# Patient Record
Sex: MALE | Race: WHITE | NOT HISPANIC OR LATINO | Employment: OTHER | URBAN - METROPOLITAN AREA
[De-identification: names, ages, dates, MRNs, and addresses within clinical notes are randomized per-mention and may not be internally consistent; named-entity substitution may affect disease eponyms.]

---

## 2017-02-24 ENCOUNTER — GENERIC CONVERSION - ENCOUNTER (OUTPATIENT)
Dept: OTHER | Facility: OTHER | Age: 72
End: 2017-02-24

## 2017-03-08 ENCOUNTER — GENERIC CONVERSION - ENCOUNTER (OUTPATIENT)
Dept: OTHER | Facility: OTHER | Age: 72
End: 2017-03-08

## 2017-03-29 ENCOUNTER — LAB CONVERSION - ENCOUNTER (OUTPATIENT)
Dept: OTHER | Facility: OTHER | Age: 72
End: 2017-03-29

## 2017-03-29 LAB
CHOLEST SERPL-MCNC: 166 MG/DL (ref 125–200)
CHOLEST/HDLC SERPL: 2.2 (CALC)
HBA1C MFR BLD HPLC: 6.2 % OF TOTAL HGB
HDLC SERPL-MCNC: 75 MG/DL
LDL CHOLESTEROL (HISTORICAL): 67 MG/DL (CALC)
NON-HDL-CHOL (CHOL-HDL) (HISTORICAL): 91 MG/DL (CALC)
TRIGL SERPL-MCNC: 121 MG/DL
TSH SERPL DL<=0.05 MIU/L-ACNC: 1.41 MIU/L (ref 0.4–4.5)

## 2017-04-13 ENCOUNTER — GENERIC CONVERSION - ENCOUNTER (OUTPATIENT)
Dept: OTHER | Facility: OTHER | Age: 72
End: 2017-04-13

## 2017-04-18 ENCOUNTER — ALLSCRIPTS OFFICE VISIT (OUTPATIENT)
Dept: OTHER | Facility: OTHER | Age: 72
End: 2017-04-18

## 2017-05-24 ENCOUNTER — GENERIC CONVERSION - ENCOUNTER (OUTPATIENT)
Dept: OTHER | Facility: OTHER | Age: 72
End: 2017-05-24

## 2017-09-14 ENCOUNTER — GENERIC CONVERSION - ENCOUNTER (OUTPATIENT)
Dept: OTHER | Facility: OTHER | Age: 72
End: 2017-09-14

## 2017-09-20 ENCOUNTER — LAB CONVERSION - ENCOUNTER (OUTPATIENT)
Dept: OTHER | Facility: OTHER | Age: 72
End: 2017-09-20

## 2017-09-20 LAB
25(OH)D3 SERPL-MCNC: 16 NG/ML (ref 30–100)
A/G RATIO (HISTORICAL): 1.3 (CALC) (ref 1–2.5)
ALBUMIN SERPL BCP-MCNC: 3.8 G/DL (ref 3.6–5.1)
ALP SERPL-CCNC: 74 U/L (ref 40–115)
ALT SERPL W P-5'-P-CCNC: 16 U/L (ref 9–46)
AST SERPL W P-5'-P-CCNC: 13 U/L (ref 10–35)
BASOPHILS # BLD AUTO: 0.5 %
BASOPHILS # BLD AUTO: 29 CELLS/UL (ref 0–200)
BILIRUB SERPL-MCNC: 0.4 MG/DL (ref 0.2–1.2)
BUN SERPL-MCNC: 16 MG/DL (ref 7–25)
BUN/CREA RATIO (HISTORICAL): ABNORMAL (CALC) (ref 6–22)
CALCIUM SERPL-MCNC: 9.1 MG/DL (ref 8.6–10.3)
CHLORIDE SERPL-SCNC: 105 MMOL/L (ref 98–110)
CHOLEST SERPL-MCNC: 145 MG/DL
CHOLEST/HDLC SERPL: 2.2 (CALC)
CO2 SERPL-SCNC: 28 MMOL/L (ref 20–31)
CREAT SERPL-MCNC: 1.16 MG/DL (ref 0.7–1.18)
DEPRECATED RDW RBC AUTO: 11.9 % (ref 11–15)
EGFR AFRICAN AMERICAN (HISTORICAL): 72 ML/MIN/1.73M2
EGFR-AMERICAN CALC (HISTORICAL): 62 ML/MIN/1.73M2
EOSINOPHIL # BLD AUTO: 319 CELLS/UL (ref 15–500)
EOSINOPHIL # BLD AUTO: 5.5 %
GAMMA GLOBULIN (HISTORICAL): 2.9 G/DL (CALC) (ref 1.9–3.7)
GLUCOSE (HISTORICAL): 110 MG/DL (ref 65–99)
HBA1C MFR BLD HPLC: 5.9 % OF TOTAL HGB
HCT VFR BLD AUTO: 41.6 % (ref 38.5–50)
HDLC SERPL-MCNC: 67 MG/DL
HGB BLD-MCNC: 13.7 G/DL (ref 13.2–17.1)
LDL CHOLESTEROL (HISTORICAL): 57 MG/DL (CALC)
LYMPHOCYTES # BLD AUTO: 12.9 %
LYMPHOCYTES # BLD AUTO: 748 CELLS/UL (ref 850–3900)
MCH RBC QN AUTO: 33.3 PG (ref 27–33)
MCHC RBC AUTO-ENTMCNC: 32.9 G/DL (ref 32–36)
MCV RBC AUTO: 101 FL (ref 80–100)
MONOCYTES # BLD AUTO: 876 CELLS/UL (ref 200–950)
MONOCYTES (HISTORICAL): 15.1 %
NEUTROPHILS # BLD AUTO: 3828 CELLS/UL (ref 1500–7800)
NEUTROPHILS # BLD AUTO: 66 %
NON-HDL-CHOL (CHOL-HDL) (HISTORICAL): 78 MG/DL (CALC)
PLATELET # BLD AUTO: 254 THOUSAND/UL (ref 140–400)
PMV BLD AUTO: 9.4 FL (ref 7.5–12.5)
POTASSIUM SERPL-SCNC: 4.5 MMOL/L (ref 3.5–5.3)
RBC # BLD AUTO: 4.12 MILLION/UL (ref 4.2–5.8)
SODIUM SERPL-SCNC: 140 MMOL/L (ref 135–146)
TOTAL PROTEIN (HISTORICAL): 6.7 G/DL (ref 6.1–8.1)
TRIGL SERPL-MCNC: 119 MG/DL
TSH SERPL DL<=0.05 MIU/L-ACNC: 1 MIU/L (ref 0.4–4.5)
WBC # BLD AUTO: 5.8 THOUSAND/UL (ref 3.8–10.8)

## 2017-10-05 ENCOUNTER — ALLSCRIPTS OFFICE VISIT (OUTPATIENT)
Dept: OTHER | Facility: OTHER | Age: 72
End: 2017-10-05

## 2017-10-18 DIAGNOSIS — E78.5 HYPERLIPIDEMIA: ICD-10-CM

## 2017-10-18 DIAGNOSIS — I63.9 CEREBRAL INFARCTION (HCC): ICD-10-CM

## 2017-10-18 DIAGNOSIS — R73.9 HYPERGLYCEMIA: ICD-10-CM

## 2017-10-18 DIAGNOSIS — I10 ESSENTIAL (PRIMARY) HYPERTENSION: ICD-10-CM

## 2017-10-31 ENCOUNTER — GENERIC CONVERSION - ENCOUNTER (OUTPATIENT)
Dept: OTHER | Facility: OTHER | Age: 72
End: 2017-10-31

## 2018-01-11 NOTE — RESULT NOTES
Message   Recorded as Task   Date: 10/13/2016 09:57 AM, Created By: Rachael Jones   Task Name: Follow Up   Assigned To: Felisha Womack   Regarding Patient: Emily Harrison, Status: Active   CommentAlexia Pilling - 13 Oct 2016 9:57 AM     TASK CREATED  Patient saw Lina Watson at the beginning of the month  Labs reviewed  Looks good  HGB A1C is 6 1  Continue current regimen      Thank you,  Philly Winslow - 13 Oct 2016 4:57 PM     TASK EDITED                 [facility] informing pt of BW results and instructions     Signatures   Electronically signed by : Faisal Ybarra, ; Oct 13 2016  4:57PM EST                       (Author)

## 2018-01-13 VITALS
DIASTOLIC BLOOD PRESSURE: 78 MMHG | HEART RATE: 80 BPM | WEIGHT: 170.25 LBS | BODY MASS INDEX: 26.72 KG/M2 | HEIGHT: 67 IN | RESPIRATION RATE: 16 BRPM | SYSTOLIC BLOOD PRESSURE: 108 MMHG

## 2018-01-14 VITALS
SYSTOLIC BLOOD PRESSURE: 132 MMHG | BODY MASS INDEX: 24.98 KG/M2 | WEIGHT: 159.13 LBS | DIASTOLIC BLOOD PRESSURE: 74 MMHG | HEIGHT: 67 IN | HEART RATE: 76 BPM

## 2018-03-21 LAB
25(OH)D3 SERPL-MCNC: 61 NG/ML (ref 30–100)
ALBUMIN SERPL-MCNC: 4.1 G/DL (ref 3.6–5.1)
ALBUMIN/GLOB SERPL: 1.4 (CALC) (ref 1–2.5)
ALP SERPL-CCNC: 58 U/L (ref 40–115)
ALT SERPL-CCNC: 17 U/L (ref 9–46)
AST SERPL-CCNC: 14 U/L (ref 10–35)
BASOPHILS # BLD AUTO: 40 CELLS/UL (ref 0–200)
BASOPHILS NFR BLD AUTO: 0.6 %
BILIRUB SERPL-MCNC: 0.4 MG/DL (ref 0.2–1.2)
BUN SERPL-MCNC: 22 MG/DL (ref 7–25)
BUN/CREAT SERPL: ABNORMAL (CALC) (ref 6–22)
CALCIUM SERPL-MCNC: 9.4 MG/DL (ref 8.6–10.3)
CHLORIDE SERPL-SCNC: 104 MMOL/L (ref 98–110)
CHOLEST SERPL-MCNC: 153 MG/DL
CHOLEST/HDLC SERPL: 1.9 (CALC)
CO2 SERPL-SCNC: 29 MMOL/L (ref 20–31)
CREAT SERPL-MCNC: 1.14 MG/DL (ref 0.7–1.18)
EOSINOPHIL # BLD AUTO: 475 CELLS/UL (ref 15–500)
EOSINOPHIL NFR BLD AUTO: 7.2 %
ERYTHROCYTE [DISTWIDTH] IN BLOOD BY AUTOMATED COUNT: 11.8 % (ref 11–15)
GLOBULIN SER CALC-MCNC: 2.9 G/DL (CALC) (ref 1.9–3.7)
GLUCOSE SERPL-MCNC: 105 MG/DL (ref 65–99)
HBA1C MFR BLD: 5.7 % OF TOTAL HGB
HCT VFR BLD AUTO: 40.7 % (ref 38.5–50)
HDLC SERPL-MCNC: 81 MG/DL
HGB BLD-MCNC: 13.8 G/DL (ref 13.2–17.1)
LDLC SERPL CALC-MCNC: 55 MG/DL (CALC)
LYMPHOCYTES # BLD AUTO: 957 CELLS/UL (ref 850–3900)
LYMPHOCYTES NFR BLD AUTO: 14.5 %
MCH RBC QN AUTO: 34.1 PG (ref 27–33)
MCHC RBC AUTO-ENTMCNC: 33.9 G/DL (ref 32–36)
MCV RBC AUTO: 100.5 FL (ref 80–100)
MONOCYTES # BLD AUTO: 845 CELLS/UL (ref 200–950)
MONOCYTES NFR BLD AUTO: 12.8 %
NEUTROPHILS # BLD AUTO: 4283 CELLS/UL (ref 1500–7800)
NEUTROPHILS NFR BLD AUTO: 64.9 %
NONHDLC SERPL-MCNC: 72 MG/DL (CALC)
PLATELET # BLD AUTO: 218 THOUSAND/UL (ref 140–400)
PMV BLD REES-ECKER: 9.6 FL (ref 7.5–12.5)
POTASSIUM SERPL-SCNC: 4.6 MMOL/L (ref 3.5–5.3)
PROT SERPL-MCNC: 7 G/DL (ref 6.1–8.1)
PSA SERPL-MCNC: 2.1 NG/ML
RBC # BLD AUTO: 4.05 MILLION/UL (ref 4.2–5.8)
SL AMB EGFR AFRICAN AMERICAN: 74 ML/MIN/1.73M2
SL AMB EGFR NON AFRICAN AMERICAN: 63 ML/MIN/1.73M2
SODIUM SERPL-SCNC: 141 MMOL/L (ref 135–146)
TRIGL SERPL-MCNC: 87 MG/DL
TSH SERPL-ACNC: 1.15 MIU/L (ref 0.4–4.5)
WBC # BLD AUTO: 6.6 THOUSAND/UL (ref 3.8–10.8)

## 2018-04-05 DIAGNOSIS — I63.9 CEREBRAL INFARCTION (HCC): ICD-10-CM

## 2018-04-05 DIAGNOSIS — E55.9 VITAMIN D DEFICIENCY: ICD-10-CM

## 2018-04-05 DIAGNOSIS — Z12.5 ENCOUNTER FOR SCREENING FOR MALIGNANT NEOPLASM OF PROSTATE: ICD-10-CM

## 2018-04-05 DIAGNOSIS — I10 ESSENTIAL (PRIMARY) HYPERTENSION: ICD-10-CM

## 2018-04-05 DIAGNOSIS — R73.9 HYPERGLYCEMIA: ICD-10-CM

## 2018-04-05 DIAGNOSIS — E78.5 HYPERLIPIDEMIA: ICD-10-CM

## 2018-04-16 PROBLEM — E55.9 VITAMIN D DEFICIENCY: Status: ACTIVE | Noted: 2017-10-05

## 2018-04-20 ENCOUNTER — OFFICE VISIT (OUTPATIENT)
Dept: FAMILY MEDICINE CLINIC | Facility: CLINIC | Age: 73
End: 2018-04-20
Payer: COMMERCIAL

## 2018-04-20 VITALS
HEART RATE: 80 BPM | SYSTOLIC BLOOD PRESSURE: 120 MMHG | BODY MASS INDEX: 26.26 KG/M2 | WEIGHT: 165.2 LBS | DIASTOLIC BLOOD PRESSURE: 70 MMHG

## 2018-04-20 DIAGNOSIS — I65.29 STENOSIS OF CAROTID ARTERY, UNSPECIFIED LATERALITY: ICD-10-CM

## 2018-04-20 DIAGNOSIS — E55.9 VITAMIN D DEFICIENCY: ICD-10-CM

## 2018-04-20 DIAGNOSIS — Z12.5 SCREENING FOR PROSTATE CANCER: ICD-10-CM

## 2018-04-20 DIAGNOSIS — E78.2 MIXED HYPERLIPIDEMIA: Primary | ICD-10-CM

## 2018-04-20 DIAGNOSIS — I63.9 CEREBRAL INFARCTION, UNSPECIFIED MECHANISM (HCC): ICD-10-CM

## 2018-04-20 DIAGNOSIS — R73.9 HYPERGLYCEMIA: ICD-10-CM

## 2018-04-20 DIAGNOSIS — I10 ESSENTIAL HYPERTENSION: ICD-10-CM

## 2018-04-20 PROCEDURE — 99214 OFFICE O/P EST MOD 30 MIN: CPT | Performed by: PHYSICIAN ASSISTANT

## 2018-04-20 PROCEDURE — 3725F SCREEN DEPRESSION PERFORMED: CPT | Performed by: PHYSICIAN ASSISTANT

## 2018-04-20 PROCEDURE — 1101F PT FALLS ASSESS-DOCD LE1/YR: CPT | Performed by: PHYSICIAN ASSISTANT

## 2018-04-20 RX ORDER — ASPIRIN 325 MG
325 TABLET ORAL DAILY
COMMUNITY
End: 2021-12-31 | Stop reason: HOSPADM

## 2018-04-20 RX ORDER — ATORVASTATIN CALCIUM 40 MG/1
TABLET, FILM COATED ORAL
Refills: 0 | COMMUNITY
Start: 2018-02-21 | End: 2018-11-20 | Stop reason: SDUPTHER

## 2018-04-20 RX ORDER — FOLIC ACID 1 MG/1
TABLET ORAL DAILY
COMMUNITY

## 2018-04-20 RX ORDER — MULTIVIT WITH MINERALS/LUTEIN
1000 TABLET ORAL DAILY
COMMUNITY

## 2018-04-20 RX ORDER — CHLORAL HYDRATE 500 MG
1000 CAPSULE ORAL DAILY
COMMUNITY
End: 2022-06-02 | Stop reason: ALTCHOICE

## 2018-04-20 RX ORDER — AMLODIPINE BESYLATE 5 MG/1
1 TABLET ORAL DAILY
COMMUNITY
Start: 2011-11-28 | End: 2018-10-17 | Stop reason: SDUPTHER

## 2018-04-20 RX ORDER — DIPHENOXYLATE HYDROCHLORIDE AND ATROPINE SULFATE 2.5; .025 MG/1; MG/1
1 TABLET ORAL DAILY
COMMUNITY
End: 2022-06-02 | Stop reason: ALTCHOICE

## 2018-04-20 RX ORDER — BENAZEPRIL HYDROCHLORIDE 40 MG/1
TABLET, FILM COATED ORAL
Refills: 0 | COMMUNITY
Start: 2018-01-22 | End: 2018-10-17 | Stop reason: SDUPTHER

## 2018-04-20 RX ORDER — METOPROLOL SUCCINATE 100 MG/1
1 TABLET, EXTENDED RELEASE ORAL DAILY
COMMUNITY
Start: 2011-11-28 | End: 2018-10-17 | Stop reason: SDUPTHER

## 2018-04-20 RX ORDER — UBIDECARENONE 75 MG
CAPSULE ORAL DAILY
COMMUNITY

## 2018-04-20 RX ORDER — MAG HYDROX/ALUMINUM HYD/SIMETH 400-400-40
1000 SUSPENSION, ORAL (FINAL DOSE FORM) ORAL DAILY
COMMUNITY

## 2018-04-20 NOTE — PROGRESS NOTES
Assessment/Plan:  Patient Instructions   1  CVA-patient is presently stable on aspirin with blood pressure control and statin therapy  Repeat carotid ultrasound will be due in 6 months  2   Hypertension-stable on metoprolol, amlodipine, and benazepril  3   Hyperlipidemia-stable on Lipitor 40 milligrams daily  Cholesterol numbers were reviewed in detail with the patient  He continues to exercise 3 times per week  4   Vitamin-D deficiency-stable with vitamin-D supplementation  Level was 61  No problem-specific Assessment & Plan notes found for this encounter  Diagnoses and all orders for this visit:    Mixed hyperlipidemia  -     CBC and differential; Future  -     Comprehensive metabolic panel; Future  -     HEMOGLOBIN A1C W/ EAG ESTIMATION; Future  -     Lipid Panel with Direct LDL reflex; Future  -     TSH, 3rd generation; Future  -     Vitamin D 25 hydroxy; Future  -     Cancel: PSA, Total Screen; Future    Hyperglycemia  -     CBC and differential; Future  -     Comprehensive metabolic panel; Future  -     HEMOGLOBIN A1C W/ EAG ESTIMATION; Future  -     Lipid Panel with Direct LDL reflex; Future  -     TSH, 3rd generation; Future  -     Vitamin D 25 hydroxy; Future  -     Cancel: PSA, Total Screen; Future    Cerebral infarction, unspecified mechanism (HCC)  -     CBC and differential; Future  -     Comprehensive metabolic panel; Future  -     HEMOGLOBIN A1C W/ EAG ESTIMATION; Future  -     Lipid Panel with Direct LDL reflex; Future  -     TSH, 3rd generation; Future  -     Vitamin D 25 hydroxy; Future  -     Cancel: PSA, Total Screen; Future  -     VAS carotid complete study; Future    Vitamin D deficiency  -     CBC and differential; Future  -     Comprehensive metabolic panel; Future  -     HEMOGLOBIN A1C W/ EAG ESTIMATION; Future  -     Lipid Panel with Direct LDL reflex; Future  -     TSH, 3rd generation; Future  -     Vitamin D 25 hydroxy; Future  -     Cancel: PSA, Total Screen;  Future  - Vitamin D 25 hydroxy    Essential hypertension  -     CBC and differential; Future  -     Comprehensive metabolic panel; Future  -     HEMOGLOBIN A1C W/ EAG ESTIMATION; Future  -     Lipid Panel with Direct LDL reflex; Future  -     TSH, 3rd generation; Future  -     Vitamin D 25 hydroxy; Future  -     Cancel: PSA, Total Screen; Future    Stenosis of carotid artery, unspecified laterality  -     CBC and differential; Future  -     Comprehensive metabolic panel; Future  -     HEMOGLOBIN A1C W/ EAG ESTIMATION; Future  -     Lipid Panel with Direct LDL reflex; Future  -     TSH, 3rd generation; Future  -     Vitamin D 25 hydroxy; Future  -     Cancel: PSA, Total Screen; Future    Screening for prostate cancer  -     PSA, Total Screen    Other orders  -     aspirin 325 mg tablet; Take 325 mg by mouth daily  -     Omega-3 Fatty Acids (FISH OIL) 1,000 mg; Take 1,000 mg by mouth daily  -     folic acid (FOLVITE) 1 mg tablet; Take by mouth daily  -     multivitamin (THERAGRAN) TABS; Take 1 tablet by mouth daily  -     cyanocobalamin (VITAMIN B-12) 100 mcg tablet; Take by mouth daily  -     Ascorbic Acid (VITAMIN C) 1000 MG tablet; Take 1,000 mg by mouth daily  -     vitamin E 100 UNIT capsule; Take 100 Units by mouth daily  -     Cholecalciferol (VITAMIN D3) 5000 units CAPS; Take 1,000 Units by mouth daily          Subjective:     CC: Follow up to review blood work  Carmelia Fleischer     Patient ID: Ana Parks is a 75 y o  male  HPI:  This is a 71-year-old gentleman that presents to the office for follow-up of chronic health conditions  He has had a history of CVA with some mild expressive dysphagia residually  He had followed with Neurology for quite some time  He also continues to get periodic carotid ultrasounds and is due in the next 6 months to get a repeat  He does have a history of hypertension which has now been controlled with benazepril, amlodipine, and metoprolol    His cholesterol has been stable on statin therapy  He has been going to the gym and exercising regularly 3 times per week  He denies any problems with chest pains or shortness of breath  He has not had any bowel or bladder issues and denies lightheadedness or dizziness  The following portions of the patient's history were reviewed and updated as appropriate: allergies, current medications, past family history, past medical history, past social history, past surgical history and problem list     Review of Systems   Constitutional: Negative for chills, fatigue and fever  HENT: Negative for congestion, ear pain and sinus pressure  Eyes: Negative for visual disturbance  Respiratory: Negative for cough, chest tightness and shortness of breath  Cardiovascular: Negative for chest pain and palpitations  Gastrointestinal: Negative for diarrhea, nausea and vomiting  Endocrine: Negative for polyuria  Genitourinary: Negative for dysuria and frequency  Musculoskeletal: Negative for arthralgias and myalgias  Skin: Negative for pallor and rash  Neurological: Negative for dizziness, weakness, light-headedness, numbness and headaches  Psychiatric/Behavioral: Negative for agitation, behavioral problems and sleep disturbance  All other systems reviewed and are negative  Objective:      Vitals:    04/20/18 1436   BP: 120/70   BP Location: Left arm   Patient Position: Sitting   Pulse: 80   Weight: 74 9 kg (165 lb 3 2 oz)            Physical Exam   Constitutional: He is oriented to person, place, and time  He appears well-developed and well-nourished  No distress  HENT:   Head: Normocephalic and atraumatic  Right Ear: External ear normal    Left Ear: External ear normal    Nose: Nose normal    Mouth/Throat: Oropharynx is clear and moist  No oropharyngeal exudate  Eyes: Conjunctivae and EOM are normal  Pupils are equal, round, and reactive to light  Neck: Normal range of motion  Neck supple  No tracheal deviation present   No thyromegaly present  Cardiovascular: Normal rate, regular rhythm and normal heart sounds  Exam reveals no friction rub  No murmur heard  Pulmonary/Chest: Effort normal and breath sounds normal  No respiratory distress  He has no wheezes  He has no rales  Abdominal: Soft  Bowel sounds are normal  He exhibits no distension  There is no tenderness  There is no rebound and no guarding  Musculoskeletal: Normal range of motion  He exhibits no edema or tenderness  Lymphadenopathy:     He has no cervical adenopathy  Neurological: He is alert and oriented to person, place, and time  No cranial nerve deficit  Coordination normal    Skin: Skin is warm and dry  No rash noted  No erythema  Psychiatric: He has a normal mood and affect  His behavior is normal  Thought content normal    Nursing note and vitals reviewed

## 2018-04-20 NOTE — PATIENT INSTRUCTIONS
1  CVA-patient is presently stable on aspirin with blood pressure control and statin therapy  Repeat carotid ultrasound will be due in 6 months  2   Hypertension-stable on metoprolol, amlodipine, and benazepril  3   Hyperlipidemia-stable on Lipitor 40 milligrams daily  Cholesterol numbers were reviewed in detail with the patient  He continues to exercise 3 times per week  4   Vitamin-D deficiency-stable with vitamin-D supplementation  Level was 61

## 2018-10-17 DIAGNOSIS — I10 BENIGN ESSENTIAL HYPERTENSION: Primary | ICD-10-CM

## 2018-10-17 LAB
25(OH)D3 SERPL-MCNC: 55 NG/ML (ref 30–100)
ALBUMIN SERPL-MCNC: 4 G/DL (ref 3.6–5.1)
ALBUMIN/GLOB SERPL: 1.4 (CALC) (ref 1–2.5)
ALP SERPL-CCNC: 62 U/L (ref 40–115)
ALT SERPL-CCNC: 20 U/L (ref 9–46)
AST SERPL-CCNC: 17 U/L (ref 10–35)
BASOPHILS # BLD AUTO: 43 CELLS/UL (ref 0–200)
BASOPHILS NFR BLD AUTO: 0.7 %
BILIRUB SERPL-MCNC: 0.4 MG/DL (ref 0.2–1.2)
BUN SERPL-MCNC: 19 MG/DL (ref 7–25)
BUN/CREAT SERPL: ABNORMAL (CALC) (ref 6–22)
CALCIUM SERPL-MCNC: 9.4 MG/DL (ref 8.6–10.3)
CHLORIDE SERPL-SCNC: 107 MMOL/L (ref 98–110)
CHOLEST SERPL-MCNC: 147 MG/DL
CHOLEST/HDLC SERPL: 1.8 (CALC)
CO2 SERPL-SCNC: 28 MMOL/L (ref 20–32)
CREAT SERPL-MCNC: 1.1 MG/DL (ref 0.7–1.18)
EOSINOPHIL # BLD AUTO: 470 CELLS/UL (ref 15–500)
EOSINOPHIL NFR BLD AUTO: 7.7 %
ERYTHROCYTE [DISTWIDTH] IN BLOOD BY AUTOMATED COUNT: 11.8 % (ref 11–15)
EST. AVERAGE GLUCOSE BLD GHB EST-MCNC: 117 (CALC)
EST. AVERAGE GLUCOSE BLD GHB EST-SCNC: 6.5 (CALC)
GLOBULIN SER CALC-MCNC: 2.8 G/DL (CALC) (ref 1.9–3.7)
GLUCOSE SERPL-MCNC: 102 MG/DL (ref 65–99)
HBA1C MFR BLD: 5.7 % OF TOTAL HGB
HCT VFR BLD AUTO: 41.6 % (ref 38.5–50)
HDLC SERPL-MCNC: 82 MG/DL
HGB BLD-MCNC: 13.8 G/DL (ref 13.2–17.1)
LDLC SERPL CALC-MCNC: 51 MG/DL (CALC)
LYMPHOCYTES # BLD AUTO: 903 CELLS/UL (ref 850–3900)
LYMPHOCYTES NFR BLD AUTO: 14.8 %
MCH RBC QN AUTO: 33.7 PG (ref 27–33)
MCHC RBC AUTO-ENTMCNC: 33.2 G/DL (ref 32–36)
MCV RBC AUTO: 101.5 FL (ref 80–100)
MONOCYTES # BLD AUTO: 769 CELLS/UL (ref 200–950)
MONOCYTES NFR BLD AUTO: 12.6 %
NEUTROPHILS # BLD AUTO: 3916 CELLS/UL (ref 1500–7800)
NEUTROPHILS NFR BLD AUTO: 64.2 %
NONHDLC SERPL-MCNC: 65 MG/DL (CALC)
PLATELET # BLD AUTO: 215 THOUSAND/UL (ref 140–400)
PMV BLD REES-ECKER: 9.8 FL (ref 7.5–12.5)
POTASSIUM SERPL-SCNC: 4.8 MMOL/L (ref 3.5–5.3)
PROT SERPL-MCNC: 6.8 G/DL (ref 6.1–8.1)
PSA SERPL-MCNC: 2.3 NG/ML
RBC # BLD AUTO: 4.1 MILLION/UL (ref 4.2–5.8)
SL AMB EGFR AFRICAN AMERICAN: 76 ML/MIN/1.73M2
SL AMB EGFR NON AFRICAN AMERICAN: 66 ML/MIN/1.73M2
SODIUM SERPL-SCNC: 142 MMOL/L (ref 135–146)
TRIGL SERPL-MCNC: 60 MG/DL
TSH SERPL-ACNC: 1.3 MIU/L (ref 0.4–4.5)
WBC # BLD AUTO: 6.1 THOUSAND/UL (ref 3.8–10.8)

## 2018-10-17 RX ORDER — METOPROLOL SUCCINATE 100 MG/1
TABLET, EXTENDED RELEASE ORAL
Qty: 90 TABLET | Refills: 3 | Status: SHIPPED | OUTPATIENT
Start: 2018-10-17 | End: 2019-10-15 | Stop reason: SDUPTHER

## 2018-10-17 RX ORDER — AMLODIPINE BESYLATE 5 MG/1
TABLET ORAL
Qty: 90 TABLET | Refills: 3 | Status: SHIPPED | OUTPATIENT
Start: 2018-10-17 | End: 2019-10-15 | Stop reason: SDUPTHER

## 2018-10-17 RX ORDER — BENAZEPRIL HYDROCHLORIDE 40 MG/1
TABLET, FILM COATED ORAL
Qty: 90 TABLET | Refills: 3 | Status: SHIPPED | OUTPATIENT
Start: 2018-10-17 | End: 2019-10-15 | Stop reason: SDUPTHER

## 2018-10-22 ENCOUNTER — OFFICE VISIT (OUTPATIENT)
Dept: FAMILY MEDICINE CLINIC | Facility: CLINIC | Age: 73
End: 2018-10-22
Payer: COMMERCIAL

## 2018-10-22 VITALS
HEART RATE: 72 BPM | DIASTOLIC BLOOD PRESSURE: 80 MMHG | BODY MASS INDEX: 25.27 KG/M2 | WEIGHT: 161 LBS | HEIGHT: 67 IN | SYSTOLIC BLOOD PRESSURE: 130 MMHG

## 2018-10-22 DIAGNOSIS — I10 ESSENTIAL HYPERTENSION: ICD-10-CM

## 2018-10-22 DIAGNOSIS — E55.9 VITAMIN D DEFICIENCY: ICD-10-CM

## 2018-10-22 DIAGNOSIS — J06.9 VIRAL UPPER RESPIRATORY TRACT INFECTION: ICD-10-CM

## 2018-10-22 DIAGNOSIS — R73.9 HYPERGLYCEMIA: ICD-10-CM

## 2018-10-22 DIAGNOSIS — I63.9 CEREBRAL INFARCTION, UNSPECIFIED MECHANISM (HCC): ICD-10-CM

## 2018-10-22 DIAGNOSIS — E78.2 MIXED HYPERLIPIDEMIA: Primary | ICD-10-CM

## 2018-10-22 PROCEDURE — 1170F FXNL STATUS ASSESSED: CPT | Performed by: PHYSICIAN ASSISTANT

## 2018-10-22 PROCEDURE — 99214 OFFICE O/P EST MOD 30 MIN: CPT | Performed by: PHYSICIAN ASSISTANT

## 2018-10-22 PROCEDURE — 1125F AMNT PAIN NOTED PAIN PRSNT: CPT | Performed by: PHYSICIAN ASSISTANT

## 2018-10-22 PROCEDURE — G0439 PPPS, SUBSEQ VISIT: HCPCS | Performed by: PHYSICIAN ASSISTANT

## 2018-10-22 PROCEDURE — 3008F BODY MASS INDEX DOCD: CPT | Performed by: PHYSICIAN ASSISTANT

## 2018-10-22 PROCEDURE — 1036F TOBACCO NON-USER: CPT | Performed by: PHYSICIAN ASSISTANT

## 2018-10-22 PROCEDURE — 1160F RVW MEDS BY RX/DR IN RCRD: CPT | Performed by: PHYSICIAN ASSISTANT

## 2018-10-22 NOTE — PROGRESS NOTES
Assessment/Plan:  Patient Instructions   1  Hyperlipidemia-presently stable with atorvastatin 40 mg daily  Cholesterol numbers were reviewed in detail with the patient  2   Benign essential hypertension-presently stable on benazepril, amlodipine, and metoprolol  3   Hyperglycemia-stable with hemoglobin A1c of 5 7  Continue to monitor periodically  4   Vitamin-D deficiency-stable at 55   5   History of cerebral infarction-presently stable, no medication changes  Continue aspirin 325 mg daily  6   Upper respiratory infection-this is likely viral and patient reports significant improvement  If symptoms persist or worsen later this week would consider antibiotic therapy  Patient verbalizes understanding and agreement with plan  7   Health maintenance-Medicare wellness visit filled out today  No problem-specific Assessment & Plan notes found for this encounter  Diagnoses and all orders for this visit:    Mixed hyperlipidemia  -     CBC and differential; Future  -     Comprehensive metabolic panel; Future  -     Hemoglobin A1C; Future  -     Lipid Panel with Direct LDL reflex; Future  -     TSH, 3rd generation; Future  -     Vitamin D 25 hydroxy; Future    Essential hypertension  -     CBC and differential; Future  -     Comprehensive metabolic panel; Future  -     Hemoglobin A1C; Future  -     Lipid Panel with Direct LDL reflex; Future  -     TSH, 3rd generation; Future  -     Vitamin D 25 hydroxy; Future    Cerebral infarction, unspecified mechanism (HCC)  -     CBC and differential; Future  -     Comprehensive metabolic panel; Future  -     Hemoglobin A1C; Future  -     Lipid Panel with Direct LDL reflex; Future  -     TSH, 3rd generation; Future  -     Vitamin D 25 hydroxy; Future    Vitamin D deficiency  -     CBC and differential; Future  -     Comprehensive metabolic panel; Future  -     Hemoglobin A1C; Future  -     Lipid Panel with Direct LDL reflex;  Future  -     TSH, 3rd generation; Future  -     Vitamin D 25 hydroxy; Future    Hyperglycemia  -     CBC and differential; Future  -     Comprehensive metabolic panel; Future  -     Hemoglobin A1C; Future  -     Lipid Panel with Direct LDL reflex; Future  -     TSH, 3rd generation; Future  -     Vitamin D 25 hydroxy; Future    Viral upper respiratory tract infection          Subjective: pt is here for follow up of hyperlipidemia and to review blood work~cd     Patient ID: Yang Hartmann is a 75 y o  male  HPI:  This is a 65-year-old gentleman that presents to the office for follow-up of chronic health conditions including hypertension, increased lipids, and history of CVA  He has been feeling well without any acute complaints  He does mention that he is recently getting over a cold however but this seems to be improving on its own and is much better than it has been  He is not having any fevers or chills  Patient does continue control of his lipids with atorvastatin  His blood pressure has been well controlled with benazepril, metoprolol, and amlodipine  Patient is also here for annual Medicare wellness visit  The following portions of the patient's history were reviewed and updated as appropriate: allergies, current medications, past family history, past medical history, past social history, past surgical history and problem list     Review of Systems   Constitutional: Negative for chills, fatigue and fever  HENT: Negative for congestion, ear pain and sinus pressure  Eyes: Negative for visual disturbance  Respiratory: Negative for cough, chest tightness and shortness of breath  Cardiovascular: Negative for chest pain and palpitations  Gastrointestinal: Negative for diarrhea, nausea and vomiting  Endocrine: Negative for polyuria  Genitourinary: Negative for dysuria and frequency  Musculoskeletal: Negative for arthralgias and myalgias  Skin: Negative for pallor and rash     Neurological: Negative for dizziness, weakness, light-headedness, numbness and headaches  Psychiatric/Behavioral: Negative for agitation, behavioral problems and sleep disturbance  All other systems reviewed and are negative  Objective:  Vitals:    10/22/18 1323   BP: 130/80   BP Location: Left arm   Patient Position: Sitting   Cuff Size: Large   Pulse: 72   Weight: 73 kg (161 lb)   Height: 5' 7" (1 702 m)                Physical Exam   Constitutional: He is oriented to person, place, and time  He appears well-developed and well-nourished  No distress  HENT:   Head: Normocephalic and atraumatic  Right Ear: External ear normal    Left Ear: External ear normal    Nose: Nose normal    Mouth/Throat: Oropharynx is clear and moist  No oropharyngeal exudate  Eyes: Pupils are equal, round, and reactive to light  Conjunctivae and EOM are normal    Neck: Normal range of motion  Neck supple  No tracheal deviation present  No thyromegaly present  Cardiovascular: Normal rate, regular rhythm and normal heart sounds  Exam reveals no friction rub  No murmur heard  Pulmonary/Chest: Effort normal and breath sounds normal  No respiratory distress  He has no wheezes  He has no rales  Abdominal: Soft  Bowel sounds are normal  He exhibits no distension  There is no tenderness  There is no rebound and no guarding  Musculoskeletal: Normal range of motion  He exhibits no edema or tenderness  Lymphadenopathy:     He has no cervical adenopathy  Neurological: He is alert and oriented to person, place, and time  No cranial nerve deficit  Coordination normal    Skin: Skin is warm and dry  No rash noted  No erythema  Psychiatric: He has a normal mood and affect  His behavior is normal  Thought content normal    Nursing note and vitals reviewed

## 2018-10-22 NOTE — PATIENT INSTRUCTIONS
1   Hyperlipidemia-presently stable with atorvastatin 40 mg daily  Cholesterol numbers were reviewed in detail with the patient  2   Benign essential hypertension-presently stable on benazepril, amlodipine, and metoprolol  3   Hyperglycemia-stable with hemoglobin A1c of 5 7  Continue to monitor periodically  4   Vitamin-D deficiency-stable at 55   5   History of cerebral infarction-presently stable, no medication changes  Continue aspirin 325 mg daily  6   Upper respiratory infection-this is likely viral and patient reports significant improvement  If symptoms persist or worsen later this week would consider antibiotic therapy  Patient verbalizes understanding and agreement with plan  7   Health maintenance-Medicare wellness visit filled out today

## 2018-10-22 NOTE — PROGRESS NOTES
Assessment and Plan:    Problem List Items Addressed This Visit     Cerebral infarction [facility])    Relevant Orders    CBC and differential    Comprehensive metabolic panel    Hemoglobin A1C    Lipid Panel with Direct LDL reflex    TSH, 3rd generation    Vitamin D 25 hydroxy    Hyperlipidemia - Primary    Relevant Orders    CBC and differential    Comprehensive metabolic panel    Hemoglobin A1C    Lipid Panel with Direct LDL reflex    TSH, 3rd generation    Vitamin D 25 hydroxy    Hypertension    Relevant Orders    CBC and differential    Comprehensive metabolic panel    Hemoglobin A1C    Lipid Panel with Direct LDL reflex    TSH, 3rd generation    Vitamin D 25 hydroxy    Vitamin D deficiency    Relevant Orders    CBC and differential    Comprehensive metabolic panel    Hemoglobin A1C    Lipid Panel with Direct LDL reflex    TSH, 3rd generation    Vitamin D 25 hydroxy    Hyperglycemia    Relevant Orders    CBC and differential    Comprehensive metabolic panel    Hemoglobin A1C    Lipid Panel with Direct LDL reflex    TSH, 3rd generation    Vitamin D 25 hydroxy      Other Visit Diagnoses     Viral upper respiratory tract infection            Health Maintenance Due   Topic Date Due    [employer] PLAN OF CARE  1945         HPI:  Nithin Calvert is a 75 y o  male here for his Subsequent Wellness Visit  Patient Active Problem List   Diagnosis    Stenosis of carotid artery    Cerebral infarction ([address] )    Expressive aphasia    Hyperlipidemia    Hypertension    Vitamin D deficiency    Macular degeneration    Hyperglycemia    Actinic keratosis     No past medical history on file    Past Surgical History:   Procedure Laterality Date    HERNIA REPAIR       Family History   Problem Relation Age of Onset    Lung cancer Father      History   Smoking Status    Former Smoker   Smokeless Tobacco    Former User     Comment: No secondhand smoke exposure     History   Alcohol Use    Yes     Comment: Social drinker; Daily alcohol use per [employer]      History   Drug use: Unknown       Current Outpatient Prescriptions   Medication Sig Dispense Refill    amLODIPine (NORVASC) 5 mg tablet take 1 tablet by mouth once daily 90 tablet 3    Ascorbic Acid (VITAMIN C) 1000 MG tablet Take 1,000 mg by mouth daily      aspirin 325 mg tablet Take 325 mg by mouth daily      atorvastatin (LIPITOR) 40 mg tablet   0    benazepril (LOTENSIN) 40 MG tablet take 1 tablet by mouth once daily 90 tablet 3    Cholecalciferol (VITAMIN D3) 5000 units CAPS Take 1,000 Units by mouth daily      cyanocobalamin (VITAMIN B-12) 100 mcg tablet Take by mouth daily      folic acid (FOLVITE) 1 mg tablet Take by mouth daily      metoprolol succinate (TOPROL-XL) 100 mg 24 hr tablet take 1 tablet by mouth once daily 90 tablet 3    multivitamin (THERAGRAN) TABS Take 1 tablet by mouth daily      Omega-3 Fatty Acids (FISH OIL) 1,000 mg Take 1,000 mg by mouth daily      vitamin E 100 UNIT capsule Take 100 Units by mouth daily       No current facility-administered medications for this visit  No Known Allergies  There is no immunization history for the selected administration types on file for this patient  Patient Care Team:  Sherly Summers PA-C as PCP - General (Family Medicine)    Medicare Screening Tests and Risk Assessments:  Harhs Jimenez is here for his Subsequent Wellness visit  Last Medicare Wellness visit information reviewed, patient interviewed and updates made to the record today  Health Risk Assessment:  Patient rates overall health as excellent  Patient feels that their physical health rating is Same  Eyesight was rated as Same  Hearing was rated as Same  Patient feels that their emotional and mental health rating is Same  Pain experienced by patient in the last 7 days has been None  Patient states that he has experienced no weight loss or gain in last 6 months  Emotional/Mental Health:  Patient has been feeling nervous/anxious      PHQ-9 Depression Screening:    Frequency of the following problems over the past two weeks:      1  Little interest or pleasure in doing things: 0 - not at all      2  Feeling down, depressed, or hopeless: 0 - not at all  PHQ-2 Score: 0          Broken Bones/Falls: Fall Risk Assessment:    In the past year, patient has experienced: No history of falling in past year          Bladder/Bowel:  Patient has not leaked urine accidently in the last six months  Patient reports no loss of bowel control  Immunizations:  Patient has not had a flu vaccination within the last year  Patient has not received a pneumonia shot  Patient has not received a shingles shot  Patient has not received tetanus/diphtheria shot  Home Safety:  Patient does not have trouble with stairs inside or outside of their home  Patient currently reports that there are no safety hazards present in home, working smoke alarms, working carbon monoxide detectors  Preventative Screenings:   prostate cancer screen performed, no colon cancer screen completed, cholesterol screen completed, glaucoma eye exam completed,     Nutrition:  Current diet: Regular and Limited junk food with servings of the following:    Medications:  Patient is currently taking over-the-counter supplements  Patient is able to manage medications  Lifestyle Choices:  Patient reports no tobacco use  Patient has smoked or used tobacco in the past   Patient has stopped his tobacco use  Patient reports alcohol use  Alcohol use per week: 10  Patient drives a vehicle  Patient wears seat belt  Current level of exercise of physical activity described by patient as: goes to the gym 3x a week          Activities of Daily Living:  Can get out of bed by his or her self, able to dress self, able to make own meals, able to do own shopping, able to bathe self, can do own laundry/housekeeping, can manage own money, pay bills and track expenses    Previous Hospitalizations:  No hospitalization or ED visit in past 12 months    Social Support: pts friend tim    Preventative Screening/Counseling:      Cardiovascular:      General: Risks and Benefits Discussed          Diabetes:      General: Screening Current          Colorectal Cancer:      General: Screening Current          Prostate Cancer:      General: Screening Current          Osteoporosis:      General: Screening Current          AAA:      General: Screening Current          Glaucoma:      General: Screening Current          HIV:      General: Screening Not Indicated          Hepatitis C:      General: Screening Not Indicated        Advanced Directives:   Patient has living will for healthcare, has durable POA for healthcare, patient has an advanced directive  Information on ACP and/or AD provided  No 5 wishes given  End of life assessment reviewed with patient  Provider agrees with end of life decisions

## 2018-10-26 ENCOUNTER — HOSPITAL ENCOUNTER (OUTPATIENT)
Dept: NON INVASIVE DIAGNOSTICS | Facility: CLINIC | Age: 73
Discharge: HOME | End: 2018-10-26
Payer: COMMERCIAL

## 2018-10-26 DIAGNOSIS — I63.9 CEREBRAL INFARCTION, UNSPECIFIED MECHANISM (HCC): ICD-10-CM

## 2018-10-26 PROCEDURE — 93880 EXTRACRANIAL BILAT STUDY: CPT | Performed by: SURGERY

## 2018-10-26 PROCEDURE — 93880 EXTRACRANIAL BILAT STUDY: CPT

## 2018-11-20 DIAGNOSIS — E78.2 MIXED HYPERLIPIDEMIA: Primary | ICD-10-CM

## 2018-11-20 RX ORDER — ATORVASTATIN CALCIUM 40 MG/1
TABLET, FILM COATED ORAL
Qty: 90 TABLET | Refills: 3 | Status: SHIPPED | OUTPATIENT
Start: 2018-11-20 | End: 2019-11-12 | Stop reason: SDUPTHER

## 2019-04-03 LAB
25(OH)D3 SERPL-MCNC: 51 NG/ML (ref 30–100)
ALBUMIN SERPL-MCNC: 4.1 G/DL (ref 3.6–5.1)
ALBUMIN/GLOB SERPL: 1.4 (CALC) (ref 1–2.5)
ALP SERPL-CCNC: 60 U/L (ref 40–115)
ALT SERPL-CCNC: 16 U/L (ref 9–46)
AST SERPL-CCNC: 14 U/L (ref 10–35)
BASOPHILS # BLD AUTO: 40 CELLS/UL (ref 0–200)
BASOPHILS NFR BLD AUTO: 0.6 %
BILIRUB SERPL-MCNC: 0.4 MG/DL (ref 0.2–1.2)
BUN SERPL-MCNC: 21 MG/DL (ref 7–25)
BUN/CREAT SERPL: ABNORMAL (CALC) (ref 6–22)
CALCIUM SERPL-MCNC: 9.4 MG/DL (ref 8.6–10.3)
CHLORIDE SERPL-SCNC: 101 MMOL/L (ref 98–110)
CHOLEST SERPL-MCNC: 156 MG/DL
CHOLEST/HDLC SERPL: 2.1 (CALC)
CO2 SERPL-SCNC: 27 MMOL/L (ref 20–32)
CREAT SERPL-MCNC: 1.15 MG/DL (ref 0.7–1.18)
EOSINOPHIL # BLD AUTO: 389 CELLS/UL (ref 15–500)
EOSINOPHIL NFR BLD AUTO: 5.8 %
ERYTHROCYTE [DISTWIDTH] IN BLOOD BY AUTOMATED COUNT: 11.8 % (ref 11–15)
GLOBULIN SER CALC-MCNC: 2.9 G/DL (CALC) (ref 1.9–3.7)
GLUCOSE SERPL-MCNC: 122 MG/DL (ref 65–99)
HBA1C MFR BLD: 6 % OF TOTAL HGB
HCT VFR BLD AUTO: 41.8 % (ref 38.5–50)
HDLC SERPL-MCNC: 76 MG/DL
HGB BLD-MCNC: 14.1 G/DL (ref 13.2–17.1)
LDLC SERPL CALC-MCNC: 64 MG/DL (CALC)
LYMPHOCYTES # BLD AUTO: 1219 CELLS/UL (ref 850–3900)
LYMPHOCYTES NFR BLD AUTO: 18.2 %
MCH RBC QN AUTO: 33.8 PG (ref 27–33)
MCHC RBC AUTO-ENTMCNC: 33.7 G/DL (ref 32–36)
MCV RBC AUTO: 100.2 FL (ref 80–100)
MONOCYTES # BLD AUTO: 730 CELLS/UL (ref 200–950)
MONOCYTES NFR BLD AUTO: 10.9 %
NEUTROPHILS # BLD AUTO: 4322 CELLS/UL (ref 1500–7800)
NEUTROPHILS NFR BLD AUTO: 64.5 %
NONHDLC SERPL-MCNC: 80 MG/DL (CALC)
PLATELET # BLD AUTO: 217 THOUSAND/UL (ref 140–400)
PMV BLD REES-ECKER: 9.8 FL (ref 7.5–12.5)
POTASSIUM SERPL-SCNC: 4.9 MMOL/L (ref 3.5–5.3)
PROT SERPL-MCNC: 7 G/DL (ref 6.1–8.1)
RBC # BLD AUTO: 4.17 MILLION/UL (ref 4.2–5.8)
SL AMB EGFR AFRICAN AMERICAN: 72 ML/MIN/1.73M2
SL AMB EGFR NON AFRICAN AMERICAN: 62 ML/MIN/1.73M2
SODIUM SERPL-SCNC: 137 MMOL/L (ref 135–146)
TRIGL SERPL-MCNC: 77 MG/DL
TSH SERPL-ACNC: 1.25 MIU/L (ref 0.4–4.5)
WBC # BLD AUTO: 6.7 THOUSAND/UL (ref 3.8–10.8)

## 2019-04-15 ENCOUNTER — OFFICE VISIT (OUTPATIENT)
Dept: FAMILY MEDICINE CLINIC | Facility: CLINIC | Age: 74
End: 2019-04-15
Payer: COMMERCIAL

## 2019-04-15 VITALS
HEART RATE: 72 BPM | DIASTOLIC BLOOD PRESSURE: 78 MMHG | HEIGHT: 67 IN | BODY MASS INDEX: 25.87 KG/M2 | SYSTOLIC BLOOD PRESSURE: 120 MMHG | WEIGHT: 164.8 LBS

## 2019-04-15 DIAGNOSIS — I63.9 CEREBRAL INFARCTION, UNSPECIFIED MECHANISM (HCC): ICD-10-CM

## 2019-04-15 DIAGNOSIS — E78.2 MIXED HYPERLIPIDEMIA: ICD-10-CM

## 2019-04-15 DIAGNOSIS — I10 ESSENTIAL HYPERTENSION: Primary | ICD-10-CM

## 2019-04-15 DIAGNOSIS — R73.9 HYPERGLYCEMIA: ICD-10-CM

## 2019-04-15 DIAGNOSIS — E55.9 VITAMIN D DEFICIENCY: ICD-10-CM

## 2019-04-15 PROCEDURE — 99214 OFFICE O/P EST MOD 30 MIN: CPT | Performed by: PHYSICIAN ASSISTANT

## 2019-09-03 ENCOUNTER — TELEPHONE (OUTPATIENT)
Dept: FAMILY MEDICINE CLINIC | Facility: CLINIC | Age: 74
End: 2019-09-03

## 2019-09-03 DIAGNOSIS — I65.29 STENOSIS OF CAROTID ARTERY, UNSPECIFIED LATERALITY: Primary | ICD-10-CM

## 2019-09-03 NOTE — TELEPHONE ENCOUNTER
Dorinda Casillas from [employer] at [address] they need a different code on a Vas  Carotid Complete study done Oct  2018  They said to go to to [employer] and look up the policy  On the LCD  The ploicy Number is [id number] you may fax a new script to [phone] or if you have any questions you may reach her at [phone] Ext   [facility]

## 2019-09-25 LAB
25(OH)D3 SERPL-MCNC: 62 NG/ML (ref 30–100)
ALBUMIN SERPL-MCNC: 4 G/DL (ref 3.6–5.1)
ALBUMIN/GLOB SERPL: 1.3 (CALC) (ref 1–2.5)
ALP SERPL-CCNC: 62 U/L (ref 40–115)
ALT SERPL-CCNC: 17 U/L (ref 9–46)
AST SERPL-CCNC: 15 U/L (ref 10–35)
BASOPHILS # BLD AUTO: 41 CELLS/UL (ref 0–200)
BASOPHILS NFR BLD AUTO: 0.5 %
BILIRUB SERPL-MCNC: 0.5 MG/DL (ref 0.2–1.2)
BUN SERPL-MCNC: 19 MG/DL (ref 7–25)
BUN/CREAT SERPL: ABNORMAL (CALC) (ref 6–22)
CALCIUM SERPL-MCNC: 9.2 MG/DL (ref 8.6–10.3)
CHLORIDE SERPL-SCNC: 105 MMOL/L (ref 98–110)
CHOLEST SERPL-MCNC: 140 MG/DL
CHOLEST/HDLC SERPL: 2.3 (CALC)
CO2 SERPL-SCNC: 29 MMOL/L (ref 20–32)
CREAT SERPL-MCNC: 1.1 MG/DL (ref 0.7–1.18)
EOSINOPHIL # BLD AUTO: 279 CELLS/UL (ref 15–500)
EOSINOPHIL NFR BLD AUTO: 3.4 %
ERYTHROCYTE [DISTWIDTH] IN BLOOD BY AUTOMATED COUNT: 11.9 % (ref 11–15)
GLOBULIN SER CALC-MCNC: 3 G/DL (CALC) (ref 1.9–3.7)
GLUCOSE SERPL-MCNC: 102 MG/DL (ref 65–99)
HBA1C MFR BLD: 6 % OF TOTAL HGB
HCT VFR BLD AUTO: 42.7 % (ref 38.5–50)
HDLC SERPL-MCNC: 62 MG/DL
HGB BLD-MCNC: 14.1 G/DL (ref 13.2–17.1)
LDLC SERPL CALC-MCNC: 62 MG/DL (CALC)
LYMPHOCYTES # BLD AUTO: 918 CELLS/UL (ref 850–3900)
LYMPHOCYTES NFR BLD AUTO: 11.2 %
MCH RBC QN AUTO: 33.5 PG (ref 27–33)
MCHC RBC AUTO-ENTMCNC: 33 G/DL (ref 32–36)
MCV RBC AUTO: 101.4 FL (ref 80–100)
MONOCYTES # BLD AUTO: 935 CELLS/UL (ref 200–950)
MONOCYTES NFR BLD AUTO: 11.4 %
NEUTROPHILS # BLD AUTO: 6027 CELLS/UL (ref 1500–7800)
NEUTROPHILS NFR BLD AUTO: 73.5 %
NONHDLC SERPL-MCNC: 78 MG/DL (CALC)
PLATELET # BLD AUTO: 253 THOUSAND/UL (ref 140–400)
PMV BLD REES-ECKER: 9.4 FL (ref 7.5–12.5)
POTASSIUM SERPL-SCNC: 4.7 MMOL/L (ref 3.5–5.3)
PROT SERPL-MCNC: 7 G/DL (ref 6.1–8.1)
RBC # BLD AUTO: 4.21 MILLION/UL (ref 4.2–5.8)
SL AMB EGFR AFRICAN AMERICAN: 76 ML/MIN/1.73M2
SL AMB EGFR NON AFRICAN AMERICAN: 65 ML/MIN/1.73M2
SODIUM SERPL-SCNC: 142 MMOL/L (ref 135–146)
TRIGL SERPL-MCNC: 84 MG/DL
TSH SERPL-ACNC: 1.23 MIU/L (ref 0.4–4.5)
WBC # BLD AUTO: 8.2 THOUSAND/UL (ref 3.8–10.8)

## 2019-10-15 DIAGNOSIS — I10 BENIGN ESSENTIAL HYPERTENSION: ICD-10-CM

## 2019-10-15 RX ORDER — METOPROLOL SUCCINATE 100 MG/1
TABLET, EXTENDED RELEASE ORAL
Qty: 90 TABLET | Refills: 3 | Status: SHIPPED | OUTPATIENT
Start: 2019-10-15 | End: 2020-10-06

## 2019-10-15 RX ORDER — AMLODIPINE BESYLATE 5 MG/1
TABLET ORAL
Qty: 90 TABLET | Refills: 3 | Status: SHIPPED | OUTPATIENT
Start: 2019-10-15 | End: 2020-10-06

## 2019-10-15 RX ORDER — BENAZEPRIL HYDROCHLORIDE 40 MG/1
TABLET, FILM COATED ORAL
Qty: 90 TABLET | Refills: 3 | Status: SHIPPED | OUTPATIENT
Start: 2019-10-15 | End: 2020-10-06

## 2019-10-23 ENCOUNTER — OFFICE VISIT (OUTPATIENT)
Dept: FAMILY MEDICINE CLINIC | Facility: CLINIC | Age: 74
End: 2019-10-23
Payer: COMMERCIAL

## 2019-10-23 VITALS
BODY MASS INDEX: 25.11 KG/M2 | HEIGHT: 67 IN | DIASTOLIC BLOOD PRESSURE: 70 MMHG | WEIGHT: 160 LBS | HEART RATE: 74 BPM | SYSTOLIC BLOOD PRESSURE: 116 MMHG

## 2019-10-23 DIAGNOSIS — R73.9 HYPERGLYCEMIA: ICD-10-CM

## 2019-10-23 DIAGNOSIS — I10 ESSENTIAL HYPERTENSION: Primary | ICD-10-CM

## 2019-10-23 DIAGNOSIS — E66.3 OVERWEIGHT (BMI 25.0-29.9): ICD-10-CM

## 2019-10-23 DIAGNOSIS — Z00.00 HEALTH CARE MAINTENANCE: ICD-10-CM

## 2019-10-23 DIAGNOSIS — E55.9 VITAMIN D DEFICIENCY: ICD-10-CM

## 2019-10-23 DIAGNOSIS — E78.2 MIXED HYPERLIPIDEMIA: ICD-10-CM

## 2019-10-23 DIAGNOSIS — I63.9 CEREBRAL INFARCTION, UNSPECIFIED MECHANISM (HCC): ICD-10-CM

## 2019-10-23 PROCEDURE — G0439 PPPS, SUBSEQ VISIT: HCPCS | Performed by: PHYSICIAN ASSISTANT

## 2019-10-23 PROCEDURE — 3074F SYST BP LT 130 MM HG: CPT | Performed by: PHYSICIAN ASSISTANT

## 2019-10-23 PROCEDURE — 1101F PT FALLS ASSESS-DOCD LE1/YR: CPT | Performed by: PHYSICIAN ASSISTANT

## 2019-10-23 PROCEDURE — 3078F DIAST BP <80 MM HG: CPT | Performed by: PHYSICIAN ASSISTANT

## 2019-10-23 PROCEDURE — 1170F FXNL STATUS ASSESSED: CPT | Performed by: PHYSICIAN ASSISTANT

## 2019-10-23 PROCEDURE — 1125F AMNT PAIN NOTED PAIN PRSNT: CPT | Performed by: PHYSICIAN ASSISTANT

## 2019-10-23 PROCEDURE — 99214 OFFICE O/P EST MOD 30 MIN: CPT | Performed by: PHYSICIAN ASSISTANT

## 2019-10-23 NOTE — PATIENT INSTRUCTIONS
1  Hypertension-presently stable with benazepril, amlodipine, and metoprolol  2  Hyperlipidemia-cholesterol numbers were reviewed in detail in presently stable with atorvastatin  3  History of CVA-patient with persistent expressive aphasia  Currently stable  He does seem fairly minimally impacted by this  He occasionally stumbles to find what he is trying to say but for the most part is very conversational   4  Hyperglycemia-fasting glucose of 102  His hemoglobin A1c is 6 0  He continues regular diet and exercise and we will reassess  5  Overweight-according to BMI, patient is less than 1 lb overweight  continue with healthy diet and exercise recommended

## 2019-10-23 NOTE — PROGRESS NOTES
Assessment/Plan:  Patient Instructions   1  Hypertension-presently stable with benazepril, amlodipine, and metoprolol  2  Hyperlipidemia-cholesterol numbers were reviewed in detail in presently stable with atorvastatin  3  History of CVA-patient with persistent expressive aphasia  Currently stable  He does seem fairly minimally impacted by this  He occasionally stumbles to find what he is trying to say but for the most part is very conversational   4  Hyperglycemia-fasting glucose of 102  His hemoglobin A1c is 6 0  He continues regular diet and exercise and we will reassess  5  Overweight-according to BMI, patient is less than 1 lb overweight  continue with healthy diet and exercise recommended  Diagnoses and all orders for this visit:    Essential hypertension  -     CBC and differential; Future  -     Comprehensive metabolic panel; Future  -     Hemoglobin A1C; Future  -     Lipid Panel with Direct LDL reflex; Future  -     TSH, 3rd generation; Future  -     Vitamin D 25 hydroxy; Future  -     PSA, Total Screen; Future    Mixed hyperlipidemia  -     CBC and differential; Future  -     Comprehensive metabolic panel; Future  -     Hemoglobin A1C; Future  -     Lipid Panel with Direct LDL reflex; Future  -     TSH, 3rd generation; Future  -     Vitamin D 25 hydroxy; Future  -     PSA, Total Screen; Future    Hyperglycemia  -     CBC and differential; Future  -     Comprehensive metabolic panel; Future  -     Hemoglobin A1C; Future  -     Lipid Panel with Direct LDL reflex; Future  -     TSH, 3rd generation; Future  -     Vitamin D 25 hydroxy; Future  -     PSA, Total Screen; Future    Vitamin D deficiency  -     CBC and differential; Future  -     Comprehensive metabolic panel; Future  -     Hemoglobin A1C; Future  -     Lipid Panel with Direct LDL reflex; Future  -     TSH, 3rd generation; Future  -     Vitamin D 25 hydroxy; Future  -     PSA, Total Screen;  Future    Health care maintenance    Cerebral infarction, unspecified mechanism ([facility] [address] )  -     CBC and differential; Future  -     Comprehensive metabolic panel; Future  -     Hemoglobin A1C; Future  -     Lipid Panel with Direct LDL reflex; Future  -     TSH, 3rd generation; Future  -     Vitamin D 25 hydroxy; Future  -     PSA, Total Screen; Future    Overweight (BMI 25 0-29  9)    BMI 25 0-25 9,adult          Subjective:      Patient ID: Debra Abraham is a 75 y o  male  HPI    The following portions of the patient's history were reviewed and updated as appropriate: allergies, current medications, past family history, past medical history, past social history, past surgical history and problem list     Review of Systems      Objective:      /70 (BP Location: Left arm, Patient Position: Sitting, Cuff Size: Large)   Pulse 74   Ht 5' 7" (1 702 m)   Wt 72 6 kg (160 lb)   BMI 25 06 kg/m²          Physical Exam    BMI Counseling: Body mass index is 25 06 kg/m²  The BMI is above normal  Nutrition recommendations include reducing portion sizes

## 2019-10-23 NOTE — PROGRESS NOTES
Assessment and Plan:     Problem List Items Addressed This Visit        Cardiovascular and Mediastinum    Hypertension - Primary    Relevant Orders    CBC and differential    Comprehensive metabolic panel    Hemoglobin A1C    Lipid Panel with Direct LDL reflex    TSH, 3rd generation    Vitamin D 25 hydroxy    PSA, Total Screen       Nervous and Auditory    Cerebral infarction (HCC)    Relevant Orders    CBC and differential    Comprehensive metabolic panel    Hemoglobin A1C    Lipid Panel with Direct LDL reflex    TSH, 3rd generation    Vitamin D 25 hydroxy    PSA, Total Screen       Other    Hyperlipidemia    Relevant Orders    CBC and differential    Comprehensive metabolic panel    Hemoglobin A1C    Lipid Panel with Direct LDL reflex    TSH, 3rd generation    Vitamin D 25 hydroxy    PSA, Total Screen    Vitamin D deficiency    Relevant Orders    CBC and differential    Comprehensive metabolic panel    Hemoglobin A1C    Lipid Panel with Direct LDL reflex    TSH, 3rd generation    Vitamin D 25 hydroxy    PSA, Total Screen    Hyperglycemia    Relevant Orders    CBC and differential    Comprehensive metabolic panel    Hemoglobin A1C    Lipid Panel with Direct LDL reflex    TSH, 3rd generation    Vitamin D 25 hydroxy    PSA, Total Screen      Other Visit Diagnoses     Health care maintenance        Overweight (BMI 25 0-29  9)        BMI 25 0-25 9,adult               Preventive health issues were discussed with patient, and age appropriate screening tests were ordered as noted in patient's After Visit Summary  Personalized health advice and appropriate referrals for health education or preventive services given if needed, as noted in patient's After Visit Summary       History of Present Illness:     Patient presents for Medicare Annual Wellness visit    Patient Care Team:  Yeni Ayala PA-C as PCP - General (Family Medicine)     Problem List:     Patient Active Problem List   Diagnosis    Stenosis of carotid artery    Cerebral infarction ([facility] [address] )    Expressive aphasia    Hyperlipidemia    Hypertension    Vitamin D deficiency    Macular degeneration    Hyperglycemia    Actinic keratosis      Past Medical and Surgical History:     No past medical history on file    Past Surgical History:   Procedure Laterality Date    HERNIA REPAIR        Family History:     Family History   Problem Relation Age of Onset    Lung cancer Father       Social History:     Social History     Socioeconomic History    Marital status: /Civil Union     Spouse name: None    Number of children: None    Years of education: None    Highest education level: None   Occupational History    Occupation: Retired   Social Needs    Financial resource strain: None    Food insecurity:     Worry: None     Inability: None    Transportation needs:     Medical: None     Non-medical: None   Tobacco Use    Smoking status: Former Smoker    Smokeless tobacco: Former User    Tobacco comment: No secondhand smoke exposure   Substance and Sexual Activity    Alcohol use: Yes     Comment: Social drinker; Daily alcohol use per [employer]    Drug use: None    Sexual activity: None   Lifestyle    Physical activity:     Days per week: None     Minutes per session: None    Stress: None   Relationships    Social connections:     Talks on phone: None     Gets together: None     Attends Buddhism service: None     Active member of club or organization: None     Attends meetings of clubs or organizations: None     Relationship status: None    Intimate partner violence:     Fear of current or ex partner: None     Emotionally abused: None     Physically abused: None     Forced sexual activity: None   Other Topics Concern    None   Social History Narrative    Single per [employer]       Medications and Allergies:     Current Outpatient Medications   Medication Sig Dispense Refill    amLODIPine (NORVASC) 5 mg tablet take 1 tablet by mouth once daily 90 tablet 3    Ascorbic Acid (VITAMIN C) 1000 MG tablet Take 1,000 mg by mouth daily      aspirin 325 mg tablet Take 325 mg by mouth daily      atorvastatin (LIPITOR) 40 mg tablet take 1 tablet by mouth once daily 90 tablet 3    benazepril (LOTENSIN) 40 MG tablet take 1 tablet by mouth once daily 90 tablet 3    Cholecalciferol (VITAMIN D3) 5000 units CAPS Take 1,000 Units by mouth daily      cyanocobalamin (VITAMIN B-12) 100 mcg tablet Take by mouth daily      folic acid (FOLVITE) 1 mg tablet Take by mouth daily      metoprolol succinate (TOPROL-XL) 100 mg 24 hr tablet take 1 tablet by mouth once daily 90 tablet 3    multivitamin (THERAGRAN) TABS Take 1 tablet by mouth daily      Omega-3 Fatty Acids (FISH OIL) 1,000 mg Take 1,000 mg by mouth daily      vitamin E 100 UNIT capsule Take 100 Units by mouth daily       No current facility-administered medications for this visit  No Known Allergies   Immunizations: There is no immunization history for the selected administration types on file for this patient  Health Maintenance:         Topic Date Due    CRC Screening: Colonoscopy  10/24/2019 (Originally 1945)     There are no preventive care reminders to display for this patient  Medicare Health Risk Assessment:     /70 (BP Location: Left arm, Patient Position: Sitting, Cuff Size: Large)   Pulse 74   Ht 5' 7" (1 702 m)   Wt 72 6 kg (160 lb)   BMI 25 06 kg/m²      Sera Finch is here for his Subsequent Wellness visit  Last Medicare Wellness visit information reviewed, patient interviewed, no change since last AWV  Health Risk Assessment:   Patient rates overall health as good  Patient feels that their physical health rating is same  Eyesight was rated as same  Hearing was rated as same  Patient feels that their emotional and mental health rating is same  Pain experienced in the last 7 days has been none  Patient states that he has experienced no weight loss or gain in last 6 months  Depression Screening:   PHQ-2 Score: 0      Fall Risk Screening: In the past year, patient has experienced: no history of falling in past year      Home Safety:  Patient does not have trouble with stairs inside or outside of their home  Patient has working smoke alarms and has working carbon monoxide detector  Home safety hazards include: none  Nutrition:   Current diet is Regular  Medications:   Patient is currently taking over-the-counter supplements  OTC medications include: ASA, fish oil  Patient is able to manage medications  Activities of Daily Living (ADLs)/Instrumental Activities of Daily Living (IADLs):   Walk and transfer into and out of bed and chair?: Yes  Dress and groom yourself?: Yes    Bathe or shower yourself?: Yes    Feed yourself? Yes  Do your laundry/housekeeping?: Yes  Manage your money, pay your bills and track your expenses?: Yes  Make your own meals?: Yes    Do your own shopping?: Yes    Previous Hospitalizations:   Any hospitalizations or ED visits within the last 12 months?: No      Advance Care Planning:   Living will: Yes    Durable POA for healthcare:  Yes    Advanced directive: Yes    End of Life Decisions reviewed with patient: Yes      Cognitive Screening:   Provider or family/friend/caregiver concerned regarding cognition?: No    PREVENTIVE SCREENINGS      Cardiovascular Screening:    General: Screening Not Indicated and History Lipid Disorder      Diabetes Screening:     General: Screening Current      Colorectal Cancer Screening:     General: Screening Current      Prostate Cancer Screening:    General: Screening Not Indicated      Osteoporosis Screening:    General: Patient Declines      Abdominal Aortic Aneurysm (AAA) Screening:    Risk factors include: age between 73-67 yo and tobacco use        Lung Cancer Screening:     General: Screening Not Indicated      Hepatitis C Screening:    General: Patient Declines      Simone De La Torre and Plan:    Problem List Items Addressed This Visit        Cardiovascular and Mediastinum    Hypertension - Primary    Relevant Orders    CBC and differential    Comprehensive metabolic panel    Hemoglobin A1C    Lipid Panel with Direct LDL reflex    TSH, 3rd generation    Vitamin D 25 hydroxy    PSA, Total Screen       Nervous and Auditory    Cerebral infarction (HCC)    Relevant Orders    CBC and differential    Comprehensive metabolic panel    Hemoglobin A1C    Lipid Panel with Direct LDL reflex    TSH, 3rd generation    Vitamin D 25 hydroxy    PSA, Total Screen       Other    Hyperlipidemia    Relevant Orders    CBC and differential    Comprehensive metabolic panel    Hemoglobin A1C    Lipid Panel with Direct LDL reflex    TSH, 3rd generation    Vitamin D 25 hydroxy    PSA, Total Screen    Vitamin D deficiency    Relevant Orders    CBC and differential    Comprehensive metabolic panel    Hemoglobin A1C    Lipid Panel with Direct LDL reflex    TSH, 3rd generation    Vitamin D 25 hydroxy    PSA, Total Screen    Hyperglycemia    Relevant Orders    CBC and differential    Comprehensive metabolic panel    Hemoglobin A1C    Lipid Panel with Direct LDL reflex    TSH, 3rd generation    Vitamin D 25 hydroxy    PSA, Total Screen      Other Visit Diagnoses     Health care maintenance        Overweight (BMI 25 0-29  9)        BMI 25 0-25 9,adult                     Diagnoses and all orders for this visit:    Essential hypertension  -     CBC and differential; Future  -     Comprehensive metabolic panel; Future  -     Hemoglobin A1C; Future  -     Lipid Panel with Direct LDL reflex; Future  -     TSH, 3rd generation; Future  -     Vitamin D 25 hydroxy; Future  -     PSA, Total Screen; Future    Mixed hyperlipidemia  -     CBC and differential; Future  -     Comprehensive metabolic panel; Future  -     Hemoglobin A1C; Future  -     Lipid Panel with Direct LDL reflex; Future  -     TSH, 3rd generation;  Future  -     Vitamin D 25 hydroxy; Future  -     PSA, Total Screen; Future    Hyperglycemia  -     CBC and differential; Future  -     Comprehensive metabolic panel; Future  -     Hemoglobin A1C; Future  -     Lipid Panel with Direct LDL reflex; Future  -     TSH, 3rd generation; Future  -     Vitamin D 25 hydroxy; Future  -     PSA, Total Screen; Future    Vitamin D deficiency  -     CBC and differential; Future  -     Comprehensive metabolic panel; Future  -     Hemoglobin A1C; Future  -     Lipid Panel with Direct LDL reflex; Future  -     TSH, 3rd generation; Future  -     Vitamin D 25 hydroxy; Future  -     PSA, Total Screen; Future    Health care maintenance    Cerebral infarction, unspecified mechanism ([facility] [address] )  -     CBC and differential; Future  -     Comprehensive metabolic panel; Future  -     Hemoglobin A1C; Future  -     Lipid Panel with Direct LDL reflex; Future  -     TSH, 3rd generation; Future  -     Vitamin D 25 hydroxy; Future  -     PSA, Total Screen; Future    Overweight (BMI 25 0-29  9)    BMI 25 0-25 9,adult              Subjective:      Patient ID: Kaylan Mayers is a 75 y o  male  CC:    Chief Complaint   Patient presents with    Hypertension    Hyperglycemia    Vitamin D Deficiency     Review BW results  Pt states he has no other concerns at this time  Flu and pneumonia vaccines declined    -  lsh  Medicare Wellness Visit       HPI:    HPI    The following portions of the patient's history were reviewed and updated as appropriate: allergies, current medications, past family history, past medical history, past social history, past surgical history and problem list       Review of Systems      Data to review:       Objective:    Vitals:    10/23/19 1249   BP: 116/70   BP Location: Left arm   Patient Position: Sitting   Cuff Size: Large   Pulse: 74   Weight: 72 6 kg (160 lb)   Height: 5' 7" (1 702 m)        Physical Exam

## 2019-11-12 DIAGNOSIS — E78.2 MIXED HYPERLIPIDEMIA: ICD-10-CM

## 2019-11-12 RX ORDER — ATORVASTATIN CALCIUM 40 MG/1
TABLET, FILM COATED ORAL
Qty: 90 TABLET | Refills: 3 | Status: SHIPPED | OUTPATIENT
Start: 2019-11-12 | End: 2020-11-05

## 2020-01-13 LAB
LEFT EYE DIABETIC RETINOPATHY: NORMAL
RIGHT EYE DIABETIC RETINOPATHY: NORMAL

## 2020-04-15 LAB
25(OH)D3 SERPL-MCNC: 61 NG/ML (ref 30–100)
ALBUMIN SERPL-MCNC: 4 G/DL (ref 3.6–5.1)
ALBUMIN/GLOB SERPL: 1.4 (CALC) (ref 1–2.5)
ALP SERPL-CCNC: 61 U/L (ref 35–144)
ALT SERPL-CCNC: 19 U/L (ref 9–46)
AST SERPL-CCNC: 15 U/L (ref 10–35)
BASOPHILS # BLD AUTO: 30 CELLS/UL (ref 0–200)
BASOPHILS NFR BLD AUTO: 0.5 %
BILIRUB SERPL-MCNC: 0.3 MG/DL (ref 0.2–1.2)
BUN SERPL-MCNC: 31 MG/DL (ref 7–25)
BUN/CREAT SERPL: 27 (CALC) (ref 6–22)
CALCIUM SERPL-MCNC: 9.4 MG/DL (ref 8.6–10.3)
CHLORIDE SERPL-SCNC: 105 MMOL/L (ref 98–110)
CHOLEST SERPL-MCNC: 149 MG/DL
CHOLEST/HDLC SERPL: 2.2 (CALC)
CO2 SERPL-SCNC: 29 MMOL/L (ref 20–32)
CREAT SERPL-MCNC: 1.13 MG/DL (ref 0.7–1.18)
EOSINOPHIL # BLD AUTO: 342 CELLS/UL (ref 15–500)
EOSINOPHIL NFR BLD AUTO: 5.7 %
ERYTHROCYTE [DISTWIDTH] IN BLOOD BY AUTOMATED COUNT: 11.5 % (ref 11–15)
GLOBULIN SER CALC-MCNC: 2.9 G/DL (CALC) (ref 1.9–3.7)
GLUCOSE SERPL-MCNC: 105 MG/DL (ref 65–99)
HBA1C MFR BLD: 6 % OF TOTAL HGB
HCT VFR BLD AUTO: 41.6 % (ref 38.5–50)
HDLC SERPL-MCNC: 68 MG/DL
HGB BLD-MCNC: 13.9 G/DL (ref 13.2–17.1)
LDLC SERPL CALC-MCNC: 65 MG/DL (CALC)
LYMPHOCYTES # BLD AUTO: 1050 CELLS/UL (ref 850–3900)
LYMPHOCYTES NFR BLD AUTO: 17.5 %
MCH RBC QN AUTO: 33.8 PG (ref 27–33)
MCHC RBC AUTO-ENTMCNC: 33.4 G/DL (ref 32–36)
MCV RBC AUTO: 101.2 FL (ref 80–100)
MONOCYTES # BLD AUTO: 690 CELLS/UL (ref 200–950)
MONOCYTES NFR BLD AUTO: 11.5 %
NEUTROPHILS # BLD AUTO: 3888 CELLS/UL (ref 1500–7800)
NEUTROPHILS NFR BLD AUTO: 64.8 %
NONHDLC SERPL-MCNC: 81 MG/DL (CALC)
PLATELET # BLD AUTO: 210 THOUSAND/UL (ref 140–400)
PMV BLD REES-ECKER: 9.5 FL (ref 7.5–12.5)
POTASSIUM SERPL-SCNC: 4.4 MMOL/L (ref 3.5–5.3)
PROT SERPL-MCNC: 6.9 G/DL (ref 6.1–8.1)
PSA SERPL-MCNC: 1.3 NG/ML
RBC # BLD AUTO: 4.11 MILLION/UL (ref 4.2–5.8)
SL AMB EGFR AFRICAN AMERICAN: 73 ML/MIN/1.73M2
SL AMB EGFR NON AFRICAN AMERICAN: 63 ML/MIN/1.73M2
SODIUM SERPL-SCNC: 141 MMOL/L (ref 135–146)
TRIGL SERPL-MCNC: 80 MG/DL
TSH SERPL-ACNC: 0.88 MIU/L (ref 0.4–4.5)
WBC # BLD AUTO: 6 THOUSAND/UL (ref 3.8–10.8)

## 2020-04-30 ENCOUNTER — OFFICE VISIT (OUTPATIENT)
Dept: FAMILY MEDICINE CLINIC | Facility: CLINIC | Age: 75
End: 2020-04-30
Payer: COMMERCIAL

## 2020-04-30 VITALS
SYSTOLIC BLOOD PRESSURE: 108 MMHG | WEIGHT: 158 LBS | BODY MASS INDEX: 24.8 KG/M2 | HEIGHT: 67 IN | HEART RATE: 72 BPM | DIASTOLIC BLOOD PRESSURE: 72 MMHG | RESPIRATION RATE: 16 BRPM

## 2020-04-30 DIAGNOSIS — E55.9 VITAMIN D DEFICIENCY: ICD-10-CM

## 2020-04-30 DIAGNOSIS — I63.9 CEREBRAL INFARCTION, UNSPECIFIED MECHANISM (HCC): ICD-10-CM

## 2020-04-30 DIAGNOSIS — I10 ESSENTIAL HYPERTENSION: Primary | ICD-10-CM

## 2020-04-30 DIAGNOSIS — E78.2 MIXED HYPERLIPIDEMIA: ICD-10-CM

## 2020-04-30 DIAGNOSIS — R73.9 HYPERGLYCEMIA: ICD-10-CM

## 2020-04-30 PROCEDURE — 3074F SYST BP LT 130 MM HG: CPT | Performed by: PHYSICIAN ASSISTANT

## 2020-04-30 PROCEDURE — 99214 OFFICE O/P EST MOD 30 MIN: CPT | Performed by: PHYSICIAN ASSISTANT

## 2020-04-30 PROCEDURE — 1160F RVW MEDS BY RX/DR IN RCRD: CPT | Performed by: PHYSICIAN ASSISTANT

## 2020-04-30 PROCEDURE — 3008F BODY MASS INDEX DOCD: CPT | Performed by: PHYSICIAN ASSISTANT

## 2020-04-30 PROCEDURE — 3078F DIAST BP <80 MM HG: CPT | Performed by: PHYSICIAN ASSISTANT

## 2020-04-30 PROCEDURE — 1036F TOBACCO NON-USER: CPT | Performed by: PHYSICIAN ASSISTANT

## 2020-10-06 DIAGNOSIS — I10 BENIGN ESSENTIAL HYPERTENSION: ICD-10-CM

## 2020-10-06 RX ORDER — METOPROLOL SUCCINATE 100 MG/1
TABLET, EXTENDED RELEASE ORAL
Qty: 90 TABLET | Refills: 3 | Status: SHIPPED | OUTPATIENT
Start: 2020-10-06 | End: 2021-10-08

## 2020-10-06 RX ORDER — BENAZEPRIL HYDROCHLORIDE 40 MG/1
TABLET, FILM COATED ORAL
Qty: 90 TABLET | Refills: 3 | Status: SHIPPED | OUTPATIENT
Start: 2020-10-06 | End: 2021-10-08

## 2020-10-06 RX ORDER — AMLODIPINE BESYLATE 5 MG/1
TABLET ORAL
Qty: 90 TABLET | Refills: 3 | Status: SHIPPED | OUTPATIENT
Start: 2020-10-06 | End: 2021-10-08

## 2020-10-21 LAB
25(OH)D3 SERPL-MCNC: 68 NG/ML (ref 30–100)
BASOPHILS # BLD AUTO: 31 CELLS/UL (ref 0–200)
BASOPHILS NFR BLD AUTO: 0.5 %
CHOLEST SERPL-MCNC: 165 MG/DL
CHOLEST/HDLC SERPL: 2.2 (CALC)
EOSINOPHIL # BLD AUTO: 291 CELLS/UL (ref 15–500)
EOSINOPHIL NFR BLD AUTO: 4.7 %
ERYTHROCYTE [DISTWIDTH] IN BLOOD BY AUTOMATED COUNT: 11.8 % (ref 11–15)
HCT VFR BLD AUTO: 41.7 % (ref 38.5–50)
HDLC SERPL-MCNC: 74 MG/DL
HGB BLD-MCNC: 13.7 G/DL (ref 13.2–17.1)
LDLC SERPL CALC-MCNC: 73 MG/DL (CALC)
LYMPHOCYTES # BLD AUTO: 1079 CELLS/UL (ref 850–3900)
LYMPHOCYTES NFR BLD AUTO: 17.4 %
MCH RBC QN AUTO: 33.6 PG (ref 27–33)
MCHC RBC AUTO-ENTMCNC: 32.9 G/DL (ref 32–36)
MCV RBC AUTO: 102.2 FL (ref 80–100)
MONOCYTES # BLD AUTO: 868 CELLS/UL (ref 200–950)
MONOCYTES NFR BLD AUTO: 14 %
NEUTROPHILS # BLD AUTO: 3931 CELLS/UL (ref 1500–7800)
NEUTROPHILS NFR BLD AUTO: 63.4 %
NONHDLC SERPL-MCNC: 91 MG/DL (CALC)
PLATELET # BLD AUTO: 195 THOUSAND/UL (ref 140–400)
PMV BLD REES-ECKER: 9.5 FL (ref 7.5–12.5)
RBC # BLD AUTO: 4.08 MILLION/UL (ref 4.2–5.8)
TRIGL SERPL-MCNC: 93 MG/DL
TSH SERPL-ACNC: 1.27 MIU/L (ref 0.4–4.5)
WBC # BLD AUTO: 6.2 THOUSAND/UL (ref 3.8–10.8)

## 2020-11-05 DIAGNOSIS — E78.2 MIXED HYPERLIPIDEMIA: ICD-10-CM

## 2020-11-05 RX ORDER — ATORVASTATIN CALCIUM 40 MG/1
TABLET, FILM COATED ORAL
Qty: 90 TABLET | Refills: 3 | Status: SHIPPED | OUTPATIENT
Start: 2020-11-05 | End: 2021-11-02

## 2020-11-11 ENCOUNTER — OFFICE VISIT (OUTPATIENT)
Dept: FAMILY MEDICINE CLINIC | Facility: CLINIC | Age: 75
End: 2020-11-11
Payer: COMMERCIAL

## 2020-11-11 VITALS
DIASTOLIC BLOOD PRESSURE: 70 MMHG | HEIGHT: 67 IN | BODY MASS INDEX: 25.9 KG/M2 | SYSTOLIC BLOOD PRESSURE: 136 MMHG | HEART RATE: 60 BPM | WEIGHT: 165 LBS | TEMPERATURE: 98 F

## 2020-11-11 DIAGNOSIS — L57.0 ACTINIC KERATOSIS: ICD-10-CM

## 2020-11-11 DIAGNOSIS — Z00.00 HEALTH CARE MAINTENANCE: ICD-10-CM

## 2020-11-11 DIAGNOSIS — R73.9 HYPERGLYCEMIA: ICD-10-CM

## 2020-11-11 DIAGNOSIS — E55.9 VITAMIN D DEFICIENCY: ICD-10-CM

## 2020-11-11 DIAGNOSIS — I63.9 CEREBRAL INFARCTION, UNSPECIFIED MECHANISM (HCC): ICD-10-CM

## 2020-11-11 DIAGNOSIS — H35.30 MACULAR DEGENERATION, UNSPECIFIED LATERALITY, UNSPECIFIED TYPE: ICD-10-CM

## 2020-11-11 DIAGNOSIS — I10 ESSENTIAL HYPERTENSION: Primary | ICD-10-CM

## 2020-11-11 DIAGNOSIS — E78.2 MIXED HYPERLIPIDEMIA: ICD-10-CM

## 2020-11-11 DIAGNOSIS — R47.01 EXPRESSIVE APHASIA: ICD-10-CM

## 2020-11-11 PROCEDURE — T1015 CLINIC SERVICE: HCPCS | Performed by: PHYSICIAN ASSISTANT

## 2020-11-11 PROCEDURE — 99214 OFFICE O/P EST MOD 30 MIN: CPT | Performed by: PHYSICIAN ASSISTANT

## 2020-11-11 PROCEDURE — G0438 PPPS, INITIAL VISIT: HCPCS | Performed by: PHYSICIAN ASSISTANT

## 2021-03-24 LAB
ALBUMIN SERPL-MCNC: 3.9 G/DL (ref 3.6–5.1)
ALBUMIN/GLOB SERPL: 1.6 (CALC) (ref 1–2.5)
ALP SERPL-CCNC: 67 U/L (ref 35–144)
ALT SERPL-CCNC: 22 U/L (ref 9–46)
AST SERPL-CCNC: 16 U/L (ref 10–35)
BASOPHILS # BLD AUTO: 31 CELLS/UL (ref 0–200)
BASOPHILS NFR BLD AUTO: 0.5 %
BILIRUB SERPL-MCNC: 0.4 MG/DL (ref 0.2–1.2)
BUN SERPL-MCNC: 21 MG/DL (ref 7–25)
BUN/CREAT SERPL: ABNORMAL (CALC) (ref 6–22)
CALCIUM SERPL-MCNC: 9.3 MG/DL (ref 8.6–10.3)
CHLORIDE SERPL-SCNC: 99 MMOL/L (ref 98–110)
CHOLEST SERPL-MCNC: 143 MG/DL
CHOLEST/HDLC SERPL: 2 (CALC)
CO2 SERPL-SCNC: 30 MMOL/L (ref 20–32)
CREAT SERPL-MCNC: 1.1 MG/DL (ref 0.7–1.18)
EOSINOPHIL # BLD AUTO: 192 CELLS/UL (ref 15–500)
EOSINOPHIL NFR BLD AUTO: 3.1 %
ERYTHROCYTE [DISTWIDTH] IN BLOOD BY AUTOMATED COUNT: 11.8 % (ref 11–15)
GLOBULIN SER CALC-MCNC: 2.5 G/DL (CALC) (ref 1.9–3.7)
GLUCOSE SERPL-MCNC: 127 MG/DL (ref 65–99)
HBA1C MFR BLD: 6.1 % OF TOTAL HGB
HCT VFR BLD AUTO: 42.1 % (ref 38.5–50)
HDLC SERPL-MCNC: 72 MG/DL
HGB BLD-MCNC: 13.9 G/DL (ref 13.2–17.1)
LDLC SERPL CALC-MCNC: 53 MG/DL (CALC)
LYMPHOCYTES # BLD AUTO: 918 CELLS/UL (ref 850–3900)
LYMPHOCYTES NFR BLD AUTO: 14.8 %
MCH RBC QN AUTO: 33.8 PG (ref 27–33)
MCHC RBC AUTO-ENTMCNC: 33 G/DL (ref 32–36)
MCV RBC AUTO: 102.4 FL (ref 80–100)
MONOCYTES # BLD AUTO: 744 CELLS/UL (ref 200–950)
MONOCYTES NFR BLD AUTO: 12 %
NEUTROPHILS # BLD AUTO: 4315 CELLS/UL (ref 1500–7800)
NEUTROPHILS NFR BLD AUTO: 69.6 %
NONHDLC SERPL-MCNC: 71 MG/DL (CALC)
PLATELET # BLD AUTO: 207 THOUSAND/UL (ref 140–400)
PMV BLD REES-ECKER: 9.5 FL (ref 7.5–12.5)
POTASSIUM SERPL-SCNC: 4.8 MMOL/L (ref 3.5–5.3)
PROT SERPL-MCNC: 6.4 G/DL (ref 6.1–8.1)
RBC # BLD AUTO: 4.11 MILLION/UL (ref 4.2–5.8)
SL AMB EGFR AFRICAN AMERICAN: 75 ML/MIN/1.73M2
SL AMB EGFR NON AFRICAN AMERICAN: 65 ML/MIN/1.73M2
SODIUM SERPL-SCNC: 137 MMOL/L (ref 135–146)
TRIGL SERPL-MCNC: 96 MG/DL
TSH SERPL-ACNC: 1.29 MIU/L (ref 0.4–4.5)
WBC # BLD AUTO: 6.2 THOUSAND/UL (ref 3.8–10.8)

## 2021-04-13 ENCOUNTER — TELEMEDICINE (OUTPATIENT)
Dept: FAMILY MEDICINE CLINIC | Facility: CLINIC | Age: 76
End: 2021-04-13
Payer: COMMERCIAL

## 2021-04-13 DIAGNOSIS — U07.1 COVID-19 DETERMINED BY CLINICAL DIAGNOSTIC CRITERIA: ICD-10-CM

## 2021-04-13 DIAGNOSIS — Z20.822 EXPOSURE TO COVID-19 VIRUS: Primary | ICD-10-CM

## 2021-04-13 PROCEDURE — 99213 OFFICE O/P EST LOW 20 MIN: CPT | Performed by: PHYSICIAN ASSISTANT

## 2021-04-13 NOTE — PROGRESS NOTES
COVID-19 Outpatient Progress Note    Assessment/Plan:    Problem List Items Addressed This Visit        Other    [id number] determined by clinical diagnostic criteria      Other Visit Diagnoses     Exposure to COVID-19 virus    -  Primary    Relevant Orders    Novel Coronavirus (Covid-19),PCR [facility] - Collected at [facility] or [facility]         Disposition:     Assessment/plan:  1  Exposure to COVID-19-patient is now symptomatic and I would recommend testing today at [facility], however patient states that he is unable to drive and is not able to get there safely for testing  Therefore based on his exposure and symptoms consistent with clinical criteria for COVID-19 infection he should be treated as positive  He will continue quarantine from the 9th of April until the 19th of April  I will follow-up with him on Friday but he will contact the office if symptoms would worsen in the meantime  I have spent 15 minutes directly with the patient  Encounter provider Tere Waddell PA-C    Provider located at [address] [place] [address] PRIMARY CARE  [facility] [id number]    Recent Visits  No visits were found meeting these conditions  Showing recent visits within past 7 days and meeting all other requirements     Today's Visits  Date Type Provider Dept   04/13/21 Telemedicine Tere Waddell PA-C [facility]   Showing today's visits and meeting all other requirements     Future Appointments  No visits were found meeting these conditions  Showing future appointments within next 150 days and meeting all other requirements        Patient agrees to participate in a virtual check in via telephone or video visit instead of presenting to the office to address urgent/immediate medical needs  Patient is aware this is a billable service  After connecting through Telephone, the patient was identified by name and date of birth   Nicole Caro was informed that this was a telemedicine visit and that the exam was being conducted confidentially over secure lines  My office door was closed  No one else was in the room  Peter Bowens acknowledged consent and understanding of privacy and security of the telemedicine visit  I informed the patient that I have reviewed his record in Epic and presented the opportunity for him to ask any questions regarding the visit today  The patient agreed to participate  It was my intent to perform this visit via video technology but the patient was not able to do a video connection so the visit was completed via audio telephone only  Subjective:   Peter Bowens is a 67 y o  male who is concerned about COVID-19  Patient's symptoms include fever, fatigue, malaise and shortness of breath  Patient denies chills, congestion, rhinorrhea, sore throat, anosmia, loss of taste, cough, chest tightness, abdominal pain, nausea, vomiting, diarrhea, myalgias and headaches  Date of symptom onset: 4/9/2021    Exposure:   Contact with a person who is under investigation (PUI) for or who is positive for COVID-19 within the last 14 days?: Yes    HPI:  This is a 79-year-old gentleman that presents via virtual telephone visit with concern over likely COVID-19 infection  He was exposed to somebody that was in his household on Easter Sunday for just a minute that was positive  He started then 5 days later on the 9th of April with fever, fatigue, some increased shortness of breath, and cough  He states that he does have baseline shortness of breath and cough with his other health conditions but it was a little bit worse  He does note that his fever has now broken and he is starting to feel quite a bit better  He has not had any difficulty getting around the house  He is currently living alone and has not been able to drive  No results found for: Simona Cronin  No past medical history on file    Past Surgical History:   Procedure Laterality Date    HERNIA REPAIR       Current Outpatient Medications   Medication Sig Dispense Refill    amLODIPine (NORVASC) 5 mg tablet take 1 tablet by mouth once daily 90 tablet 3    Ascorbic Acid (VITAMIN C) 1000 MG tablet Take 1,000 mg by mouth daily      aspirin 325 mg tablet Take 325 mg by mouth daily      atorvastatin (LIPITOR) 40 mg tablet take 1 tablet by mouth once daily 90 tablet 3    benazepril (LOTENSIN) 40 MG tablet take 1 tablet by mouth once daily 90 tablet 3    Cholecalciferol (VITAMIN D3) 5000 units CAPS Take 1,000 Units by mouth daily      cyanocobalamin (VITAMIN B-12) 100 mcg tablet Take by mouth daily      folic acid (FOLVITE) 1 mg tablet Take by mouth daily      metoprolol succinate (TOPROL-XL) 100 mg 24 hr tablet take 1 tablet by mouth once daily 90 tablet 3    multivitamin (THERAGRAN) TABS Take 1 tablet by mouth daily      Omega-3 Fatty Acids (FISH OIL) 1,000 mg Take 1,000 mg by mouth daily      vitamin E 100 UNIT capsule Take 100 Units by mouth daily       No current facility-administered medications for this visit  No Known Allergies    Review of Systems   Constitutional: Positive for fatigue and fever  Negative for chills  HENT: Negative for congestion, rhinorrhea and sore throat  Respiratory: Positive for shortness of breath  Negative for cough and chest tightness  Gastrointestinal: Negative for abdominal pain, diarrhea, nausea and vomiting  Musculoskeletal: Negative for myalgias  Neurological: Negative for headaches  Objective: There were no vitals filed for this visit  Physical Exam  Constitutional:       General: He is not in acute distress  Pulmonary:      Effort: No respiratory distress  Neurological:      Mental Status: He is alert and oriented to person, place, and time  Psychiatric:         Mood and Affect: Mood normal          Thought Content:  Thought content normal          Judgment: Judgment normal  VIRTUAL VISIT DISCLAIMER    Talia Adam acknowledges that he has consented to an online visit or consultation  He understands that the online visit is based solely on information provided by him, and that, in the absence of a face-to-face physical evaluation by the physician, the diagnosis he receives is both limited and provisional in terms of accuracy and completeness  This is not intended to replace a full medical face-to-face evaluation by the physician  Talia Adam understands and accepts these terms

## 2021-04-16 ENCOUNTER — TELEMEDICINE (OUTPATIENT)
Dept: FAMILY MEDICINE CLINIC | Facility: CLINIC | Age: 76
End: 2021-04-16
Payer: COMMERCIAL

## 2021-04-16 DIAGNOSIS — U07.1 COVID-19 DETERMINED BY CLINICAL DIAGNOSTIC CRITERIA: Primary | ICD-10-CM

## 2021-04-16 PROCEDURE — 99212 OFFICE O/P EST SF 10 MIN: CPT | Performed by: PHYSICIAN ASSISTANT

## 2021-04-16 NOTE — PROGRESS NOTES
COVID-19 Outpatient Progress Note    Assessment/Plan:    Problem List Items Addressed This Visit        Other    [id number] determined by clinical diagnostic criteria - Primary         Disposition:     Assessment/plan:  1  COVID-19 diagnosed by clinical criteria-patient is feeling well  He is released from quarantine as of this Monday the 19th  He has had recent blood work and we will schedule are regular follow-up for this coming week  If he has any worsening symptoms he will contact us sooner  I have spent 10 minutes directly with the patient  Encounter provider Catrachita Dill PA-C    Provider located at [address] [place] [phone]    Recent Visits  Date Type Provider Dept   04/13/21 Telemedicine [facility]   Showing recent visits within past 7 days and meeting all other requirements     Today's Visits  Date Type Provider Dept   04/16/21 Telemedicine Catrachita Dill PA-C [facility]   Showing today's visits and meeting all other requirements     Future Appointments  No visits were found meeting these conditions  Showing future appointments within next 150 days and meeting all other requirements        Patient agrees to participate in a virtual check in via telephone or video visit instead of presenting to the office to address urgent/immediate medical needs  Patient is aware this is a billable service  After connecting through Telephone, the patient was identified by name and date of birth  Clara Miller was informed that this was a telemedicine visit and that the exam was being conducted confidentially over secure lines  My office door was closed  No one else was in the room  Clara Miller acknowledged consent and understanding of privacy and security of the telemedicine visit   I informed the patient that I have reviewed his record in Epic and presented the opportunity for him to ask any questions regarding the visit today  The patient agreed to participate  It was my intent to perform this visit via video technology but the patient was not able to do a video connection so the visit was completed via audio telephone only  Subjective:   Jessica Garcia is a 67 y o  male who has been screened for COVID-19  Symptom change since last report: resolving  Patient is currently asymptomatic  Patient denies fever, chills, fatigue, malaise, congestion, rhinorrhea, sore throat, anosmia, loss of taste, cough, shortness of breath, chest tightness, abdominal pain, nausea, vomiting, diarrhea, myalgias and headaches  Jeremy Santana has been staying home and has isolated themselves in his home  He is taking care to not share personal items and is cleaning all surfaces that are touched often, like counters, tabletops, and doorknobs using household cleaning sprays or wipes  He is wearing a mask when he leaves his room  Date of symptom onset: 4/9/2021    HPI:  This is a 59-year-old gentleman that presents via virtual telephone visit for follow-up of clinically diagnosed COVID-19 infection  He has not been able to get to a testing center for test, but he does have symptoms that developed 5 days after significant exposure to someone that was positive  Earlier this week he did have some significant symptoms fever, headache, and severe fatigue  He slept most of Wednesday and felt better Thursday and today as if nothing was wrong  No results found for: Franca Kline, [address]  No past medical history on file    Past Surgical History:   Procedure Laterality Date    HERNIA REPAIR       Current Outpatient Medications   Medication Sig Dispense Refill    amLODIPine (NORVASC) 5 mg tablet take 1 tablet by mouth once daily 90 tablet 3    Ascorbic Acid (VITAMIN C) 1000 MG tablet Take 1,000 mg by mouth daily      aspirin 325 mg tablet Take 325 mg by mouth daily      atorvastatin (LIPITOR) 40 mg tablet take 1 tablet by mouth once daily 90 tablet 3    benazepril (LOTENSIN) 40 MG tablet take 1 tablet by mouth once daily 90 tablet 3    Cholecalciferol (VITAMIN D3) 5000 units CAPS Take 1,000 Units by mouth daily      cyanocobalamin (VITAMIN B-12) 100 mcg tablet Take by mouth daily      folic acid (FOLVITE) 1 mg tablet Take by mouth daily      metoprolol succinate (TOPROL-XL) 100 mg 24 hr tablet take 1 tablet by mouth once daily 90 tablet 3    multivitamin (THERAGRAN) TABS Take 1 tablet by mouth daily      Omega-3 Fatty Acids (FISH OIL) 1,000 mg Take 1,000 mg by mouth daily      vitamin E 100 UNIT capsule Take 100 Units by mouth daily       No current facility-administered medications for this visit  No Known Allergies    Review of Systems   Constitutional: Negative for chills, fatigue and fever  HENT: Negative for congestion, rhinorrhea and sore throat  Respiratory: Negative for cough, chest tightness and shortness of breath  Gastrointestinal: Negative for abdominal pain, diarrhea, nausea and vomiting  Musculoskeletal: Negative for myalgias  Neurological: Negative for headaches  Objective: There were no vitals filed for this visit  Physical Exam  Constitutional:       General: He is not in acute distress  Pulmonary:      Effort: No respiratory distress  Neurological:      Mental Status: He is alert and oriented to person, place, and time  Psychiatric:         Mood and Affect: Mood normal          Thought Content: Thought content normal          Judgment: Judgment normal        VIRTUAL VISIT DISCLAIMER    Nicole Caro acknowledges that he has consented to an online visit or consultation  He understands that the online visit is based solely on information provided by him, and that, in the absence of a face-to-face physical evaluation by the physician, the diagnosis he receives is both limited and provisional in terms of accuracy and completeness   This is not intended to replace a full medical face-to-face evaluation by the physician  Jessica Garcia understands and accepts these terms

## 2021-04-21 ENCOUNTER — OFFICE VISIT (OUTPATIENT)
Dept: FAMILY MEDICINE CLINIC | Facility: CLINIC | Age: 76
End: 2021-04-21
Payer: COMMERCIAL

## 2021-04-21 VITALS
SYSTOLIC BLOOD PRESSURE: 128 MMHG | OXYGEN SATURATION: 99 % | HEART RATE: 66 BPM | HEIGHT: 67 IN | WEIGHT: 167 LBS | DIASTOLIC BLOOD PRESSURE: 76 MMHG | BODY MASS INDEX: 26.21 KG/M2 | RESPIRATION RATE: 16 BRPM | TEMPERATURE: 97.7 F

## 2021-04-21 DIAGNOSIS — I63.9 CEREBRAL INFARCTION, UNSPECIFIED MECHANISM (HCC): Primary | ICD-10-CM

## 2021-04-21 DIAGNOSIS — I10 ESSENTIAL HYPERTENSION: ICD-10-CM

## 2021-04-21 DIAGNOSIS — E78.2 MIXED HYPERLIPIDEMIA: ICD-10-CM

## 2021-04-21 PROCEDURE — 1036F TOBACCO NON-USER: CPT | Performed by: PHYSICIAN ASSISTANT

## 2021-04-21 PROCEDURE — 3074F SYST BP LT 130 MM HG: CPT | Performed by: PHYSICIAN ASSISTANT

## 2021-04-21 PROCEDURE — 1160F RVW MEDS BY RX/DR IN RCRD: CPT | Performed by: PHYSICIAN ASSISTANT

## 2021-04-21 PROCEDURE — 3078F DIAST BP <80 MM HG: CPT | Performed by: PHYSICIAN ASSISTANT

## 2021-04-21 PROCEDURE — 99214 OFFICE O/P EST MOD 30 MIN: CPT | Performed by: PHYSICIAN ASSISTANT

## 2021-04-21 NOTE — PROGRESS NOTES
Assessment and Plan:  Patient Instructions   Assessment/plan:  1  COVID-19 infection-patient has just completed quarantine and he has some residual fatigue present  He will try to increase physical activity next week gradually  Follow-up in the next 4-6 weeks if he does not feel that he is improving  2  Benign essential hypertension-presently stable with benazepril and amlodipine and metoprolol  No medication changes  3  Mixed hyperlipidemia-stable on atorvastatin 40 mg daily  4  Cerebral infarction-presently stable, continue regular aspirin daily  Blood work was reviewed in detail with patient  Recommend follow-up in 6 months with repeat labs  Problem List Items Addressed This Visit        Cardiovascular and Mediastinum    Hypertension    Relevant Orders    CBC and differential    Comprehensive metabolic panel    Hemoglobin A1C    Lipid Panel with Direct LDL reflex    TSH, 3rd generation       Nervous and Auditory    Cerebral infarction ([facility] [address] ) - Primary    Relevant Orders    CBC and differential    Comprehensive metabolic panel    Hemoglobin A1C    Lipid Panel with Direct LDL reflex    TSH, 3rd generation       Other    Hyperlipidemia    Relevant Orders    CBC and differential    Comprehensive metabolic panel    Hemoglobin A1C    Lipid Panel with Direct LDL reflex    TSH, 3rd generation                 Diagnoses and all orders for this visit:    Cerebral infarction, unspecified mechanism (HCC)  -     CBC and differential; Future  -     Comprehensive metabolic panel; Future  -     Hemoglobin A1C; Future  -     Lipid Panel with Direct LDL reflex; Future  -     TSH, 3rd generation; Future    Essential hypertension  -     CBC and differential; Future  -     Comprehensive metabolic panel; Future  -     Hemoglobin A1C; Future  -     Lipid Panel with Direct LDL reflex; Future  -     TSH, 3rd generation;  Future    Mixed hyperlipidemia  -     CBC and differential; Future  -     Comprehensive metabolic panel; Future  -     Hemoglobin A1C; Future  -     Lipid Panel with Direct LDL reflex; Future  -     TSH, 3rd generation; Future              Subjective:      Patient ID: Jodi Perdomo is a 67 y o  male  CC:    Chief Complaint   Patient presents with    Follow-up     pt here for a follow up from having covid, pt states he is still very fatigue  R Sean       HPI:    HPI:  This is a 70-year-old gentleman that presents to the office for follow-up of chronic health conditions  He was recently diagnosed with COVID about 2 weeks ago and has finished quarantine  He is still with some residual fatigue after his infection  He does have a history of hypertension, hyperlipidemia, and previous cerebral infarction  He has had labs which were completed in late March prior to him developing COVID infection  He does plan on returning to the gym this coming Monday  He feels that he has just been so inactive for a long period of time that it is affecting him  He is not feeling short of breath or having any wheezing in the chest and he denies any swelling of the extremities or symptoms of shortness of breath nighttime  The following portions of the patient's history were reviewed and updated as appropriate: allergies, current medications, past family history, past medical history, past social history, past surgical history and problem list       Review of Systems   Constitutional: Negative for chills, fatigue and fever  HENT: Negative for congestion, ear pain and sinus pressure  Eyes: Negative for visual disturbance  Respiratory: Negative for cough, chest tightness and shortness of breath  Cardiovascular: Negative for chest pain and palpitations  Gastrointestinal: Negative for diarrhea, nausea and vomiting  Endocrine: Negative for polyuria  Genitourinary: Negative for dysuria and frequency  Musculoskeletal: Negative for arthralgias and myalgias  Skin: Negative for pallor and rash     Neurological: Negative for dizziness, weakness, light-headedness, numbness and headaches  Psychiatric/Behavioral: Negative for agitation, behavioral problems and sleep disturbance  All other systems reviewed and are negative  Data to review:       Objective:    Vitals:    04/21/21 1354   BP: 128/76   BP Location: Left arm   Patient Position: Sitting   Cuff Size: Large   Pulse: 66   Resp: 16   Temp: 97 7 °F (36 5 °C)   TempSrc: Temporal   SpO2: 99%   Weight: 75 8 kg (167 lb)   Height: 5' 7" (1 702 m)        Physical Exam  Constitutional:       General: He is not in acute distress  Appearance: He is well-developed  HENT:      Head: Normocephalic and atraumatic  Right Ear: Tympanic membrane normal       Left Ear: Tympanic membrane normal    Eyes:      Conjunctiva/sclera: Conjunctivae normal    Neck:      Musculoskeletal: Normal range of motion  Cardiovascular:      Rate and Rhythm: Normal rate and regular rhythm  Pulmonary:      Effort: Pulmonary effort is normal    Abdominal:      General: Abdomen is flat  Bowel sounds are normal  There is no distension  Palpations: Abdomen is soft  There is no mass  Musculoskeletal: Normal range of motion  Skin:     General: Skin is warm  Findings: No rash  Neurological:      Mental Status: He is alert and oriented to person, place, and time  Psychiatric:         Mood and Affect: Mood normal            BMI Counseling: Body mass index is 26 16 kg/m²  The BMI is above normal  Nutrition recommendations include decreasing portion sizes  Exercise recommendations include exercising 3-5 times per week

## 2021-04-21 NOTE — PATIENT INSTRUCTIONS
Assessment/plan:  1  COVID-19 infection-patient has just completed quarantine and he has some residual fatigue present  He will try to increase physical activity next week gradually  Follow-up in the next 4-6 weeks if he does not feel that he is improving  2  Benign essential hypertension-presently stable with benazepril and amlodipine and metoprolol  No medication changes  3  Mixed hyperlipidemia-stable on atorvastatin 40 mg daily  4  Cerebral infarction-presently stable, continue regular aspirin daily  Blood work was reviewed in detail with patient  Recommend follow-up in 6 months with repeat labs

## 2021-10-05 LAB
ALBUMIN SERPL-MCNC: 4.1 G/DL (ref 3.6–5.1)
ALBUMIN/GLOB SERPL: 1.4 (CALC) (ref 1–2.5)
ALP SERPL-CCNC: 67 U/L (ref 35–144)
ALT SERPL-CCNC: 21 U/L (ref 9–46)
AST SERPL-CCNC: 16 U/L (ref 10–35)
BASOPHILS # BLD AUTO: 38 CELLS/UL (ref 0–200)
BASOPHILS NFR BLD AUTO: 0.6 %
BILIRUB SERPL-MCNC: 0.5 MG/DL (ref 0.2–1.2)
BUN SERPL-MCNC: 17 MG/DL (ref 7–25)
BUN/CREAT SERPL: ABNORMAL (CALC) (ref 6–22)
CALCIUM SERPL-MCNC: 9.3 MG/DL (ref 8.6–10.3)
CHLORIDE SERPL-SCNC: 104 MMOL/L (ref 98–110)
CHOLEST SERPL-MCNC: 158 MG/DL
CHOLEST/HDLC SERPL: 1.9 (CALC)
CO2 SERPL-SCNC: 31 MMOL/L (ref 20–32)
CREAT SERPL-MCNC: 1.1 MG/DL (ref 0.7–1.18)
EOSINOPHIL # BLD AUTO: 230 CELLS/UL (ref 15–500)
EOSINOPHIL NFR BLD AUTO: 3.6 %
ERYTHROCYTE [DISTWIDTH] IN BLOOD BY AUTOMATED COUNT: 11.8 % (ref 11–15)
GLOBULIN SER CALC-MCNC: 2.9 G/DL (CALC) (ref 1.9–3.7)
GLUCOSE SERPL-MCNC: 112 MG/DL (ref 65–99)
HBA1C MFR BLD: 5.7 % OF TOTAL HGB
HCT VFR BLD AUTO: 41.4 % (ref 38.5–50)
HDLC SERPL-MCNC: 85 MG/DL
HGB BLD-MCNC: 14.1 G/DL (ref 13.2–17.1)
LDLC SERPL CALC-MCNC: 59 MG/DL (CALC)
LYMPHOCYTES # BLD AUTO: 1005 CELLS/UL (ref 850–3900)
LYMPHOCYTES NFR BLD AUTO: 15.7 %
MCH RBC QN AUTO: 34.1 PG (ref 27–33)
MCHC RBC AUTO-ENTMCNC: 34.1 G/DL (ref 32–36)
MCV RBC AUTO: 100.2 FL (ref 80–100)
MONOCYTES # BLD AUTO: 838 CELLS/UL (ref 200–950)
MONOCYTES NFR BLD AUTO: 13.1 %
NEUTROPHILS # BLD AUTO: 4288 CELLS/UL (ref 1500–7800)
NEUTROPHILS NFR BLD AUTO: 67 %
NONHDLC SERPL-MCNC: 73 MG/DL (CALC)
PLATELET # BLD AUTO: 212 THOUSAND/UL (ref 140–400)
PMV BLD REES-ECKER: 9.4 FL (ref 7.5–12.5)
POTASSIUM SERPL-SCNC: 4.6 MMOL/L (ref 3.5–5.3)
PROT SERPL-MCNC: 7 G/DL (ref 6.1–8.1)
RBC # BLD AUTO: 4.13 MILLION/UL (ref 4.2–5.8)
SL AMB EGFR AFRICAN AMERICAN: 75 ML/MIN/1.73M2
SL AMB EGFR NON AFRICAN AMERICAN: 64 ML/MIN/1.73M2
SODIUM SERPL-SCNC: 142 MMOL/L (ref 135–146)
TRIGL SERPL-MCNC: 67 MG/DL
TSH SERPL-ACNC: 1.3 MIU/L (ref 0.4–4.5)
WBC # BLD AUTO: 6.4 THOUSAND/UL (ref 3.8–10.8)

## 2021-10-08 DIAGNOSIS — I10 BENIGN ESSENTIAL HYPERTENSION: ICD-10-CM

## 2021-10-08 RX ORDER — METOPROLOL SUCCINATE 100 MG/1
TABLET, EXTENDED RELEASE ORAL
Qty: 90 TABLET | Refills: 3 | Status: SHIPPED | OUTPATIENT
Start: 2021-10-08 | End: 2022-06-23 | Stop reason: DRUGHIGH

## 2021-10-08 RX ORDER — AMLODIPINE BESYLATE 5 MG/1
TABLET ORAL
Qty: 90 TABLET | Refills: 3 | Status: SHIPPED | OUTPATIENT
Start: 2021-10-08 | End: 2022-06-02 | Stop reason: ALTCHOICE

## 2021-10-08 RX ORDER — BENAZEPRIL HYDROCHLORIDE 40 MG/1
TABLET, FILM COATED ORAL
Qty: 90 TABLET | Refills: 3 | Status: SHIPPED | OUTPATIENT
Start: 2021-10-08 | End: 2022-02-09 | Stop reason: HOSPADM

## 2021-10-19 ENCOUNTER — OFFICE VISIT (OUTPATIENT)
Dept: FAMILY MEDICINE CLINIC | Facility: CLINIC | Age: 76
End: 2021-10-19
Payer: COMMERCIAL

## 2021-10-19 VITALS
TEMPERATURE: 97.4 F | SYSTOLIC BLOOD PRESSURE: 144 MMHG | BODY MASS INDEX: 25.43 KG/M2 | HEART RATE: 80 BPM | HEIGHT: 67 IN | WEIGHT: 162 LBS | DIASTOLIC BLOOD PRESSURE: 86 MMHG

## 2021-10-19 DIAGNOSIS — I10 PRIMARY HYPERTENSION: ICD-10-CM

## 2021-10-19 DIAGNOSIS — R47.01 EXPRESSIVE APHASIA: ICD-10-CM

## 2021-10-19 DIAGNOSIS — E78.2 MIXED HYPERLIPIDEMIA: ICD-10-CM

## 2021-10-19 DIAGNOSIS — I65.29 STENOSIS OF CAROTID ARTERY, UNSPECIFIED LATERALITY: ICD-10-CM

## 2021-10-19 DIAGNOSIS — R73.9 HYPERGLYCEMIA: ICD-10-CM

## 2021-10-19 DIAGNOSIS — I63.9 CEREBRAL INFARCTION, UNSPECIFIED MECHANISM (HCC): Primary | ICD-10-CM

## 2021-10-19 PROCEDURE — 3077F SYST BP >= 140 MM HG: CPT | Performed by: PHYSICIAN ASSISTANT

## 2021-10-19 PROCEDURE — 1036F TOBACCO NON-USER: CPT | Performed by: PHYSICIAN ASSISTANT

## 2021-10-19 PROCEDURE — 3725F SCREEN DEPRESSION PERFORMED: CPT | Performed by: PHYSICIAN ASSISTANT

## 2021-10-19 PROCEDURE — T1015 CLINIC SERVICE: HCPCS | Performed by: PHYSICIAN ASSISTANT

## 2021-10-19 PROCEDURE — 3079F DIAST BP 80-89 MM HG: CPT | Performed by: PHYSICIAN ASSISTANT

## 2021-10-19 PROCEDURE — 1160F RVW MEDS BY RX/DR IN RCRD: CPT | Performed by: PHYSICIAN ASSISTANT

## 2021-10-19 PROCEDURE — 99214 OFFICE O/P EST MOD 30 MIN: CPT | Performed by: PHYSICIAN ASSISTANT

## 2021-11-02 DIAGNOSIS — E78.2 MIXED HYPERLIPIDEMIA: ICD-10-CM

## 2021-11-02 RX ORDER — ATORVASTATIN CALCIUM 40 MG/1
TABLET, FILM COATED ORAL
Qty: 90 TABLET | Refills: 3 | Status: SHIPPED | OUTPATIENT
Start: 2021-11-02

## 2021-12-16 ENCOUNTER — TELEPHONE (OUTPATIENT)
Dept: FAMILY MEDICINE CLINIC | Facility: CLINIC | Age: 76
End: 2021-12-16

## 2021-12-18 ENCOUNTER — TELEPHONE (OUTPATIENT)
Dept: FAMILY MEDICINE CLINIC | Facility: CLINIC | Age: 76
End: 2021-12-18

## 2021-12-18 DIAGNOSIS — R06.02 SHORTNESS OF BREATH: Primary | ICD-10-CM

## 2021-12-27 ENCOUNTER — APPOINTMENT (EMERGENCY)
Dept: RADIOLOGY | Facility: HOSPITAL | Age: 76
DRG: 871 | End: 2021-12-27
Payer: COMMERCIAL

## 2021-12-27 ENCOUNTER — HOSPITAL ENCOUNTER (INPATIENT)
Facility: HOSPITAL | Age: 76
LOS: 4 days | Discharge: HOME HEALTH | DRG: 871 | End: 2021-12-31
Attending: EMERGENCY MEDICINE | Admitting: HOSPITALIST
Payer: COMMERCIAL

## 2021-12-27 ENCOUNTER — APPOINTMENT (EMERGENCY)
Dept: CT IMAGING | Facility: HOSPITAL | Age: 76
DRG: 871 | End: 2021-12-27
Payer: COMMERCIAL

## 2021-12-27 DIAGNOSIS — J96.01 ACUTE RESPIRATORY FAILURE WITH HYPOXIA (HCC): ICD-10-CM

## 2021-12-27 DIAGNOSIS — R93.89 ABNORMAL CT SCAN: ICD-10-CM

## 2021-12-27 DIAGNOSIS — I26.99 PULMONARY EMBOLISM (HCC): ICD-10-CM

## 2021-12-27 DIAGNOSIS — J43.9 EMPHYSEMA LUNG (HCC): ICD-10-CM

## 2021-12-27 DIAGNOSIS — I26.94 MULTIPLE SUBSEGMENTAL PULMONARY EMBOLI WITHOUT ACUTE COR PULMONALE (HCC): ICD-10-CM

## 2021-12-27 DIAGNOSIS — R91.8 LUNG MASS: ICD-10-CM

## 2021-12-27 DIAGNOSIS — I47.2 NSVT (NONSUSTAINED VENTRICULAR TACHYCARDIA) (HCC): ICD-10-CM

## 2021-12-27 DIAGNOSIS — J18.9 PNEUMONIA: ICD-10-CM

## 2021-12-27 DIAGNOSIS — R09.02 HYPOXIA: Primary | ICD-10-CM

## 2021-12-27 DIAGNOSIS — R06.02 SOB (SHORTNESS OF BREATH): ICD-10-CM

## 2021-12-27 DIAGNOSIS — J18.9 COMMUNITY ACQUIRED PNEUMONIA: ICD-10-CM

## 2021-12-27 PROBLEM — N17.9 AKI (ACUTE KIDNEY INJURY) (HCC): Status: ACTIVE | Noted: 2021-12-27

## 2021-12-27 PROBLEM — A41.9 SEPSIS (HCC): Status: ACTIVE | Noted: 2021-12-27

## 2021-12-27 PROBLEM — R77.8 ELEVATED TROPONIN: Status: ACTIVE | Noted: 2021-12-27

## 2021-12-27 PROBLEM — I77.810 AORTIC ECTASIA, THORACIC (HCC): Status: ACTIVE | Noted: 2021-12-27

## 2021-12-27 LAB
2HR DELTA HS TROPONIN: 21 NG/L
4HR DELTA HS TROPONIN: 19 NG/L
ALBUMIN SERPL BCP-MCNC: 3 G/DL (ref 3.5–5)
ALP SERPL-CCNC: 94 U/L (ref 46–116)
ALT SERPL W P-5'-P-CCNC: 20 U/L (ref 12–78)
ANION GAP SERPL CALCULATED.3IONS-SCNC: 11 MMOL/L (ref 4–13)
APTT PPP: 29 SECONDS (ref 23–37)
AST SERPL W P-5'-P-CCNC: 14 U/L (ref 5–45)
ATRIAL RATE: 88 BPM
BASOPHILS # BLD AUTO: 0.04 THOUSANDS/ΜL (ref 0–0.1)
BASOPHILS NFR BLD AUTO: 0 % (ref 0–1)
BILIRUB SERPL-MCNC: 0.5 MG/DL (ref 0.2–1)
BUN SERPL-MCNC: 32 MG/DL (ref 5–25)
CALCIUM ALBUM COR SERPL-MCNC: 9.6 MG/DL (ref 8.3–10.1)
CALCIUM SERPL-MCNC: 8.8 MG/DL (ref 8.3–10.1)
CARDIAC TROPONIN I PNL SERPL HS: 105 NG/L
CARDIAC TROPONIN I PNL SERPL HS: 107 NG/L
CARDIAC TROPONIN I PNL SERPL HS: 86 NG/L
CHLORIDE SERPL-SCNC: 101 MMOL/L (ref 100–108)
CK SERPL-CCNC: 111 U/L (ref 39–308)
CO2 SERPL-SCNC: 24 MMOL/L (ref 21–32)
CREAT SERPL-MCNC: 1.5 MG/DL (ref 0.6–1.3)
CRP SERPL QL: 88.8 MG/L
EOSINOPHIL # BLD AUTO: 0 THOUSAND/ΜL (ref 0–0.61)
EOSINOPHIL NFR BLD AUTO: 0 % (ref 0–6)
ERYTHROCYTE [DISTWIDTH] IN BLOOD BY AUTOMATED COUNT: 13 % (ref 11.6–15.1)
FLUAV RNA RESP QL NAA+PROBE: NEGATIVE
FLUBV RNA RESP QL NAA+PROBE: NEGATIVE
GFR SERPL CREATININE-BSD FRML MDRD: 44 ML/MIN/1.73SQ M
GLUCOSE SERPL-MCNC: 136 MG/DL (ref 65–140)
HCT VFR BLD AUTO: 43.5 % (ref 36.5–49.3)
HGB BLD-MCNC: 14.4 G/DL (ref 12–17)
IMM GRANULOCYTES # BLD AUTO: 0.08 THOUSAND/UL (ref 0–0.2)
IMM GRANULOCYTES NFR BLD AUTO: 1 % (ref 0–2)
INR PPP: 1.13 (ref 0.84–1.19)
LACTATE SERPL-SCNC: 1.8 MMOL/L (ref 0.5–2)
LYMPHOCYTES # BLD AUTO: 0.74 THOUSANDS/ΜL (ref 0.6–4.47)
LYMPHOCYTES NFR BLD AUTO: 7 % (ref 14–44)
MAGNESIUM SERPL-MCNC: 2 MG/DL (ref 1.6–2.6)
MCH RBC QN AUTO: 34.5 PG (ref 26.8–34.3)
MCHC RBC AUTO-ENTMCNC: 33.1 G/DL (ref 31.4–37.4)
MCV RBC AUTO: 104 FL (ref 82–98)
MONOCYTES # BLD AUTO: 1.25 THOUSAND/ΜL (ref 0.17–1.22)
MONOCYTES NFR BLD AUTO: 12 % (ref 4–12)
NEUTROPHILS # BLD AUTO: 8.59 THOUSANDS/ΜL (ref 1.85–7.62)
NEUTS SEG NFR BLD AUTO: 80 % (ref 43–75)
NRBC BLD AUTO-RTO: 0 /100 WBCS
NT-PROBNP SERPL-MCNC: 1220 PG/ML
P AXIS: 43 DEGREES
PLATELET # BLD AUTO: 231 THOUSANDS/UL (ref 149–390)
PMV BLD AUTO: 9.2 FL (ref 8.9–12.7)
POTASSIUM SERPL-SCNC: 4.6 MMOL/L (ref 3.5–5.3)
PR INTERVAL: 140 MS
PROCALCITONIN SERPL-MCNC: 0.05 NG/ML
PROT SERPL-MCNC: 7.7 G/DL (ref 6.4–8.2)
PROTHROMBIN TIME: 14.1 SECONDS (ref 11.6–14.5)
QRS AXIS: 71 DEGREES
QRSD INTERVAL: 84 MS
QT INTERVAL: 372 MS
QTC INTERVAL: 450 MS
RBC # BLD AUTO: 4.17 MILLION/UL (ref 3.88–5.62)
RSV RNA RESP QL NAA+PROBE: NEGATIVE
SARS-COV-2 RNA RESP QL NAA+PROBE: NEGATIVE
SODIUM SERPL-SCNC: 136 MMOL/L (ref 136–145)
T WAVE AXIS: -3 DEGREES
VENTRICULAR RATE: 88 BPM
WBC # BLD AUTO: 10.7 THOUSAND/UL (ref 4.31–10.16)

## 2021-12-27 PROCEDURE — 96365 THER/PROPH/DIAG IV INF INIT: CPT

## 2021-12-27 PROCEDURE — 93005 ELECTROCARDIOGRAM TRACING: CPT

## 2021-12-27 PROCEDURE — G1004 CDSM NDSC: HCPCS

## 2021-12-27 PROCEDURE — 84484 ASSAY OF TROPONIN QUANT: CPT | Performed by: EMERGENCY MEDICINE

## 2021-12-27 PROCEDURE — 84145 PROCALCITONIN (PCT): CPT | Performed by: EMERGENCY MEDICINE

## 2021-12-27 PROCEDURE — 80053 COMPREHEN METABOLIC PANEL: CPT | Performed by: EMERGENCY MEDICINE

## 2021-12-27 PROCEDURE — 99285 EMERGENCY DEPT VISIT HI MDM: CPT | Performed by: EMERGENCY MEDICINE

## 2021-12-27 PROCEDURE — 96375 TX/PRO/DX INJ NEW DRUG ADDON: CPT

## 2021-12-27 PROCEDURE — 87040 BLOOD CULTURE FOR BACTERIA: CPT | Performed by: EMERGENCY MEDICINE

## 2021-12-27 PROCEDURE — 83735 ASSAY OF MAGNESIUM: CPT | Performed by: EMERGENCY MEDICINE

## 2021-12-27 PROCEDURE — 82955 ASSAY OF G6PD ENZYME: CPT | Performed by: EMERGENCY MEDICINE

## 2021-12-27 PROCEDURE — 86140 C-REACTIVE PROTEIN: CPT | Performed by: EMERGENCY MEDICINE

## 2021-12-27 PROCEDURE — 99222 1ST HOSP IP/OBS MODERATE 55: CPT | Performed by: HOSPITALIST

## 2021-12-27 PROCEDURE — 36415 COLL VENOUS BLD VENIPUNCTURE: CPT | Performed by: EMERGENCY MEDICINE

## 2021-12-27 PROCEDURE — 83880 ASSAY OF NATRIURETIC PEPTIDE: CPT | Performed by: EMERGENCY MEDICINE

## 2021-12-27 PROCEDURE — 94640 AIRWAY INHALATION TREATMENT: CPT

## 2021-12-27 PROCEDURE — 83605 ASSAY OF LACTIC ACID: CPT | Performed by: EMERGENCY MEDICINE

## 2021-12-27 PROCEDURE — 71275 CT ANGIOGRAPHY CHEST: CPT

## 2021-12-27 PROCEDURE — 93010 ELECTROCARDIOGRAM REPORT: CPT | Performed by: INTERNAL MEDICINE

## 2021-12-27 PROCEDURE — 71045 X-RAY EXAM CHEST 1 VIEW: CPT

## 2021-12-27 PROCEDURE — 85025 COMPLETE CBC W/AUTO DIFF WBC: CPT | Performed by: EMERGENCY MEDICINE

## 2021-12-27 PROCEDURE — 82550 ASSAY OF CK (CPK): CPT | Performed by: EMERGENCY MEDICINE

## 2021-12-27 PROCEDURE — 99285 EMERGENCY DEPT VISIT HI MDM: CPT

## 2021-12-27 PROCEDURE — 0241U HB NFCT DS VIR RESP RNA 4 TRGT: CPT | Performed by: EMERGENCY MEDICINE

## 2021-12-27 PROCEDURE — 85730 THROMBOPLASTIN TIME PARTIAL: CPT | Performed by: EMERGENCY MEDICINE

## 2021-12-27 PROCEDURE — 85610 PROTHROMBIN TIME: CPT | Performed by: EMERGENCY MEDICINE

## 2021-12-27 RX ORDER — HEPARIN SODIUM 1000 [USP'U]/ML
5600 INJECTION, SOLUTION INTRAVENOUS; SUBCUTANEOUS
Status: DISCONTINUED | OUTPATIENT
Start: 2021-12-27 | End: 2021-12-29

## 2021-12-27 RX ORDER — HEPARIN SODIUM 1000 [USP'U]/ML
2800 INJECTION, SOLUTION INTRAVENOUS; SUBCUTANEOUS
Status: DISCONTINUED | OUTPATIENT
Start: 2021-12-27 | End: 2021-12-29

## 2021-12-27 RX ORDER — SODIUM CHLORIDE FOR INHALATION 0.9 %
3 VIAL, NEBULIZER (ML) INHALATION ONCE
Status: COMPLETED | OUTPATIENT
Start: 2021-12-27 | End: 2021-12-27

## 2021-12-27 RX ORDER — HEPARIN SODIUM 1000 [USP'U]/ML
5600 INJECTION, SOLUTION INTRAVENOUS; SUBCUTANEOUS ONCE
Status: COMPLETED | OUTPATIENT
Start: 2021-12-27 | End: 2021-12-27

## 2021-12-27 RX ORDER — HEPARIN SODIUM 10000 [USP'U]/100ML
3-30 INJECTION, SOLUTION INTRAVENOUS
Status: DISCONTINUED | OUTPATIENT
Start: 2021-12-27 | End: 2021-12-29

## 2021-12-27 RX ORDER — METOPROLOL SUCCINATE 50 MG/1
100 TABLET, EXTENDED RELEASE ORAL DAILY
Status: DISCONTINUED | OUTPATIENT
Start: 2021-12-28 | End: 2021-12-31 | Stop reason: HOSPADM

## 2021-12-27 RX ORDER — AMLODIPINE BESYLATE 5 MG/1
5 TABLET ORAL DAILY
Status: DISCONTINUED | OUTPATIENT
Start: 2021-12-28 | End: 2021-12-28

## 2021-12-27 RX ORDER — AZITHROMYCIN 250 MG/1
500 TABLET, FILM COATED ORAL EVERY 24 HOURS
Status: COMPLETED | OUTPATIENT
Start: 2021-12-27 | End: 2021-12-29

## 2021-12-27 RX ORDER — FLUTICASONE FUROATE AND VILANTEROL 200; 25 UG/1; UG/1
1 POWDER RESPIRATORY (INHALATION) DAILY
Status: DISCONTINUED | OUTPATIENT
Start: 2021-12-28 | End: 2021-12-28

## 2021-12-27 RX ORDER — ASPIRIN 81 MG/1
324 TABLET, CHEWABLE ORAL ONCE
Status: DISCONTINUED | OUTPATIENT
Start: 2021-12-27 | End: 2021-12-27

## 2021-12-27 RX ORDER — METHYLPREDNISOLONE SODIUM SUCCINATE 40 MG/ML
20 INJECTION, POWDER, LYOPHILIZED, FOR SOLUTION INTRAMUSCULAR; INTRAVENOUS EVERY 8 HOURS
Status: DISCONTINUED | OUTPATIENT
Start: 2021-12-27 | End: 2021-12-29

## 2021-12-27 RX ORDER — FOLIC ACID 1 MG/1
1 TABLET ORAL DAILY
Status: DISCONTINUED | OUTPATIENT
Start: 2021-12-28 | End: 2021-12-31 | Stop reason: HOSPADM

## 2021-12-27 RX ORDER — GUAIFENESIN 600 MG
600 TABLET, EXTENDED RELEASE 12 HR ORAL 2 TIMES DAILY
Status: DISCONTINUED | OUTPATIENT
Start: 2021-12-27 | End: 2021-12-31 | Stop reason: HOSPADM

## 2021-12-27 RX ORDER — ATORVASTATIN CALCIUM 40 MG/1
40 TABLET, FILM COATED ORAL EVERY EVENING
Status: DISCONTINUED | OUTPATIENT
Start: 2021-12-27 | End: 2021-12-31 | Stop reason: HOSPADM

## 2021-12-27 RX ORDER — LEVALBUTEROL 1.25 MG/.5ML
1.25 SOLUTION, CONCENTRATE RESPIRATORY (INHALATION)
Status: DISCONTINUED | OUTPATIENT
Start: 2021-12-27 | End: 2021-12-31 | Stop reason: HOSPADM

## 2021-12-27 RX ORDER — ACETAMINOPHEN 325 MG/1
650 TABLET ORAL EVERY 6 HOURS PRN
Status: DISCONTINUED | OUTPATIENT
Start: 2021-12-27 | End: 2021-12-31 | Stop reason: HOSPADM

## 2021-12-27 RX ORDER — SODIUM CHLORIDE FOR INHALATION 0.9 %
3 VIAL, NEBULIZER (ML) INHALATION
Status: DISCONTINUED | OUTPATIENT
Start: 2021-12-27 | End: 2021-12-28

## 2021-12-27 RX ORDER — SODIUM CHLORIDE 9 MG/ML
75 INJECTION, SOLUTION INTRAVENOUS CONTINUOUS
Status: DISCONTINUED | OUTPATIENT
Start: 2021-12-27 | End: 2021-12-28

## 2021-12-27 RX ORDER — SODIUM CHLORIDE 9 MG/ML
3 INJECTION INTRAVENOUS
Status: DISCONTINUED | OUTPATIENT
Start: 2021-12-27 | End: 2021-12-31 | Stop reason: HOSPADM

## 2021-12-27 RX ORDER — DEXAMETHASONE SODIUM PHOSPHATE 4 MG/ML
10 INJECTION, SOLUTION INTRA-ARTICULAR; INTRALESIONAL; INTRAMUSCULAR; INTRAVENOUS; SOFT TISSUE ONCE
Status: COMPLETED | OUTPATIENT
Start: 2021-12-27 | End: 2021-12-27

## 2021-12-27 RX ADMIN — ISODIUM CHLORIDE 3 ML: 0.03 SOLUTION RESPIRATORY (INHALATION) at 15:09

## 2021-12-27 RX ADMIN — AZITHROMYCIN MONOHYDRATE 500 MG: 250 TABLET ORAL at 22:52

## 2021-12-27 RX ADMIN — ALBUTEROL SULFATE 10 MG: 2.5 SOLUTION RESPIRATORY (INHALATION) at 15:09

## 2021-12-27 RX ADMIN — ATORVASTATIN CALCIUM 40 MG: 40 TABLET, FILM COATED ORAL at 22:52

## 2021-12-27 RX ADMIN — IODIXANOL 85 ML: 320 INJECTION, SOLUTION INTRAVASCULAR at 16:49

## 2021-12-27 RX ADMIN — DEXAMETHASONE SODIUM PHOSPHATE 10 MG: 4 INJECTION, SOLUTION INTRAMUSCULAR; INTRAVENOUS at 15:05

## 2021-12-27 RX ADMIN — IPRATROPIUM BROMIDE 1 MG: 0.5 SOLUTION RESPIRATORY (INHALATION) at 15:09

## 2021-12-27 RX ADMIN — CEFTRIAXONE SODIUM 1000 MG: 10 INJECTION, POWDER, FOR SOLUTION INTRAVENOUS at 18:20

## 2021-12-27 RX ADMIN — METHYLPREDNISOLONE SODIUM SUCCINATE 20 MG: 40 INJECTION, POWDER, FOR SOLUTION INTRAMUSCULAR; INTRAVENOUS at 22:21

## 2021-12-27 RX ADMIN — HEPARIN SODIUM 18 UNITS/KG/HR: 10000 INJECTION, SOLUTION INTRAVENOUS at 22:24

## 2021-12-27 RX ADMIN — SODIUM CHLORIDE 75 ML/HR: 0.9 INJECTION, SOLUTION INTRAVENOUS at 22:42

## 2021-12-27 RX ADMIN — GUAIFENESIN 600 MG: 600 TABLET, EXTENDED RELEASE ORAL at 22:16

## 2021-12-27 RX ADMIN — HEPARIN SODIUM 5600 UNITS: 1000 INJECTION INTRAVENOUS; SUBCUTANEOUS at 22:16

## 2021-12-27 RX ADMIN — HEPARIN SODIUM 5600 UNITS: 1000 INJECTION INTRAVENOUS; SUBCUTANEOUS at 23:15

## 2021-12-27 NOTE — ED NOTES
Pt placed in triage room 3, placed on 15L nonrebreather, sats in 90s        Cuate Robertson RN  12/27/21 0889

## 2021-12-27 NOTE — ED PROVIDER NOTES
History  Chief Complaint   Patient presents with    Shortness of Breath     Pt has increased SOB for several weeks, dyspnea with exertion  Also reports weight loss  79-year-old male with history of hypertension, prior COVID-19 infection, former smoker, presents to the emergency department for shortness of breath  Patient reports he has had gradually worsening shortness of breath over the last 3 weeks  Also has a chronic cough is worse than usual and has lost about 10 lb over the last month  Denies fever, chills, cough, chest pain, n/v/d, abdominal pain, headache, any other complaints  Is not vaccinated against COVID-19  Prior to Admission Medications   Prescriptions Last Dose Informant Patient Reported? Taking? Ascorbic Acid (VITAMIN C) 1000 MG tablet  Self Yes No   Sig: Take 1,000 mg by mouth daily   Cholecalciferol (VITAMIN D3) 5000 units CAPS  Self Yes No   Sig: Take 1,000 Units by mouth daily   Omega-3 Fatty Acids (FISH OIL) 1,000 mg   Yes No   Sig: Take 1,000 mg by mouth daily   amLODIPine (NORVASC) 5 mg tablet   No No   Sig: take 1 tablet by mouth once daily   aspirin 325 mg tablet  Self Yes No   Sig: Take 325 mg by mouth daily   atorvastatin (LIPITOR) 40 mg tablet   No No   Sig: take 1 tablet by mouth once daily   benazepril (LOTENSIN) 40 MG tablet   No No   Sig: take 1 tablet by mouth once daily   cyanocobalamin (VITAMIN B-12) 100 mcg tablet  Self Yes No   Sig: Take by mouth daily   fluticasone-vilanterol (Breo Ellipta) 200-25 MCG/INH inhaler   No No   Sig: Inhale 1 puff daily Rinse mouth after use     folic acid (FOLVITE) 1 mg tablet  Self Yes No   Sig: Take by mouth daily   metoprolol succinate (TOPROL-XL) 100 mg 24 hr tablet   No No   Sig: take 1 tablet by mouth once daily   multivitamin (THERAGRAN) TABS  Self Yes No   Sig: Take 1 tablet by mouth daily   vitamin E 100 UNIT capsule  Self Yes No   Sig: Take 100 Units by mouth daily      Facility-Administered Medications: None       Past Medical History:   Diagnosis Date    Hypertension        Past Surgical History:   Procedure Laterality Date    HERNIA REPAIR         Family History   Problem Relation Age of Onset    Lung cancer Father      I have reviewed and agree with the history as documented  E-Cigarette/Vaping    E-Cigarette Use Never User      E-Cigarette/Vaping Substances     Social History     Tobacco Use    Smoking status: Former Smoker     Packs/day: 2 00     Years: 49 00     Pack years: 98 00     Quit date:      Years since quittin 9    Smokeless tobacco: Former User    Tobacco comment: No secondhand smoke exposure   Vaping Use    Vaping Use: Never used   Substance Use Topics    Alcohol use: Yes     Comment: Social drinker; Daily alcohol use per [employer]    Drug use: Not on file       Review of Systems   Constitutional: Positive for unexpected weight change  Negative for chills and fever  HENT: Negative  Negative for congestion and rhinorrhea  Eyes: Negative  Respiratory: Positive for cough and shortness of breath  Cardiovascular: Negative for chest pain and leg swelling  Gastrointestinal: Negative  Negative for abdominal distention, abdominal pain, diarrhea, nausea and vomiting  Musculoskeletal: Negative  Negative for back pain and neck pain  Skin: Negative  Negative for rash  Neurological: Negative  Negative for light-headedness and headaches  Hematological: Negative  All other systems reviewed and are negative  Physical Exam  Physical Exam  Vitals and nursing note reviewed  Constitutional:       General: He is not in acute distress  Appearance: He is well-developed  HENT:      Head: Normocephalic and atraumatic  Mouth/Throat:      Mouth: Mucous membranes are moist    Eyes:      Pupils: Pupils are equal, round, and reactive to light  Cardiovascular:      Rate and Rhythm: Normal rate and regular rhythm  Heart sounds: Normal heart sounds  No murmur heard    No friction rub  No gallop  Pulmonary:      Effort: Pulmonary effort is normal  Tachypnea (RR 28) present  No respiratory distress  Breath sounds: No stridor  Examination of the right-lower field reveals rales  Examination of the left-lower field reveals rales  Decreased breath sounds, wheezing and rales present  Comments: Decreased movement and expiratory wheezing bilaterally  Speaking in full sentences  Chest:      Chest wall: No tenderness  Abdominal:      Palpations: Abdomen is soft  Tenderness: There is no abdominal tenderness  There is no guarding or rebound  Musculoskeletal:         General: No swelling or tenderness  Normal range of motion  Cervical back: Normal range of motion and neck supple  Right lower leg: No edema  Left lower leg: No edema  Skin:     General: Skin is warm and dry  Capillary Refill: Capillary refill takes less than 2 seconds  Neurological:      Mental Status: He is alert and oriented to person, place, and time        Comments: Clear fluent speech         Vital Signs  ED Triage Vitals [12/27/21 1419]   Temperature Pulse Respirations Blood Pressure SpO2   97 9 °F (36 6 °C) 97 (!) 36 158/67 (S) (!) 52 %      Temp Source Heart Rate Source Patient Position - Orthostatic VS BP Location FiO2 (%)   Oral Monitor Sitting Right arm --      Pain Score       No Pain           Vitals:    12/27/21 1419 12/27/21 1913   BP: 158/67 135/63   Pulse: 97 88   Patient Position - Orthostatic VS: Sitting Lying         Visual Acuity      ED Medications  Medications   sodium chloride (PF) 0 9 % injection 3 mL (has no administration in time range)   azithromycin (ZITHROMAX) 500 mg in sodium chloride 0 9% 250mL IVPB 500 mg (has no administration in time range)   heparin (porcine) injection 5,600 Units (has no administration in time range)   heparin (porcine) 25,000 units in 0 45% NaCl 250 mL infusion (premix) (has no administration in time range)   heparin (porcine) injection 5,600 Units (has no administration in time range)   heparin (porcine) injection 2,800 Units (has no administration in time range)   albuterol inhalation solution 10 mg (10 mg Nebulization Given 12/27/21 1509)     And   ipratropium (ATROVENT) 0 02 % inhalation solution 1 mg (1 mg Nebulization Given 12/27/21 1509)     And   sodium chloride 0 9 % inhalation solution 3 mL (3 mL Nebulization Given 12/27/21 1509)   dexamethasone (DECADRON) injection 10 mg (10 mg Intravenous Given 12/27/21 1505)   iodixanol (VISIPAQUE) 320 MG/ML injection 85 mL (85 mL Intravenous Given 12/27/21 1649)   ceftriaxone (ROCEPHIN) 1 g/50 mL in dextrose IVPB ( Intravenous Restarted 12/27/21 1910)       Diagnostic Studies  Results Reviewed     Procedure Component Value Units Date/Time    Magnesium [[id number]]  (Normal) Collected: 12/27/21 1503    Lab Status: Final result Specimen: Blood from Arm, Right Updated: 12/27/21 2015     Magnesium 2 0 mg/dL     C-reactive protein [[id number]]  (Abnormal) Collected: 12/27/21 1503    Lab Status: Final result Specimen: Blood from Arm, Right Updated: 12/27/21 2015     CRP 88 8 mg/L     HS Troponin I 4hr [[id number]]  (Abnormal) Collected: 12/27/21 1908    Lab Status: Final result Specimen: Blood from Arm, Left Updated: 12/27/21 2004     hs TnI 4hr 105 ng/L      Delta 4hr hsTnI 19 ng/L     CK (with reflex to MB) [[id number]]  (Normal) Collected: 12/27/21 1503    Lab Status: Final result Specimen: Blood from Arm, Right Updated: 12/27/21 1957     Total  U/L     Ferritin [[id number]] Updated: 12/27/21 1943    Lab Status: No result Specimen: Blood from Arm, Left     Lactic acid, plasma [[id number]]  (Normal) Collected: 12/27/21 1852    Lab Status: Final result Specimen: Blood from Hand, Left Updated: 12/27/21 1928     LACTIC ACID 1 8 mmol/L     Narrative:      Result may be elevated if tourniquet was used during collection      APTT [[id number]]  (Normal) Collected: 12/27/21 6782    Lab Status: Final result Specimen: Blood from Hand, Left Updated: 12/27/21 1918     PTT 29 seconds     Protime-INR [[id number]]  (Normal) Collected: 12/27/21 1852    Lab Status: Final result Specimen: Blood from Hand, Left Updated: 12/27/21 1918     Protime 14 1 seconds      INR 1 13    Blood culture #2 [[id number]] Collected: 12/27/21 1852    Lab Status: In process Specimen: Blood from Hand, Left Updated: 12/27/21 1908    Blood culture #1 [[id number]] Collected: 12/27/21 1456    Lab Status: In process Specimen: Blood from Arm, Right Updated: 12/27/21 1908    Procalcitonin with AM Reflex [[id number]] Collected: 12/27/21 1852    Lab Status: In process Specimen: Blood from Hand, Left Updated: 12/27/21 1907    Glucose 6 phosphate dehydrogenase [[id number]] Collected: 12/27/21 1852    Lab Status: In process Specimen: Blood from Hand, Left Updated: 12/27/21 1906    COVID/FLU/RSV - 2 hour TAT [[id number]]  (Normal) Collected: 12/27/21 1719    Lab Status: Final result Specimen: Nares from Nasopharyngeal Swab Updated: 12/27/21 1808     SARS-CoV-2 Negative     INFLUENZA A PCR Negative     INFLUENZA B PCR Negative     RSV PCR Negative    Narrative:      FOR PEDIATRIC PATIENTS - copy/paste COVID Guidelines URL to browser: [email or web address]  [email or web address]     This test has been authorized by FDA under an EUA (Emergency Use Assay) for use by authorized laboratories  Clinical caution and judgement should be used with the interpretation of these results with consideration of the clinical impression and other laboratory testing  Testing reported as "Positive" or "Negative" has been proven to be accurate according to standard laboratory validation requirements  All testing is performed with control materials showing appropriate reactivity at standard intervals      HS Troponin I 2hr [[id number]]  (Abnormal) Collected: 12/27/21 1714    Lab Status: Final result Specimen: Blood from Arm, Right Updated: 12/27/21 1802 hs TnI 2hr 107 ng/L      Delta 2hr hsTnI 21 ng/L     HS Troponin 0hr (reflex protocol) [[id number]]  (Abnormal) Collected: 12/27/21 1504    Lab Status: Final result Specimen: Blood from Arm, Right Updated: 12/27/21 1557     hs TnI 0hr 86 ng/L     Comprehensive metabolic panel [[id number]]  (Abnormal) Collected: 12/27/21 1503    Lab Status: Final result Specimen: Blood from Arm, Right Updated: 12/27/21 1547     Sodium 136 mmol/L      Potassium 4 6 mmol/L      Chloride 101 mmol/L      CO2 24 mmol/L      ANION GAP 11 mmol/L      BUN 32 mg/dL      Creatinine 1 50 mg/dL      Glucose 136 mg/dL      Calcium 8 8 mg/dL      Corrected Calcium 9 6 mg/dL      AST 14 U/L      ALT 20 U/L      Alkaline Phosphatase 94 U/L      Total Protein 7 7 g/dL      Albumin 3 0 g/dL      Total Bilirubin 0 50 mg/dL      eGFR 44 ml/min/1 73sq m     Narrative:      [place] guidelines for Chronic Kidney Disease (CKD):     Stage 1 with normal or high GFR (GFR > 90 mL/min/1 73 square meters)    Stage 2 Mild CKD (GFR = 60-89 mL/min/1 73 square meters)    Stage 3A Moderate CKD (GFR = 45-59 mL/min/1 73 square meters)    Stage 3B Moderate CKD (GFR = 30-44 mL/min/1 73 square meters)    Stage 4 Severe CKD (GFR = 15-29 mL/min/1 73 square meters)    Stage 5 End Stage CKD (GFR <15 mL/min/1 73 square meters)  Note: GFR calculation is accurate only with a steady state creatinine    NT-BNP PRO [[id number]]  (Abnormal) Collected: 12/27/21 1503    Lab Status: Final result Specimen: Blood from Arm, Right Updated: 12/27/21 1547     NT-proBNP 1,220 pg/mL     CBC and differential [[id number]]  (Abnormal) Collected: 12/27/21 1504    Lab Status: Final result Specimen: Blood from Arm, Right Updated: 12/27/21 1515     WBC 10 70 Thousand/uL      RBC 4 17 Million/uL      Hemoglobin 14 4 g/dL      Hematocrit 43 5 %       fL      MCH 34 5 pg      MCHC 33 1 g/dL      RDW 13 0 %      MPV 9 2 fL      Platelets 627 Thousands/uL      nRBC 0 /100 WBCs      Neutrophils Relative 80 %      Immat GRANS % 1 %      Lymphocytes Relative 7 %      Monocytes Relative 12 %      Eosinophils Relative 0 %      Basophils Relative 0 %      Neutrophils Absolute 8 59 Thousands/µL      Immature Grans Absolute 0 08 Thousand/uL      Lymphocytes Absolute 0 74 Thousands/µL      Monocytes Absolute 1 25 Thousand/µL      Eosinophils Absolute 0 00 Thousand/µL      Basophils Absolute 0 04 Thousands/µL                  CTA ED chest PE study   Final Result by Haley Villegas MD (12/27 8366)      Segmental pulmonary emboli are noted in the left upper lobe image 2/104 and right lower lobe image 2/179  Measured RV/LV ratio is within normal limits at less than 0 9  Severe background emphysema with scattered groundglass opacities and alveolar opacities in the right middle lobe and left lower lobe could represent pneumonia  Bacterial versus Covid 19 pneumonia are both possibilities  Asymmetric soft tissue in the right apex compared to the left  While this could reflect scar tissue, a follow-up CT scan is recommended in 3-6 months to ensure stability  Ectasia of the descending thoracic aorta at the diaphragmatic hiatus measuring 4 6 x 2 9 cm on the final image of this exam incompletely evaluated  Follow-up CTA of the aorta could be obtained for more evaluation  I personally discussed this study with ISAEL GAMINO on 12/27/2021 at 5:38 PM                      Workstation performed: [id number]         XR chest 1 view portable   ED Interpretation by Juan Sumner MD (12/27 1615)   Bilateral pulmonary edema/pulmonary infiltrates       Final Result by Klarissa Guillory MD (12/27 1634)      Patchy opacities within the mid and lower lung fields likely infectious in nature and should be correlated with Covid-19 testing                    Workstation performed: [id number]                    Procedures  Procedures         ED Course  ED Course as of 12/27/21 2042   [facility] 27, 2021   1559 Procedure Note: EKG  Date/Time: 12/27/21 4:03 PM   Interpreted by: Ping Drake  Indications / Diagnosis: CP  ECG reviewed by me, the ED Provider: yes   The EKG demonstrates:  Rate: 88  Rhythm: normal sinus  Intervals: normal intervals  Axis: normal axis  QRS/Blocks: normal QRS  ST Changes: T wave inversion in III, No other acute ST Changes, no STD/NUNU      1603 hs TnI 0hr(!!): 86   1706 Discussed elevated trop with cardiology and reviewed EKG  Will likely start ACS protocol heparin ggt if CTA is negative for PE     1803 hs TnI 2hr(!!): 107                               SBIRT 20yo+      Most Recent Value   SBIRT (23 yo +)    In order to provide better care to our patients, we are screening all of our patients for alcohol and drug use  Would it be okay to ask you these screening questions? No Filed at: 12/27/2021 1427                    MDM  Number of Diagnoses or Management Options  Hypoxia  Lung mass  Multiple subsegmental pulmonary emboli without acute cor pulmonale (HCC)  Pneumonia  SOB (shortness of breath)  Diagnosis management comments: 71-year-old male with history of hypertension, prior COVID-19 infection, former smoker, presents to the emergency department for shortness of breath  Within the differential consider viral illness such as COVID-19, ACS, pneumonia, pneumothorax, CHF, COPD  He does have poor air movement and wheezing on exam   Will give duoneb and steroids and obtain labs and imaging to assess for these etiologies  He was initially hypoxic to O2 sats in the 50s but is currently stable on non-rebreather  Final assessment:  CT imaging suggestive of pneumonia, unclear if viral vs bacterial  Started IV ceftriaxone and azithromycin for antibiotics coverage  He also has multiple subsegmental PEs and possible lung mass  Troponin is elevated  Discussed with Cardiology  Suspect demand ischemia in setting of hypoxia    He does have a T-wave inversion in lead III but no other acute ischemic changes  Given PE started heparin drip per PE protocol  He already took full-dose aspirin at home  Disposition  Final diagnoses:   Pneumonia   Hypoxia   SOB (shortness of breath)   Multiple subsegmental pulmonary emboli without acute cor pulmonale (HCC)   Lung mass     Time reflects when diagnosis was documented in both MDM as applicable and the Disposition within this note     Time User Action Codes Description Comment    12/27/2021  6:51 PM Minor, Ping Add [J18 9] Pneumonia     12/27/2021  6:51 PM Minor, Ping Add [R09 02] Hypoxia     12/27/2021  6:51 PM Minor, Ping Add [R06 02] SOB (shortness of breath)     12/27/2021  6:51 PM Minor, Ping Add [I26 94] Multiple subsegmental pulmonary emboli without acute cor pulmonale ([address] )     12/27/2021  6:51 PM Minor, Ping Modify [J18 9] Pneumonia     12/27/2021  6:51 PM Minor, Ping Modify [R09 02] Hypoxia     12/27/2021  8:41 PM Minor, Ping Add [R91 8] Lung mass       ED Disposition     ED Disposition Condition Date/Time Comment    Admit Stable Mon Dec 27, 2021  6:51 PM Case was discussed with LOU and the patient's admission status was agreed to be Admission Status: inpatient status to the service of [facility]   Follow-up Information    None         Patient's Medications   Discharge Prescriptions    No medications on file       No discharge procedures on file      PDMP Review     None          ED Provider  Electronically Signed by           Imani Regalado MD  12/27/21 2042

## 2021-12-28 ENCOUNTER — APPOINTMENT (INPATIENT)
Dept: NON INVASIVE DIAGNOSTICS | Facility: HOSPITAL | Age: 76
DRG: 871 | End: 2021-12-28
Payer: COMMERCIAL

## 2021-12-28 LAB
ANION GAP SERPL CALCULATED.3IONS-SCNC: 10 MMOL/L (ref 4–13)
AORTIC ROOT: 2.7 CM
AORTIC VALVE MEAN VELOCITY: 7 M/S
APICAL FOUR CHAMBER EJECTION FRACTION: 66 %
APTT PPP: 161 SECONDS (ref 23–37)
APTT PPP: 70 SECONDS (ref 23–37)
APTT PPP: >210 SECONDS (ref 23–37)
ATRIAL RATE: 106 BPM
AV LVOT MEAN GRADIENT: 1 MMHG
AV LVOT PEAK GRADIENT: 2 MMHG
AV MEAN GRADIENT: 2 MMHG
AV PEAK GRADIENT: 5 MMHG
BACTERIA SPT RESP CULT: ABNORMAL
BASOPHILS # BLD AUTO: 0.01 THOUSANDS/ΜL (ref 0–0.1)
BASOPHILS NFR BLD AUTO: 0 % (ref 0–1)
BUN SERPL-MCNC: 29 MG/DL (ref 5–25)
CALCIUM SERPL-MCNC: 8.5 MG/DL (ref 8.3–10.1)
CHLORIDE SERPL-SCNC: 107 MMOL/L (ref 100–108)
CO2 SERPL-SCNC: 24 MMOL/L (ref 21–32)
CREAT SERPL-MCNC: 1.22 MG/DL (ref 0.6–1.3)
DOP CALC AO VTI: 22.26 CM
DOP CALC LVOT PEAK VEL VTI: 19.84 CM
DOP CALC LVOT PEAK VEL: 0.75 M/S
E WAVE DECELERATION TIME: 136 MS
EOSINOPHIL # BLD AUTO: 0 THOUSAND/ΜL (ref 0–0.61)
EOSINOPHIL NFR BLD AUTO: 0 % (ref 0–6)
ERYTHROCYTE [DISTWIDTH] IN BLOOD BY AUTOMATED COUNT: 13 % (ref 11.6–15.1)
FERRITIN SERPL-MCNC: 364 NG/ML (ref 8–388)
FRACTIONAL SHORTENING: 35 % (ref 28–44)
G6PD BLD QN: 543 U/10E12 RBC (ref 127–427)
GFR SERPL CREATININE-BSD FRML MDRD: 56 ML/MIN/1.73SQ M
GLUCOSE SERPL-MCNC: 168 MG/DL (ref 65–140)
GRAM STN SPEC: ABNORMAL
HCT VFR BLD AUTO: 38.4 % (ref 36.5–49.3)
HGB BLD-MCNC: 12.7 G/DL (ref 12–17)
IMM GRANULOCYTES # BLD AUTO: 0.04 THOUSAND/UL (ref 0–0.2)
IMM GRANULOCYTES NFR BLD AUTO: 1 % (ref 0–2)
INTERVENTRICULAR SEPTUM IN DIASTOLE (PARASTERNAL SHORT AXIS VIEW): 0.7 CM
LEFT ATRIUM AREA SYSTOLE SINGLE PLANE A4C: 19.7 CM2
LEFT INTERNAL DIMENSION IN SYSTOLE: 2.8 CM (ref 2.1–4)
LEFT VENTRICULAR INTERNAL DIMENSION IN DIASTOLE: 4.3 CM (ref 4.24–6.32)
LEFT VENTRICULAR POSTERIOR WALL IN END DIASTOLE: 0.8 CM
LEFT VENTRICULAR STROKE VOLUME: 51 ML
LYMPHOCYTES # BLD AUTO: 0.42 THOUSANDS/ΜL (ref 0.6–4.47)
LYMPHOCYTES NFR BLD AUTO: 7 % (ref 14–44)
MCH RBC QN AUTO: 34.5 PG (ref 26.8–34.3)
MCHC RBC AUTO-ENTMCNC: 33.1 G/DL (ref 31.4–37.4)
MCV RBC AUTO: 104 FL (ref 82–98)
MONOCYTES # BLD AUTO: 0.24 THOUSAND/ΜL (ref 0.17–1.22)
MONOCYTES NFR BLD AUTO: 4 % (ref 4–12)
MV E'TISSUE VEL-SEP: 10 CM/S
MV PEAK A VEL: 1.09 M/S
MV PEAK E VEL: 78 CM/S
MV STENOSIS PRESSURE HALF TIME: 0 MS
NEUTROPHILS # BLD AUTO: 5.56 THOUSANDS/ΜL (ref 1.85–7.62)
NEUTS SEG NFR BLD AUTO: 88 % (ref 43–75)
NRBC BLD AUTO-RTO: 0 /100 WBCS
P AXIS: 55 DEGREES
PLATELET # BLD AUTO: 186 THOUSANDS/UL (ref 149–390)
PMV BLD AUTO: 9.2 FL (ref 8.9–12.7)
POTASSIUM SERPL-SCNC: 4.8 MMOL/L (ref 3.5–5.3)
PR INTERVAL: 152 MS
PROCALCITONIN SERPL-MCNC: 0.06 NG/ML
QRS AXIS: 74 DEGREES
QRSD INTERVAL: 84 MS
QT INTERVAL: 336 MS
QTC INTERVAL: 446 MS
RA PRESSURE ESTIMATED: 8 MMHG
RBC # BLD AUTO: 3.63 X10E6/UL (ref 4.14–5.8)
RBC # BLD AUTO: 3.68 MILLION/UL (ref 3.88–5.62)
RIGHT ATRIUM AREA SYSTOLE A4C: 17.1 CM2
RIGHT VENTRICLE ID DIMENSION: 4.1 CM
SL CV LV EF: 65
SL CV PED ECHO LEFT VENTRICLE DIASTOLIC VOLUME (MOD BIPLANE) 2D: 81 ML
SL CV PED ECHO LEFT VENTRICLE SYSTOLIC VOLUME (MOD BIPLANE) 2D: 30 ML
SODIUM SERPL-SCNC: 141 MMOL/L (ref 136–145)
T WAVE AXIS: -7 DEGREES
TRICUSPID VALVE S': 0.8 CM/S
VENTRICULAR RATE: 106 BPM
WBC # BLD AUTO: 6.27 THOUSAND/UL (ref 4.31–10.16)
Z-SCORE OF LEFT VENTRICULAR DIMENSION IN END SYSTOLE: -1.85

## 2021-12-28 PROCEDURE — 94640 AIRWAY INHALATION TREATMENT: CPT

## 2021-12-28 PROCEDURE — 97163 PT EVAL HIGH COMPLEX 45 MIN: CPT

## 2021-12-28 PROCEDURE — 87205 SMEAR GRAM STAIN: CPT | Performed by: PHYSICIAN ASSISTANT

## 2021-12-28 PROCEDURE — 85730 THROMBOPLASTIN TIME PARTIAL: CPT | Performed by: STUDENT IN AN ORGANIZED HEALTH CARE EDUCATION/TRAINING PROGRAM

## 2021-12-28 PROCEDURE — 84145 PROCALCITONIN (PCT): CPT | Performed by: EMERGENCY MEDICINE

## 2021-12-28 PROCEDURE — 36415 COLL VENOUS BLD VENIPUNCTURE: CPT | Performed by: HOSPITALIST

## 2021-12-28 PROCEDURE — 99223 1ST HOSP IP/OBS HIGH 75: CPT | Performed by: INTERNAL MEDICINE

## 2021-12-28 PROCEDURE — 80048 BASIC METABOLIC PNL TOTAL CA: CPT | Performed by: PHYSICIAN ASSISTANT

## 2021-12-28 PROCEDURE — 85025 COMPLETE CBC W/AUTO DIFF WBC: CPT | Performed by: PHYSICIAN ASSISTANT

## 2021-12-28 PROCEDURE — 93306 TTE W/DOPPLER COMPLETE: CPT

## 2021-12-28 PROCEDURE — 99232 SBSQ HOSP IP/OBS MODERATE 35: CPT | Performed by: STUDENT IN AN ORGANIZED HEALTH CARE EDUCATION/TRAINING PROGRAM

## 2021-12-28 PROCEDURE — 85730 THROMBOPLASTIN TIME PARTIAL: CPT | Performed by: HOSPITALIST

## 2021-12-28 PROCEDURE — 82728 ASSAY OF FERRITIN: CPT | Performed by: EMERGENCY MEDICINE

## 2021-12-28 PROCEDURE — 97166 OT EVAL MOD COMPLEX 45 MIN: CPT

## 2021-12-28 PROCEDURE — 93010 ELECTROCARDIOGRAM REPORT: CPT

## 2021-12-28 PROCEDURE — 93970 EXTREMITY STUDY: CPT

## 2021-12-28 RX ORDER — BUDESONIDE 0.5 MG/2ML
0.5 INHALANT ORAL
Status: DISCONTINUED | OUTPATIENT
Start: 2021-12-28 | End: 2021-12-31 | Stop reason: HOSPADM

## 2021-12-28 RX ORDER — AMLODIPINE BESYLATE 10 MG/1
10 TABLET ORAL DAILY
Status: DISCONTINUED | OUTPATIENT
Start: 2021-12-29 | End: 2021-12-31 | Stop reason: HOSPADM

## 2021-12-28 RX ADMIN — LEVALBUTEROL HYDROCHLORIDE 1.25 MG: 1.25 SOLUTION, CONCENTRATE RESPIRATORY (INHALATION) at 19:53

## 2021-12-28 RX ADMIN — GUAIFENESIN 600 MG: 600 TABLET, EXTENDED RELEASE ORAL at 08:35

## 2021-12-28 RX ADMIN — METHYLPREDNISOLONE SODIUM SUCCINATE 20 MG: 40 INJECTION, POWDER, FOR SOLUTION INTRAMUSCULAR; INTRAVENOUS at 05:22

## 2021-12-28 RX ADMIN — BUDESONIDE 0.5 MG: 0.5 INHALANT ORAL at 19:52

## 2021-12-28 RX ADMIN — AZITHROMYCIN MONOHYDRATE 500 MG: 250 TABLET ORAL at 21:39

## 2021-12-28 RX ADMIN — FOLIC ACID 1 MG: 1 TABLET ORAL at 08:35

## 2021-12-28 RX ADMIN — ISODIUM CHLORIDE 3 ML: 0.03 SOLUTION RESPIRATORY (INHALATION) at 08:34

## 2021-12-28 RX ADMIN — IPRATROPIUM BROMIDE 0.5 MG: 0.5 SOLUTION RESPIRATORY (INHALATION) at 19:52

## 2021-12-28 RX ADMIN — GUAIFENESIN 600 MG: 600 TABLET, EXTENDED RELEASE ORAL at 17:33

## 2021-12-28 RX ADMIN — BUDESONIDE 0.5 MG: 0.5 INHALANT ORAL at 09:12

## 2021-12-28 RX ADMIN — AMLODIPINE BESYLATE 5 MG: 5 TABLET ORAL at 08:35

## 2021-12-28 RX ADMIN — IPRATROPIUM BROMIDE 0.5 MG: 0.5 SOLUTION RESPIRATORY (INHALATION) at 13:26

## 2021-12-28 RX ADMIN — LEVALBUTEROL HYDROCHLORIDE 1.25 MG: 1.25 SOLUTION, CONCENTRATE RESPIRATORY (INHALATION) at 08:35

## 2021-12-28 RX ADMIN — LEVALBUTEROL HYDROCHLORIDE 1.25 MG: 1.25 SOLUTION, CONCENTRATE RESPIRATORY (INHALATION) at 13:26

## 2021-12-28 RX ADMIN — IPRATROPIUM BROMIDE 0.5 MG: 0.5 SOLUTION RESPIRATORY (INHALATION) at 09:00

## 2021-12-28 RX ADMIN — METHYLPREDNISOLONE SODIUM SUCCINATE 20 MG: 40 INJECTION, POWDER, FOR SOLUTION INTRAMUSCULAR; INTRAVENOUS at 21:39

## 2021-12-28 RX ADMIN — ISODIUM CHLORIDE 3 ML: 0.03 SOLUTION RESPIRATORY (INHALATION) at 13:26

## 2021-12-28 RX ADMIN — GLYCERIN, HYPROMELLOSE, POLYETHYLENE GLYCOL 1 DROP: .2; .2; 1 LIQUID OPHTHALMIC at 21:39

## 2021-12-28 RX ADMIN — ATORVASTATIN CALCIUM 40 MG: 40 TABLET, FILM COATED ORAL at 17:33

## 2021-12-28 RX ADMIN — METHYLPREDNISOLONE SODIUM SUCCINATE 20 MG: 40 INJECTION, POWDER, FOR SOLUTION INTRAMUSCULAR; INTRAVENOUS at 13:26

## 2021-12-28 RX ADMIN — METOPROLOL SUCCINATE 100 MG: 50 TABLET, EXTENDED RELEASE ORAL at 08:35

## 2021-12-28 RX ADMIN — HEPARIN SODIUM 15 UNITS/KG/HR: 10000 INJECTION, SOLUTION INTRAVENOUS at 20:03

## 2021-12-28 NOTE — PROGRESS NOTES
[facility]  Progress Note - Gearldine Ahumada 1945, 67 y o  male MRN: [id number]  Unit/Bed#: S9H2 Encounter: [id number]  Primary Care Provider: Tommy Luna PA-C   Date and time admitted to hospital: 12/27/2021  2:36 PM    CATY (acute kidney injury) [facility])  Assessment & Plan  · Present on admission as evidenced by creatinine 1 5  · Baseline creatinine 1 1-1 2  · Improved with IV fluids    Abnormal CT scan  Assessment & Plan  · CTA chest shows: Asymmetric soft tissue in the right apex compared to the left  This measures 2 4 x 2 4 cm    While this could represent scar tissue, a mass is not excluded particularly in this high risk setting  · Outpatient follow-up CT scan recommended in 3-6 months  · Pulmonary to evaluate, consider possible bronchoscopy inpatient versus outpatient    Community acquired pneumonia  Assessment & Plan  · CTA chest shows evidence multifocal pneumonia  · Pro tacho negative, will discontinue Rocephin    Pulmonary embolism (HCC)  Assessment & Plan  · CTA chest shows: segmental pulmonary emboli in the MINAL without right heart strain   · Pending 2D echo  · Appreciate pulmonary evaluation  · Will likely need 6 weeks of anticoagulation  · Continue heparin drip for now, consider NOAC tomorrow    Hypertension  Assessment & Plan  · Blood pressure stable  · Continue PTA Norvasc and Toprol XL    * Acute respiratory failure with hypoxia (HCC)  Assessment & Plan    · Likely multifactorial in the setting of acute pulmonary embolism, mild COPD exacerbation, questionable mass in right apex of the lung   · Patient initially hypoxic on admission at 52% oxygen saturation requiring 7L NRB at 97% spO2  · COVID negative   · Heparin drip for PE  · Discontinue antibiotics with procalcitonin negative x2  · Continue IV steroids and bronchodilators per pulmonology  · Wean oxygen as able        VTE Pharmacologic Prophylaxis:   Pharmacologic: Heparin Drip  Mechanical VTE Prophylaxis in Place: Yes    Patient Centered Rounds: I have performed bedside rounds with nursing staff today  Discussions with Specialists or Other Care Team Provider: Pulmonary    Education and Discussions with Family / Patient: patient    Time Spent for Care: 30 minutes  More than 50% of total time spent on counseling and coordination of care as described above  Current Length of Stay: 1 day(s)    Current Patient Status: Inpatient   Certification Statement: The patient will continue to require additional inpatient hospital stay due to heparin gtt, pending echo and pulm evaluation    Discharge Plan: pending, 24-48 hours    Code Status: Level 1 - Full Code      Subjective:   No events overnight  Reports breathing has somewhat improved  Objective:     Vitals:   Temp (24hrs), Av 9 °F (36 6 °C), Min:97 9 °F (36 6 °C), Max:97 9 °F (36 6 °C)    Temp:  [97 9 °F (36 6 °C)] 97 9 °F (36 6 °C)  HR:  [65-97] 75  Resp:  [28-36] 28  BP: (135-158)/(63-84) 154/84  SpO2:  [52 %-97 %] 97 %  Body mass index is 24 9 kg/m²  Input and Output Summary (last 24 hours): Intake/Output Summary (Last 24 hours) at 2021 1247  Last data filed at 2021 0501  Gross per 24 hour   Intake --   Output 500 ml   Net -500 ml       Physical Exam:     Physical Exam  Vitals and nursing note reviewed  Constitutional:       Appearance: Normal appearance  HENT:      Head: Normocephalic and atraumatic  Eyes:      Conjunctiva/sclera: Conjunctivae normal    Cardiovascular:      Rate and Rhythm: Normal rate and regular rhythm  Heart sounds: Normal heart sounds  Pulmonary:      Effort: Pulmonary effort is normal       Breath sounds: Wheezing and rales present  No rhonchi  Abdominal:      General: Bowel sounds are normal  There is no distension  Palpations: Abdomen is soft  Tenderness: There is no abdominal tenderness  Musculoskeletal:         General: No swelling  Right lower leg: Edema present        Left lower leg: Edema present  Comments: Trace pitting edema   Skin:     General: Skin is warm and dry  Neurological:      General: No focal deficit present  Mental Status: He is alert  Mental status is at baseline  Additional Data:     Labs:    Results from last 7 days   Lab Units 12/28/21  0501   WBC Thousand/uL 6 27   HEMOGLOBIN g/dL 12 7   HEMATOCRIT % 38 4   PLATELETS Thousands/uL 186   NEUTROS PCT % 88*   LYMPHS PCT % 7*   MONOS PCT % 4   EOS PCT % 0     Results from last 7 days   Lab Units 12/28/21  0501 12/27/21  1503 12/27/21  1503   SODIUM mmol/L 141   < > 136   POTASSIUM mmol/L 4 8   < > 4 6   CHLORIDE mmol/L 107   < > 101   CO2 mmol/L 24   < > 24   BUN mg/dL 29*   < > 32*   CREATININE mg/dL 1 22   < > 1 50*   ANION GAP mmol/L 10   < > 11   CALCIUM mg/dL 8 5   < > 8 8   ALBUMIN g/dL  --   --  3 0*   TOTAL BILIRUBIN mg/dL  --   --  0 50   ALK PHOS U/L  --   --  94   ALT U/L  --   --  20   AST U/L  --   --  14   GLUCOSE RANDOM mg/dL 168*   < > 136    < > = values in this interval not displayed  Results from last 7 days   Lab Units 12/27/21  1852   INR  1 13             Results from last 7 days   Lab Units 12/28/21  0504 12/27/21  1852   LACTIC ACID mmol/L  --  1 8   PROCALCITONIN ng/ml 0 06 0 05           * I Have Reviewed All Lab Data Listed Above  * Additional Pertinent Lab Tests Reviewed: [facility] Admission Reviewed    Imaging:    XR chest 1 view portable    Result Date: 12/27/2021  Impression: Patchy opacities within the mid and lower lung fields likely infectious in nature and should be correlated with Covid-19 testing  Workstation performed: [id number]     CTA ED chest PE study    Result Date: 12/27/2021  Impression: Segmental pulmonary emboli are noted in the left upper lobe image 2/104 and right lower lobe image 2/179  Measured RV/LV ratio is within normal limits at less than 0 9    Severe background emphysema with scattered groundglass opacities and alveolar opacities in the right middle lobe and left lower lobe could represent pneumonia  Bacterial versus Covid 19 pneumonia are both possibilities  Asymmetric soft tissue in the right apex compared to the left  While this could reflect scar tissue, a follow-up CT scan is recommended in 3-6 months to ensure stability  Ectasia of the descending thoracic aorta at the diaphragmatic hiatus measuring 4 6 x 2 9 cm on the final image of this exam incompletely evaluated  Follow-up CTA of the aorta could be obtained for more evaluation  I personally discussed this study with PING DRAKE on 12/27/2021 at 5:38 PM  Workstation performed: [id number]       Recent Cultures (last 7 days):     Results from last 7 days   Lab Units 12/27/21  1852 12/27/21  1456   BLOOD CULTURE  Received in Microbiology Lab  Culture in Progress  Received in Microbiology Lab  Culture in Progress         Last 24 Hours Medication List:   Current Facility-Administered Medications   Medication Dose Route Frequency Provider Last Rate    acetaminophen  650 mg Oral Q6H PRN Mundoe Ramón, PA-C      amLODIPine  5 mg Oral Daily Ashley Melgar, PA-C      atorvastatin  40 mg Oral QPM Ashley Melgar, PA-C      azithromycin  500 mg Oral Q24H Ashley Melgar, PA-C      budesonide  0 5 mg Nebulization Q12H ASHLEY Nelson      folic acid  1 mg Oral Daily Ashley Melgar, PA-C      guaiFENesin  600 mg Oral BID Ashley Melgar, PA-C      heparin (porcine)  3-30 Units/kg/hr (Order-Specific) Intravenous Titrated Ping Drake MD 15 Units/kg/hr (12/28/21 0718)    heparin (porcine)  2,800 Units Intravenous Q1H PRN Ping Drake MD      heparin (porcine)  5,600 Units Intravenous Q1H PRN Ping Drake MD      ipratropium  0 5 mg Nebulization TID YUMIKO Bar-C      levalbuterol  1 25 mg Nebulization TID YUMIKO Bar-C      And    sodium chloride  3 mL Nebulization TID Ashley Melgar, PA-C      methylPREDNISolone sodium succinate  20 mg Intravenous Q8H Nick Feathers, PA-C      metoprolol succinate  100 mg Oral Daily Nick Feathers, PA-C      sodium chloride (PF)  3 mL Intravenous Q1H PRN Cuca Drake MD      sodium chloride  75 mL/hr Intravenous Continuous Nick Feathers, PA-C 75 mL/hr (12/27/21 2730)        Today, Patient Was Seen By: Tom Curtis MD    ** Please Note: Dictation voice to text software may have been used in the creation of this document   **

## 2021-12-28 NOTE — ASSESSMENT & PLAN NOTE
· CTA chest shows severe emphysema  · Patient received nebulizer treatment and IV Decadron in the ER due to suspected COPD exacerbation with wheezing  · Upon my exam, wheezing has resolved  · Patient is on Breo Ellipta PTA  · Continue scheduled nebulizer treatments   · IV Solu-Medrol 20 mg q 8 hours

## 2021-12-28 NOTE — PLAN OF CARE
Problem: PHYSICAL THERAPY ADULT  Goal: Performs mobility at highest level of function for planned discharge setting  See evaluation for individualized goals  Description: Treatment/Interventions: Functional transfer training,LE strengthening/ROM,Elevations,Therapeutic exercise,Endurance training,Patient/family training,Equipment eval/education,Bed mobility,Gait training,Spoke to nursing,OT          See flowsheet documentation for full assessment, interventions and recommendations  Note: Prognosis: Fair  Problem List: Decreased strength,Decreased endurance,Impaired balance,Decreased mobility,Decreased safety awareness (impaired knowledge of energy conservation)  Assessment: Pt is an 67 y o  male admitted to [facility] on 12/27/21 with c/o SOB  Pt is being treated for Acute Respiratory Failure with Hypoxia  Pt found to have PE and Exac COPD  PT consulted with eval and treat and activity as tolerated orders  Pt lives alone in a split level home  Pt has limited support of a neighbor  At baseline, pt is independent with ADLs and ambulation without AD  Upon evaluation, pt required min A for ambulation and was very limited due to poor endurance/ drop in SaO2/ ROSALES  Pt presents with weakness, impaired balance, impaired endurance, gait dysfunction and decreased safety awareness  The patient's AM-PAC Basic Mobility Inpatient Short Form Raw Score is 19  A Raw score of greater than 16 suggests the patient may benefit from discharge to home  Please also refer to the recommendation of the Physical Therapist for safe discharge planning  PT to follow 3-5x/wk during acute stay to address deficits  Recommend STR v  Home PT depending on progress due to pt functioning below baseline     Barriers to Discharge: Decreased caregiver support,Inaccessible home environment        PT Discharge Recommendation: Post acute rehabilitation services (STR v  Home with Home PT depending on progress)          See flowsheet documentation for full assessment

## 2021-12-28 NOTE — ASSESSMENT & PLAN NOTE
· CTA chest shows: Asymmetric soft tissue in the right apex compared to the left  This measures 2 4 x 2 4 cm    While this could represent scar tissue, a mass is not excluded particularly in this high risk setting  · Outpatient follow-up CT scan recommended in 3-6 months

## 2021-12-28 NOTE — ASSESSMENT & PLAN NOTE
· CTA chest shows: segmental pulmonary emboli in the MINAL without right heart strain   · Troponins elevated on admission, BNP 1220   · Check echocardiogram, bilateral lower extremity venous duplex  · Suspect may be provoked by possible underlying lung malignancy  · Patient currently requiring 7 L on non-rebreather  · Start IV heparin gtt   · Monitor on telemetry

## 2021-12-28 NOTE — PHYSICAL THERAPY NOTE
PT EVALUATION    67 y o     [id number]    SOB (shortness of breath) [R06 02]    Past Medical History:   Diagnosis Date    Hypertension          Past Surgical History:   Procedure Laterality Date    HERNIA REPAIR          12/28/21 1412   PT Last Visit   PT Visit Date 12/28/21   Note Type   Note type Evaluation   Pain Assessment   Pain Assessment Tool 0-10   Pain Score No Pain   Restrictions/Precautions   Weight Bearing Precautions Per Order No   Other Precautions Multiple lines;Telemetry;O2   Home Living   Type of Home House   Home Layout Multi-level;Bed/bath upstairs  (Split-level, no NUNU; 5+5+7 steps to Bed/ Bath)   Bathroom Shower/Tub Tub/shower unit   Bathroom Toilet Standard   Bathroom Accessibility Accessible   Home Equipment Other (Comment)  (no DME)   Prior Function   Level of [place] Independent with ADLs and functional mobility   Lives With Alone   Receives Help From Neighbor  Erenis is supportive, but is moving into [facility] soon )   ADL Assistance Independent   IADLs Needs assistance   Falls in the last 6 months 0   Vocational Retired   General   Additional Pertinent History (+) PE, on Heparin; COPD   Family/Caregiver Present No   Cognition   Overall Cognitive Status WFL   Arousal/Participation Alert   Orientation Level Oriented X4   Memory Within functional limits   Following Commands Follows one step commands without difficulty   Subjective   Subjective Agreeable to participate in evaluation   RLE Assessment   RLE Assessment X   Strength RLE   R Hip Flexion 4-/5   R Knee Extension 4+/5   R Knee Flexion 4/5   LLE Assessment   LLE Assessment X   Strength LLE   L Hip Flexion 4-/5   L Knee Flexion 4/5   L Knee Extension 4+/5   Bed Mobility   Supine to Sit 6  Modified independent   Sit to Supine 6  Modified independent   Transfers   Sit to Stand 5  Supervision   Additional items Increased time required;Verbal cues   Stand to Sit 5  Supervision   Additional items Increased time required;Verbal cues Additional Comments VCs for safety   Ambulation/Elevation   Gait pattern Forward Flexion; Wide OCTAVIA; Decreased foot clearance; Short stride   Gait Assistance 4  Minimal assist   Additional items Assist x 1;Verbal cues   Assistive Device None   Distance 2 sidesteps to right (ltd due to lines/ space in ED)   Balance   Static Sitting Good   Dynamic Sitting Fair +   Static Standing Fair -   Dynamic Standing Poor +   Ambulatory Poor +   Endurance Deficit   Endurance Deficit Yes   Endurance Deficit Description ROSALES, 80-92% on 8 L face mask   Activity Tolerance   Activity Tolerance Patient limited by fatigue;Treatment limited secondary to medical complications (Comment)   Medical Staff Phuong Potter OT   Nurse Made Aware yes, Tamera, RN   Assessment   Prognosis Fair   Problem List Decreased strength;Decreased endurance; Impaired balance;Decreased mobility; Decreased safety awareness  (impaired knowledge of energy conservation)   Assessment Pt is an 67 y o  male [facility] on 12/27/21 with c/o SOB  Pt is being treated for Acute Respiratory Failure with Hypoxia  Pt found to have PE and Exac COPD  PT consulted with eval and treat and activity as tolerated orders  Pt lives alone in a split level home  Pt has limited support of a neighbor  At baseline, pt is independent with ADLs and ambulation without AD  Upon evaluation, pt required min A for ambulation and was very limited due to poor endurance/ drop in SaO2/ ROSALES  Pt presents with weakness, impaired balance, impaired endurance, gait dysfunction and decreased safety awareness  The patient's AM-PAC Basic Mobility Inpatient Short Form Raw Score is 19  A Raw score of greater than 16 suggests the patient may benefit from discharge to home  Please also refer to the recommendation of the Physical Therapist for safe discharge planning  PT to follow 3-5x/wk during acute stay to address deficits   Recommend STR v  Home PT depending on progress due to pt functioning below baseline  Barriers to Discharge Decreased caregiver support; Inaccessible home environment   Goals   Patient Goals to get better   STG Expiration Date 01/11/22   Short Term Goal #1 1)  Pt will perform all transfers with Rocco demonstrating safe and appropriate technique 100% of the time in order to improve ability to negotiate safely in home environment  2) Amb with least restrictive AD > 100'x1 with mod I in order to demonstrate ability to negotiate in home environment  3)  Improve overall strength and balance 1/2 grade in order to optimize ability to perform functional tasks and reduce fall risk  4) Increase activity tolerance to 45 minutes in order to improve endurance to functional tasks  5)  Negotiate stairs using most appropriate technique and S in order to be able to negotiate safely in home environment  6) PT for ongoing patient and family/caregiver education, DME needs and d/c planning in order to promote highest level of function in least restrictive environment  PT Treatment Day 0   Plan   Treatment/Interventions Functional transfer training;LE strengthening/ROM; Elevations; Therapeutic exercise; Endurance training;Patient/family training;Equipment eval/education; Bed mobility;Gait training;Spoke to nursing;OT   PT Frequency 3-5x/wk   Recommendation   PT Discharge Recommendation Post acute rehabilitation services  (STR v  Home with Home PT depending on progress)   AM-PAC Basic Mobility Inpatient   Turning in Bed Without Bedrails 4   Lying on Back to Sitting on Edge of Flat Bed 4   Moving Bed to Chair 3   Standing Up From Chair 3   Walk in Room 3   Climb 3-5 Stairs 2   Basic Mobility Inpatient Raw Score 19   Basic Mobility Standardized Score 42 48   Highest Level Of Mobility   -[facility] Goal 6: Walk 10 steps or more   End of Consult   Patient Position at End of Consult Supine; All needs within reach     Complexity: High  History: advanced age, stairs in home environment, lives alone, poor vision (h/o glaucoma)  Examination: impairments in musculoskeletal system (weakness), endurance, balance, locomotion, knowledge of precautions  Presentation: unstable/ unpredictable (ongoing medical management, monitor VS)    Kennedi Barker, PT

## 2021-12-28 NOTE — ASSESSMENT & PLAN NOTE
· Likely multifactorial in the setting of acute pulmonary embolism, mild COPD exacerbation, questionable mass in right apex of the lung   · Patient initially hypoxic on admission at 52% oxygen saturation requiring 7L NRB at 97% spO2  · COVID negative   · Heparin drip for PE  · Discontinue antibiotics with procalcitonin negative x2  · Continue IV steroids and bronchodilators per pulmonology  · Wean oxygen as able

## 2021-12-28 NOTE — ASSESSMENT & PLAN NOTE
Background:  Patient with past medical history of hypertension, CVA, hyperlipidemia presents with worsening shortness of breath over the past 2-3 weeks with associated cough, wheezing, palpitations  Patient reports smoking for 48 years, has since quit      · Likely multifactorial in the setting of acute pulmonary embolism, possible community acquired pneumonia, mild COPD exacerbation, questionable mass in right apex of the lung   · Patient initially hypoxic on admission at 52% oxygen saturation requiring 7L NRB at 97% spO2  · COVID negative   · Anticoagulation for pulmonary embolism with heparin drip  · Empiric IV antibiotics for possible community-acquired pneumonia,  can discontinue antibiotics if procalcitonin x2 negative  · Treatment for possible COPD exacerbation with scheduled nebulizers and IV steroids  · Patient is not on oxygen at home, continue to wean off oxygen to maintain oxygen saturation greater than 89%

## 2021-12-28 NOTE — H&P
[facility]  H&P- Lilia Mills 1945, 67 y o  male MRN: [id number]  Unit/Bed#: G7Y3 Encounter: [id number]  Primary Care Provider: Ab Garces PA-C   Date and time admitted to hospital: 12/27/2021  2:36 PM    * Acute respiratory failure with hypoxia [facility])  Assessment & Plan  Background:  Patient with past medical history of hypertension, CVA, hyperlipidemia presents with worsening shortness of breath over the past 2-3 weeks with associated cough, wheezing, palpitations  Patient reports smoking for 48 years, has since quit      · Likely multifactorial in the setting of acute pulmonary embolism, possible community acquired pneumonia, mild COPD exacerbation, questionable mass in right apex of the lung   · Patient initially hypoxic on admission at 52% oxygen saturation requiring 7L NRB at 97% spO2  · COVID negative   · Anticoagulation for pulmonary embolism with heparin drip  · Empiric IV antibiotics for possible community-acquired pneumonia,  can discontinue antibiotics if procalcitonin x2 negative  · Treatment for possible COPD exacerbation with scheduled nebulizers and IV steroids  · Patient is not on oxygen at home, continue to wean off oxygen to maintain oxygen saturation greater than 89%    Pulmonary embolism (HCC)  Assessment & Plan  · CTA chest shows: segmental pulmonary emboli in the MINAL without right heart strain   · Troponins elevated on admission, BNP 1220   · Check echocardiogram, bilateral lower extremity venous duplex  · Suspect may be provoked by possible underlying lung malignancy  · Patient currently requiring 7 L on non-rebreather  · Start IV heparin gtt   · Monitor on telemetry    Sepsis ([facility] [address] )  Assessment & Plan  · Present on admission as evidenced by tachypnea 36, tachycardia 97, hypoxia 52% oxygen saturation  · Chest x-ray and CTA chest show scattered ground-glass opacities and alveolar opacities in the right middle lobe and left lower lobe- suspected multifocal pneumonia  · Lactic acid 1 8  · Will empirically treat with IV ceftriaxone azithromycin  · Check procalcitonin x2, if negative can DC antibiotics  · Check sputum culture, urine strep, urine Legionella   · Follow final blood cultures      Community acquired pneumonia  Assessment & Plan  · CTA chest shows evidence multifocal pneumonia  · IV ceftriaxone and azithromycin  · Check procalcitonin x2  · Pending sputum culture, urine strep, urine Legionella    Emphysema lung (HCC)  Assessment & Plan  · CTA chest shows severe emphysema  · Patient received nebulizer treatment and IV Decadron in the ER due to suspected COPD exacerbation with wheezing  · Upon my exam, wheezing has resolved  · Patient is on Breo Ellipta PTA  · Continue scheduled nebulizer treatments   · IV Solu-Medrol 20 mg q 8 hours    Elevated troponin  Assessment & Plan  · Troponin elevated on admission 86 > 107   · Suspect elevated troponin the setting of acute pulmonary embolism and sepsis  · Follow-up 4 hour troponin  · Patient denies any chest pain  · EKG shows NSR with nonspecific ST/T-wave abnormalities  · Continue to trend troponin with EKG    Abnormal CT scan  Assessment & Plan  · CTA chest shows: Asymmetric soft tissue in the right apex compared to the left  This measures 2 4 x 2 4 cm  While this could represent scar tissue, a mass is not excluded particularly in this high risk setting  · Outpatient follow-up CT scan recommended in 3-6 months    CATY (acute kidney injury) ([facility] [address] )  Assessment & Plan  · Present on admission as evidenced by creatinine 1 5  · Baseline creatinine 1 1-1 2  · Suspect prerenal in setting of dehydration, patient admits to decreased p o   Intake  · Will start on IV fluid hydration  · Hold benazepril in setting of CATY  · Avoid nephrotoxin/hypertension    Aortic ectasia, thoracic (HCC)  Assessment & Plan  · CTA chest shows: Ectasia of the descending thoracic aorta at the diaphragmatic hiatus measuring 4 6 x 2 9 cm · Recommend f/u CTA of the aorta for further evaluation- will hold off for now as patient has CATY and received contrast for CTA chest  · Outpatient vascular surgery follow up     Hypertension  Assessment & Plan  · Blood pressure stable  · Continue PTA Norvasc and Toprol XL  · Hold benazepril in setting of CATY    Hyperlipidemia  Assessment & Plan  Continue atorvastatin 40 mg daily    Cerebral infarction [facility])  Assessment & Plan  · History of CVA with stenosis of carotid artery  · Continue aspirin and statin    VTE Pharmacologic Prophylaxis: VTE Score: 10 High Risk (Score >/= 5) - Pharmacological DVT Prophylaxis Ordered: heparin drip  Sequential Compression Devices Ordered  Code Status: Level 1 - Full Code   Discussion with family: Attempted to update  (son) via phone  Left voicemail  Anticipated Length of Stay: Patient will be admitted on an inpatient basis with an anticipated length of stay of greater than 2 midnights secondary to Acute hypoxic respiratory failure  Total Time for Visit, including Counseling / Coordination of Care: 45 minutes Greater than 50% of this total time spent on direct patient counseling and coordination of care  Chief Complaint:  Worsening shortness of breath x2 weeks    History of Present Illness:  Alee Zheng is a 67 y o  male with a PMH of emphysema, hypertension, hyperlipidemia, CVA who presents with worsening shortness of breath over the past 2-3 weeks with associated cough, wheezing, palpitations  Patient came in hypoxic with oxygen saturation of 56% requiring non-rebreather at 7 liters/minute  Patient reports history of smoking for 40 years, has since quit smoking  Patient denies any chest pain, fever, chills, abdominal pain, nausea, vomiting, diarrhea, dysuria symptoms  Patient reports that he lives at home alone, no issues with ambulation    Reports that his son will come check on him occasionally however reports that he does need some help at home     Review of Systems:  Review of Systems   Constitutional: Negative for chills and fever  HENT: Negative for sore throat  Eyes: Negative for visual disturbance  Respiratory: Positive for cough, shortness of breath and wheezing  Cardiovascular: Positive for palpitations  Negative for chest pain and leg swelling  Gastrointestinal: Negative for abdominal distention, abdominal pain, constipation, diarrhea, nausea and vomiting  Endocrine: Negative for polyuria  Genitourinary: Negative for dysuria  Musculoskeletal: Negative for arthralgias and back pain  Skin: Negative for rash  Neurological: Negative for dizziness, tremors, seizures, syncope, speech difficulty, weakness, light-headedness, numbness and headaches  Hematological: Negative for adenopathy  Psychiatric/Behavioral: Negative for confusion  The patient is not nervous/anxious  Past Medical and Surgical History:   Past Medical History:   Diagnosis Date    Hypertension        Past Surgical History:   Procedure Laterality Date    HERNIA REPAIR         Meds/Allergies:  Prior to Admission medications    Medication Sig Start Date End Date Taking?  Authorizing Provider   amLODIPine (NORVASC) 5 mg tablet take 1 tablet by mouth once daily 10/8/21   Nikita Mcneil PA-C   Ascorbic Acid (VITAMIN C) 1000 MG tablet Take 1,000 mg by mouth daily    Historical Provider, MD   aspirin 325 mg tablet Take 325 mg by mouth daily    Historical Provider, MD   atorvastatin (LIPITOR) 40 mg tablet take 1 tablet by mouth once daily 11/2/21   Brock Mehta DO   benazepril (LOTENSIN) 40 MG tablet take 1 tablet by mouth once daily 10/8/21   Nikita Mcneil PA-C   Cholecalciferol (VITAMIN D3) 5000 units CAPS Take 1,000 Units by mouth daily    Historical Provider, MD   cyanocobalamin (VITAMIN B-12) 100 mcg tablet Take by mouth daily    Historical Provider, MD   fluticasone-vilanterol (Breo Ellipta) 200-25 MCG/INH inhaler Inhale 1 puff daily Rinse mouth after use  21  Gisell Galvan PA-C   folic acid (FOLVITE) 1 mg tablet Take by mouth daily    Historical Provider, MD   metoprolol succinate (TOPROL-XL) 100 mg 24 hr tablet take 1 tablet by mouth once daily 10/8/21   Gisell Galvan PA-C   multivitamin [facility]) TABS Take 1 tablet by mouth daily    Historical Provider, MD   Omega-3 Fatty Acids (FISH OIL) 1,000 mg Take 1,000 mg by mouth daily    Historical Provider, MD   vitamin E 100 UNIT capsule Take 100 Units by mouth daily    Historical Provider, MD     I have reviewed home medications using recent Epic encounter  Allergies: No Known Allergies    Social History:  Marital Status: /Civil Union   Patient Pre-hospital Living Situation: Home  Patient Pre-hospital Level of Mobility: walks  Patient Pre-hospital Diet Restrictions: none  Substance Use History:   Social History     Substance and Sexual Activity   Alcohol Use Yes    Comment: Social drinker; Daily alcohol use per [employer]     Social History     Tobacco Use   Smoking Status Former Smoker    Packs/day: 2 00    Years: 49 00    Pack years: 98 00    Quit date:     Years since quittin 9   Smokeless Tobacco Former User   Tobacco Comment    No secondhand smoke exposure     Social History     Substance and Sexual Activity   Drug Use Not on file       Family History:  Family History   Problem Relation Age of Onset    Lung cancer Father        Physical Exam:     Vitals:   Blood Pressure: 135/63 (21)  Pulse: 88 (21)  Temperature: 97 9 °F (36 6 °C) (21)  Temp Source: Oral (21)  Respirations: (!) 28 (21)  Weight - Scale: 72 4 kg (159 lb 9 8 oz) (21)  SpO2: 97 % (21)    Physical Exam  Constitutional:       General: He is not in acute distress  Appearance: He is not toxic-appearing or diaphoretic     HENT:      Mouth/Throat:      Mouth: Mucous membranes are moist    Eyes:      General: No scleral icterus  Cardiovascular:      Rate and Rhythm: Normal rate and regular rhythm  Heart sounds: Normal heart sounds  Pulmonary:      Effort: Pulmonary effort is normal  No respiratory distress  Breath sounds: Normal breath sounds  No wheezing, rhonchi or rales  Abdominal:      General: Abdomen is flat  Bowel sounds are normal       Palpations: Abdomen is soft  Tenderness: There is no abdominal tenderness  Musculoskeletal:         General: Normal range of motion  Right lower leg: No edema  Left lower leg: No edema  Skin:     General: Skin is warm  Neurological:      General: No focal deficit present  Mental Status: He is alert and oriented to person, place, and time  Mental status is at baseline  Cranial Nerves: No cranial nerve deficit  Sensory: No sensory deficit  Motor: No weakness     Psychiatric:         Mood and Affect: Mood normal            Additional Data:     Lab Results:  Results from last 7 days   Lab Units 12/27/21  1504   WBC Thousand/uL 10 70*   HEMOGLOBIN g/dL 14 4   HEMATOCRIT % 43 5   PLATELETS Thousands/uL 231   NEUTROS PCT % 80*   LYMPHS PCT % 7*   MONOS PCT % 12   EOS PCT % 0     Results from last 7 days   Lab Units 12/27/21  1503   SODIUM mmol/L 136   POTASSIUM mmol/L 4 6   CHLORIDE mmol/L 101   CO2 mmol/L 24   BUN mg/dL 32*   CREATININE mg/dL 1 50*   ANION GAP mmol/L 11   CALCIUM mg/dL 8 8   ALBUMIN g/dL 3 0*   TOTAL BILIRUBIN mg/dL 0 50   ALK PHOS U/L 94   ALT U/L 20   AST U/L 14   GLUCOSE RANDOM mg/dL 136     Results from last 7 days   Lab Units 12/27/21  1852   INR  1 13             Results from last 7 days   Lab Units 12/27/21  1852   LACTIC ACID mmol/L 1 8   PROCALCITONIN ng/ml 0 05       Imaging: Reviewed radiology reports from this admission including: CTA chest, chest x-ray  CTA ED chest PE study   Final Result by Reed Buck MD (12/27 1746)      Segmental pulmonary emboli are noted in the left upper lobe image 2/104 and right lower lobe image 2/179  Measured RV/LV ratio is within normal limits at less than 0 9  Severe background emphysema with scattered groundglass opacities and alveolar opacities in the right middle lobe and left lower lobe could represent pneumonia  Bacterial versus Covid 19 pneumonia are both possibilities  Asymmetric soft tissue in the right apex compared to the left  While this could reflect scar tissue, a follow-up CT scan is recommended in 3-6 months to ensure stability  Ectasia of the descending thoracic aorta at the diaphragmatic hiatus measuring 4 6 x 2 9 cm on the final image of this exam incompletely evaluated  Follow-up CTA of the aorta could be obtained for more evaluation  I personally discussed this study with ISAEL GAMINO on 12/27/2021 at 5:38 PM                      Workstation performed: [id number]         XR chest 1 view portable   ED Interpretation by Shabnam Carrera MD (12/27 9237)   Bilateral pulmonary edema/pulmonary infiltrates       Final Result by Asmita Gay MD (12/27 3112)      Patchy opacities within the mid and lower lung fields likely infectious in nature and should be correlated with Covid-19 testing  Workstation performed: [id number]         VAS lower limb venous duplex study, complete bilateral    (Results Pending)       EKG and Other Studies Reviewed on Admission:   · EKG: NSR  HR 88, nonspecific ST/T wave abnormality   ** Please Note: This note has been constructed using a voice recognition system   **

## 2021-12-28 NOTE — ASSESSMENT & PLAN NOTE
· CTA chest shows: segmental pulmonary emboli in the MINAL without right heart strain   · Pending 2D echo  · Appreciate pulmonary evaluation  · Will likely need 6 weeks of anticoagulation  · Continue heparin drip for now, consider NOAC tomorrow

## 2021-12-28 NOTE — OCCUPATIONAL THERAPY NOTE
Occupational Therapy Evaluation     Patient Name: Tab Goldsmith  AEGLR'P Date: 12/28/2021  Problem List  Principal Problem:    Acute respiratory failure with hypoxia (HCC)  Active Problems:    [facility] )    Hyperlipidemia    Hypertension    Pulmonary embolism (HCC)    Emphysema lung (HCC)    Sepsis ([facility] )    Aortic ectasia, thoracic ([facility] )    Community acquired pneumonia    Abnormal CT scan    Elevated troponin    CATY (acute kidney injury) ([facility] )    Past Medical History  Past Medical History:   Diagnosis Date    Hypertension      Past Surgical History  Past Surgical History:   Procedure Laterality Date    HERNIA REPAIR             12/28/21 1413   OT Last Visit   OT Visit Date 12/28/21   Note Type   Note type Evaluation   Restrictions/Precautions   Weight Bearing Precautions Per Order No   Other Precautions Multiple lines;Telemetry;O2;Fall Risk   Pain Assessment   Pain Assessment Tool 0-10   Pain Score No Pain   Home Living   Type of [address] [place] [address] Multi-level;Bed/bath upstairs  (Split level home  0 NUNU, 5+5+7 to bed/bath)   Bathroom Shower/Tub Tub/shower unit   Bathroom Toilet Standard   Bathroom Equipment   (Denies DME)   [address]   (Denies DME)   Additional Comments Pt lives alone in a multi-level house with 0 NUNU and 5+5+7 steps to bedroom  Pt reports supportive neighbor, however neighbor going to be moving to [facility]  Limited local support other than neighbor  Prior Function   Level of [place] Independent with ADLs and functional mobility   Lives With Alone   Receives Help From Savana Fleder  (Savana Felder is supportive, but is moving into [place] soon )   ADL Assistance Independent   IADLs Independent   Falls in the last 6 months 0   Vocational Retired   Comments At baseline, pt was I w/ ADLs, IADLs, and functional transfers/mobility w/o use of AD  (-)   Denies falls PTA     Lifestyle   Autonomy At baseline, pt was I w/ ADLs, IADLs, and functional transfers/mobility w/o use of AD  (-)   Denies falls PTA  Reciprocal Relationships [facility]   Service to Others Retired   Intrinsic Gratification Watching TV   Psychosocial   Psychosocial (WDL) WDL   ADL   Where Assessed Edge of bed   Eating Assistance 7  [facility] 5  [address] [facility] 5  Supervision/Setup   LB [address] 4  [address] 5  Supervision/Setup    [facility] 4  [address]  4  [address] [facility] 4  Minimal Assistance   Bed Mobility   Supine to Sit 6  Modified independent   Additional items HOB elevated; Bedrails; Increased time required   Sit to Supine 6  Modified independent   Additional items Increased time required;HOB elevated   Additional Comments Pt lying supine at end of session w/ call bell and phone within reach  All needs met and pt reports no further questions for OT at this time   Transfers   Sit to Stand 5  Supervision   Additional items Increased time required;Verbal cues   Stand to Sit 5  Supervision   Additional items Increased time required;Verbal cues   Additional Comments Cues for safe technique and hand placement   Functional Mobility   Functional Mobility 4  Minimal assistance   Additional Comments Assist x1 w/o use of AD; Assist for balance/steadying   Balance   Static Sitting Fair +   Dynamic Sitting Fair   Static Standing Fair -   Dynamic Standing Poor +   Ambulatory Poor +   Activity Tolerance   Activity Tolerance Patient limited by fatigue;Treatment limited secondary to medical complications (Comment)   Medical Staff Made Rose Fleming, PT   Nurse Made Aware yes;  Tamera, RN   RUE Assessment   RUE Assessment WFL   RUE Strength   RUE Overall Strength Within Functional Limits - able to perform ADL tasks with strength  (4/5 throughout)   LUE Assessment   LUE Assessment WFL   LUE Strength   LUE Overall Strength Within Functional Limits - able to perform ADL tasks with strength  (4/5 throughout)   Hand Function   Gross Motor Coordination Functional   Fine Motor Coordination Functional   Sensation   Light Touch No apparent deficits   Proprioception   Proprioception No apparent deficits   Vision-Basic Assessment   Current Vision Wears glasses only for reading   Visual History Macular degeneration  (R>L)   Vision - Complex Assessment   Acuity Able to read clock/calendar on wall without difficulty   Perception   Inattention/Neglect Appears intact   Cognition   Overall Cognitive Status [facility]   Arousal/Participation Alert; Cooperative   Attention Within functional limits   Orientation Level Oriented to person;Oriented to place;Oriented to time   Memory Within functional limits   Following Commands Follows one step commands without difficulty   Comments Pleasant and cooperative   Assessment   Limitation Decreased ADL status; Decreased endurance;Decreased self-care trans;Decreased high-level ADLs   Prognosis Good   Assessment Pt is a 67 y o  male seen for OT evaluation s/p adm [facility] 12[facility]21 w/ worsening SOB x2 wks with associated cough, wheezing, and palpitations  Pt admitted w/ Acute respiratory failure with hypoxia, CATY, Community acquired pneumonia, and Pulmonary embolism  Pt on heparin drip  Pt on 8L via NRB, SpO2: 80-93% throughout eval  Comorbidities affecting pts functional performance include a significant PMH of Emphysema, HTN, HLD, and CVA  Pt with active OT orders and activity orders for Up with assistance  Pt lives alone in a multi-level house with 0 NUNU and 5+5+7 steps to bedroom  Pt reports supportive neighbor, however neighbor going to be moving to [facility]  Limited local support other than neighbor  At baseline, pt was I w/ ADLs, IADLs, and functional transfers/mobility w/o use of AD  (-)   Denies falls PTA   Upon evaluation, pt currently requires Supervision for UB ADLs, Min A for LB ADLs, Min A for toileting, Mod I for bed mobility, Supervision for transfers, and Min A for functional mobility 2* the following deficits impacting occupational performance: decreased strength, decreased balance and decreased tolerance  These impairments, as well at pts steps to enter environment, limited home support, difficulty performing ADLS and difficulty performing IADLS  limit pts ability to safely engage in all baseline areas of occupation  Based on the aforementioned OT evaluation, functional performance deficits, and assessments, pt has been identified as a Moderate complexity evaluation  Pt to continue to benefit from continued acute OT services during hospital stay to address defined deficits and to maximize level of functional independence in the following Occupational Performance areas: grooming, bathing/shower, toilet hygiene, dressing, medication management, health maintenance, functional mobility, community mobility, clothing management, cleaning, meal prep and household maintenance  From OT standpoint, recommend STR vs Home OT pending progress upon D/C  OT will continue to follow pt 3-5x/wk to address the following goals to  w/in 10-14 days:   Goals   Patient Goals To get better   LTG Time Frame 10-14   Long Term Goal Please refer to LTGs listed below   Plan   Treatment Interventions ADL retraining;Functional transfer training;UE strengthening/ROM; Endurance training;Patient/family training;Equipment evaluation/education; Compensatory technique education; Activityengagement; Energy conservation   Goal Expiration Date 22   OT Treatment Day 0   OT Frequency 3-5x/wk   Recommendation   OT Discharge Recommendation Post acute rehabilitation services  (Vs Home OT pending progress)   OT - OK to Discharge Yes  (when medically cleared)   Additional Comments  The patient's raw score on the AM-PAC Daily Activity inpatient short form is 19, standardized score is 40 22, greater than 39 4   Patients at this level are likely to benefit from discharge to home, however pt lives alone and is requiring overall Min A for ADLs and functional mobility at this time  Please refer to the recommendation of the Occupational Therapist for safe discharge planning  AM-PAC Daily Activity Inpatient   Lower Body Dressing 3   Bathing 3   Toileting 3   Upper Body Dressing 3   Grooming 3   Eating 4   Daily Activity Raw Score 19   Daily Activity Standardized Score (Calc for Raw Score >=11) 40 22   AM-PAC Applied Cognition Inpatient   Following a Speech/Presentation 4   Understanding Ordinary Conversation 4   Taking Medications 3   Remembering Where Things Are Placed or Put Away 4   Remembering List of 4-5 Errands 4   Taking Care of Complicated Tasks 3   Applied Cognition Raw Score 22   Applied Cognition Standardized Score 47 83       GOALS    1  Pt will improve activity tolerance to G for min 30 min txment sessions for increase engagement in functional tasks    2  Pt will complete bed mobility at a Mod I level w/ G balance/safety demonstrated to decrease caregiver assistance required     3  Pt will complete UB dressing/self care w/ mod I using adaptive device and DME as needed     4  Pt will complete LB dressing/self care w/ mod I using adaptive device and DME as needed    5  Pt will complete toileting w/ mod I w/ G hygiene/thoroughness using DME as needed    6  Pt will improve functional transfers to Mod I on/off all surfaces using DME as needed w/ G balance/safety     7  Pt will improve functional mobility during ADL/IADL/leisure tasks to Mod I using DME as needed w/ G balance/safety     8  Pt will be attentive 100% of the time during ongoing cognitive assessment w/ G participation to assist w/ safe d/c planning/recommendations    9  Pt will demonstrate G carryover of pt/caregiver education and training as appropriate w/o cues w/ good tolerance to increase safety during functional tasks    10   Pt will demonstrate 100% carryover of energy conservation techniques t/o functional I/ADL/leisure tasks w/o cues s/p skilled education to increase endurance during functional tasks    11  Pt will participate in simulated IADL management task to increase independence to Mod I w/ G safety and endurance    12  Pt will verbalize 3 potential fall hazards and identify appropriate compensatory techniques to decrease fall risk in home environment     13   Pt will increase standing tolerance to 8-10 mins with Fair+ dynamic standing balance to increase safety during participation in ADLs       Beatriz Fernandez, OTR/L

## 2021-12-28 NOTE — ASSESSMENT & PLAN NOTE
· Present on admission as evidenced by creatinine 1 5  · Baseline creatinine 1 1-1 2  · Suspect prerenal in setting of dehydration, patient admits to decreased p o   Intake  · Will start on IV fluid hydration  · Hold benazepril in setting of CATY  · Avoid nephrotoxin/hypertension

## 2021-12-28 NOTE — PLAN OF CARE
Problem: OCCUPATIONAL THERAPY ADULT  Goal: Performs self-care activities at highest level of function for planned discharge setting  See evaluation for individualized goals  Description: Treatment Interventions: ADL retraining,Functional transfer training,UE strengthening/ROM,Endurance training,Patient/family training,Equipment evaluation/education,Compensatory technique education,Activityengagement,Energy conservation          See flowsheet documentation for full assessment, interventions and recommendations  Note: Limitation: Decreased ADL status,Decreased endurance,Decreased self-care trans,Decreased high-level ADLs  Prognosis: Good  Assessment: Pt is a 67 y o  male seen for OT evaluation s/p adm to [address] on 12/27/2021 w/ worsening SOB x2 wks with associated cough, wheezing, and palpitations  Pt admitted w/ Acute respiratory failure with hypoxia, CATY, Community acquired pneumonia, and Pulmonary embolism  Pt on heparin drip  Pt on 8L via NRB, SpO2: 80-93% throughout eval  Comorbidities affecting pts functional performance include a significant PMH of Emphysema, HTN, HLD, and CVA  Pt with active OT orders and activity orders for Up with assistance  Pt lives alone in a multi-level house with 0 NUNU and 5+5+7 steps to bedroom  Pt reports supportive neighbor, however neighbor going to be moving to [facility]  Limited local support other than neighbor  At baseline, pt was I w/ ADLs, IADLs, and functional transfers/mobility w/o use of AD  (-)   Denies falls PTA  Upon evaluation, pt currently requires Supervision for UB ADLs, Min A for LB ADLs, Min A for toileting, Mod I for bed mobility, Supervision for transfers, and Min A for functional mobility 2* the following deficits impacting occupational performance: decreased strength, decreased balance and decreased tolerance   These impairments, as well at pts steps to enter environment, limited home support, difficulty performing ADLS and difficulty performing IADLS  limit pts ability to safely engage in all baseline areas of occupation  Based on the aforementioned OT evaluation, functional performance deficits, and assessments, pt has been identified as a Moderate complexity evaluation  Pt to continue to benefit from continued acute OT services during hospital stay to address defined deficits and to maximize level of functional independence in the following Occupational Performance areas: grooming, bathing/shower, toilet hygiene, dressing, medication management, health maintenance, functional mobility, community mobility, clothing management, cleaning, meal prep and household maintenance  From OT standpoint, recommend STR vs Home OT pending progress upon D/C   OT will continue to follow pt 3-5x/wk to address the following goals to  w/in 10-14 days:     OT Discharge Recommendation: Post acute rehabilitation services (Vs Home OT pending progress)  OT - OK to Discharge: Yes (when medically cleared)

## 2021-12-28 NOTE — ASSESSMENT & PLAN NOTE
· Present on admission as evidenced by tachypnea 36, tachycardia 97, hypoxia 52% oxygen saturation  · Chest x-ray and CTA chest show scattered ground-glass opacities and alveolar opacities in the right middle lobe and left lower lobe- suspected multifocal pneumonia  · Lactic acid 1 8  · Will empirically treat with IV ceftriaxone azithromycin  · Check procalcitonin x2, if negative can DC antibiotics  · Check sputum culture, urine strep, urine Legionella   · Follow final blood cultures

## 2021-12-28 NOTE — ASSESSMENT & PLAN NOTE
· CTA chest shows: Asymmetric soft tissue in the right apex compared to the left  This measures 2 4 x 2 4 cm    While this could represent scar tissue, a mass is not excluded particularly in this high risk setting  · Outpatient follow-up CT scan recommended in 3-6 months  · Pulmonary to evaluate, consider possible bronchoscopy inpatient versus outpatient

## 2021-12-28 NOTE — CONSULTS
Consultation - Pulmonary Medicine   Gumaro Hope 67 y o  male MRN: [id number]  Unit/Bed#: D1C7 Encounter: [id number]      Assessment/Plan:    1  Acute hypoxic respiratory failure likely multifaceted as listed below       -  currently on simple mask-6 L-97%, patient does not wear home O2       -  continue saturations greater 89%       -  pulmonary toileting:  Deep breathing cough, OOB as tolerated, IS Q 1 hr       -  will need home O2 evaluation    2  Acute submassive possibly unprovoked segmental MINAL & RLL PEs w/ out RV dysfunction       - patient initiated on heparin GTT       -  Denied:  Recent travel, surgery, sedentary lifestyle       -  will need to update all cancer screening/markers       -  etiology currently at this time unclear       -  lower extremity Dopplers pending       -  echo pending       -  will likely need minimum 6 months NOAC    3  Abnormal chest CT w/ H/o COVID-19- 4/2021       -  Scattered bilateral GGO       -  Differential:  Volume vs bacteria/viral PNA vs pneumonitis vs fibrosis       -  Negative:  RSV/flu/COVID-19, procalcitonin x 1- repeat pending       -  Pending:  Sputum, Legionella, strep pneumoniae       -  Day # 2- azithromycin/Rocephin       -  recommend continuing antibiotics until 2nd procalcitonin negative       -  may need to consider bronchoscopy         4  COPD of unknown severity with acute exacerbation       -  severe background emphysematous changes noted upon imaging       -  Inpatient:  IV Solu-Medrol 20 mg q 8, budesonide b i d , Xopenex/Atrovent t i d        -  home regimen:  Breo 200/25 mcg 1 puff daily       -  will need formal PFT testing       -  may be a candidate for EBV    5   Suspected mild volume overload       -  elevated proBNP 1220, GGO noted upon imaging       -  echo pending       -  would recommend keeping patient on drier side, may consider diuresing once echo is resulted       -  continue I&Os and daily weights        History of Present Illness Physician Requesting Consult: Madelyn Grey MD  Reason for Consult / Principal Problem:  Respiratory failure  Hx and PE limited by: "I keep coughing"  Chief Complaint:  Nothing  HPI: Ronnie Guo is a 67 y o   male who presented to [facility] [place] [address]  with complaints of shortness of breath  Patient has past medical history of hypertension, emphysema, COVID-19- 4/2021, and CVA  Patient reports that he began having some shortness of breath approximately 3 weeks ago  Patient reports that he noticed his shortness of breath increasing in severity  Patient also reports that he noticed a 10 lb weight loss  Upon ED admission patient was noted to be 52% on room air  CTA shows bilateral PEs along with possible bilateral pneumonia  Patient was initiated heparin GTT and administered 40 mg IV Solu-Medrol nebulizer treatment and Rocephin/azithromycin  Pulmonary was consulted for acute hypoxic respiratory failure  Today upon examination patient appeared somewhat tachypneic and dyspneic  Patient was wearing a simple mask and was noted to have significantly diminished aeration throughout lung fields  Patient had a frequent cough with mild green to yellow sputum production  Patient is currently denying any fevers, chills, hemoptysis, headaches, night sweats, pleuritic chest pain, or palpitations  A pulmonary standpoint, patient does not have a pulmonologist   Patient reports an approximate 1 pack per day 44 year smoking history  Patient reports he quit smoking approximately 17 years ago  Patient reports being diagnosed with COPD however has never had formal PFT testing  Patient reports being recently initiated on Breo 200/25 mcg 1 puff daily per his PCP  Patient is not maintained any oxygen therapies  Patient denies any occupational exposures as he worked in computer programming  Patient denies having any pets    Patient denies any symptoms of GERD, SOURAV, seasonal allergies, dysphagia, or postnasal drip  Patient denies any acute exposures to dust, mold, asbestos, or silica  Inpatient consult to Pulmonology  Consult performed by: ASHLEY Dougherty  Consult ordered by: José Miguel Tsang PA-C          Review of Systems   Constitutional: Negative for activity change and appetite change  HENT: Negative for congestion and dental problem  Respiratory: Positive for cough and shortness of breath  Negative for apnea, choking, chest tightness, wheezing and stridor  Cardiovascular: Negative for chest pain, palpitations and leg swelling  Gastrointestinal: Negative for abdominal distention and abdominal pain  Genitourinary: Negative for difficulty urinating and dysuria  Musculoskeletal: Negative for arthralgias and back pain  Skin: Negative for color change and pallor  Neurological: Negative for dizziness and facial asymmetry  Psychiatric/Behavioral: Negative for agitation and behavioral problems         Historical Information   Past Medical History:   Diagnosis Date    Hypertension      Past Surgical History:   Procedure Laterality Date    HERNIA REPAIR       Social History   Social History     Substance and Sexual Activity   Alcohol Use Yes    Comment: Social drinker; Daily alcohol use per [employer]     Social History     Substance and Sexual Activity   Drug Use Not on file     Social History     Tobacco Use   Smoking Status Former Smoker    Packs/day: 2 00    Years: 49 00    Pack years: 98 00    Quit date:     Years since quittin 0   Smokeless Tobacco Former User   Tobacco Comment    No secondhand smoke exposure     E-Cigarette/Vaping    E-Cigarette Use Never User      E-Cigarette/Vaping Substances     Occupational History:  [employer]     Family History:   Family History   Problem Relation Age of Onset    Lung cancer Father        Meds/Allergies   pertinent pulmonary meds have been reviewed    No Known Allergies    Objective   Vitals: Blood pressure 135/63, pulse 88, temperature 97 9 °F (36 6 °C), temperature source Oral, resp  rate (!) 28, weight 72 4 kg (159 lb 9 8 oz), SpO2 97 %  Simple mask,Body mass index is 25 kg/m²  Intake/Output Summary (Last 24 hours) at 12/28/2021 0737  Last data filed at 12/28/2021 0501  Gross per 24 hour   Intake --   Output 500 ml   Net -500 ml     Invasive Devices  Report    Peripheral Intravenous Line            Peripheral IV 12/27/21 Left Hand <1 day    Peripheral IV 12/27/21 Right Antecubital <1 day                Physical Exam  Constitutional:       General: He is not in acute distress  Appearance: Normal appearance  He is well-developed and normal weight  He is ill-appearing  HENT:      Head: Normocephalic and atraumatic  Nose: Nose normal  No congestion or rhinorrhea  Mouth/Throat:      Mouth: Mucous membranes are dry  Pharynx: No oropharyngeal exudate or posterior oropharyngeal erythema  Cardiovascular:      Rate and Rhythm: Normal rate and regular rhythm  Pulses: Normal pulses  Heart sounds: Normal heart sounds  No murmur heard  No friction rub  No gallop  Pulmonary:      Effort: Tachypnea and accessory muscle usage present  No prolonged expiration or respiratory distress  Breath sounds: Decreased air movement present  Examination of the right-lower field reveals rales  Examination of the left-lower field reveals rales  Decreased breath sounds and rales present  No wheezing or rhonchi  Comments: Significantly diminished breath sounds lower lobe rales  Abdominal:      General: Abdomen is flat  Bowel sounds are normal  There is no distension  Palpations: Abdomen is soft  There is no mass  Musculoskeletal:         General: No swelling or tenderness  Normal range of motion  Cervical back: Normal range of motion and neck supple  No rigidity or tenderness  Skin:     General: Skin is warm and dry  Coloration: Skin is not jaundiced or pale     Neurological: General: No focal deficit present  Mental Status: He is alert and oriented to person, place, and time  Mental status is at baseline  Psychiatric:         Mood and Affect: Mood normal          Behavior: Behavior normal          Lab Results:   I have personally reviewed pertinent lab results CBC:   Lab Results   Component Value Date    WBC 6 27 12/28/2021    HGB 12 7 12/28/2021    HCT 38 4 12/28/2021     (H) 12/28/2021     12/28/2021    MCH 34 5 (H) 12/28/2021    MCHC 33 1 12/28/2021    RDW 13 0 12/28/2021    MPV 9 2 12/28/2021    NRBC 0 12/28/2021   , CMP:   Lab Results   Component Value Date    SODIUM 141 12/28/2021    K 4 8 12/28/2021     12/28/2021    CO2 24 12/28/2021    BUN 29 (H) 12/28/2021    CREATININE 1 22 12/28/2021    CALCIUM 8 5 12/28/2021    AST 14 12/27/2021    ALT 20 12/27/2021    ALKPHOS 94 12/27/2021    EGFR 56 12/28/2021   , PT/INR:   Lab Results   Component Value Date    INR 1 13 12/27/2021       Imaging Studies: I have personally reviewed pertinent films in PACS     CTA chest-bilateral ground-glass opacity, left upper lobe and right lower lobe PE    EKG, Pathology, and Other Studies: I have personally reviewed pertinent films in PACS     EKG 12/27/2021- normal sinus rhythm    Pulmonary Results (PFTs, PSG): I have personally reviewed pertinent films in PACS     No PFTs recorded    VTE Prophylaxis: Sequential compression device (Venodyne)     Code Status: Level 1 - Full Code    None    Portions of the record may have been created with voice recognition software  Occasional wrong word or "sound a like" substitutions may have occurred due to the inherent limitations of voice recognition software  Read the chart carefully and recognize, using context, where substitutions have occurred

## 2021-12-28 NOTE — ASSESSMENT & PLAN NOTE
· Troponin elevated on admission 86 > 107   · Suspect elevated troponin the setting of acute pulmonary embolism and sepsis  · Follow-up 4 hour troponin  · Patient denies any chest pain  · EKG shows NSR with nonspecific ST/T-wave abnormalities  · Continue to trend troponin with EKG

## 2021-12-28 NOTE — ASSESSMENT & PLAN NOTE
· CTA chest shows evidence multifocal pneumonia  · IV ceftriaxone and azithromycin  · Check procalcitonin x2  · Pending sputum culture, urine strep, urine Legionella

## 2021-12-28 NOTE — PLAN OF CARE
Problem: Potential for Falls  Goal: Patient will remain free of falls  Description: INTERVENTIONS:  - Educate patient/family on patient safety including physical limitations  - Instruct patient to call for assistance with activity   - Consult OT/PT to assist with strengthening/mobility   - Keep Call bell within reach  - Keep bed low and locked with side rails adjusted as appropriate  - Keep care items and personal belongings within reach  - Initiate and maintain comfort rounds  - Make Fall Risk Sign visible to staff  - Offer Toileting every 2 hours   Hours, in advance of need  - Initiate/Maintain no alarm  - Obtain necessary fall risk management equipment: na  - Apply yellow socks and bracelet for high fall risk patients  - Consider moving patient to room near nurses station  Outcome: Progressing

## 2021-12-28 NOTE — ASSESSMENT & PLAN NOTE
· CTA chest shows: Ectasia of the descending thoracic aorta at the diaphragmatic hiatus measuring 4 6 x 2 9 cm   · Recommend f/u CTA of the aorta for further evaluation- will hold off for now as patient has CATY and received contrast for CTA chest  · Outpatient vascular surgery follow up

## 2021-12-29 PROBLEM — I47.29 NSVT (NONSUSTAINED VENTRICULAR TACHYCARDIA): Status: ACTIVE | Noted: 2021-12-29

## 2021-12-29 PROBLEM — I47.2 NSVT (NONSUSTAINED VENTRICULAR TACHYCARDIA) (HCC): Status: ACTIVE | Noted: 2021-12-29

## 2021-12-29 LAB
ANION GAP SERPL CALCULATED.3IONS-SCNC: 10 MMOL/L (ref 4–13)
APTT PPP: 53 SECONDS (ref 23–37)
BASOPHILS # BLD AUTO: 0.01 THOUSANDS/ΜL (ref 0–0.1)
BASOPHILS NFR BLD AUTO: 0 % (ref 0–1)
BUN SERPL-MCNC: 38 MG/DL (ref 5–25)
CALCIUM SERPL-MCNC: 8.5 MG/DL (ref 8.3–10.1)
CHLORIDE SERPL-SCNC: 104 MMOL/L (ref 100–108)
CO2 SERPL-SCNC: 23 MMOL/L (ref 21–32)
CREAT SERPL-MCNC: 1.3 MG/DL (ref 0.6–1.3)
EOSINOPHIL # BLD AUTO: 0 THOUSAND/ΜL (ref 0–0.61)
EOSINOPHIL NFR BLD AUTO: 0 % (ref 0–6)
ERYTHROCYTE [DISTWIDTH] IN BLOOD BY AUTOMATED COUNT: 12.9 % (ref 11.6–15.1)
GFR SERPL CREATININE-BSD FRML MDRD: 52 ML/MIN/1.73SQ M
GLUCOSE SERPL-MCNC: 174 MG/DL (ref 65–140)
HCT VFR BLD AUTO: 36.3 % (ref 36.5–49.3)
HGB BLD-MCNC: 11.6 G/DL (ref 12–17)
IMM GRANULOCYTES # BLD AUTO: 0.06 THOUSAND/UL (ref 0–0.2)
IMM GRANULOCYTES NFR BLD AUTO: 1 % (ref 0–2)
LYMPHOCYTES # BLD AUTO: 0.43 THOUSANDS/ΜL (ref 0.6–4.47)
LYMPHOCYTES NFR BLD AUTO: 5 % (ref 14–44)
MAGNESIUM SERPL-MCNC: 2.2 MG/DL (ref 1.6–2.6)
MCH RBC QN AUTO: 32.8 PG (ref 26.8–34.3)
MCHC RBC AUTO-ENTMCNC: 32 G/DL (ref 31.4–37.4)
MCV RBC AUTO: 103 FL (ref 82–98)
MONOCYTES # BLD AUTO: 0.6 THOUSAND/ΜL (ref 0.17–1.22)
MONOCYTES NFR BLD AUTO: 7 % (ref 4–12)
NEUTROPHILS # BLD AUTO: 7.88 THOUSANDS/ΜL (ref 1.85–7.62)
NEUTS SEG NFR BLD AUTO: 87 % (ref 43–75)
NRBC BLD AUTO-RTO: 0 /100 WBCS
PLATELET # BLD AUTO: 216 THOUSANDS/UL (ref 149–390)
PMV BLD AUTO: 9.2 FL (ref 8.9–12.7)
POTASSIUM SERPL-SCNC: 4.9 MMOL/L (ref 3.5–5.3)
RBC # BLD AUTO: 3.54 MILLION/UL (ref 3.88–5.62)
SODIUM SERPL-SCNC: 137 MMOL/L (ref 136–145)
WBC # BLD AUTO: 8.98 THOUSAND/UL (ref 4.31–10.16)

## 2021-12-29 PROCEDURE — 99222 1ST HOSP IP/OBS MODERATE 55: CPT | Performed by: INTERNAL MEDICINE

## 2021-12-29 PROCEDURE — 94640 AIRWAY INHALATION TREATMENT: CPT

## 2021-12-29 PROCEDURE — 85025 COMPLETE CBC W/AUTO DIFF WBC: CPT | Performed by: STUDENT IN AN ORGANIZED HEALTH CARE EDUCATION/TRAINING PROGRAM

## 2021-12-29 PROCEDURE — 85730 THROMBOPLASTIN TIME PARTIAL: CPT | Performed by: STUDENT IN AN ORGANIZED HEALTH CARE EDUCATION/TRAINING PROGRAM

## 2021-12-29 PROCEDURE — 80048 BASIC METABOLIC PNL TOTAL CA: CPT | Performed by: STUDENT IN AN ORGANIZED HEALTH CARE EDUCATION/TRAINING PROGRAM

## 2021-12-29 PROCEDURE — 97530 THERAPEUTIC ACTIVITIES: CPT

## 2021-12-29 PROCEDURE — 97535 SELF CARE MNGMENT TRAINING: CPT

## 2021-12-29 PROCEDURE — 99232 SBSQ HOSP IP/OBS MODERATE 35: CPT | Performed by: NURSE PRACTITIONER

## 2021-12-29 PROCEDURE — 99232 SBSQ HOSP IP/OBS MODERATE 35: CPT | Performed by: STUDENT IN AN ORGANIZED HEALTH CARE EDUCATION/TRAINING PROGRAM

## 2021-12-29 PROCEDURE — 93970 EXTREMITY STUDY: CPT | Performed by: SURGERY

## 2021-12-29 PROCEDURE — 83735 ASSAY OF MAGNESIUM: CPT | Performed by: INTERNAL MEDICINE

## 2021-12-29 RX ORDER — PREDNISONE 20 MG/1
40 TABLET ORAL DAILY
Status: DISCONTINUED | OUTPATIENT
Start: 2021-12-30 | End: 2021-12-31 | Stop reason: HOSPADM

## 2021-12-29 RX ORDER — FAMOTIDINE 20 MG/1
20 TABLET, FILM COATED ORAL DAILY
Status: DISCONTINUED | OUTPATIENT
Start: 2021-12-29 | End: 2021-12-31 | Stop reason: HOSPADM

## 2021-12-29 RX ORDER — METHYLPREDNISOLONE SODIUM SUCCINATE 40 MG/ML
40 INJECTION, POWDER, LYOPHILIZED, FOR SOLUTION INTRAMUSCULAR; INTRAVENOUS EVERY 8 HOURS
Status: DISCONTINUED | OUTPATIENT
Start: 2021-12-29 | End: 2021-12-29

## 2021-12-29 RX ORDER — CEFDINIR 300 MG/1
300 CAPSULE ORAL EVERY 12 HOURS SCHEDULED
Status: DISCONTINUED | OUTPATIENT
Start: 2021-12-29 | End: 2021-12-31 | Stop reason: HOSPADM

## 2021-12-29 RX ADMIN — AZITHROMYCIN MONOHYDRATE 500 MG: 250 TABLET ORAL at 21:30

## 2021-12-29 RX ADMIN — LEVALBUTEROL HYDROCHLORIDE 1.25 MG: 1.25 SOLUTION, CONCENTRATE RESPIRATORY (INHALATION) at 08:32

## 2021-12-29 RX ADMIN — BUDESONIDE 0.5 MG: 0.5 INHALANT ORAL at 19:27

## 2021-12-29 RX ADMIN — LEVALBUTEROL HYDROCHLORIDE 1.25 MG: 1.25 SOLUTION, CONCENTRATE RESPIRATORY (INHALATION) at 19:27

## 2021-12-29 RX ADMIN — GUAIFENESIN 600 MG: 600 TABLET, EXTENDED RELEASE ORAL at 17:52

## 2021-12-29 RX ADMIN — APIXABAN 5 MG: 5 TABLET, FILM COATED ORAL at 08:38

## 2021-12-29 RX ADMIN — ATORVASTATIN CALCIUM 40 MG: 40 TABLET, FILM COATED ORAL at 17:52

## 2021-12-29 RX ADMIN — CEFDINIR 300 MG: 300 CAPSULE ORAL at 21:26

## 2021-12-29 RX ADMIN — IPRATROPIUM BROMIDE 0.5 MG: 0.5 SOLUTION RESPIRATORY (INHALATION) at 08:32

## 2021-12-29 RX ADMIN — FAMOTIDINE 20 MG: 20 TABLET ORAL at 08:38

## 2021-12-29 RX ADMIN — FOLIC ACID 1 MG: 1 TABLET ORAL at 08:19

## 2021-12-29 RX ADMIN — APIXABAN 5 MG: 5 TABLET, FILM COATED ORAL at 17:52

## 2021-12-29 RX ADMIN — AMLODIPINE BESYLATE 10 MG: 10 TABLET ORAL at 08:19

## 2021-12-29 RX ADMIN — IPRATROPIUM BROMIDE 0.5 MG: 0.5 SOLUTION RESPIRATORY (INHALATION) at 19:27

## 2021-12-29 RX ADMIN — HEPARIN SODIUM 2800 UNITS: 1000 INJECTION INTRAVENOUS; SUBCUTANEOUS at 06:42

## 2021-12-29 RX ADMIN — LEVALBUTEROL HYDROCHLORIDE 1.25 MG: 1.25 SOLUTION, CONCENTRATE RESPIRATORY (INHALATION) at 13:25

## 2021-12-29 RX ADMIN — METOPROLOL SUCCINATE 100 MG: 50 TABLET, EXTENDED RELEASE ORAL at 08:38

## 2021-12-29 RX ADMIN — BUDESONIDE 0.5 MG: 0.5 INHALANT ORAL at 08:32

## 2021-12-29 RX ADMIN — CEFDINIR 300 MG: 300 CAPSULE ORAL at 08:49

## 2021-12-29 RX ADMIN — GUAIFENESIN 600 MG: 600 TABLET, EXTENDED RELEASE ORAL at 08:19

## 2021-12-29 RX ADMIN — METHYLPREDNISOLONE SODIUM SUCCINATE 20 MG: 40 INJECTION, POWDER, FOR SOLUTION INTRAMUSCULAR; INTRAVENOUS at 06:04

## 2021-12-29 RX ADMIN — IPRATROPIUM BROMIDE 0.5 MG: 0.5 SOLUTION RESPIRATORY (INHALATION) at 13:25

## 2021-12-29 NOTE — UTILIZATION REVIEW
Initial Clinical Review    Admission: Date/Time/Statement:   Admission Orders (From admission, onward)     Ordered        12/27/21 1850  INPATIENT ADMISSION  Once                      Orders Placed This Encounter   Procedures    INPATIENT ADMISSION     Standing Status:   Standing     Number of Occurrences:   1     Order Specific Question:   Level of Care     Answer:   Med Surg [16]     Order Specific Question:   Estimated length of stay     Answer:   More than 2 Midnights     Order Specific Question:   Certification     Answer:   I certify that inpatient services are medically necessary for this patient for a duration of greater than two midnights  See H&P and MD Progress Notes for additional information about the patient's course of treatment  ED Arrival Information     Expected Arrival Acuity    - 12/27/2021 13:34 Emergent         Means of arrival Escorted by Service Admission type    [facility] Emergency         Arrival complaint    Shortness of Breath        Chief Complaint   Patient presents with    Shortness of Breath     Pt has increased SOB for several weeks, dyspnea with exertion  Also reports weight loss  Initial Presentation: 67 y O male presents to ED from home with increasing shortness of breath for past 2-3 weeks with cough, wheezing and palpitations  Found hypoxic on ED arrival with sats  56  % requiring NRB at 7 L/min  Has  36  Year smoking history, now  Quit  PMH is  Emphysema, HTN and  CVA  CTA chest  Shows  PE/  possible  Mass/ pneumonia  Labs  Reveal elevated troponin, elevated creatinine  Met sepsis criteria on admission with tachypnea, tachycardia  And hypoxia  COVID  Negative  Admit  Ip with Acute respiratory failure with hypoxia, Pulmonary Embolism, Sepsis  And Pneumonia and plan is  Tele,  PJ, IV heparin, NRB mask, monitor labs, sputum/blood cultures, and continue home  meds        Date: 12/28       Day 2:   Pulmonary consult  Acute hypoxemic respiratory failure - multifactorial    Continue  IV heparin  Continue  IC  S/medrol, nebulizers  Monitor  Respiratory status  COVID negative  Needs PT/OT  Will need home  O2  req  Prior to d/c       ED Triage Vitals [12/27/21 1419]   Temperature Pulse Respirations Blood Pressure SpO2   97 9 °F (36 6 °C) 97 (!) 36 158/67 (S) (!) 52 %      Temp Source Heart Rate Source Patient Position - Orthostatic VS BP Location FiO2 (%)   Oral Monitor Sitting Right arm --      Pain Score       No Pain          Wt Readings from Last 1 Encounters:   12/28/21 72 1 kg (159 lb)     Additional Vital Signs:   36 6 °C) 67 18 143/75 98 96 % -- -- -- -- --    12/28/21 23:34:49 97 3 °F (36 3 °C) Abnormal  80 18 116/63 81 93 % -- -- -- -- Lying   12/28/21 1955 -- -- -- -- -- 96 % -- -- -- -- --   12/28/21 1641 -- -- -- -- -- -- 44 -- 6 L/min Nasal cannula --   12/28/21 1550 -- 101 24 Abnormal  126/58 -- 94 % -- 8 L/min -- None (Room air) Sitting   12/28/21 15:48:33 -- 101 -- -- -- 95 % -- -- -- -- --   12/28/21 1303 -- 69 26 Abnormal  147/71 -- 98 % -- -- -- -- Lying   12/28/21 1146 -- 75 -- 154/84 -- -- -- -- -- -- --   12/28/21 0815 -- -- -- -- -- -- -- 7 L/min -- Non-rebreather mask --   12/28/21 0700 -- 65 -- 154/84 -- -- -- -- -- -- Lying   12/27/21 1913 -- 88 28 Abnormal  135/63 -- 97 % -- -- -- None (Room air) Lying   12/27/21 1513 -- -- -- -- -- 94 % -- -- -- Non-rebreather mask  --   12/27/21 1419 97 9 °F (36 6 °C) 97 36 Abnormal  158/67 -- 52 % Abnormal   -- -- -- None (Room air) Sitting    Comments:      Pertinent Labs/Diagnostic Test Results:   CTA  Chest  ( 12/27)    Segmental pulmonary emboli are noted in the left upper lobe image 2/104 and right lower lobe image 2/179  Measured RV/LV ratio is within normal limits at less than 0 9  Severe background emphysema with scattered groundglass opacities and alveolar opacities in the right middle lobe and left lower lobe could represent pneumonia   Bacterial versus Covid 19 pneumonia are both possibilities  Asymmetric soft tissue in the right apex compared to the left  While this could reflect scar tissue, a follow-up CT scan is recommended in 3-6 months to ensure stability  Ectasia of the descending thoracic aorta at the diaphragmatic hiatus measuring 4 6 x 2 9 cm on the final image of this exam incompletely evaluated  Follow-up CTA of the aorta could be obtained for more evaluation  CXR  ( 12/27)    Patchy opacities within the mid and lower lung fields likely infectious in nature and should be correlated with Covid-19 testing  EKG  Rate: 88  Rhythm: normal sinus  Intervals: normal intervals  Axis: normal axis  QRS/Blocks: normal QRS  ST Changes: T wave inversion in III, No other acute ST Changes, no STD/NUNU         Results from last 7 days   Lab Units 12/27/21  1719   SARS-COV-2  Negative     Results from last 7 days   Lab Units 12/29/21  0502 12/28/21  0501 12/27/21  1504   WBC Thousand/uL 8 98 6 27 10 70*   HEMOGLOBIN g/dL 11 6* 12 7 14 4   HEMATOCRIT % 36 3* 38 4 43 5   PLATELETS Thousands/uL 216 186 231   NEUTROS ABS Thousands/µL 7 88* 5 56 8 59*         Results from last 7 days   Lab Units 12/29/21  0502 12/28/21  0501 12/27/21  1503   SODIUM mmol/L 137 141 136   POTASSIUM mmol/L 4 9 4 8 4 6   CHLORIDE mmol/L 104 107 101   CO2 mmol/L 23 24 24   ANION GAP mmol/L 10 10 11   BUN mg/dL 38* 29* 32*   CREATININE mg/dL 1 30 1 22 1 50*   EGFR ml/min/1 73sq m 52 56 44   CALCIUM mg/dL 8 5 8 5 8 8   MAGNESIUM mg/dL 2 2  --  2 0     Results from last 7 days   Lab Units 12/27/21  1503   AST U/L 14   ALT U/L 20   ALK PHOS U/L 94   TOTAL PROTEIN g/dL 7 7   ALBUMIN g/dL 3 0*   TOTAL BILIRUBIN mg/dL 0 50         Results from last 7 days   Lab Units 12/29/21  0502 12/28/21  0501 12/27/21  1503   GLUCOSE RANDOM mg/dL 174* 168* 136           Results from last 7 days   Lab Units 12/27/21  1503   CK TOTAL U/L 111     Results from last 7 days   Lab Units 12/27/21  1908 12/27/21  1714 12/27/21  1504   HS TNI 0HR ng/L  --   --  86*   HS TNI 2HR ng/L  --  107*  --    HSTNI D2 ng/L  --  21  --    HS TNI 4HR ng/L 105*  --   --    HSTNI D4 ng/L 19  --   --          Results from last 7 days   Lab Units 12/29/21  0502 12/28/21  2052 12/28/21  1422 12/28/21  0501 12/27/21  1852   PROTIME seconds  --   --   --   --  14 1   INR   --   --   --   --  1 13   PTT seconds 53* >210* 70*   < > 29    < > = values in this interval not displayed  Results from last 7 days   Lab Units 12/28/21  0504 12/27/21  1852   PROCALCITONIN ng/ml 0 06 0 05     Results from last 7 days   Lab Units 12/27/21  1852   LACTIC ACID mmol/L 1 8             Results from last 7 days   Lab Units 12/27/21  1503   NT-PRO BNP pg/mL 1,220*     Results from last 7 days   Lab Units 12/28/21  2010   FERRITIN ng/mL 364             Results from last 7 days   Lab Units 12/27/21  1503   CRP mg/L 88 8*                 Results from last 7 days   Lab Units 12/27/21  1719   INFLUENZA A PCR  Negative   INFLUENZA B PCR  Negative   RSV PCR  Negative                             Results from last 7 days   Lab Units 12/28/21  0842 12/27/21  1852 12/27/21  1456   BLOOD CULTURE   --  No Growth at 24 hrs  No Growth at 24 hrs  SPUTUM CULTURE  Test not performed  Suggest repeat specimen  --   --    GRAM STAIN RESULT  >10 squamous epithelial cells/lpf, indicating orophayngeal contamination  *  2+ Polys*  3+ Gram positive cocci in pairs*  3+ Gram positive cocci in clusters*  2+ Gram positive rods*  1+ Gram negative rods*  --   --                ED Treatment:   Medication Administration from 12/27/2021 1334 to 12/28/2021 1545       Date/Time Order Dose Route Action Comments     12/27/2021 1509 albuterol inhalation solution 10 mg 10 mg Nebulization Given      12/27/2021 1509 ipratropium (ATROVENT) 0 02 % inhalation solution 1 mg 1 mg Nebulization Given      12/27/2021 1509 sodium chloride 0 9 % inhalation solution 3 mL 3 mL Nebulization Given 12/27/2021 1505 dexamethasone (DECADRON) injection 10 mg 10 mg Intravenous Given      12/27/2021 1807 aspirin chewable tablet 324 mg 324 mg Oral Not Given      12/27/2021 1649 iodixanol (VISIPAQUE) 320 MG/ML injection 85 mL 85 mL Intravenous Given      12/27/2021 1910 ceftriaxone (ROCEPHIN) 1 g/50 mL in dextrose IVPB   Intravenous Restarted cultures obtained     12/27/2021 1822 ceftriaxone (ROCEPHIN) 1 g/50 mL in dextrose IVPB 0 mg Intravenous Hold need to collect cultures     12/27/2021 1820 ceftriaxone (ROCEPHIN) 1 g/50 mL in dextrose IVPB 1,000 mg Intravenous New Bag      12/27/2021 2216 heparin (porcine) injection 5,600 Units 5,600 Units Intravenous Given      12/28/2021 0718 heparin (porcine) 25,000 units in 0 45% NaCl 250 mL infusion (premix) 15 Units/kg/hr Intravenous Restarted      12/28/2021 0600 heparin (porcine) 25,000 units in 0 45% NaCl 250 mL infusion (premix) 0 Units/kg/hr Intravenous Stopped Hold for 1 hour per protocol     12/27/2021 2253 heparin (porcine) 25,000 units in 0 45% NaCl 250 mL infusion (premix) 3 Units/kg/hr Intravenous Not Given      12/27/2021 2224 heparin (porcine) 25,000 units in 0 45% NaCl 250 mL infusion (premix) 18 Units/kg/hr Intravenous New Bag      12/27/2021 2315 heparin (porcine) injection 5,600 Units 5,600 Units Intravenous Given      12/28/2021 0835 amLODIPine (NORVASC) tablet 5 mg 5 mg Oral Given      12/27/2021 2252 atorvastatin (LIPITOR) tablet 40 mg 40 mg Oral Given      37/94/3246 5620 folic acid (FOLVITE) tablet 1 mg 1 mg Oral Given      12/28/2021 0835 metoprolol succinate (TOPROL-XL) 24 hr tablet 100 mg 100 mg Oral Given      12/27/2021 2242 sodium chloride 0 9 % infusion 75 mL/hr Intravenous New Bag      12/28/2021 0835 guaiFENesin (MUCINEX) 12 hr tablet 600 mg 600 mg Oral Given      12/27/2021 2216 guaiFENesin (MUCINEX) 12 hr tablet 600 mg 600 mg Oral Given      12/28/2021 1326 ipratropium (ATROVENT) 0 02 % inhalation solution 0 5 mg 0 5 mg Nebulization Given 12/28/2021 0900 ipratropium (ATROVENT) 0 02 % inhalation solution 0 5 mg 0 5 mg Nebulization Given      12/28/2021 1326 levalbuterol (XOPENEX) inhalation solution 1 25 mg 1 25 mg Nebulization Given      12/28/2021 0835 levalbuterol (XOPENEX) inhalation solution 1 25 mg 1 25 mg Nebulization Given      12/28/2021 1326 sodium chloride 0 9 % inhalation solution 3 mL 3 mL Nebulization Given      12/28/2021 0834 sodium chloride 0 9 % inhalation solution 3 mL 3 mL Nebulization Given      12/28/2021 1326 methylPREDNISolone sodium succinate (Solu-MEDROL) injection 20 mg 20 mg Intravenous Given      12/28/2021 0522 methylPREDNISolone sodium succinate (Solu-MEDROL) injection 20 mg 20 mg Intravenous Given      12/27/2021 2221 methylPREDNISolone sodium succinate (Solu-MEDROL) injection 20 mg 20 mg Intravenous Given      12/27/2021 2252 azithromycin (ZITHROMAX) tablet 500 mg 500 mg Oral Given      12/28/2021 0912 budesonide (PULMICORT) inhalation solution 0 5 mg 0 5 mg Nebulization Given         Present on Admission:   Hypertension   Hyperlipidemia   Cerebral infarction [facility])      Admitting Diagnosis: Emphysema lung ([address] ) [J43 9]  Lung mass [R91 8]  Pneumonia [J18 9]  Pulmonary embolism (HCC) [I26 99]  SOB (shortness of breath) [R06 02]  Community acquired pneumonia [J18 9]  Hypoxia [R09 02]  Abnormal CT scan [R93 89]  Acute respiratory failure with hypoxia (HCC) [J96 01]  Multiple subsegmental pulmonary emboli without acute cor pulmonale (HCC) [I26 94]  Age/Sex: 67 y o  male  Admission Orders:  Scheduled Medications:  amLODIPine, 10 mg, Oral, Daily  apixaban, 5 mg, Oral, BID  atorvastatin, 40 mg, Oral, QPM  azithromycin, 500 mg, Oral, Q24H  budesonide, 0 5 mg, Nebulization, Q12H  cefdinir, 300 mg, Oral, Q12H TAYLA  famotidine, 20 mg, Oral, Daily  folic acid, 1 mg, Oral, Daily  guaiFENesin, 600 mg, Oral, BID  ipratropium, 0 5 mg, Nebulization, TID  levalbuterol, 1 25 mg, Nebulization, TID  metoprolol succinate, 100 mg, Oral, Daily  [START ON 12/30/2021] predniSONE, 40 mg, Oral, Daily  IV  S/medrol - d/c  12/29  IV  Rocephin - d/c  12/28      Continuous IV Infusions:  IV  Heparin - d/c   12/29  IVF  75/hr - d/c   12/28     PRN Meds:  acetaminophen, 650 mg, Oral, Q6H PRN  glycerin-hypromellose-, 1 drop, Both Eyes, Q6H PRN  sodium chloride (PF), 3 mL, Intravenous, Q1H PRN        IP CONSULT TO PULMONOLOGY  IP CONSULT TO CASE MANAGEMENT  IP CONSULT TO CARDIOLOGY    Network Utilization Review Department  ATTENTION: Please call with any questions or concerns to [phone] and carefully listen to the prompts so that you are directed to the right person  All voicemails are confidential   Cleotis Dirk all requests for admission clinical reviews, approved or denied determinations and any other requests to dedicated fax number below belonging to the campus where the patient is receiving treatment   List of dedicated fax numbers for the Facilities:  [address] DENIALS (Administrative/Medical Necessity) [phone]   [address] (Maternity/NICU/Pediatrics) [phone]   [address]  [address] [facility] [place] [address] [place] [address] [address] [facility] [facility] [phone]

## 2021-12-29 NOTE — OCCUPATIONAL THERAPY NOTE
Occupational Therapy Progress Note     Patient Name: Chelly Dewey  WVPHR'Q Date: 12/29/2021  Problem List  Principal Problem:    Acute respiratory failure with hypoxia ([facility] )  Active Problems:    Cerebral infarction ([facility] )    Hyperlipidemia    Hypertension    Pulmonary embolism (HCC)    Emphysema lung (HCC)    Sepsis ([facility] )    Aortic ectasia, thoracic ([facility] )    Community acquired pneumonia    Abnormal CT scan    Elevated troponin    CATY (acute kidney injury) ([place] [address] )    NSVT (nonsustained ventricular tachycardia) ([place] [address] )          12/29/21 1439   OT Last Visit   OT Visit Date 12/29/21   Note Type   Note Type Treatment   Restrictions/Precautions   Weight Bearing Precautions Per Order No   Other Precautions Multiple lines;Telemetry; Fall Risk;O2   General   Response to Previous Treatment Patient with no complaints from previous session   Pain Assessment   Pain Assessment Tool 0-10   Pain Score No Pain   ADL   Where Assessed Chair   Grooming Assistance 5  Supervision/Setup   Grooming Deficit Setup;Supervision/safety; Increased time to complete   LB Dressing Assistance 5  Supervision/Setup   LB Dressing Deficit Setup;Supervision/safety; Increased time to complete; Thread RLE into pants; Thread LLE into pants;Pull up over hips   Functional Standing Tolerance   Time 3-4 mins   Activity Dynamic standing balance activity   Comments No LOBs noted  Standing tolerance limited 2* SOB/ROSALES  Bed Mobility   Supine to Sit 6  Modified independent   Additional items HOB elevated; Bedrails; Increased time required   Sit to Supine Unable to assess   Additional Comments Pt seated OOB in chair at end of session with call bell and phone within reach  All needs met and pt reports no further questions for OT at this time  Transfers   Sit to Stand 5  Supervision   Additional items Bedrails;Armrests; Increased time required;Verbal cues   Stand to Sit 5  Supervision   Additional items Armrests; Increased time required;Verbal cues   Additional Comments Cues for safe technique and hand placement   Functional Mobility   Functional Mobility 5  Supervision   Additional Comments Assist x1 w/o use of AD; SpO2 post-mobility: 74-79% on 3L O2  SpO2 increased to 89-91% on 3L w/ seated rest break  Therapeutic Excerise-Strength   UE Strength Yes   Right Upper Extremity- Strength   R Shoulder Flexion; Extension;Horizontal ABduction   R Elbow Elbow flexion;Elbow extension   R Position Seated   Equipment Theraband  (Green resistance )   R Weight/Reps/Sets 10 reps x1 set   Left Upper Extremity-Strength   L Shoulder Flexion; Extension;Horizontal ABduction   L Elbow Elbow flexion;Elbow extension   L Position Seated   Equipment Theraband  (Green resistance )   L Weights/Reps/Sets 10 reps x1 set   Cognition   Overall Cognitive Status WFL   Arousal/Participation Alert; Cooperative   Attention Within functional limits   Orientation Level Oriented X4   Memory Within functional limits   Following Commands Follows one step commands without difficulty   Comments Pleasant and cooperative   Activity Tolerance   Activity Tolerance Patient limited by fatigue;Treatment limited secondary to medical complications (Comment)   Medical Staff Made Aware Rolecarlene Porter RN   Assessment   Assessment Pt seen for OT treatment session focusing on functional activity tolerance, bed mobility, ADLs, energy conservation education, functional transfers/mobility, and UE ROM/strengthening  Pt alert and cooperative throughout session  Pt lying supine at start of session  Resting SpO2: 91-93% on 3L O2  Bed mobility (supine>sit) completed at a Mod I level w/ use of bed rails and increased time to complete  Transfers (sit<>stand) completed w/ Supervision w/ cues for safe technique and hand placement  Pt progressed from Leonor WERNER to Supervision for functional mobility with assist for O2 line management  SpO2 post-mobility: 74-79% on 3L O2   Educated pt on energy conservation techniques (ie: compensatory/pursed lip breathing, sitting during ADLs/IADLs as possible, pacing, etc ) with pt verbalizing understanding of education  SpO2 increased to 89-91% on 3L w/ seated rest break  LB dressing completed w/ Supervision to don/[facility] pants w/ Fair- dynamic standing balance demonstrated when standing to pull pants over hips  Grooming tasks completed w/ Supervision w/ setup required and increased time to complete  UE exercises completed while seated OOB in chair to increase UE ROM/strength needed for ADLs/transfers  Seated rest breaks required between exercises 2* increased SOB/ROSALES demonstrated  No c/o pain  Pt seated OOB in chair at end of session with call bell and phone within reach  All needs met and pt reports no further questions for OT at this time  Pt expressed goal of D/Cing home at discharge, only concern is stairs within home  Continue to recommend Home OT vs STR pending continued progress and stair trial w/ PT when medically cleared  OT to follow pt on caseload  Plan   Treatment Interventions ADL retraining;Functional transfer training;UE strengthening/ROM; Endurance training;Patient/family training;Equipment evaluation/education; Compensatory technique education; Energy conservation; Activityengagement   Goal Expiration Date 01/11/22   OT Treatment Day 1   OT Frequency 3-5x/wk   Recommendation   OT Discharge Recommendation Home with home health rehabilitation  (vs STR pending progress and stair trial w/ PT)   OT - OK to Discharge Yes  (when medically cleared)   Additional Comments  The patient's raw score on the AM-PAC Daily Activity inpatient short form is 19, standardized score is 40 22, greater than 39 4  Patients at this level are likely to benefit from discharge to home  Please refer to the recommendation of the Occupational Therapist for safe discharge planning     AM-PAC Daily Activity Inpatient   Lower Body Dressing 3   Bathing 3   Toileting 3   Upper Body Dressing 3   Grooming 3   Eating 4   Daily Activity Raw Score 19   Daily Activity Standardized Score (Calc for Raw Score >=11) 40 22   AM-PAC Applied Cognition Inpatient   Following a Speech/Presentation 4   Understanding Ordinary Conversation 4   Taking Medications 3   Remembering Where Things Are Placed or Put Away 4   Remembering List of 4-5 Errands 4   Taking Care of Complicated Tasks 3   Applied Cognition Raw Score 22   Applied Cognition Standardized Score 47 83          [facility], OTR/L

## 2021-12-29 NOTE — ASSESSMENT & PLAN NOTE
· CTA chest shows: segmental pulmonary emboli in the MINAL without right heart strain   · 2D echo reviewed, no significant right heart strain noted  · Discontinue heparin drip, transition to oral Eliquis, plan for 6 month course  · Will need to follow with Pulmonary or PCP outpatient

## 2021-12-29 NOTE — CONSULTS
Cardiology Consultation  Brown Bale, MD Radene Sandifer, MD Lus Mustache, DO, MD Ridge Mas DO, Ciera Martin DO, [facility]  ----------------------------------------------------------------  [address], [address] [facility]    Talia Adam 67 y o  male MRN: [id number]  Unit/Bed#: E5 -01 Encounter: [id number]      Reason for Consultation: NSVT      ASSESSMENT:    Nonsustained VT on telemetry   Acute pulmonary embolism, December 2021   Acute exacerbation of COPD   Nonischemic troponin elevation secondary to acute pulmonary embolism   LVEF 82%, grade 1 diastolic dysfunction, normal RV size and function, mild biatrial dilatation, mild MR, December 2021   Ectasia of descending thoracic aorta measuring 4 6 x 2 9 cm, December 2021   Possible pneumonia   Soft tissue mass at right pulmonary apex 2 4 x 2 4 cm, December 2021   Hypertension   Dyslipidemia   CVA    PLAN:  Patient had 21 beat run of nonsustained VT  He was asymptomatic and has structurally normal heart  This comes in the face of acute exacerbation of COPD, possible pneumonia and acute pulmonary embolism  Keep potassium greater than 4 and magnesium greater than 2  Check magnesium level today and replete p r n  Encouraged to stop drinking alcohol  Clinically appears euvolemic and examines dry  If magnesium level within normal limits, would consider increasing Toprol-XL to 100 mg in the morning and 50 mg at night  No further inpatient CV testing  May benefit from outpatient sleep study  Outpatient follow-up for additional CV testing as clinically indicated after discharge including stress testing  No ischemic testing in patient with acute pulmonary embolism  Will see further inpatient p r n      Signed: Brigida Still DO, [facility], [facility]      History of Present Illness:  Talia Adam is a 67 y o  male with hypertension, dyslipidemia, CVA and COPD with emphysema presents with shortness of breath for the past several weeks  Three weeks prior to admission, the patient was extremely functional going to the gym on a regular basis  He then began to experience progressive shortness of breath to the point where he had to present to [facility] for evaluation  He had no associated chest discomfort, fever sweats or chills  He was found to require significant oxygen is his saturation was 56 percent in the emergency department  A non-rebreather, his saturations came up into the 90s  He has a known history of smoking with a greater than 40 pack-year history  CTA of the chest was performed showing pulmonary embolism  Subsequently, he was placed on telemetry and was found have a 21 run nonsustained VT  We have been asked to see him regarding this episode of VT  During the episode, he was asymptomatic  He was hemodynamically stable  He denies any chest pain, pressure, tightness or squeezing  Denies lightheadedness, dizziness or palpitations  Admits to significant shortness of breath that is stable since admission  Review of Systems:  Review of Systems   Constitutional: Negative for decreased appetite, fever, weight gain and weight loss  HENT: Negative for congestion and sore throat  Eyes: Negative for visual disturbance  Cardiovascular: Positive for dyspnea on exertion  Negative for chest pain, leg swelling, near-syncope and palpitations  Respiratory: Positive for shortness of breath  Negative for cough  Hematologic/Lymphatic: Negative for bleeding problem  Skin: Negative for rash  Musculoskeletal: Negative for myalgias and neck pain  Gastrointestinal: Negative for abdominal pain and nausea  Neurological: Negative for light-headedness and weakness  Psychiatric/Behavioral: Negative for depression           Past Medical History:   Diagnosis Date    Hypertension        Past Surgical History:   Procedure Laterality Date    HERNIA REPAIR Social History     Socioeconomic History    Marital status: /Civil Union     Spouse name: None    Number of children: None    Years of education: None    Highest education level: None   Occupational History    Occupation: Retired   Tobacco Use    Smoking status: Former Smoker     Packs/day: 2 00     Years: 49 00     Pack years: 98 00     Quit date:      Years since quittin 0    Smokeless tobacco: Former User    Tobacco comment: No secondhand smoke exposure   Vaping Use    Vaping Use: Never used   Substance and Sexual Activity    Alcohol use: Yes     Comment: Social drinker; Daily alcohol use per [employer]    Drug use: None    Sexual activity: None   Other Topics Concern    None   Social History Narrative    Single per [employer]     Social Determinants of Health     Financial Resource Strain: Not on file   Food Insecurity: Not on file   Transportation Needs: Not on file   Physical Activity: Not on file   Stress: Not on file   Social Connections: Not on file   Intimate Partner Violence: Not on file   Housing Stability: Not on file       Family History   Problem Relation Age of Onset    Lung cancer Father        No Known Allergies    No current facility-administered medications on file prior to encounter  Current Outpatient Medications on File Prior to Encounter   Medication Sig    amLODIPine (NORVASC) 5 mg tablet take 1 tablet by mouth once daily    Ascorbic Acid (VITAMIN C) 1000 MG tablet Take 1,000 mg by mouth daily    aspirin 325 mg tablet Take 325 mg by mouth daily    atorvastatin (LIPITOR) 40 mg tablet take 1 tablet by mouth once daily    benazepril (LOTENSIN) 40 MG tablet take 1 tablet by mouth once daily    Cholecalciferol (VITAMIN D3) 5000 units CAPS Take 1,000 Units by mouth daily    cyanocobalamin (VITAMIN B-12) 100 mcg tablet Take by mouth daily    fluticasone-vilanterol (Breo Ellipta) 200-25 MCG/INH inhaler Inhale 1 puff daily Rinse mouth after use      folic acid (FOLVITE) 1 mg tablet Take by mouth daily    metoprolol succinate (TOPROL-XL) 100 mg 24 hr tablet take 1 tablet by mouth once daily    multivitamin (THERAGRAN) TABS Take 1 tablet by mouth daily    Omega-3 Fatty Acids (FISH OIL) 1,000 mg Take 1,000 mg by mouth daily    vitamin E 100 UNIT capsule Take 100 Units by mouth daily        Current Facility-Administered Medications   Medication Dose Route Frequency Provider Last Rate    acetaminophen  650 mg Oral Q6H PRN Deanna Austin, ORLY      amLODIPine  10 mg Oral Daily Lonlenard Mealing, MD      apixaban  5 mg Oral BID Fantae [facility], MD      atorvastatin  40 mg Oral QPM [facility] Neighbor, PA-BERONICA      azithromycin  500 mg Oral Q24H Ga Norman, PA-BERONICA      budesonide  0 5 mg Nebulization Q12H ASHLEY Thomas      cefdinir  300 mg Oral Q12H [address] Fito Luong MD      famotidine  20 mg Oral Daily Adal Naiking, MD      folic acid  1 mg Oral Daily [facility] Norman, PA-C      glycerin-hypromellose-  1 drop Both Eyes Q6H PRN Tonia Luong MD      guaiFENesin  600 mg Oral BID [facility], PA-C      ipratropium  0 5 mg Nebulization TID [facility] Norman, PA-C      levalbuterol  1 25 mg Nebulization TID [facility]ighbor, PA-C      metoprolol succinate  100 mg Oral Daily [facility] Norman, PA-C      [START ON 12/30/2021] predniSONE  40 mg Oral Daily Adal Maya MD      sodium chloride (PF)  3 mL Intravenous Q1H PRN Ping Drake MD              Vitals:    12/28/21 1550 12/28/21 1955 12/28/21 2334 12/29/21 0656   BP: 126/58  116/63 143/75   BP Location: Right arm  Right arm    Pulse: 101  80 67   Resp: (!) 24  18 18   Temp:   (!) 97 3 °F (36 3 °C) 97 9 °F (36 6 °C)   TempSrc:   Oral    SpO2: 94% 96% 93% 96%   Weight:       Height:           I/O last 3 completed shifts:  In: -   Out: 1150 [Urine:1150]      Intake/Output Summary (Last 24 hours) at 12/29/2021 1127  Last data filed at 12/29/2021 0900  Gross per 24 hour   Intake 180 ml   Output 650 ml   Net -470 ml       Weight change: -0 278 kg (-9 8 oz)    PHYSICAL EXAMINATION:  Gen: Awake, Alert, NAD  Head/eyes: AT/NC, pupils equal and round, Anicteric  ENT: mmm  Neck: Supple, No elevated JVP, trachea midline  Resp: CTA bilaterally no w/r/r  CV: RRR +S1, S2, No m/r/g  Abd: Soft, NT/ND + BS  Ext: no LE edema bilaterally  Neuro: Follows commands, moves all extermities  Psych: Appropriate affect, normal mood, pleasant attitude, non-combative  Skin: warm; no rash, erythema or venous stasis changes on exposed skin    Lab Results:  Results from last 7 days   Lab Units 12/29/21  0502   WBC Thousand/uL 8 98   HEMOGLOBIN g/dL 11 6*   HEMATOCRIT % 36 3*   PLATELETS Thousands/uL 216     Results from last 7 days   Lab Units 12/29/21  0502 12/28/21  0501 12/27/21  1503   POTASSIUM mmol/L 4 9   < > 4 6   CHLORIDE mmol/L 104   < > 101   CO2 mmol/L 23   < > 24   BUN mg/dL 38*   < > 32*   CREATININE mg/dL 1 30   < > 1 50*   CALCIUM mg/dL 8 5   < > 8 8   ALK PHOS U/L  --   --  94   ALT U/L  --   --  20   AST U/L  --   --  14    < > = values in this interval not displayed  No results found for: TROPONINT      Results from last 7 days   Lab Units 12/27/21  1503   CK TOTAL U/L 111         Results from last 7 days   Lab Units 12/27/21  1852   INR  1 13           No results found for this or any previous visit  No results found for this or any previous visit  No results found for this or any previous visit  No results found for this or any previous visit  CXR: No results found for this or any previous visit  Results for orders placed during the hospital encounter of 12/27/21    XR chest 1 view portable    Narrative  CHEST    INDICATION:   sob  COMPARISON:  10/7/2004    EXAM PERFORMED/VIEWS:  XR CHEST PORTABLE      FINDINGS:    Cardiomediastinal silhouette appears unremarkable  There are patchy opacities within the mid and lower lung fields    There is no pneumothorax  Osseous structures appear within normal limits for patient age  Impression  Patchy opacities within the mid and lower lung fields likely infectious in nature and should be correlated with Covid-19 testing  Workstation performed: [id number]      ECG:  Sinus rhythm with nonspecific ST T wave abnormalities    This note was completed in part utilizing [employer] Direct Software  Grammatical errors, random word insertions, spelling mistakes, and incomplete sentences may be an occasional consequence of this system secondary to software limitations, ambient noise, and hardware issues  If you have any questions or concerns about the content, text, or information contained within the body of this dictation, please contact the provider for clarification

## 2021-12-29 NOTE — ASSESSMENT & PLAN NOTE
· CTA chest shows: Ectasia of the descending thoracic aorta at the diaphragmatic hiatus measuring 4 6 x 2 9 cm   · Recommend f/u CTA in 1-2 months with PCP

## 2021-12-29 NOTE — PROGRESS NOTES
Progress Note - Pulmonary   Johnanna Inch 67 y o  male MRN: [id number]  Unit/Bed#: E5 -01 Encounter: [id number]    Assessment/Plan:    1  Acute hypoxic respiratory failure likely multifaceted as listed below      -  currently 4 L-96%, patient does not wear home O2      -  continue saturations greater than 90%      -  pulmonary toileting:  Deep breathing cough, OOB as tolerated, IS Q 1 hr      -  need ambulatory pulse ox prior to discharge    2  Acute submassive possibly unprovoked segmental MINAL & RLL PEs w/ out RV dysfunction       -  12/28/2021- EF 65%, RV function normal       -  Dopplers unremarkable       -  patient needs to follow-up with Hematology and update all cancer screening/markers       -  will need minimum 6 months NOAC    3  Abnormal chest CT w/ H/o COVID-19- 4/2021       -  Scattered bilateral GGO       -  Differential:  Volume vs bacteria/viral PNA vs pneumonitis vs fibrosis       -  Negative:  RSV/fluid/COVID-19, procalcitonin negative x2       -  sputum need repeating, need to collect urine antigens       -  Day #3- azithromycin/Rocephin vs/oral substitute complete 5 days       -  can likely discontinue antibiotics, suspect viral in nature       -  will need repeat chest CT in 6 weeks    4  COPD of unknown severity with acute exacerbation       -  severe background emphysematous changes noted upon imaging       -  given improved hypoxia and imaging would recommend longer steroid taper 40 mg x3 days decreased by 10 mg q 3 days        -  clear to resume home regimen:  Breo 200/25 mcg 1 puff daily        -  will need formal PFTs-may be a candidate for EBV    5   Mild volume overload       -  elevated proBNP- 1220       -  would recommend keeping patient on drier side       -  will try 1 dose of IV Lasix 20 mg       -  continue I&Os and daily weights      - outpatient follow-up per discharge instructions    Chief Complaint:    "I feel pretty good"    Subjective:    Francie Freeman was comfortably sitting  He still reports some shortness of breath upon exertion  No significant overnight events reported  Patient currently denies any fever, chills, hemoptysis, headaches, night sweats, pleuritic chest pain, or palpitations    Objective:    Vitals: Blood pressure 143/75, pulse 67, temperature 97 9 °F (36 6 °C), resp  rate 18, height 5' 7" (1 702 m), weight 72 1 kg (159 lb), SpO2 96 %  4L,Body mass index is 24 9 kg/m²  Intake/Output Summary (Last 24 hours) at 12/29/2021 1046  Last data filed at 12/29/2021 0900  Gross per 24 hour   Intake 180 ml   Output 650 ml   Net -470 ml       Invasive Devices  Report    Peripheral Intravenous Line            Peripheral IV 12/27/21 Left Hand 1 day    Peripheral IV 12/27/21 Right Antecubital 1 day                Physical Exam:   Physical Exam  Constitutional:       General: He is not in acute distress  Appearance: Normal appearance  He is normal weight  He is not ill-appearing  HENT:      Head: Normocephalic and atraumatic  Nose: Nose normal  No congestion or rhinorrhea  Mouth/Throat:      Mouth: Mucous membranes are dry  Pharynx: No oropharyngeal exudate or posterior oropharyngeal erythema  Cardiovascular:      Rate and Rhythm: Normal rate and regular rhythm  Pulses: Normal pulses  Heart sounds: Normal heart sounds  No murmur heard  No friction rub  No gallop  Pulmonary:      Effort: Pulmonary effort is normal  No tachypnea, bradypnea or respiratory distress  Breath sounds: No stridor, decreased air movement or transmitted upper airway sounds  Decreased breath sounds present  No wheezing, rhonchi or rales  Comments: Diminished breath sounds  Chest:      Chest wall: No tenderness  Abdominal:      General: Abdomen is flat  Bowel sounds are normal  There is no distension  Palpations: Abdomen is soft  There is no mass  Musculoskeletal:         General: No swelling or tenderness  Normal range of motion        Cervical back: Normal range of motion and neck supple  No rigidity or tenderness  Skin:     General: Skin is warm and dry  Coloration: Skin is not jaundiced or pale  Neurological:      General: No focal deficit present  Mental Status: He is alert and oriented to person, place, and time  Mental status is at baseline  Psychiatric:         Mood and Affect: Mood normal          Behavior: Behavior normal          Labs:    I have personally reviewed pertinent lab results CBC:   Lab Results   Component Value Date    WBC 8 98 12/29/2021    HGB 11 6 (L) 12/29/2021    HCT 36 3 (L) 12/29/2021     (H) 12/29/2021     12/29/2021    MCH 32 8 12/29/2021    MCHC 32 0 12/29/2021    RDW 12 9 12/29/2021    MPV 9 2 12/29/2021    NRBC 0 12/29/2021   , CMP:   Lab Results   Component Value Date    SODIUM 137 12/29/2021    K 4 9 12/29/2021     12/29/2021    CO2 23 12/29/2021    BUN 38 (H) 12/29/2021    CREATININE 1 30 12/29/2021    CALCIUM 8 5 12/29/2021    EGFR 52 12/29/2021       Imaging and other studies: I have personally reviewed pertinent films in PACS     CTA chest-bilateral ground-glass opacity, left upper lobe and right lower lobe PE

## 2021-12-29 NOTE — ASSESSMENT & PLAN NOTE
· CTA chest shows evidence multifocal pneumonia  · Will treat with 5 days of antibiotics, changed to cefdinir

## 2021-12-29 NOTE — PLAN OF CARE
Problem: Potential for Falls  Goal: Patient will remain free of falls  Description: INTERVENTIONS:  - Educate patient/family on patient safety including physical limitations  - Instruct patient to call for assistance with activity   - Consult OT/PT to assist with strengthening/mobility   - Keep Call bell within reach  - Keep bed low and locked with side rails adjusted as appropriate  - Keep care items and personal belongings within reach  - Initiate and maintain comfort rounds  - Make Fall Risk Sign visible to staff  - Offer Toileting every 2 Hours, in advance of need  - Initiate/Maintain bed alarm    - Apply yellow socks and bracelet for high fall risk patients  - Consider moving patient to room near nurses station  12/29/2021 0708 by Katerina Frias RN  Outcome: Progressing  12/29/2021 0707 by Katerina Frias RN  Outcome: Progressing     Problem: PAIN - ADULT  Goal: Verbalizes/displays adequate comfort level or baseline comfort level  Description: Interventions:  - Encourage patient to monitor pain and request assistance  - Assess pain using appropriate pain scale  - Administer analgesics based on type and severity of pain and evaluate response  - Implement non-pharmacological measures as appropriate and evaluate response  - Consider cultural and social influences on pain and pain management  - Notify physician/advanced practitioner if interventions unsuccessful or patient reports new pain  12/29/2021 0708 by Katerina Frias RN  Outcome: Progressing  12/29/2021 0707 by Katerina Frias RN  Outcome: Progressing     Problem: INFECTION - ADULT  Goal: Absence or prevention of progression during hospitalization  Description: INTERVENTIONS:  - Assess and monitor for signs and symptoms of infection  - Monitor lab/diagnostic results  - Monitor all insertion sites, i e  indwelling lines, tubes, and drains  - Monitor endotracheal if appropriate and nasal secretions for changes in amount and color  - [place] appropriate cooling/warming therapies per order  - Administer medications as ordered  - Instruct and encourage patient and family to use good hand hygiene technique  - Identify and instruct in appropriate isolation precautions for identified infection/condition  12/29/2021 0708 by Erasto Wiley RN  Outcome: Progressing  12/29/2021 0707 by Erasto Wiley RN  Outcome: Progressing     Problem: DISCHARGE PLANNING  Goal: Discharge to home or other facility with appropriate resources  Description: INTERVENTIONS:  - Identify barriers to discharge w/patient and caregiver  - Arrange for needed discharge resources and transportation as appropriate  - Identify discharge learning needs (meds, wound care, etc )  - Arrange for interpretive services to assist at discharge as needed  - Refer to Case Management Department for coordinating discharge planning if the patient needs post-hospital services based on physician/advanced practitioner order or complex needs related to functional status, cognitive ability, or social support system  12/29/2021 0708 by Erasto Wiley RN  Outcome: Progressing  12/29/2021 0707 by Erasto Wiley RN  Outcome: Progressing     Problem: Knowledge Deficit  Goal: Patient/family/caregiver demonstrates understanding of disease process, treatment plan, medications, and discharge instructions  Description: Complete learning assessment and assess knowledge base    Interventions:  - Provide teaching at level of understanding  - Provide teaching via preferred learning methods  12/29/2021 0708 by Erasto Wiley RN  Outcome: Progressing  12/29/2021 0707 by Erasto Wiley RN  Outcome: Progressing     Problem: RESPIRATORY - ADULT  Goal: Achieves optimal ventilation and oxygenation  Description: INTERVENTIONS:  - Assess for changes in respiratory status  - Assess for changes in mentation and behavior  - Position to facilitate oxygenation and minimize respiratory effort  - Oxygen administered by appropriate delivery if ordered  - Initiate smoking cessation education as indicated  - Encourage broncho-pulmonary hygiene including cough, deep breathe, Incentive Spirometry  - Assess the need for suctioning and aspirate as needed  - Assess and instruct to report SOB or any respiratory difficulty  - Respiratory Therapy support as indicated  Outcome: Progressing

## 2021-12-29 NOTE — PROGRESS NOTES
[facility]  Progress Note - [place] Marie 1945, 67 y o  male MRN: [id number]  Unit/Bed#: E5 -01 Encounter: [id number]  Primary Care Provider: Pricila Fisher PA-C   Date and time admitted to hospital: 12/27/2021  2:36 PM    NSVT (nonsustained ventricular tachycardia) (HCC)  Assessment & Plan  Twenty-one beats of V-tach noted on telemetry, likely in the setting of PE, COPD and pneumonia  Cardiology consulted, appreciate recommendations  Consider increasing Toprol-XL dose from 100 mg once daily to 100 mg in a m  And 50 mg in p m  Recommend sleep study outpatient    Abnormal CT scan  Assessment & Plan  · CTA chest shows: Asymmetric soft tissue in the right apex compared to the left  This measures 2 4 x 2 4 cm    While this could represent scar tissue, a mass is not excluded particularly in this high risk setting  · Outpatient follow-up CT scan recommended in 3-6 months  · Pulmonary consulted, recommend outpatient evaluation for possible bronchoscopy    Community acquired pneumonia  Assessment & Plan  · CTA chest shows evidence multifocal pneumonia  · Will treat with 5 days of antibiotics, changed to cefdinir    Aortic ectasia, thoracic (HCC)  Assessment & Plan  · CTA chest shows: Ectasia of the descending thoracic aorta at the diaphragmatic hiatus measuring 4 6 x 2 9 cm   · Recommend f/u CTA in 1-2 months with PCP    Sepsis ([facility] [address] )  Assessment & Plan  · Present on admission as evidenced by tachypnea 36, tachycardia 97, hypoxia 52% oxygen saturation  · Sepsis resolved  · Etiology likely pneumonia versus COPD exacerbation with acute PE      Emphysema lung ([facility] [address] )  Assessment & Plan  · CTA chest shows severe emphysema  · Patient received nebulizer treatment and IV Decadron in the ER due to suspected COPD exacerbation with wheezing  · Continue bronchodilators  · Transition IV Solu-Medrol to prednisone 40 mg once daily, to complete 5 day course    Pulmonary embolism (HCC)  Assessment & Plan  · CTA chest shows: segmental pulmonary emboli in the MINAL without right heart strain   · 2D echo reviewed, no significant right heart strain noted  · Discontinue heparin drip, transition to oral Eliquis, plan for 6 month course  · Will need to follow with Pulmonary or PCP outpatient    Hypertension  Assessment & Plan  · Blood pressure currently stable  · Continue Norvasc and Toprol-XL home dose    * Acute respiratory failure with hypoxia (HCC)  Assessment & Plan    · Etiology multifactorial, PE versus COPD exacerbation versus pneumonia  · Patient initially hypoxic on admission at 52% oxygen saturation requiring 7L NRB at 97% spO2  · COVID negative   · Wean oxygen as able, currently on 3 L nasal cannula        VTE Pharmacologic Prophylaxis:   Pharmacologic: Apixaban (Eliquis)  Mechanical VTE Prophylaxis in Place: Yes    Patient Centered Rounds: I have performed bedside rounds with nursing staff today  Discussions with Specialists or Other Care Team Provider: Cardiology    Education and Discussions with Family / Patient: Patient    Time Spent for Care: 30 minutes  More than 50% of total time spent on counseling and coordination of care as described above  Current Length of Stay: 2 day(s)    Current Patient Status: Inpatient   Certification Statement: The patient will continue to require additional inpatient hospital stay due to pending STR and CM    Discharge Plan: 24-48 hours    Code Status: Level 1 - Full Code      Subjective:   No events overnight  This morning noted to have 21 beats of V-tach  Patient denies any chest pains or worsening shortness of breath  Reports his breathing has significantly improved since starting heparin drip  Tolerating diet, ambulated with physical therapy without difficulty      Objective:     Vitals:   Temp (24hrs), Av 6 °F (36 4 °C), Min:97 3 °F (36 3 °C), Max:97 9 °F (36 6 °C)    Temp:  [97 3 °F (36 3 °C)-97 9 °F (36 6 °C)] 97 9 °F (36 6 °C)  HR:  [] 67  Resp:  [18-24] 18  BP: (116-143)/(58-75) 143/75  SpO2:  [93 %-96 %] 96 %  Body mass index is 24 9 kg/m²  Input and Output Summary (last 24 hours): Intake/Output Summary (Last 24 hours) at 12/29/2021 1427  Last data filed at 12/29/2021 1240  Gross per 24 hour   Intake 360 ml   Output 1150 ml   Net -790 ml       Physical Exam:     Physical Exam  Vitals and nursing note reviewed  Constitutional:       Appearance: Normal appearance  HENT:      Head: Normocephalic and atraumatic  Eyes:      Conjunctiva/sclera: Conjunctivae normal    Cardiovascular:      Rate and Rhythm: Normal rate and regular rhythm  Heart sounds: Normal heart sounds  Pulmonary:      Effort: Pulmonary effort is normal       Breath sounds: No wheezing  Abdominal:      General: Bowel sounds are normal  There is no distension  Palpations: Abdomen is soft  Tenderness: There is no abdominal tenderness  Musculoskeletal:         General: No swelling  Right lower leg: No edema  Left lower leg: No edema  Skin:     General: Skin is warm and dry  Neurological:      General: No focal deficit present  Mental Status: He is alert  Mental status is at baseline             Additional Data:     Labs:    Results from last 7 days   Lab Units 12/29/21  0502   WBC Thousand/uL 8 98   HEMOGLOBIN g/dL 11 6*   HEMATOCRIT % 36 3*   PLATELETS Thousands/uL 216   NEUTROS PCT % 87*   LYMPHS PCT % 5*   MONOS PCT % 7   EOS PCT % 0     Results from last 7 days   Lab Units 12/29/21  0502 12/28/21  0501 12/27/21  1503   SODIUM mmol/L 137   < > 136   POTASSIUM mmol/L 4 9   < > 4 6   CHLORIDE mmol/L 104   < > 101   CO2 mmol/L 23   < > 24   BUN mg/dL 38*   < > 32*   CREATININE mg/dL 1 30   < > 1 50*   ANION GAP mmol/L 10   < > 11   CALCIUM mg/dL 8 5   < > 8 8   ALBUMIN g/dL  --   --  3 0*   TOTAL BILIRUBIN mg/dL  --   --  0 50   ALK PHOS U/L  --   --  94   ALT U/L  --   --  20   AST U/L  --   --  14   GLUCOSE RANDOM mg/dL 174*   < > 136    < > = values in this interval not displayed  Results from last 7 days   Lab Units 12/27/21  1852   INR  1 13             Results from last 7 days   Lab Units 12/28/21  0504 12/27/21  1852   LACTIC ACID mmol/L  --  1 8   PROCALCITONIN ng/ml 0 06 0 05           * I Have Reviewed All Lab Data Listed Above  * Additional Pertinent Lab Tests Reviewed: Kristopher 65 Admission Reviewed    Imaging:  XR chest 1 view portable    Result Date: 12/27/2021  Impression: Patchy opacities within the mid and lower lung fields likely infectious in nature and should be correlated with Covid-19 testing  Workstation performed: [id number]     CTA ED chest PE study    Result Date: 12/27/2021  Impression: Segmental pulmonary emboli are noted in the left upper lobe image 2/104 and right lower lobe image 2/179  Measured RV/LV ratio is within normal limits at less than 0 9  Severe background emphysema with scattered groundglass opacities and alveolar opacities in the right middle lobe and left lower lobe could represent pneumonia  Bacterial versus Covid 19 pneumonia are both possibilities  Asymmetric soft tissue in the right apex compared to the left  While this could reflect scar tissue, a follow-up CT scan is recommended in 3-6 months to ensure stability  Ectasia of the descending thoracic aorta at the diaphragmatic hiatus measuring 4 6 x 2 9 cm on the final image of this exam incompletely evaluated  Follow-up CTA of the aorta could be obtained for more evaluation  I personally discussed this study with ISAEL GAMINO on 12/27/2021 at 5:38 PM  Workstation performed: [id number]       Recent Cultures (last 7 days):     Results from last 7 days   Lab Units 12/28/21  0842 12/27/21  1852 12/27/21  1456   BLOOD CULTURE   --  No Growth at 24 hrs  No Growth at 24 hrs  SPUTUM CULTURE  Test not performed  Suggest repeat specimen    --   --    GRAM STAIN RESULT  >10 squamous epithelial cells/lpf, indicating orophayngeal contamination  *  2+ Polys*  3+ Gram positive cocci in pairs*  3+ Gram positive cocci in clusters*  2+ Gram positive rods*  1+ Gram negative rods*  --   --        Last 24 Hours Medication List:   Current Facility-Administered Medications   Medication Dose Route Frequency Provider Last Rate    acetaminophen  650 mg Oral Q6H PRN Susy Chun PA-C      amLODIPine  10 mg Oral Daily Marc Malik MD      apixaban  5 mg Oral BID Marc Malik MD      atorvastatin  40 mg Oral QPM Susy Chun PA-C      azithromycin  500 mg Oral Q24H Susy Chun PA-C      budesonide  0 5 mg Nebulization Q12H ASHLEY Butt      cefdinir  300 mg Oral Q12H [address] Fito Veronica MD      famotidine  20 mg Oral Daily Marc Malik MD      folic acid  1 mg Oral Daily Susy Chun PA-C      glycerin-hypromellose-  1 drop Both Eyes Q6H PRN Marc Malik MD      guaiFENesin  600 mg Oral BID Susy Chun PA-C      ipratropium  0 5 mg Nebulization TID Susy Chun PA-C      levalbuterol  1 25 mg Nebulization TID Susy Chun PA-C      metoprolol succinate  100 mg Oral Daily Susy Chun PA-C      [START ON 12/30/2021] predniSONE  40 mg Oral Daily Marc Malik MD      sodium chloride (PF)  3 mL Intravenous Q1H PRN Shayy Drake MD          Today, Patient Was Seen By: Marc Malik MD    ** Please Note: Dictation voice to text software may have been used in the creation of this document   **

## 2021-12-29 NOTE — ASSESSMENT & PLAN NOTE
· CTA chest shows: Asymmetric soft tissue in the right apex compared to the left  This measures 2 4 x 2 4 cm    While this could represent scar tissue, a mass is not excluded particularly in this high risk setting  · Outpatient follow-up CT scan recommended in 3-6 months  · Pulmonary consulted, recommend outpatient evaluation for possible bronchoscopy

## 2021-12-29 NOTE — ASSESSMENT & PLAN NOTE
Twenty-one beats of V-tach noted on telemetry, likely in the setting of PE, COPD and pneumonia  Cardiology consulted, appreciate recommendations  Consider increasing Toprol-XL dose from 100 mg once daily to 100 mg in a m  And 50 mg in p m    Recommend sleep study outpatient

## 2021-12-29 NOTE — PLAN OF CARE
Problem: OCCUPATIONAL THERAPY ADULT  Goal: Performs self-care activities at highest level of function for planned discharge setting  See evaluation for individualized goals  Description: Treatment Interventions: ADL retraining,Functional transfer training,UE strengthening/ROM,Endurance training,Patient/family training,Equipment evaluation/education,Compensatory technique education,Activityengagement,Energy conservation          See flowsheet documentation for full assessment, interventions and recommendations  Outcome: Progressing  Note: Limitation: Decreased ADL status,Decreased endurance,Decreased self-care trans,Decreased high-level ADLs  Prognosis: Good  Assessment: Pt seen for OT treatment session focusing on functional activity tolerance, bed mobility, ADLs, energy conservation education, functional transfers/mobility, and UE ROM/strengthening  Pt alert and cooperative throughout session  Pt lying supine at start of session  Resting SpO2: 91-93% on 3L O2  Bed mobility (supine>sit) completed at a Mod I level w/ use of bed rails and increased time to complete  Transfers (sit<>stand) completed w/ Supervision w/ cues for safe technique and hand placement  Pt progressed from [address] to Supervision for functional mobility with assist for O2 line management  SpO2 post-mobility: 74-79% on 3L O2  Educated pt on energy conservation techniques (ie: compensatory/pursed lip breathing, sitting during ADLs/IADLs as possible, pacing, etc ) with pt verbalizing understanding of education  SpO2 increased to 89-91% on 3L w/ seated rest break  LB dressing completed w/ Supervision to don/[facility] pants w/ Fair- dynamic standing balance demonstrated when standing to pull pants over hips  Grooming tasks completed w/ Supervision w/ setup required and increased time to complete  UE exercises completed while seated OOB in chair to increase UE ROM/strength needed for ADLs/transfers   Seated rest breaks required between exercises 2* increased SOB/ROSALES demonstrated  No c/o pain  Pt seated OOB in chair at end of session with call bell and phone within reach  All needs met and pt reports no further questions for OT at this time  Pt expressed goal of D/Cing home at discharge, only concern is stairs within home  Continue to recommend Home OT vs STR pending continued progress and stair trial w/ PT when medically cleared  OT to follow pt on caseload        OT Discharge Recommendation: Home with home health rehabilitation (vs STR pending progress and stair trial w/ PT)  OT - OK to Discharge: Yes (when medically cleared)

## 2021-12-29 NOTE — ASSESSMENT & PLAN NOTE
· CTA chest shows severe emphysema  · Patient received nebulizer treatment and IV Decadron in the ER due to suspected COPD exacerbation with wheezing  · Continue bronchodilators  · Transition IV Solu-Medrol to prednisone 40 mg once daily, to complete 5 day course

## 2021-12-29 NOTE — ASSESSMENT & PLAN NOTE
· Etiology multifactorial, PE versus COPD exacerbation versus pneumonia  · Patient initially hypoxic on admission at 52% oxygen saturation requiring 7L NRB at 97% spO2  · COVID negative   · Wean oxygen as able, currently on 3 L nasal cannula

## 2021-12-29 NOTE — PLAN OF CARE
Problem: Potential for Falls  Goal: Patient will remain free of falls  Description: INTERVENTIONS:  - Educate patient/family on patient safety including physical limitations  - Instruct patient to call for assistance with activity   - Consult OT/PT to assist with strengthening/mobility   - Keep Call bell within reach  - Keep bed low and locked with side rails adjusted as appropriate  - Keep care items and personal belongings within reach  - Initiate and maintain comfort rounds  - Make Fall Risk Sign visible to staff     Problem: MOBILITY - ADULT  Goal: Maintain or return to baseline ADL function  Description: INTERVENTIONS:  -  Assess patient's ability to carry out ADLs; assess patient's baseline for ADL function and identify physical deficits which impact ability to perform ADLs (bathing, care of mouth/teeth, toileting, grooming, dressing, etc )  - Assess/evaluate cause of self-care deficits   - Assess range of motion  - Assess patient's mobility; develop plan if impaired  - Assess patient's need for assistive devices and provide as appropriate  - Encourage maximum independence but intervene and supervise when necessary  - Involve family in performance of ADLs  - Assess for home care needs following discharge   - Consider OT consult to assist with ADL evaluation and planning for discharge  - Provide patient education as appropriate  12/28/2021 2334 by Nicolle Mcbride RN  Outcome: Progressing  12/28/2021 2334 by Nicolle Mcbride RN  Outcome: Progressing  Goal: Maintains/Returns to pre admission functional level  Description: INTERVENTIONS:  - Perform BMAT or MOVE assessment daily    - Set and communicate daily mobility goal to care team and patient/family/caregiver     - Collaborate with rehabilitation services on mobility goals if consulted  - Record patient progress and toleration of activity level   12/28/2021 2334 by Nicolle Mcbride RN  Outcome: Progressing  12/28/2021 2334 by Nicolle Mcbride RN  Outcome: Progressing     Problem: PAIN - ADULT  Goal: Verbalizes/displays adequate comfort level or baseline comfort level  Description: Interventions:  - Encourage patient to monitor pain and request assistance  - Assess pain using appropriate pain scale  - Administer analgesics based on type and severity of pain and evaluate response  - Implement non-pharmacological measures as appropriate and evaluate response  - Consider cultural and social influences on pain and pain management  - Notify physician/advanced practitioner if interventions unsuccessful or patient reports new pain  Outcome: Progressing     Problem: INFECTION - ADULT  Goal: Absence or prevention of progression during hospitalization  Description: INTERVENTIONS:  - Assess and monitor for signs and symptoms of infection  - Monitor lab/diagnostic results  - Monitor all insertion sites, i e  indwelling lines, tubes, and drains  - Monitor endotracheal if appropriate and nasal secretions for changes in amount and color  - [place] appropriate cooling/warming therapies per order  - Administer medications as ordered  - Instruct and encourage patient and family to use good hand hygiene technique  - Identify and instruct in appropriate isolation precautions for identified infection/condition  Outcome: Progressing  Goal: Absence of fever/infection during neutropenic period  Description: INTERVENTIONS:  - Monitor WBC    Outcome: Progressing     Problem: Knowledge Deficit  Goal: Patient/family/caregiver demonstrates understanding of disease process, treatment plan, medications, and discharge instructions  Description: Complete learning assessment and assess knowledge base    Interventions:  - Provide teaching at level of understanding  - Provide teaching via preferred learning methods  Outcome: Progressing

## 2021-12-29 NOTE — ASSESSMENT & PLAN NOTE
· Present on admission as evidenced by tachypnea 36, tachycardia 97, hypoxia 52% oxygen saturation  · Sepsis resolved  · Etiology likely pneumonia versus COPD exacerbation with acute PE

## 2021-12-30 ENCOUNTER — APPOINTMENT (INPATIENT)
Dept: RADIOLOGY | Facility: HOSPITAL | Age: 76
DRG: 871 | End: 2021-12-30
Payer: COMMERCIAL

## 2021-12-30 LAB
ANION GAP SERPL CALCULATED.3IONS-SCNC: 7 MMOL/L (ref 4–13)
BUN SERPL-MCNC: 29 MG/DL (ref 5–25)
CALCIUM SERPL-MCNC: 8.6 MG/DL (ref 8.3–10.1)
CHLORIDE SERPL-SCNC: 102 MMOL/L (ref 100–108)
CO2 SERPL-SCNC: 27 MMOL/L (ref 21–32)
CREAT SERPL-MCNC: 1.14 MG/DL (ref 0.6–1.3)
ERYTHROCYTE [DISTWIDTH] IN BLOOD BY AUTOMATED COUNT: 13.1 % (ref 11.6–15.1)
GFR SERPL CREATININE-BSD FRML MDRD: 61 ML/MIN/1.73SQ M
GLUCOSE SERPL-MCNC: 143 MG/DL (ref 65–140)
HCT VFR BLD AUTO: 41 % (ref 36.5–49.3)
HGB BLD-MCNC: 13.6 G/DL (ref 12–17)
L PNEUMO1 AG UR QL IA.RAPID: NEGATIVE
MAGNESIUM SERPL-MCNC: 2 MG/DL (ref 1.6–2.6)
MCH RBC QN AUTO: 34.3 PG (ref 26.8–34.3)
MCHC RBC AUTO-ENTMCNC: 33.2 G/DL (ref 31.4–37.4)
MCV RBC AUTO: 103 FL (ref 82–98)
PLATELET # BLD AUTO: 220 THOUSANDS/UL (ref 149–390)
PMV BLD AUTO: 9.1 FL (ref 8.9–12.7)
POTASSIUM SERPL-SCNC: 4.1 MMOL/L (ref 3.5–5.3)
PROCALCITONIN SERPL-MCNC: 0.06 NG/ML
RBC # BLD AUTO: 3.97 MILLION/UL (ref 3.88–5.62)
S PNEUM AG UR QL: NEGATIVE
SODIUM SERPL-SCNC: 136 MMOL/L (ref 136–145)
WBC # BLD AUTO: 7.89 THOUSAND/UL (ref 4.31–10.16)

## 2021-12-30 PROCEDURE — 80048 BASIC METABOLIC PNL TOTAL CA: CPT | Performed by: STUDENT IN AN ORGANIZED HEALTH CARE EDUCATION/TRAINING PROGRAM

## 2021-12-30 PROCEDURE — 71045 X-RAY EXAM CHEST 1 VIEW: CPT

## 2021-12-30 PROCEDURE — 84145 PROCALCITONIN (PCT): CPT | Performed by: PHYSICIAN ASSISTANT

## 2021-12-30 PROCEDURE — 94640 AIRWAY INHALATION TREATMENT: CPT

## 2021-12-30 PROCEDURE — 87449 NOS EACH ORGANISM AG IA: CPT | Performed by: PHYSICIAN ASSISTANT

## 2021-12-30 PROCEDURE — 99232 SBSQ HOSP IP/OBS MODERATE 35: CPT | Performed by: INTERNAL MEDICINE

## 2021-12-30 PROCEDURE — 97116 GAIT TRAINING THERAPY: CPT

## 2021-12-30 PROCEDURE — 83735 ASSAY OF MAGNESIUM: CPT | Performed by: STUDENT IN AN ORGANIZED HEALTH CARE EDUCATION/TRAINING PROGRAM

## 2021-12-30 PROCEDURE — 99232 SBSQ HOSP IP/OBS MODERATE 35: CPT | Performed by: STUDENT IN AN ORGANIZED HEALTH CARE EDUCATION/TRAINING PROGRAM

## 2021-12-30 PROCEDURE — 97535 SELF CARE MNGMENT TRAINING: CPT

## 2021-12-30 PROCEDURE — 94761 N-INVAS EAR/PLS OXIMETRY MLT: CPT

## 2021-12-30 PROCEDURE — 85027 COMPLETE CBC AUTOMATED: CPT | Performed by: STUDENT IN AN ORGANIZED HEALTH CARE EDUCATION/TRAINING PROGRAM

## 2021-12-30 PROCEDURE — 97110 THERAPEUTIC EXERCISES: CPT

## 2021-12-30 RX ORDER — ALBUTEROL SULFATE 2.5 MG/3ML
2.5 SOLUTION RESPIRATORY (INHALATION) EVERY 6 HOURS PRN
Status: DISCONTINUED | OUTPATIENT
Start: 2021-12-30 | End: 2021-12-31 | Stop reason: HOSPADM

## 2021-12-30 RX ORDER — METOPROLOL SUCCINATE 50 MG/1
50 TABLET, EXTENDED RELEASE ORAL EVERY EVENING
Status: DISCONTINUED | OUTPATIENT
Start: 2021-12-30 | End: 2021-12-30

## 2021-12-30 RX ADMIN — ATORVASTATIN CALCIUM 40 MG: 40 TABLET, FILM COATED ORAL at 17:56

## 2021-12-30 RX ADMIN — IPRATROPIUM BROMIDE 0.5 MG: 0.5 SOLUTION RESPIRATORY (INHALATION) at 18:56

## 2021-12-30 RX ADMIN — GUAIFENESIN 600 MG: 600 TABLET, EXTENDED RELEASE ORAL at 17:56

## 2021-12-30 RX ADMIN — CEFDINIR 300 MG: 300 CAPSULE ORAL at 20:08

## 2021-12-30 RX ADMIN — IPRATROPIUM BROMIDE 0.5 MG: 0.5 SOLUTION RESPIRATORY (INHALATION) at 07:40

## 2021-12-30 RX ADMIN — APIXABAN 5 MG: 5 TABLET, FILM COATED ORAL at 17:56

## 2021-12-30 RX ADMIN — LEVALBUTEROL HYDROCHLORIDE 1.25 MG: 1.25 SOLUTION, CONCENTRATE RESPIRATORY (INHALATION) at 13:30

## 2021-12-30 RX ADMIN — LEVALBUTEROL HYDROCHLORIDE 1.25 MG: 1.25 SOLUTION, CONCENTRATE RESPIRATORY (INHALATION) at 07:40

## 2021-12-30 RX ADMIN — BUDESONIDE 0.5 MG: 0.5 INHALANT ORAL at 07:40

## 2021-12-30 RX ADMIN — FAMOTIDINE 20 MG: 20 TABLET ORAL at 08:06

## 2021-12-30 RX ADMIN — APIXABAN 5 MG: 5 TABLET, FILM COATED ORAL at 08:05

## 2021-12-30 RX ADMIN — PREDNISONE 40 MG: 20 TABLET ORAL at 08:05

## 2021-12-30 RX ADMIN — IPRATROPIUM BROMIDE 0.5 MG: 0.5 SOLUTION RESPIRATORY (INHALATION) at 13:30

## 2021-12-30 RX ADMIN — GUAIFENESIN 600 MG: 600 TABLET, EXTENDED RELEASE ORAL at 08:06

## 2021-12-30 RX ADMIN — LEVALBUTEROL HYDROCHLORIDE 1.25 MG: 1.25 SOLUTION, CONCENTRATE RESPIRATORY (INHALATION) at 18:56

## 2021-12-30 RX ADMIN — BUDESONIDE 0.5 MG: 0.5 INHALANT ORAL at 18:57

## 2021-12-30 RX ADMIN — FOLIC ACID 1 MG: 1 TABLET ORAL at 08:06

## 2021-12-30 RX ADMIN — METOPROLOL SUCCINATE 100 MG: 50 TABLET, EXTENDED RELEASE ORAL at 08:05

## 2021-12-30 RX ADMIN — CEFDINIR 300 MG: 300 CAPSULE ORAL at 08:13

## 2021-12-30 RX ADMIN — AMLODIPINE BESYLATE 10 MG: 10 TABLET ORAL at 08:05

## 2021-12-30 NOTE — PHYSICAL THERAPY NOTE
PHYSICAL THERAPY NOTE          Patient Name: Bethany RYAN Date: 12/30/2021 12/30/21 1510   Note Type   Note Type Treatment   Pain Assessment   Pain Assessment Tool 0-10   Pain Score No Pain   Restrictions/Precautions   Other Precautions Telemetry;O2;Fall Risk  (6l o2 nc)   General   Chart Reviewed Yes   Family/Caregiver Present No   Cognition   Arousal/Participation Alert; Cooperative;Responsive   Attention Within functional limits   Orientation Level Oriented X4   Memory Within functional limits   Following Commands Follows one step commands without difficulty   Subjective   Subjective i'm supposed to go home tomorrow  Bed Mobility   Supine to Sit 6  Modified independent   Additional items Assist x 1;HOB elevated; Bedrails; Increased time required   Sit to Supine 6  Modified independent   Additional items Assist x 1;HOB elevated; Bedrails   Transfers   Sit to Stand 5  Supervision   Additional items Assist x 1;Verbal cues   Stand to Sit 5  Supervision   Additional items Assist x 1;Verbal cues   Ambulation/Elevation   Gait pattern Forward Flexion; Inconsistent lillian;Decreased foot clearance;Decreased hip extension;[place]  (fast gait speed)   Gait Assistance 5  Supervision  (cg at times with quick fast turns)   Additional items Assist x 1;Verbal cues   Assistive Device None   Distance 115' x1    Balance   Static Sitting Normal   Dynamic Sitting Good   Static Standing Fair +   Dynamic Standing Fair   Ambulatory Fair   Endurance Deficit   Endurance Deficit Description tucker, desats to 79%-80% with mobility, hr 95bpm- 100bpm     Activity Tolerance   Activity Tolerance   (hypoxia, tucker on 6l nc o2)   Exercises   Knee AROM Long Arc Quad Sitting;15 reps;AROM; Bilateral   Marching Standing;15 reps;AROM; Bilateral  (with support of rw, toe raises with suport of rw x 15 reps  )   Equipment Use   Comments pt ed on energy conservation, techniques, slow controlled movemnts, and pacing techniques  discussed safety conerns of pt returning home alone, discussed if pt is able to return home to one of his children's home upon d/c from hospital however pt reports NOT being able to pt encnouraged to sit out of bed for all meals  Assessment   Prognosis Fair   Problem List Decreased endurance; Impaired balance;Decreased safety awareness; Impaired judgement   Assessment Pt seen for PT treatment session  Pt on 6l; o2 NC  O2 sats 95% - 96% at rest   Pt  Is impulsive with decreased insight into deficits and decreased awareness of limitations requires cues for pacing, and energy conservation  Pt  Performs bed mobility with mod I, transfers with supervision ambulation with supervision to cg min assist with turns due to decreased balance, decreased safety and impulsivity  Pt is able  to manage own o2 during mobility training  Pt  Requires cues to move slowly and perform all mobility  Pt performs b/l le  standing arom exercises x 15 reps with support of Rw and close supervision- cg x1  Pt progressed with ambulation distances to 115' x1  Pt desats to 79%- 80% , HR  bpm with mobility  The patient's AM-PAC Basic Mobility Inpatient Short Form Raw Score is 22  A Raw score of greater than 16 suggests the patient may benefit from discharge to home  Please also refer to the recommendation of the Physical Therapist for safe discharge planning  Recommend d/c to home ,pending continued progress, with increased support services and increased family support frequent daily checks  Goals   Patient Goals to go home alone   STG Expiration Date 01/11/22   PT Treatment Day 1   Plan   Treatment/Interventions Functional transfer training;LE strengthening/ROM; Therapeutic exercise; Endurance training;Patient/family training;Bed mobility; Equipment eval/education;Gait training;Spoke to nursing   Progress Progressing toward goals   PT Frequency 3-5x/wk Recommendation   PT Discharge Recommendation Home with home health rehabilitation   [id number] Mobility Inpatient   Turning in Bed Without Bedrails 4   Lying on Back to Sitting on Edge of Flat Bed 4   Moving Bed to Chair 4   Standing Up From Chair 4   Walk in Room 3   Climb 3-5 Stairs 3   Basic Mobility Inpatient Raw Score 22   Basic Mobility Standardized Score 47 4   Highest Level Of Mobility   JH-HLM Goal 7: Walk 25 feet or more   JH-HLM Goal Achieved Yes   End of Consult   Patient Position at End of Consult Supine   Krystalissa Chanel, PTA

## 2021-12-30 NOTE — PROGRESS NOTES
Progress Note - Pulmonary   Yang Hartmann 67 y o  male MRN: [id number]  Unit/Bed#: E5 -01 Encounter: [id number]    Assessment/Plan:    1  Acute hypoxic respiratory failure likely multifaceted as listed below       -  currently 3 L-91%, patient does not wear home O2       -  continue saturations greater than 90%       -  pulmonary toileting:  Deep breathing cough, OOB as tolerated, IS Q 1 hr       -  place home O2 evaluation    2  Acute submassive possibly unprovoked segmental MINAL & RLL PEs w/ out RV dysfunction       -  12/28/2021- EF 65%, RV function normal       -  Dopplers unremarkable       -  discussed with Internal Medicine to establish Hematology follow-up prior to discharge       -  will need to follow-up with PCP to update all cancer markers       -  will need minimum 6 months NOAC       -  continue Eliquis    3  Abnormal chest CT w/ H/o COVID-19- 4/2021       -  Scattered bilateral GGO       -  Differential:  Volume vs bacteria/viral PNA vs pneumonitis vs fibrosis       -  Negative:  RSV/fluid/COVID-19, procalcitonin negative x2, sputum       -  please collect urine antigen prior to discharge    4  COPD of unknown severity with acute exacerbation       -  severe background emphysematous changes noted upon imaging       -  Continue Day # 1/3- 40 mg prednisone, decreased by 10 mg q 3 days       -  clear to resume home regimen:  Breo 200/25 mcg 1 puff daily       -  will need formal PFTs and close pulmonary follow-up, possibly EBV candidate    5  Mild volume overload       -  elevated proBNP 1220       -  trialed 1 dose of IV Lasix 20 mg       -  continue I&Os and daily weight      -  outpatient follow-up per discharge instructions  - pulmonary will sign off at this time        Chief Complaint:    "I want to go home"    Subjective:    Rosana Wells was comfortably sitting in his bed  He reports that he would like to be discharged today  No significant overnight events reported    Patient currently denies any fever, chills hemoptysis, headaches, night sweats, pleuritic chest pain, or palpitations  Objective:    Vitals: Blood pressure 146/78, pulse 69, temperature 97 6 °F (36 4 °C), resp  rate 19, height 5' 7" (1 702 m), weight 72 1 kg (159 lb), SpO2 91 %  3L,Body mass index is 24 9 kg/m²  Intake/Output Summary (Last 24 hours) at 12/30/2021 0946  Last data filed at 12/30/2021 0547  Gross per 24 hour   Intake 380 ml   Output 1400 ml   Net -1020 ml       Invasive Devices  Report    Peripheral Intravenous Line            Peripheral IV 12/27/21 Left Hand 2 days    Peripheral IV 12/27/21 Right Antecubital 2 days                Physical Exam:   Physical Exam  Constitutional:       General: He is not in acute distress  Appearance: Normal appearance  He is normal weight  He is not ill-appearing  HENT:      Nose: Nose normal  No congestion or rhinorrhea  Mouth/Throat:      Mouth: Mucous membranes are dry  Pharynx: No oropharyngeal exudate or posterior oropharyngeal erythema  Cardiovascular:      Rate and Rhythm: Normal rate and regular rhythm  Pulses: Normal pulses  Heart sounds: Normal heart sounds  No murmur heard  No friction rub  No gallop  Pulmonary:      Effort: Pulmonary effort is normal  No tachypnea, bradypnea, accessory muscle usage or respiratory distress  Breath sounds: Decreased air movement present  No stridor or transmitted upper airway sounds  Decreased breath sounds present  No wheezing, rhonchi or rales  Comments: Overall clear breath sounds  Chest:      Chest wall: No tenderness  Abdominal:      General: Abdomen is flat  Bowel sounds are normal  There is no distension  Palpations: Abdomen is soft  There is no mass  Musculoskeletal:         General: No swelling or tenderness  Normal range of motion  Cervical back: Normal range of motion and neck supple  No rigidity or tenderness  Skin:     General: Skin is warm and dry        Coloration: Skin is not jaundiced or pale  Neurological:      General: No focal deficit present  Mental Status: He is alert and oriented to person, place, and time  Mental status is at baseline  Psychiatric:         Mood and Affect: Mood normal          Behavior: Behavior normal          Labs:    I have personally reviewed pertinent lab results, CBC:   Lab Results   Component Value Date    WBC 7 89 12/30/2021    HGB 13 6 12/30/2021    HCT 41 0 12/30/2021     (H) 12/30/2021     12/30/2021    MCH 34 3 12/30/2021    MCHC 33 2 12/30/2021    RDW 13 1 12/30/2021    MPV 9 1 12/30/2021   , CMP:   Lab Results   Component Value Date    SODIUM 136 12/30/2021    K 4 1 12/30/2021     12/30/2021    CO2 27 12/30/2021    BUN 29 (H) 12/30/2021    CREATININE 1 14 12/30/2021    CALCIUM 8 6 12/30/2021    EGFR 61 12/30/2021       Imaging and other studies: I have personally reviewed pertinent films in PACS     CTA chest-bilateral ground-glass opacity, left upper lobe and right lower lobe PE

## 2021-12-30 NOTE — ASSESSMENT & PLAN NOTE
· CTA chest shows: segmental pulmonary emboli in the MINAL without right heart strain   · 2D echo reviewed, no significant right heart strain noted  · Discontinue heparin drip, transition to oral Eliquis, plan for 6 month course  · Recommend that patient follow-up PCP, obtain all age-appropriate cancer screenings  · Referral given for Hematology-Oncology follow-up outpatient

## 2021-12-30 NOTE — ASSESSMENT & PLAN NOTE
· CTA chest shows severe emphysema  · Patient received nebulizer treatment and IV Decadron in the ER due to suspected COPD exacerbation with wheezing  · Continue bronchodilators  · Continue prednisone 40 mg once daily, pulmonary recommends prolonged steroid taper

## 2021-12-30 NOTE — QUICK NOTE
No further nonsustained VT on telemetry  Discussed with primary service that nonsustained VT is likely related to pulmonary embolism with hypoxia  Patient receiving single dose of IV diuretic to see how he responds per Pulmonary  Clinically did not examine to be volume up and CT scan read more as COVID-19/pneumonia than pulmonary edema  Repeat chest x-ray today  COVID testing as per Pulmonary/primary team  If patient has further runs of nonsustained VT, would consider increasing Toprol-XL to 100 mg daily and 50 mg at night  Keep potassium greater than 4 and magnesium greater than 2  Please re-consult if further nonsustained VT or change in clinical presentation

## 2021-12-30 NOTE — ASSESSMENT & PLAN NOTE
Twenty-one beats of V-tach noted on telemetry, likely in the setting of PE, COPD and pneumonia  Cardiology consulted, appreciate recommendations  Consider increasing Toprol-XL dose from 100 mg once daily to 100 mg in a m  And 50 mg in p m    Recommend sleep study outpatient  No further beats of V-tach noted

## 2021-12-30 NOTE — CASE MANAGEMENT
Case Management Discharge Planning Note    Patient name Nikita Kendall  Location East 5 /E5 [facility]-* MRN [id number]  : 1945 Date 2021       Current Admission Date: 2021  Current Admission Diagnosis:Acute respiratory failure with hypoxia [facility])   Patient Active Problem List    Diagnosis Date Noted    NSVT (nonsustained ventricular tachycardia) ([facility] ) 2021    Pulmonary embolism ([facility] ) 2021    Emphysema lung ([address] ) 2021    Sepsis ([address] ) 2021    Aortic ectasia, thoracic ([facility] ) 2021    Community acquired pneumonia 2021    Abnormal CT scan 2021    Acute respiratory failure with hypoxia ([facility] ) 2021    Elevated troponin 2021    CATY (acute kidney injury) ([facility] ) 2021    COVID-19 determined by clinical diagnostic criteria 2021    Vitamin D deficiency 10/05/2017    Expressive aphasia 2016    Actinic keratosis 2013    Stenosis of carotid artery 10/29/2013    Hyperglycemia 10/25/2012    Cerebral infarction ([facility] ) 10/24/2012    Hyperlipidemia 10/24/2012    Hypertension 10/24/2012    Macular degeneration 10/24/2012      LOS (days): 3  Geometric Mean LOS (GMLOS) (days):   Days to GMLOS:     OBJECTIVE:  Risk of Unplanned Readmission Score: 14         Current admission status: Inpatient   Preferred Pharmacy:   [facility] [address] [address]  Phone: [phone] Fax: [phone]    Primary Care Provider: Mike Green PA-C    Primary Insurance: [facility]  Secondary Insurance:     DISCHARGE DETAILS:    Discharge planning discussed with[de-identified] Patient and son  Freedom of Choice: Yes  Comments - Freedom of Choice: Patient currentl declining STR and prefers to go home with [address]  CM contacted family/caregiver?: Yes  Were Treatment Team discharge recommendations reviewed with patient/caregiver?: Yes  Did patient/caregiver verbalize understanding of patient care needs?: Yes  Were patient/caregiver advised of the risks associated with not following Treatment Team discharge recommendations?: Yes    Contacts  Relationship to Patient[de-identified] Family  Contact Method: Phone  Reason/Outcome: Continuity of [address]         Is the patient interested in Andie Manning at discharge?: Yes  [address] requested[de-identified] [address] Name[de-identified] [facility] Provider[de-identified] PCP  Home Health Services Needed[de-identified] Oxygen Via Nasal Cannula,Gait/ADL Training,Evaluate Functional Status and Safety,Strengthening/Theraputic Exercises to Improve Function  Oxygen LPM Ordered (if applicable based on home health services needed):: 2 LPM  Homebound Criteria Met[de-identified] Uses an Assist Device (i e  cane, walker, etc),Requires the Assistance of Another Person for Safe Ambulation or to Leave the Home  Supporting Clincal Findings[de-identified] Limited Endurance,Requires Oxygen,Dyspnea with Exertion    DME Referral Provided  Referral made for DME?: Yes  DME referral completed for the following items[de-identified] Portable Oxygen concentrator,Home Oxygen concentrator  DME Supplier Name[de-identified] [employer]    Other Referral/Resources/Interventions Provided:  Interventions: HHC,DME  Referral Comments: Patient accepted by [facility] for RN/PT/OT    Treatment Team Recommendation: Home with [address]  Discharge Destination Plan[de-identified] Home,Home with [address] O2 order placed via parachute- pending insurance approval     CM dept will continue to follow

## 2021-12-30 NOTE — PLAN OF CARE
Problem: PAIN - ADULT  Goal: Verbalizes/displays adequate comfort level or baseline comfort level  Description: Interventions:  - Encourage patient to monitor pain and request assistance  - Assess pain using appropriate pain scale  - Administer analgesics based on type and severity of pain and evaluate response  - Implement non-pharmacological measures as appropriate and evaluate response  - Consider cultural and social influences on pain and pain management  - Notify physician/advanced practitioner if interventions unsuccessful or patient reports new pain  Outcome: Progressing     Problem: RESPIRATORY - ADULT  Goal: Achieves optimal ventilation and oxygenation  Description: INTERVENTIONS:  - Assess for changes in respiratory status  - Assess for changes in mentation and behavior  - Position to facilitate oxygenation and minimize respiratory effort  - Oxygen administered by appropriate delivery if ordered  - Initiate smoking cessation education as indicated  - Encourage broncho-pulmonary hygiene including cough, deep breathe, Incentive Spirometry  - Assess the need for suctioning and aspirate as needed  - Assess and instruct to report SOB or any respiratory difficulty  - Respiratory Therapy support as indicated  Outcome: Progressing     Problem: PAIN - ADULT  Goal: Verbalizes/displays adequate comfort level or baseline comfort level  Description: Interventions:  - Encourage patient to monitor pain and request assistance  - Assess pain using appropriate pain scale  - Administer analgesics based on type and severity of pain and evaluate response  - Implement non-pharmacological measures as appropriate and evaluate response  - Consider cultural and social influences on pain and pain management  - Notify physician/advanced practitioner if interventions unsuccessful or patient reports new pain  Outcome: Progressing     Problem: RESPIRATORY - ADULT  Goal: Achieves optimal ventilation and oxygenation  Description: INTERVENTIONS:  - Assess for changes in respiratory status  - Assess for changes in mentation and behavior  - Position to facilitate oxygenation and minimize respiratory effort  - Oxygen administered by appropriate delivery if ordered  - Initiate smoking cessation education as indicated  - Encourage broncho-pulmonary hygiene including cough, deep breathe, Incentive Spirometry  - Assess the need for suctioning and aspirate as needed  - Assess and instruct to report SOB or any respiratory difficulty  - Respiratory Therapy support as indicated  Outcome: Progressing     Problem: PAIN - ADULT  Goal: Verbalizes/displays adequate comfort level or baseline comfort level  Description: Interventions:  - Encourage patient to monitor pain and request assistance  - Assess pain using appropriate pain scale  - Administer analgesics based on type and severity of pain and evaluate response  - Implement non-pharmacological measures as appropriate and evaluate response  - Consider cultural and social influences on pain and pain management  - Notify physician/advanced practitioner if interventions unsuccessful or patient reports new pain  Outcome: Progressing     Problem: RESPIRATORY - ADULT  Goal: Achieves optimal ventilation and oxygenation  Description: INTERVENTIONS:  - Assess for changes in respiratory status  - Assess for changes in mentation and behavior  - Position to facilitate oxygenation and minimize respiratory effort  - Oxygen administered by appropriate delivery if ordered  - Initiate smoking cessation education as indicated  - Encourage broncho-pulmonary hygiene including cough, deep breathe, Incentive Spirometry  - Assess the need for suctioning and aspirate as needed  - Assess and instruct to report SOB or any respiratory difficulty  - Respiratory Therapy support as indicated  Outcome: Progressing

## 2021-12-30 NOTE — ASSESSMENT & PLAN NOTE
· CTA chest shows evidence multifocal pneumonia  · Will treat with 5 days of antibiotics, changed to cefdinir  · Sputum culture currently growing mixed brianne  · Blood cultures no growth to date

## 2021-12-30 NOTE — RESPIRATORY THERAPY NOTE
Home Oxygen Qualifying Test       Patient name: Greyson Cano        : 1945   Date of Test:  2021  Diagnosis:      Home Oxygen Test:    Medicare Guidelines require item(s) 1-5 on all ambulatory patients or 1 and 2 on non-ambulatory patients  1   Baseline SPO2 on Room Air at rest 86%  2   SPO2 during exercise on Room Air  N/A   During exercise monitor SpO2  If SPO2 increases >=89% with ambulation do not add supplemental oxygen  If <= 88% on room air add O2 via NC and titrate patient  Patient must be ambulated with O2 and titrated to > 88% with exertion  3   SPO2 on Oxygen at rest 96% 6 lpm     4   SPO2 during exercise on Oxygen  89% a liter flow of 6lpm     5   Exercise performed:  Pt performed 6 minute walk test requiring 2L of oxygen with exertion  [x]  Supplemental Home Oxygen is indicated  []  Client does not qualify for home oxygen  Respiratory Additional Notes- Pt took several breaks during exercise due to increased WOB and SOB      Blair Gamble, RT

## 2021-12-30 NOTE — PLAN OF CARE
Problem: PHYSICAL THERAPY ADULT  Goal: Performs mobility at highest level of function for planned discharge setting  See evaluation for individualized goals  Description: Treatment/Interventions: Functional transfer training,LE strengthening/ROM,Therapeutic exercise,Endurance training,Patient/family training,Bed mobility,Equipment eval/education,Gait training,Spoke to nursing          See flowsheet documentation for full assessment, interventions and recommendations  Outcome: Progressing  Note: Prognosis: Fair  Problem List: Decreased endurance,Impaired balance,Decreased safety awareness,Impaired judgement  Assessment: Pt is an 67 y o  male admitted to [place] [address] on 12/27/21 with c/o SOB  Pt is being treated for Acute Respiratory Failure with Hypoxia  Pt found to have PE and Exac COPD  PT consulted with eval and treat and activity as tolerated orders  Pt lives alone in a split level home  Pt has limited support of a neighbor  At baseline, pt is independent with ADLs and ambulation without AD  Upon evaluation, pt required min A for ambulation and was very limited due to poor endurance/ drop in SaO2/ ROSALES  Pt presents with weakness, impaired balance, impaired endurance, gait dysfunction and decreased safety awareness  The patient's AM-PAC Basic Mobility Inpatient Short Form Raw Score is 19  A Raw score of greater than 16 suggests the patient may benefit from discharge to home  Please also refer to the recommendation of the Physical Therapist for safe discharge planning  PT to follow 3-5x/wk during acute stay to address deficits  Recommend STR v  Home PT depending on progress due to pt functioning below baseline  Barriers to Discharge: Decreased caregiver support,Inaccessible home environment        PT Discharge Recommendation: Home with home health rehabilitation          See flowsheet documentation for full assessment  Ivermectin Pregnancy And Lactation Text: This medication is Pregnancy Category C and it isn't known if it is safe during pregnancy. It is also excreted in breast milk. Benzoyl Peroxide Pregnancy And Lactation Text: This medication is Pregnancy Category C. It is unknown if benzoyl peroxide is excreted in breast milk. Clindamycin Pregnancy And Lactation Text: This medication can be used in pregnancy if certain situations. Clindamycin is also present in breast milk. Picato Counseling:  I discussed with the patient the risks of Picato including but not limited to erythema, scaling, itching, weeping, crusting, and pain. Nsaids Counseling: NSAID Counseling: I discussed with the patient that NSAIDs should be taken with food. Prolonged use of NSAIDs can result in the development of stomach ulcers.  Patient advised to stop taking NSAIDs if abdominal pain occurs.  The patient verbalized understanding of the proper use and possible adverse effects of NSAIDs.  All of the patient's questions and concerns were addressed. Griseofulvin Pregnancy And Lactation Text: This medication is Pregnancy Category X and is known to cause serious birth defects. It is unknown if this medication is excreted in breast milk but breast feeding should be avoided. Valtrex Pregnancy And Lactation Text: this medication is Pregnancy Category B and is considered safe during pregnancy. This medication is not directly found in breast milk but it's metabolite acyclovir is present. Cyclophosphamide Pregnancy And Lactation Text: This medication is Pregnancy Category D and it isn't considered safe during pregnancy. This medication is excreted in breast milk. Benzoyl Peroxide Counseling: Patient counseled that medicine may cause skin irritation and bleach clothing.  In the event of skin irritation, the patient was advised to reduce the amount of the drug applied or use it less frequently.   The patient verbalized understanding of the proper use and possible adverse effects of benzoyl peroxide.  All of the patient's questions and concerns were addressed. Ivermectin Counseling:  Patient instructed to take medication on an empty stomach with a full glass of water.  Patient informed of potential adverse effects including but not limited to nausea, diarrhea, dizziness, itching, and swelling of the extremities or lymph nodes.  The patient verbalized understanding of the proper use and possible adverse effects of ivermectin.  All of the patient's questions and concerns were addressed. Siliq Pregnancy And Lactation Text: The risk during pregnancy and breastfeeding is uncertain with this medication. Hydroxychloroquine Pregnancy And Lactation Text: This medication has been shown to cause fetal harm but it isn't assigned a Pregnancy Risk Category. There are small amounts excreted in breast milk. Clindamycin Counseling: I counseled the patient regarding use of clindamycin as an antibiotic for prophylactic and/or therapeutic purposes. Clindamycin is active against numerous classes of bacteria, including skin bacteria. Side effects may include nausea, diarrhea, gastrointestinal upset, rash, hives, yeast infections, and in rare cases, colitis. Minoxidil Pregnancy And Lactation Text: This medication has not been assigned a Pregnancy Risk Category but animal studies failed to show danger with the topical medication. It is unknown if the medication is excreted in breast milk. Cyclophosphamide Counseling:  I discussed with the patient the risks of cyclophosphamide including but not limited to hair loss, hormonal abnormalities, decreased fertility, abdominal pain, diarrhea, nausea and vomiting, bone marrow suppression and infection. The patient understands that monitoring is required while taking this medication. Siliq Counseling:  I discussed with the patient the risks of Siliq including but not limited to new or worsening depression, suicidal thoughts and behavior, immunosuppression, malignancy, posterior leukoencephalopathy syndrome, and serious infections.  The patient understands that monitoring is required including a PPD at baseline and must alert us or the primary physician if symptoms of infection or other concerning signs are noted. There is also a special program designed to monitor depression which is required with Siliq. Valtrex Counseling: I discussed with the patient the risks of valacyclovir including but not limited to kidney damage, nausea, vomiting and severe allergy.  The patient understands that if the infection seems to be worsening or is not improving, they are to call. Detail Level: Zone Griseofulvin Counseling:  I discussed with the patient the risks of griseofulvin including but not limited to photosensitivity, cytopenia, liver damage, nausea/vomiting and severe allergy.  The patient understands that this medication is best absorbed when taken with a fatty meal (e.g., ice cream or french fries). Cephalexin Pregnancy And Lactation Text: This medication is Pregnancy Category B and considered safe during pregnancy.  It is also excreted in breast milk but can be used safely for shorter doses. Minoxidil Counseling: Minoxidil is a topical medication which can increase blood flow where it is applied. It is uncertain how this medication increases hair growth. Side effects are uncommon and include stinging and allergic reactions. Thalidomide Pregnancy And Lactation Text: This medication is Pregnancy Category X and is absolutely contraindicated during pregnancy. It is unknown if it is excreted in breast milk. Rituxan Pregnancy And Lactation Text: This medication is Pregnancy Category C and it isn't know if it is safe during pregnancy. It is unknown if this medication is excreted in breast milk but similar antibodies are known to be excreted. Hydroxychloroquine Counseling:  I discussed with the patient that a baseline ophthalmologic exam is needed at the start of therapy and every year thereafter while on therapy. A CBC may also be warranted for monitoring.  The side effects of this medication were discussed with the patient, including but not limited to agranulocytosis, aplastic anemia, seizures, rashes, retinopathy, and liver toxicity. Patient instructed to call the office should any adverse effect occur.  The patient verbalized understanding of the proper use and possible adverse effects of Plaquenil.  All the patient's questions and concerns were addressed. Zyclara Pregnancy And Lactation Text: This medication is Pregnancy Category C. It is unknown if this medication is excreted in breast milk. High Dose Vitamin A Pregnancy And Lactation Text: High dose vitamin A therapy is contraindicated during pregnancy and breast feeding. Cellcept Pregnancy And Lactation Text: This medication is Pregnancy Category D and isn't considered safe during pregnancy. It is unknown if this medication is excreted in breast milk. Fluconazole Pregnancy And Lactation Text: This medication is Pregnancy Category C and it isn't know if it is safe during pregnancy. It is also excreted in breast milk. Cephalexin Counseling: I counseled the patient regarding use of cephalexin as an antibiotic for prophylactic and/or therapeutic purposes. Cephalexin (commonly prescribed under brand name Keflex) is a cephalosporin antibiotic which is active against numerous classes of bacteria, including most skin bacteria. Side effects may include nausea, diarrhea, gastrointestinal upset, rash, hives, yeast infections, and in rare cases, hepatitis, kidney disease, seizures, fever, confusion, neurologic symptoms, and others. Patients with severe allergies to penicillin medications are cautioned that there is about a 10% incidence of cross-reactivity with cephalosporins. When possible, patients with penicillin allergies should use alternatives to cephalosporins for antibiotic therapy. Albendazole Counseling:  I discussed with the patient the risks of albendazole including but not limited to cytopenia, kidney damage, nausea/vomiting and severe allergy.  The patient understands that this medication is being used in an off-label manner. Glycopyrrolate Pregnancy And Lactation Text: This medication is Pregnancy Category B and is considered safe during pregnancy. It is unknown if it is excreted breast milk. Cellcept Counseling:  I discussed with the patient the risks of mycophenolate mofetil including but not limited to infection/immunosuppression, GI upset, hypokalemia, hypercholesterolemia, bone marrow suppression, lymphoproliferative disorders, malignancy, GI ulceration/bleed/perforation, colitis, interstitial lung disease, kidney failure, progressive multifocal leukoencephalopathy, and birth defects.  The patient understands that monitoring is required including a baseline creatinine and regular CBC testing. In addition, patient must alert us immediately if symptoms of infection or other concerning signs are noted. Zyclara Counseling:  I discussed with the patient the risks of imiquimod including but not limited to erythema, scaling, itching, weeping, crusting, and pain.  Patient understands that the inflammatory response to imiquimod is variable from person to person and was educated regarded proper titration schedule.  If flu-like symptoms develop, patient knows to discontinue the medication and contact us. Fluconazole Counseling:  Patient counseled regarding adverse effects of fluconazole including but not limited to headache, diarrhea, nausea, upset stomach, liver function test abnormalities, taste disturbance, and stomach pain.  There is a rare possibility of liver failure that can occur when taking fluconazole.  The patient understands that monitoring of LFTs and kidney function test may be required, especially at baseline. The patient verbalized understanding of the proper use and possible adverse effects of fluconazole.  All of the patient's questions and concerns were addressed. Rituxan Counseling:  I discussed with the patient the risks of Rituxan infusions. Side effects can include infusion reactions, severe drug rashes including mucocutaneous reactions, reactivation of latent hepatitis and other infections and rarely progressive multifocal leukoencephalopathy.  All of the patient's questions and concerns were addressed. Thalidomide Counseling: I discussed with the patient the risks of thalidomide including but not limited to birth defects, anxiety, weakness, chest pain, dizziness, cough and severe allergy. High Dose Vitamin A Counseling: Side effects reviewed, pt to contact office should one occur. Imiquimod Counseling:  I discussed with the patient the risks of imiquimod including but not limited to erythema, scaling, itching, weeping, crusting, and pain.  Patient understands that the inflammatory response to imiquimod is variable from person to person and was educated regarded proper titration schedule.  If flu-like symptoms develop, patient knows to discontinue the medication and contact us. Bactrim Pregnancy And Lactation Text: This medication is Pregnancy Category D and is known to cause fetal risk.  It is also excreted in breast milk. Topical Sulfur Applications Pregnancy And Lactation Text: This medication is Pregnancy Category C and has an unknown safety profile during pregnancy. It is unknown if this topical medication is excreted in breast milk. Infliximab Pregnancy And Lactation Text: This medication is Pregnancy Category B and is considered safe during pregnancy. It is unknown if this medication is excreted in breast milk. Sski Pregnancy And Lactation Text: This medication is Pregnancy Category D and isn't considered safe during pregnancy. It is excreted in breast milk. Glycopyrrolate Counseling:  I discussed with the patient the risks of glycopyrrolate including but not limited to skin rash, drowsiness, dry mouth, difficulty urinating, and blurred vision. Hydroxyzine Pregnancy And Lactation Text: This medication is not safe during pregnancy and should not be taken. It is also excreted in breast milk and breast feeding isn't recommended. Isotretinoin Pregnancy And Lactation Text: This medication is Pregnancy Category X and is considered extremely dangerous during pregnancy. It is unknown if it is excreted in breast milk. Bactrim Counseling:  I discussed with the patient the risks of sulfa antibiotics including but not limited to GI upset, allergic reaction, drug rash, diarrhea, dizziness, photosensitivity, and yeast infections.  Rarely, more serious reactions can occur including but not limited to aplastic anemia, agranulocytosis, methemoglobinemia, blood dyscrasias, liver or kidney failure, lung infiltrates or desquamative/blistering drug rashes. Rifampin Pregnancy And Lactation Text: This medication is Pregnancy Category C and it isn't know if it is safe during pregnancy. It is also excreted in breast milk and should not be used if you are breast feeding. Isotretinoin Counseling: Patient should get monthly blood tests, not donate blood, not drive at night if vision affected, not share medication, and not undergo elective surgery for 6 months after tx completed. Side effects reviewed, pt to contact office should one occur. Azathioprine Counseling:  I discussed with the patient the risks of azathioprine including but not limited to myelosuppression, immunosuppression, hepatotoxicity, lymphoma, and infections.  The patient understands that monitoring is required including baseline LFTs, Creatinine, possible TPMP genotyping and weekly CBCs for the first month and then every 2 weeks thereafter.  The patient verbalized understanding of the proper use and possible adverse effects of azathioprine.  All of the patient's questions and concerns were addressed. SSKI Counseling:  I discussed with the patient the risks of SSKI including but not limited to thyroid abnormalities, metallic taste, GI upset, fever, headache, acne, arthralgias, paraesthesias, lymphadenopathy, easy bleeding, arrhythmias, and allergic reaction. Infliximab Counseling:  I discussed with the patient the risks of infliximab including but not limited to myelosuppression, immunosuppression, autoimmune hepatitis, demyelinating diseases, lymphoma, and serious infections.  The patient understands that monitoring is required including a PPD at baseline and must alert us or the primary physician if symptoms of infection or other concerning signs are noted. Gabapentin Pregnancy And Lactation Text: This medication is Pregnancy Category C and isn't considered safe during pregnancy. It is excreted in breast milk. Hydroxyzine Counseling: Patient advised that the medication is sedating and not to drive a car after taking this medication.  Patient informed of potential adverse effects including but not limited to dry mouth, urinary retention, and blurry vision.  The patient verbalized understanding of the proper use and possible adverse effects of hydroxyzine.  All of the patient's questions and concerns were addressed. Topical Sulfur Applications Counseling: Topical Sulfur Counseling: Patient counseled that this medication may cause skin irritation or allergic reactions.  In the event of skin irritation, the patient was advised to reduce the amount of the drug applied or use it less frequently.   The patient verbalized understanding of the proper use and possible adverse effects of topical sulfur application.  All of the patient's questions and concerns were addressed. Tetracycline Pregnancy And Lactation Text: This medication is Pregnancy Category D and not consider safe during pregnancy. It is also excreted in breast milk. Azithromycin Pregnancy And Lactation Text: This medication is considered safe during pregnancy and is also secreted in breast milk. Hydroquinone Counseling:  Patient advised that medication may result in skin irritation, lightening (hypopigmentation), dryness, and burning.  In the event of skin irritation, the patient was advised to reduce the amount of the drug applied or use it less frequently.  Rarely, spots that are treated with hydroquinone can become darker (pseudoochronosis).  Should this occur, patient instructed to stop medication and call the office. The patient verbalized understanding of the proper use and possible adverse effects of hydroquinone.  All of the patient's questions and concerns were addressed. Rifampin Counseling: I discussed with the patient the risks of rifampin including but not limited to liver damage, kidney damage, red-orange body fluids, nausea/vomiting and severe allergy. Topical Clindamycin Pregnancy And Lactation Text: This medication is Pregnancy Category B and is considered safe during pregnancy. It is unknown if it is excreted in breast milk. Spironolactone Pregnancy And Lactation Text: This medication can cause feminization of the male fetus and should be avoided during pregnancy. The active metabolite is also found in breast milk. Gabapentin Counseling: I discussed with the patient the risks of gabapentin including but not limited to dizziness, somnolence, fatigue and ataxia. Tetracycline Counseling: Patient counseled regarding possible photosensitivity and increased risk for sunburn.  Patient instructed to avoid sunlight, if possible.  When exposed to sunlight, patients should wear protective clothing, sunglasses, and sunscreen.  The patient was instructed to call the office immediately if the following severe adverse effects occur:  hearing changes, easy bruising/bleeding, severe headache, or vision changes.  The patient verbalized understanding of the proper use and possible adverse effects of tetracycline.  All of the patient's questions and concerns were addressed. Patient understands to avoid pregnancy while on therapy due to potential birth defects. Bexarotene Pregnancy And Lactation Text: This medication is Pregnancy Category X and should not be given to women who are pregnant or may become pregnant. This medication should not be used if you are breast feeding. Doxepin Pregnancy And Lactation Text: This medication is Pregnancy Category C and it isn't known if it is safe during pregnancy. It is also excreted in breast milk and breast feeding isn't recommended. Topical Clindamycin Counseling: Patient counseled that this medication may cause skin irritation or allergic reactions.  In the event of skin irritation, the patient was advised to reduce the amount of the drug applied or use it less frequently.   The patient verbalized understanding of the proper use and possible adverse effects of clindamycin.  All of the patient's questions and concerns were addressed. Azithromycin Counseling:  I discussed with the patient the risks of azithromycin including but not limited to GI upset, allergic reaction, drug rash, diarrhea, and yeast infections. Xolair Pregnancy And Lactation Text: This medication is Pregnancy Category B and is considered safe during pregnancy. This medication is excreted in breast milk. Bexarotene Counseling:  I discussed with the patient the risks of bexarotene including but not limited to hair loss, dry lips/skin/eyes, liver abnormalities, hyperlipidemia, pancreatitis, depression/suicidal ideation, photosensitivity, drug rash/allergic reactions, hypothyroidism, anemia, leukopenia, infection, cataracts, and teratogenicity.  Patient understands that they will need regular blood tests to check lipid profile, liver function tests, white blood cell count, thyroid function tests and pregnancy test if applicable. Ilumya Counseling: I discussed with the patient the risks of tildrakizumab including but not limited to immunosuppression, malignancy, posterior leukoencephalopathy syndrome, and serious infections.  The patient understands that monitoring is required including a PPD at baseline and must alert us or the primary physician if symptoms of infection or other concerning signs are noted. Spironolactone Counseling: Patient advised regarding risks of diarrhea, abdominal pain, hyperkalemia, birth defects (for female patients), liver toxicity and renal toxicity. The patient may need blood work to monitor liver and kidney function and potassium levels while on therapy. The patient verbalized understanding of the proper use and possible adverse effects of spironolactone.  All of the patient's questions and concerns were addressed. Doxepin Counseling:  Patient advised that the medication is sedating and not to drive a car after taking this medication. Patient informed of potential adverse effects including but not limited to dry mouth, urinary retention, and blurry vision.  The patient verbalized understanding of the proper use and possible adverse effects of doxepin.  All of the patient's questions and concerns were addressed. Quinolones Counseling:  I discussed with the patient the risks of fluoroquinolones including but not limited to GI upset, allergic reaction, drug rash, diarrhea, dizziness, photosensitivity, yeast infections, liver function test abnormalities, tendonitis/tendon rupture. Tazorac Pregnancy And Lactation Text: This medication is not safe during pregnancy. It is unknown if this medication is excreted in breast milk. Eucrisa Counseling: Patient may experience a mild burning sensation during topical application. Eucrisa is not approved in children less than 2 years of age. Birth Control Pills Pregnancy And Lactation Text: This medication should be avoided if pregnant and for the first 30 days post-partum. Sarecycline Counseling: Patient advised regarding possible photosensitivity and discoloration of the teeth, skin, lips, tongue and gums.  Patient instructed to avoid sunlight, if possible.  When exposed to sunlight, patients should wear protective clothing, sunglasses, and sunscreen.  The patient was instructed to call the office immediately if the following severe adverse effects occur:  hearing changes, easy bruising/bleeding, severe headache, or vision changes.  The patient verbalized understanding of the proper use and possible adverse effects of sarecycline.  All of the patient's questions and concerns were addressed. Erivedge Counseling- I discussed with the patient the risks of Erivedge including but not limited to nausea, vomiting, diarrhea, constipation, weight loss, changes in the sense of taste, decreased appetite, muscle spasms, and hair loss.  The patient verbalized understanding of the proper use and possible adverse effects of Erivedge.  All of the patient's questions and concerns were addressed. Cimetidine Pregnancy And Lactation Text: This medication is Pregnancy Category B and is considered safe during pregnancy. It is also excreted in breast milk and breast feeding isn't recommended. Xolair Counseling:  Patient informed of potential adverse effects including but not limited to fever, muscle aches, rash and allergic reactions.  The patient verbalized understanding of the proper use and possible adverse effects of Xolair.  All of the patient's questions and concerns were addressed. Acitretin Pregnancy And Lactation Text: This medication is Pregnancy Category X and should not be given to women who are pregnant or may become pregnant in the future. This medication is excreted in breast milk. Birth Control Pills Counseling: Birth Control Pill Counseling: I discussed with the patient the potential side effects of OCPs including but not limited to increased risk of stroke, heart attack, thrombophlebitis, deep venous thrombosis, hepatic adenomas, breast changes, GI upset, headaches, and depression.  The patient verbalized understanding of the proper use and possible adverse effects of OCPs. All of the patient's questions and concerns were addressed. Dapsone Pregnancy And Lactation Text: This medication is Pregnancy Category C and is not considered safe during pregnancy or breast feeding. Acitretin Counseling:  I discussed with the patient the risks of acitretin including but not limited to hair loss, dry lips/skin/eyes, liver damage, hyperlipidemia, depression/suicidal ideation, photosensitivity.  Serious rare side effects can include but are not limited to pancreatitis, pseudotumor cerebri, bony changes, clot formation/stroke/heart attack.  Patient understands that alcohol is contraindicated since it can result in liver toxicity and significantly prolong the elimination of the drug by many years. Xeldeseanz Pregnancy And Lactation Text: This medication is Pregnancy Category D and is not considered safe during pregnancy.  The risk during breast feeding is also uncertain. Tazorac Counseling:  Patient advised that medication is irritating and drying.  Patient may need to apply sparingly and wash off after an hour before eventually leaving it on overnight.  The patient verbalized understanding of the proper use and possible adverse effects of tazorac.  All of the patient's questions and concerns were addressed. Humira Counseling:  I discussed with the patient the risks of adalimumab including but not limited to myelosuppression, immunosuppression, autoimmune hepatitis, demyelinating diseases, lymphoma, and serious infections.  The patient understands that monitoring is required including a PPD at baseline and must alert us or the primary physician if symptoms of infection or other concerning signs are noted. Cimetidine Counseling:  I discussed with the patient the risks of Cimetidine including but not limited to gynecomastia, headache, diarrhea, nausea, drowsiness, arrhythmias, pancreatitis, skin rashes, psychosis, bone marrow suppression and kidney toxicity. Elidel Counseling: Patient may experience a mild burning sensation during topical application. Elidel is not approved in children less than 2 years of age. There have been case reports of hematologic and skin malignancies in patients using topical calcineurin inhibitors although causality is questionable. Minocycline Counseling: Patient advised regarding possible photosensitivity and discoloration of the teeth, skin, lips, tongue and gums.  Patient instructed to avoid sunlight, if possible.  When exposed to sunlight, patients should wear protective clothing, sunglasses, and sunscreen.  The patient was instructed to call the office immediately if the following severe adverse effects occur:  hearing changes, easy bruising/bleeding, severe headache, or vision changes.  The patient verbalized understanding of the proper use and possible adverse effects of minocycline.  All of the patient's questions and concerns were addressed. Xeljanz Counseling: I discussed with the patient the risks of Xeljanz therapy including increased risk of infection, liver issues, headache, diarrhea, or cold symptoms. Live vaccines should be avoided. They were instructed to call if they have any problems. Dapsone Counseling: I discussed with the patient the risks of dapsone including but not limited to hemolytic anemia, agranulocytosis, rashes, methemoglobinemia, kidney failure, peripheral neuropathy, headaches, GI upset, and liver toxicity.  Patients who start dapsone require monitoring including baseline LFTs and weekly CBCs for the first month, then every month thereafter.  The patient verbalized understanding of the proper use and possible adverse effects of dapsone.  All of the patient's questions and concerns were addressed. Taltz Counseling: I discussed with the patient the risks of ixekizumab including but not limited to immunosuppression, serious infections, worsening of inflammatory bowel disease and drug reactions.  The patient understands that monitoring is required including a PPD at baseline and must alert us or the primary physician if symptoms of infection or other concerning signs are noted. Drysol Pregnancy And Lactation Text: This medication is considered safe during pregnancy and breast feeding. Topical Retinoid counseling:  Patient advised to apply a pea-sized amount only at bedtime and wait 30 minutes after washing their face before applying.  If too drying, patient may add a non-comedogenic moisturizer. The patient verbalized understanding of the proper use and possible adverse effects of retinoids.  All of the patient's questions and concerns were addressed. Metronidazole Pregnancy And Lactation Text: This medication is Pregnancy Category B and considered safe during pregnancy.  It is also excreted in breast milk. Prednisone Pregnancy And Lactation Text: This medication is Pregnancy Category C and it isn't know if it is safe during pregnancy. This medication is excreted in breast milk. Oxybutynin Counseling:  I discussed with the patient the risks of oxybutynin including but not limited to skin rash, drowsiness, dry mouth, difficulty urinating, and blurred vision. Enbrel Counseling:  I discussed with the patient the risks of etanercept including but not limited to myelosuppression, immunosuppression, autoimmune hepatitis, demyelinating diseases, lymphoma, and infections.  The patient understands that monitoring is required including a PPD at baseline and must alert us or the primary physician if symptoms of infection or other concerning signs are noted. Metronidazole Counseling:  I discussed with the patient the risks of metronidazole including but not limited to seizures, nausea/vomiting, a metallic taste in the mouth, nausea/vomiting and severe allergy. Cimzia Pregnancy And Lactation Text: This medication crosses the placenta but can be considered safe in certain situations. Cimzia may be excreted in breast milk. Drysol Counseling:  I discussed with the patient the risks of drysol/aluminum chloride including but not limited to skin rash, itching, irritation, burning. Solaraze Pregnancy And Lactation Text: This medication is Pregnancy Category B and is considered safe. There is some data to suggest avoiding during the third trimester. It is unknown if this medication is excreted in breast milk. Dupixent Pregnancy And Lactation Text: This medication likely crosses the placenta but the risk for the fetus is uncertain. This medication is excreted in breast milk. Stelara Counseling:  I discussed with the patient the risks of ustekinumab including but not limited to immunosuppression, malignancy, posterior leukoencephalopathy syndrome, and serious infections.  The patient understands that monitoring is required including a PPD at baseline and must alert us or the primary physician if symptoms of infection or other concerning signs are noted. Otezla Pregnancy And Lactation Text: This medication is Pregnancy Category C and it isn't known if it is safe during pregnancy. It is unknown if it is excreted in breast milk. Colchicine Counseling:  Patient counseled regarding adverse effects including but not limited to stomach upset (nausea, vomiting, stomach pain, or diarrhea).  Patient instructed to limit alcohol consumption while taking this medication.  Colchicine may reduce blood counts especially with prolonged use.  The patient understands that monitoring of kidney function and blood counts may be required, especially at baseline. The patient verbalized understanding of the proper use and possible adverse effects of colchicine.  All of the patient's questions and concerns were addressed. Terbinafine Counseling: Patient counseling regarding adverse effects of terbinafine including but not limited to headache, diarrhea, rash, upset stomach, liver function test abnormalities, itching, taste/smell disturbance, nausea, abdominal pain, and flatulence.  There is a rare possibility of liver failure that can occur when taking terbinafine.  The patient understands that a baseline LFT and kidney function test may be required. The patient verbalized understanding of the proper use and possible adverse effects of terbinafine.  All of the patient's questions and concerns were addressed. Prednisone Counseling:  I discussed with the patient the risks of prolonged use of prednisone including but not limited to weight gain, insomnia, osteoporosis, mood changes, diabetes, susceptibility to infection, glaucoma and high blood pressure.  In cases where prednisone use is prolonged, patients should be monitored with blood pressure checks, serum glucose levels and an eye exam.  Additionally, the patient may need to be placed on GI prophylaxis, PCP prophylaxis, and calcium and vitamin D supplementation and/or a bisphosphonate.  The patient verbalized understanding of the proper use and the possible adverse effects of prednisone.  All of the patient's questions and concerns were addressed. Tremfya Counseling: I discussed with the patient the risks of guselkumab including but not limited to immunosuppression, serious infections, worsening of inflammatory bowel disease and drug reactions.  The patient understands that monitoring is required including a PPD at baseline and must alert us or the primary physician if symptoms of infection or other concerning signs are noted. Cimzia Counseling:  I discussed with the patient the risks of Cimzia including but not limited to immunosuppression, allergic reactions and infections.  The patient understands that monitoring is required including a PPD at baseline and must alert us or the primary physician if symptoms of infection or other concerning signs are noted. 5-Fu Pregnancy And Lactation Text: This medication is Pregnancy Category X and contraindicated in pregnancy and in women who may become pregnant. It is unknown if this medication is excreted in breast milk. Dupixent Counseling: I discussed with the patient the risks of dupilumab including but not limited to eye infection and irritation, cold sores, injection site reactions, worsening of asthma, allergic reactions and increased risk of parasitic infection.  Live vaccines should be avoided while taking dupilumab. Dupilumab will also interact with certain medications such as warfarin and cyclosporine. The patient understands that monitoring is required and they must alert us or the primary physician if symptoms of infection or other concerning signs are noted. Otezla Counseling: The side effects of Otezla were discussed with the patient, including but not limited to worsening or new depression, weight loss, diarrhea, nausea, upper respiratory tract infection, and headache. Patient instructed to call the office should any adverse effect occur.  The patient verbalized understanding of the proper use and possible adverse effects of Otezla.  All the patient's questions and concerns were addressed. Solaraze Counseling:  I discussed with the patient the risks of Solaraze including but not limited to erythema, scaling, itching, weeping, crusting, and pain. Erythromycin Pregnancy And Lactation Text: This medication is Pregnancy Category B and is considered safe during pregnancy. It is also excreted in breast milk. Methotrexate Pregnancy And Lactation Text: This medication is Pregnancy Category X and is known to cause fetal harm. This medication is excreted in breast milk. Ketoconazole Pregnancy And Lactation Text: This medication is Pregnancy Category C and it isn't know if it is safe during pregnancy. It is also excreted in breast milk and breast feeding isn't recommended. 5-Fu Counseling: 5-Fluorouracil Counseling:  I discussed with the patient the risks of 5-fluorouracil including but not limited to erythema, scaling, itching, weeping, crusting, and pain. Erythromycin Counseling:  I discussed with the patient the risks of erythromycin including but not limited to GI upset, allergic reaction, drug rash, diarrhea, increase in liver enzymes, and yeast infections. Methotrexate Counseling:  Patient counseled regarding adverse effects of methotrexate including but not limited to nausea, vomiting, abnormalities in liver function tests. Patients may develop mouth sores, rash, diarrhea, and abnormalities in blood counts. The patient understands that monitoring is required including LFT's and blood counts.  There is a rare possibility of scarring of the liver and lung problems that can occur when taking methotrexate. Persistent nausea, loss of appetite, pale stools, dark urine, cough, and shortness of breath should be reported immediately. Patient advised to discontinue methotrexate treatment at least three months before attempting to become pregnant.  I discussed the need for folate supplements while taking methotrexate.  These supplements can decrease side effects during methotrexate treatment. The patient verbalized understanding of the proper use and possible adverse effects of methotrexate.  All of the patient's questions and concerns were addressed. Protopic Pregnancy And Lactation Text: This medication is Pregnancy Category C. It is unknown if this medication is excreted in breast milk when applied topically. Clofazimine Counseling:  I discussed with the patient the risks of clofazimine including but not limited to skin and eye pigmentation, liver damage, nausea/vomiting, gastrointestinal bleeding and allergy. Skyrizi Counseling: I discussed with the patient the risks of risankizumab-rzaa including but not limited to immunosuppression, and serious infections.  The patient understands that monitoring is required including a PPD at baseline and must alert us or the primary physician if symptoms of infection or other concerning signs are noted. Include Pregnancy/Lactation Warning?: No Ketoconazole Counseling:   Patient counseled regarding improving absorption with orange juice.  Adverse effects include but are not limited to breast enlargement, headache, diarrhea, nausea, upset stomach, liver function test abnormalities, taste disturbance, and stomach pain.  There is a rare possibility of liver failure that can occur when taking ketoconazole. The patient understands that monitoring of LFTs may be required, especially at baseline. The patient verbalized understanding of the proper use and possible adverse effects of ketoconazole.  All of the patient's questions and concerns were addressed. Doxycycline Pregnancy And Lactation Text: This medication is Pregnancy Category D and not consider safe during pregnancy. It is also excreted in breast milk but is considered safe for shorter treatment courses. Protopic Counseling: Patient may experience a mild burning sensation during topical application. Protopic is not approved in children less than 2 years of age. There have been case reports of hematologic and skin malignancies in patients using topical calcineurin inhibitors although causality is questionable. Odomzo Counseling- I discussed with the patient the risks of Odomzo including but not limited to nausea, vomiting, diarrhea, constipation, weight loss, changes in the sense of taste, decreased appetite, muscle spasms, and hair loss.  The patient verbalized understanding of the proper use and possible adverse effects of Odomzo.  All of the patient's questions and concerns were addressed. Cosentyx Counseling:  I discussed with the patient the risks of Cosentyx including but not limited to worsening of Crohn's disease, immunosuppression, allergic reactions and infections.  The patient understands that monitoring is required including a PPD at baseline and must alert us or the primary physician if symptoms of infection or other concerning signs are noted. Doxycycline Counseling:  Patient counseled regarding possible photosensitivity and increased risk for sunburn.  Patient instructed to avoid sunlight, if possible.  When exposed to sunlight, patients should wear protective clothing, sunglasses, and sunscreen.  The patient was instructed to call the office immediately if the following severe adverse effects occur:  hearing changes, easy bruising/bleeding, severe headache, or vision changes.  The patient verbalized understanding of the proper use and possible adverse effects of doxycycline.  All of the patient's questions and concerns were addressed. Carac Counseling:  I discussed with the patient the risks of Carac including but not limited to erythema, scaling, itching, weeping, crusting, and pain. Cyclosporine Counseling:  I discussed with the patient the risks of cyclosporine including but not limited to hypertension, gingival hyperplasia,myelosuppression, immunosuppression, liver damage, kidney damage, neurotoxicity, lymphoma, and serious infections. The patient understands that monitoring is required including baseline blood pressure, CBC, CMP, lipid panel and uric acid, and then 1-2 times monthly CMP and blood pressure. Nsaids Pregnancy And Lactation Text: These medications are considered safe up to 30 weeks gestation. It is excreted in breast milk. Arava Counseling:  Patient counseled regarding adverse effects of Arava including but not limited to nausea, vomiting, abnormalities in liver function tests. Patients may develop mouth sores, rash, diarrhea, and abnormalities in blood counts. The patient understands that monitoring is required including LFTs and blood counts.  There is a rare possibility of scarring of the liver and lung problems that can occur when taking methotrexate. Persistent nausea, loss of appetite, pale stools, dark urine, cough, and shortness of breath should be reported immediately. Patient advised to discontinue Arava treatment and consult with a physician prior to attempting conception. The patient will have to undergo a treatment to eliminate Arava from the body prior to conception. Simponi Counseling:  I discussed with the patient the risks of golimumab including but not limited to myelosuppression, immunosuppression, autoimmune hepatitis, demyelinating diseases, lymphoma, and serious infections.  The patient understands that monitoring is required including a PPD at baseline and must alert us or the primary physician if symptoms of infection or other concerning signs are noted. Itraconazole Counseling:  I discussed with the patient the risks of itraconazole including but not limited to liver damage, nausea/vomiting, neuropathy, and severe allergy.  The patient understands that this medication is best absorbed when taken with acidic beverages such as non-diet cola or ginger ale.  The patient understands that monitoring is required including baseline LFTs and repeat LFTs at intervals.  The patient understands that they are to contact us or the primary physician if concerning signs are noted.

## 2021-12-30 NOTE — PLAN OF CARE
Problem: Potential for Falls  Goal: Patient will remain free of falls  Description: INTERVENTIONS:  - Educate patient/family on patient safety including physical limitations  - Instruct patient to call for assistance with activity   - Consult OT/PT to assist with strengthening/mobility   - Keep Call bell within reach  - Keep bed low and locked with side rails adjusted as appropriate  - Keep care items and personal belongings within reach  - Initiate and maintain comfort rounds  - Make Fall Risk Sign visible to staff  - Offer Toileting every  Hours, in advance of need  - Initiate/Maintain alarm  - Obtain necessary fall risk management equipment:   - Apply yellow socks and bracelet for high fall risk patients  - Consider moving patient to room near nurses station  Outcome: Progressing     Problem: MOBILITY - ADULT  Goal: Maintain or return to baseline ADL function  Description: INTERVENTIONS:  -  Assess patient's ability to carry out ADLs; assess patient's baseline for ADL function and identify physical deficits which impact ability to perform ADLs (bathing, care of mouth/teeth, toileting, grooming, dressing, etc )  - Assess/evaluate cause of self-care deficits   - Assess range of motion  - Assess patient's mobility; develop plan if impaired  - Assess patient's need for assistive devices and provide as appropriate  - Encourage maximum independence but intervene and supervise when necessary  - Involve family in performance of ADLs  - Assess for home care needs following discharge   - Consider OT consult to assist with ADL evaluation and planning for discharge  - Provide patient education as appropriate  Outcome: Progressing  Goal: Maintains/Returns to pre admission functional level  Description: INTERVENTIONS:  - Perform BMAT or MOVE assessment daily    - Set and communicate daily mobility goal to care team and patient/family/caregiver     - Collaborate with rehabilitation services on mobility goals if consulted  - Perform Range of Motion  times a day  - Reposition patient every  hours    - Dangle patient  times a day  - Stand patient  times a day  - Ambulate patient  times a day  - Out of bed to chair  times a day   - Out of bed for meals  times a day  - Out of bed for toileting  - Record patient progress and toleration of activity level   Outcome: Progressing     Problem: PAIN - ADULT  Goal: Verbalizes/displays adequate comfort level or baseline comfort level  Description: Interventions:  - Encourage patient to monitor pain and request assistance  - Assess pain using appropriate pain scale  - Administer analgesics based on type and severity of pain and evaluate response  - Implement non-pharmacological measures as appropriate and evaluate response  - Consider cultural and social influences on pain and pain management  - Notify physician/advanced practitioner if interventions unsuccessful or patient reports new pain  Outcome: Progressing     Problem: INFECTION - ADULT  Goal: Absence or prevention of progression during hospitalization  Description: INTERVENTIONS:  - Assess and monitor for signs and symptoms of infection  - Monitor lab/diagnostic results  - Monitor all insertion sites, i e  indwelling lines, tubes, and drains  - Monitor endotracheal if appropriate and nasal secretions for changes in amount and color  - [place] appropriate cooling/warming therapies per order  - Administer medications as ordered  - Instruct and encourage patient and family to use good hand hygiene technique  - Identify and instruct in appropriate isolation precautions for identified infection/condition  Outcome: Progressing  Goal: Absence of fever/infection during neutropenic period  Description: INTERVENTIONS:  - Monitor WBC    Outcome: Progressing     Problem: DISCHARGE PLANNING  Goal: Discharge to home or other facility with appropriate resources  Description: INTERVENTIONS:  - Identify barriers to discharge w/patient and caregiver  - Arrange for needed discharge resources and transportation as appropriate  - Identify discharge learning needs (meds, wound care, etc )  - Arrange for interpretive services to assist at discharge as needed  - Refer to Case Management Department for coordinating discharge planning if the patient needs post-hospital services based on physician/advanced practitioner order or complex needs related to functional status, cognitive ability, or social support system  Outcome: Progressing     Problem: Knowledge Deficit  Goal: Patient/family/caregiver demonstrates understanding of disease process, treatment plan, medications, and discharge instructions  Description: Complete learning assessment and assess knowledge base    Interventions:  - Provide teaching at level of understanding  - Provide teaching via preferred learning methods  Outcome: Progressing     Problem: RESPIRATORY - ADULT  Goal: Achieves optimal ventilation and oxygenation  Description: INTERVENTIONS:  - Assess for changes in respiratory status  - Assess for changes in mentation and behavior  - Position to facilitate oxygenation and minimize respiratory effort  - Oxygen administered by appropriate delivery if ordered  - Initiate smoking cessation education as indicated  - Encourage broncho-pulmonary hygiene including cough, deep breathe, Incentive Spirometry  - Assess the need for suctioning and aspirate as needed  - Assess and instruct to report SOB or any respiratory difficulty  - Respiratory Therapy support as indicated  Outcome: Progressing

## 2021-12-30 NOTE — PLAN OF CARE
Problem: Potential for Falls  Goal: Patient will remain free of falls  Description: INTERVENTIONS:  - Educate patient/family on patient safety including physical limitations  - Instruct patient to call for assistance with activity   - Consult OT/PT to assist with strengthening/mobility   - Keep Call bell within reach  - Keep bed low and locked with side rails adjusted as appropriate  - Keep care items and personal belongings within reach  - Initiate and maintain comfort rounds  - Make Fall Risk Sign visible to staff  - Offer Toileting every  Hours, in advance of need  - Initiate/Maintain alarm  - Obtain necessary fall risk management equipment:   - Apply yellow socks and bracelet for high fall risk patients  - Consider moving patient to room near nurses station  Outcome: Progressing     Problem: MOBILITY - ADULT  Goal: Maintain or return to baseline ADL function  Description: INTERVENTIONS:  -  Assess patient's ability to carry out ADLs; assess patient's baseline for ADL function and identify physical deficits which impact ability to perform ADLs (bathing, care of mouth/teeth, toileting, grooming, dressing, etc )  - Assess/evaluate cause of self-care deficits   - Assess range of motion  - Assess patient's mobility; develop plan if impaired  - Assess patient's need for assistive devices and provide as appropriate  - Encourage maximum independence but intervene and supervise when necessary  - Involve family in performance of ADLs  - Assess for home care needs following discharge   - Consider OT consult to assist with ADL evaluation and planning for discharge  - Provide patient education as appropriate  Outcome: Progressing  Goal: Maintains/Returns to pre admission functional level  Description: INTERVENTIONS:  - Perform BMAT or MOVE assessment daily    - Set and communicate daily mobility goal to care team and patient/family/caregiver     - Collaborate with rehabilitation services on mobility goals if consulted  - Perform Range of Motion times a day  - Reposition patient every  hours    - Dangle patient  times a day  - Stand patient  times a day  - Ambulate patient  times a day  - Out of bed to chair  times a day   - Out of bed for meals  times a day  - Out of bed for toileting  - Record patient progress and toleration of activity level   Outcome: Progressing     Problem: PAIN - ADULT  Goal: Verbalizes/displays adequate comfort level or baseline comfort level  Description: Interventions:  - Encourage patient to monitor pain and request assistance  - Assess pain using appropriate pain scale  - Administer analgesics based on type and severity of pain and evaluate response  - Implement non-pharmacological measures as appropriate and evaluate response  - Consider cultural and social influences on pain and pain management  - Notify physician/advanced practitioner if interventions unsuccessful or patient reports new pain  Outcome: Progressing     Problem: INFECTION - ADULT  Goal: Absence or prevention of progression during hospitalization  Description: INTERVENTIONS:  - Assess and monitor for signs and symptoms of infection  - Monitor lab/diagnostic results  - Monitor all insertion sites, i e  indwelling lines, tubes, and drains  - Monitor endotracheal if appropriate and nasal secretions for changes in amount and color  - [place] appropriate cooling/warming therapies per order  - Administer medications as ordered  - Instruct and encourage patient and family to use good hand hygiene technique  - Identify and instruct in appropriate isolation precautions for identified infection/condition  Outcome: Progressing  Goal: Absence of fever/infection during neutropenic period  Description: INTERVENTIONS:  - Monitor WBC    Outcome: Progressing     Problem: DISCHARGE PLANNING  Goal: Discharge to home or other facility with appropriate resources  Description: INTERVENTIONS:  - Identify barriers to discharge w/patient and caregiver  - Arrange for needed discharge resources and transportation as appropriate  - Identify discharge learning needs (meds, wound care, etc )  - Arrange for interpretive services to assist at discharge as needed  - Refer to Case Management Department for coordinating discharge planning if the patient needs post-hospital services based on physician/advanced practitioner order or complex needs related to functional status, cognitive ability, or social support system  Outcome: Progressing     Problem: Knowledge Deficit  Goal: Patient/family/caregiver demonstrates understanding of disease process, treatment plan, medications, and discharge instructions  Description: Complete learning assessment and assess knowledge base    Interventions:  - Provide teaching at level of understanding  - Provide teaching via preferred learning methods  Outcome: Progressing     Problem: RESPIRATORY - ADULT  Goal: Achieves optimal ventilation and oxygenation  Description: INTERVENTIONS:  - Assess for changes in respiratory status  - Assess for changes in mentation and behavior  - Position to facilitate oxygenation and minimize respiratory effort  - Oxygen administered by appropriate delivery if ordered  - Initiate smoking cessation education as indicated  - Encourage broncho-pulmonary hygiene including cough, deep breathe, Incentive Spirometry  - Assess the need for suctioning and aspirate as needed  - Assess and instruct to report SOB or any respiratory difficulty  - Respiratory Therapy support as indicated  Outcome: Progressing

## 2021-12-30 NOTE — PROGRESS NOTES
[address] [facility]  Progress Note - Valdez Atkinson 1945, 67 y o  male MRN: [id number]  Unit/Bed#: E5 -01 Encounter: [id number]  Primary Care Provider: Kari Camp PA-C   Date and time admitted to hospital: 12/27/2021  2:36 PM    NSVT (nonsustained ventricular tachycardia) ([facility])  Assessment & Plan  Twenty-one beats of V-tach noted on telemetry, likely in the setting of PE, COPD and pneumonia  Cardiology consulted, appreciate recommendations  Consider increasing Toprol-XL dose from 100 mg once daily to 100 mg in a m  And 50 mg in p m  Recommend sleep study outpatient  No further beats of V-tach noted    CATY (acute kidney injury) ([facility] )  Assessment & Plan    · Resolved    Abnormal CT scan  Assessment & Plan  · CTA chest shows: Asymmetric soft tissue in the right apex compared to the left  This measures 2 4 x 2 4 cm    While this could represent scar tissue, a mass is not excluded particularly in this high risk setting  · Outpatient follow-up CT scan recommended in 3-6 months  · Pulmonary consulted, recommend outpatient evaluation for possible bronchoscopy    Community acquired pneumonia  Assessment & Plan  · CTA chest shows evidence multifocal pneumonia  · Will treat with 5 days of antibiotics, changed to cefdinir  · Sputum culture currently growing mixed brianne  · Blood cultures no growth to date    Aortic ectasia, thoracic (HCC)  Assessment & Plan  · CTA chest shows: Ectasia of the descending thoracic aorta at the diaphragmatic hiatus measuring 4 6 x 2 9 cm   · Recommend f/u CTA in 1-2 months with PCP    Sepsis ([facility] )  Assessment & Plan  · Present on admission as evidenced by tachypnea 36, tachycardia 97, hypoxia 52% oxygen saturation  · Sepsis resolved  · Etiology likely pneumonia versus COPD exacerbation with acute PE      Emphysema lung ([facility] )  Assessment & Plan  · CTA chest shows severe emphysema  · Patient received nebulizer treatment and IV Decadron in the ER due to suspected COPD exacerbation with wheezing  · Continue bronchodilators  · Continue prednisone 40 mg once daily, pulmonary recommends prolonged steroid taper    Pulmonary embolism (HCC)  Assessment & Plan  · CTA chest shows: segmental pulmonary emboli in the MINAL without right heart strain   · 2D echo reviewed, no significant right heart strain noted  · Discontinue heparin drip, transition to oral Eliquis, plan for 6 month course  · Recommend that patient follow-up PCP, obtain all age-appropriate cancer screenings  · Referral given for Hematology-Oncology follow-up outpatient    Hypertension  Assessment & Plan  · Blood pressure currently stable  · Continue Norvasc and Toprol-XL home dose    * Acute respiratory failure with hypoxia ([facility] )  Assessment & Plan    · Etiology multifactorial, PE versus COPD exacerbation versus pneumonia  · Patient initially hypoxic on admission at 52% oxygen saturation requiring 7L NRB at 97% spO2  · COVID negative   · Wean oxygen as able, currently on 3 L nasal cannula  · Patient declined short-term rehab, home O2 evaluation completed showing a patient needs oxygen prior to discharge  · Plan to discharge patient home tomorrow        VTE Pharmacologic Prophylaxis:   Pharmacologic: Apixaban (Eliquis)  Mechanical VTE Prophylaxis in Place: Yes    Patient Centered Rounds: I have performed bedside rounds with nursing staff today  Discussions with Specialists or Other Care Team Provider:  None    Education and Discussions with Family / Patient:  Patient    Time Spent for Care: 30 minutes  More than 50% of total time spent on counseling and coordination of care as described above      Current Length of Stay: 3 day(s)    Current Patient Status: Inpatient   Certification Statement: The patient will continue to require additional inpatient hospital stay due to Awaiting arrangement of home health and PT, likely home tomorrow    Discharge Plan:  Tomorrow    Code Status: Level 1 - Full Code      Subjective: Patient reports today his breathing has significantly improved, ambulated with some dyspnea  Denies any chest pains at this time  Tolerating diet  Patient requesting to take a shower today  Objective:     Vitals:   Temp (24hrs), Av 5 °F (36 4 °C), Min:97 3 °F (36 3 °C), Max:97 6 °F (36 4 °C)    Temp:  [97 3 °F (36 3 °C)-97 6 °F (36 4 °C)] 97 6 °F (36 4 °C)  HR:  [69-82] 69  Resp:  [19] 19  BP: (124-146)/(67-78) 146/78  SpO2:  [88 %-95 %] 91 %  Body mass index is 24 9 kg/m²  Input and Output Summary (last 24 hours): Intake/Output Summary (Last 24 hours) at 2021 1124  Last data filed at 2021 0547  Gross per 24 hour   Intake 380 ml   Output 1400 ml   Net -1020 ml       Physical Exam:     Physical Exam  Vitals and nursing note reviewed  Constitutional:       Appearance: Normal appearance  HENT:      Head: Normocephalic and atraumatic  Eyes:      Conjunctiva/sclera: Conjunctivae normal    Cardiovascular:      Rate and Rhythm: Normal rate and regular rhythm  Heart sounds: Normal heart sounds  Pulmonary:      Effort: Pulmonary effort is normal       Comments: Diminished breath sounds bilaterally  Abdominal:      General: Bowel sounds are normal  There is no distension  Palpations: Abdomen is soft  Tenderness: There is no abdominal tenderness  Musculoskeletal:         General: No swelling  Right lower leg: No edema  Left lower leg: No edema  Skin:     General: Skin is warm and dry  Neurological:      General: No focal deficit present  Mental Status: He is alert  Mental status is at baseline           Additional Data:     Labs:    Results from last 7 days   Lab Units 21  0813 21  0502 21  0502   WBC Thousand/uL 7 89   < > 8 98   HEMOGLOBIN g/dL 13 6   < > 11 6*   HEMATOCRIT % 41 0   < > 36 3*   PLATELETS Thousands/uL 220   < > 216   NEUTROS PCT %  --   --  87*   LYMPHS PCT %  --   --  5*   MONOS PCT %  --   --  7   EOS PCT %  -- --  0    < > = values in this interval not displayed  Results from last 7 days   Lab Units 12/30/21  0813 12/28/21  0501 12/27/21  1503   SODIUM mmol/L 136   < > 136   POTASSIUM mmol/L 4 1   < > 4 6   CHLORIDE mmol/L 102   < > 101   CO2 mmol/L 27   < > 24   BUN mg/dL 29*   < > 32*   CREATININE mg/dL 1 14   < > 1 50*   ANION GAP mmol/L 7   < > 11   CALCIUM mg/dL 8 6   < > 8 8   ALBUMIN g/dL  --   --  3 0*   TOTAL BILIRUBIN mg/dL  --   --  0 50   ALK PHOS U/L  --   --  94   ALT U/L  --   --  20   AST U/L  --   --  14   GLUCOSE RANDOM mg/dL 143*   < > 136    < > = values in this interval not displayed  Results from last 7 days   Lab Units 12/27/21  1852   INR  1 13             Results from last 7 days   Lab Units 12/28/21  0504 12/27/21  1852   LACTIC ACID mmol/L  --  1 8   PROCALCITONIN ng/ml 0 06 0 05           * I Have Reviewed All Lab Data Listed Above  * Additional Pertinent [facility] Tests [facility]ed: [facility]    Imaging:    XR chest 1 view portable    Result Date: 12/27/2021  Impression: Patchy opacities within the mid and lower lung fields likely infectious in nature and should be correlated with Covid-19 testing  Workstation performed: [id number]     CTA ED chest PE study    Result Date: 12/27/2021  Impression: Segmental pulmonary emboli are noted in the left upper lobe image 2/104 and right lower lobe image 2/179  Measured RV/LV ratio is within normal limits at less than 0 9  Severe background emphysema with scattered groundglass opacities and alveolar opacities in the right middle lobe and left lower lobe could represent pneumonia  Bacterial versus Covid 19 pneumonia are both possibilities  Asymmetric soft tissue in the right apex compared to the left  While this could reflect scar tissue, a follow-up CT scan is recommended in 3-6 months to ensure stability   Ectasia of the descending thoracic aorta at the diaphragmatic hiatus measuring 4 6 x 2 9 cm on the final image of this exam incompletely evaluated  Follow-up CTA of the aorta could be obtained for more evaluation  I personally discussed this study with ISAEL DRAKE on 12/27/2021 at 5:38 PM  Workstation performed: [id number]       Recent Cultures (last 7 days):     Results from last 7 days   Lab Units 12/28/21  0842 12/27/21  1852 12/27/21  1456   BLOOD CULTURE   --  No Growth at 48 hrs  No Growth at 48 hrs  SPUTUM CULTURE  Test not performed  Suggest repeat specimen  --   --    GRAM STAIN RESULT  >10 squamous epithelial cells/lpf, indicating orophayngeal contamination  *  2+ Polys*  3+ Gram positive cocci in pairs*  3+ Gram positive cocci in clusters*  2+ Gram positive rods*  1+ Gram negative rods*  --   --        Last 24 Hours Medication List:   Current Facility-Administered Medications   Medication Dose Route Frequency Provider Last Rate    acetaminophen  650 mg Oral Q6H PRN YUMIKO Geiger-C      albuterol  2 5 mg Nebulization Q6H PRN ASHLEY Rooney      amLODIPine  10 mg Oral Daily Faith Hilario MD      apixaban  5 mg Oral BID Faith Hilario MD      atorvastatin  40 mg Oral QPM YUMIKO Geiger-BERONICA      budesonide  0 5 mg Nebulization Q12H ASHLEY Key      cefdinir  300 mg Oral Q12H [address] Fito King MD      famotidine  20 mg Oral Daily Faith Hilario MD      folic acid  1 mg Oral Daily Kain Thayer, PA-C      glycerin-hypromellose-  1 drop Both Eyes Q6H PRN Faith Hilario MD      guaiFENesin  600 mg Oral BID YUMIKO Geiger-C      ipratropium  0 5 mg Nebulization TID Kain Thayer, PA-C      levalbuterol  1 25 mg Nebulization TID YUMIKO Geiger-BERONICA      metoprolol succinate  100 mg Oral Daily Kain Thayer, PA-C      predniSONE  40 mg Oral Daily Faith Hilario MD      sodium chloride (PF)  3 mL Intravenous Q1H PRN Jessy Drake MD          Today, Patient Was Seen By: Faith Hilario MD    ** Please Note: Dictation voice to text software may have been used in the creation of this document   **

## 2021-12-30 NOTE — PLAN OF CARE
Problem: PHYSICAL THERAPY ADULT  Goal: Performs mobility at highest level of function for planned discharge setting  See evaluation for individualized goals  Description: Treatment/Interventions: Functional transfer training,LE strengthening/ROM,Therapeutic exercise,Endurance training,Patient/family training,Bed mobility,Equipment eval/education,Gait training,Spoke to nursing          See flowsheet documentation for full assessment, interventions and recommendations  12/30/2021 1727 by Sirisha Renteria PTA  Outcome: Progressing  Note: Prognosis: Fair  Problem List: Decreased endurance,Impaired balance,Decreased safety awareness,Impaired judgement  Assessment: Pt seen for PT treatment session  Pt on 6l; o2 NC  O2 sats 95% - 96% at rest   Pt  Is impulsive with decreased insight into deficits and decreased awareness of limitations requires cues for pacing, and energy conservation  Pt  Performs bed mobility with mod I, transfers with supervision ambulation with supervision to cg min assist with turns due to decreased balance, decreased safety and impulsivity  Pt is able  to manage own o2 during mobility training  Pt  Requires cues to move slowly and perform all mobility  Pt performs b/l le  standing arom exercises x 15 reps with support of Rw and close supervision- cg x1  Pt progressed with ambulation distances to 115' x1  Pt desats to 79%- 80% , HR  bpm with mobility  The patient's AM-PAC Basic Mobility Inpatient Short Form Raw Score is 22  A Raw score of greater than 16 suggests the patient may benefit from discharge to home  Please also refer to the recommendation of the Physical Therapist for safe discharge planning  Recommend d/c to home ,pending continued progress, with increased support services and increased family support frequent daily checks     Barriers to Discharge: Decreased caregiver support,Inaccessible home environment        PT Discharge Recommendation: Home with home health rehabilitation          See flowsheet documentation for full assessment

## 2021-12-30 NOTE — ASSESSMENT & PLAN NOTE
· Etiology multifactorial, PE versus COPD exacerbation versus pneumonia  · Patient initially hypoxic on admission at 52% oxygen saturation requiring 7L NRB at 97% spO2  · COVID negative   · Wean oxygen as able, currently on 3 L nasal cannula  · Patient declined short-term rehab, home O2 evaluation completed showing a patient needs oxygen prior to discharge  · Plan to discharge patient home tomorrow

## 2021-12-30 NOTE — CASE MANAGEMENT
Case Management Progress Note    Patient name Cortney Ordonez  Location East 5 /E5 -* MRN [id number]  : 1945 Date 2021       LOS (days): 3  Geometric Mean LOS (GMLOS) (days):   Days to GMLOS:        OBJECTIVE:        Current admission status: Inpatient  [facility] [address] [facility] [address]  Phone: [phone] Fax: [phone]    Primary Care Provider: Stephan Maria PA-C    Primary Insurance: [facility]  Secondary Insurance:     PROGRESS NOTE:    CM spoke with [employer] the oxygen has to be delivered today to patient's home because they are closed tomorrow  CM spoke with patient's son Faraz Corey and connected the call, [employer] advised the O2 will be delivered between 4pm and 7pm today and they are closed tomorrow  Faraz Corey advised he will go to patient's home today to receive it

## 2021-12-30 NOTE — NURSING NOTE
Agree with previous RN assessment  Pt resting in bed, no complaints at this time  Call bell within reach

## 2021-12-31 VITALS
RESPIRATION RATE: 16 BRPM | DIASTOLIC BLOOD PRESSURE: 81 MMHG | TEMPERATURE: 99 F | HEIGHT: 67 IN | SYSTOLIC BLOOD PRESSURE: 148 MMHG | OXYGEN SATURATION: 94 % | WEIGHT: 159 LBS | HEART RATE: 74 BPM | BODY MASS INDEX: 24.96 KG/M2

## 2021-12-31 LAB
ANION GAP SERPL CALCULATED.3IONS-SCNC: 8 MMOL/L (ref 4–13)
BASOPHILS # BLD AUTO: 0.01 THOUSANDS/ΜL (ref 0–0.1)
BASOPHILS NFR BLD AUTO: 0 % (ref 0–1)
BUN SERPL-MCNC: 25 MG/DL (ref 5–25)
CALCIUM SERPL-MCNC: 8.6 MG/DL (ref 8.3–10.1)
CHLORIDE SERPL-SCNC: 99 MMOL/L (ref 100–108)
CO2 SERPL-SCNC: 28 MMOL/L (ref 21–32)
CREAT SERPL-MCNC: 0.92 MG/DL (ref 0.6–1.3)
EOSINOPHIL # BLD AUTO: 0.02 THOUSAND/ΜL (ref 0–0.61)
EOSINOPHIL NFR BLD AUTO: 0 % (ref 0–6)
ERYTHROCYTE [DISTWIDTH] IN BLOOD BY AUTOMATED COUNT: 12.7 % (ref 11.6–15.1)
GFR SERPL CREATININE-BSD FRML MDRD: 79 ML/MIN/1.73SQ M
GLUCOSE SERPL-MCNC: 126 MG/DL (ref 65–140)
HCT VFR BLD AUTO: 42.9 % (ref 36.5–49.3)
HGB BLD-MCNC: 14 G/DL (ref 12–17)
IMM GRANULOCYTES # BLD AUTO: 0.05 THOUSAND/UL (ref 0–0.2)
IMM GRANULOCYTES NFR BLD AUTO: 1 % (ref 0–2)
LYMPHOCYTES # BLD AUTO: 0.82 THOUSANDS/ΜL (ref 0.6–4.47)
LYMPHOCYTES NFR BLD AUTO: 12 % (ref 14–44)
MCH RBC QN AUTO: 32.9 PG (ref 26.8–34.3)
MCHC RBC AUTO-ENTMCNC: 32.6 G/DL (ref 31.4–37.4)
MCV RBC AUTO: 101 FL (ref 82–98)
MONOCYTES # BLD AUTO: 0.83 THOUSAND/ΜL (ref 0.17–1.22)
MONOCYTES NFR BLD AUTO: 12 % (ref 4–12)
NEUTROPHILS # BLD AUTO: 5 THOUSANDS/ΜL (ref 1.85–7.62)
NEUTS SEG NFR BLD AUTO: 75 % (ref 43–75)
NRBC BLD AUTO-RTO: 0 /100 WBCS
PLATELET # BLD AUTO: 216 THOUSANDS/UL (ref 149–390)
PMV BLD AUTO: 9.4 FL (ref 8.9–12.7)
POTASSIUM SERPL-SCNC: 4.8 MMOL/L (ref 3.5–5.3)
PROCALCITONIN SERPL-MCNC: <0.05 NG/ML
RBC # BLD AUTO: 4.25 MILLION/UL (ref 3.88–5.62)
SODIUM SERPL-SCNC: 135 MMOL/L (ref 136–145)
WBC # BLD AUTO: 6.73 THOUSAND/UL (ref 4.31–10.16)

## 2021-12-31 PROCEDURE — 85025 COMPLETE CBC W/AUTO DIFF WBC: CPT | Performed by: STUDENT IN AN ORGANIZED HEALTH CARE EDUCATION/TRAINING PROGRAM

## 2021-12-31 PROCEDURE — 99239 HOSP IP/OBS DSCHRG MGMT >30: CPT | Performed by: STUDENT IN AN ORGANIZED HEALTH CARE EDUCATION/TRAINING PROGRAM

## 2021-12-31 PROCEDURE — 80048 BASIC METABOLIC PNL TOTAL CA: CPT | Performed by: STUDENT IN AN ORGANIZED HEALTH CARE EDUCATION/TRAINING PROGRAM

## 2021-12-31 PROCEDURE — 84145 PROCALCITONIN (PCT): CPT | Performed by: PHYSICIAN ASSISTANT

## 2021-12-31 PROCEDURE — 94640 AIRWAY INHALATION TREATMENT: CPT

## 2021-12-31 RX ORDER — CEFDINIR 300 MG/1
300 CAPSULE ORAL EVERY 12 HOURS SCHEDULED
Qty: 3 CAPSULE | Refills: 0 | Status: SHIPPED | OUTPATIENT
Start: 2021-12-31 | End: 2022-01-02

## 2021-12-31 RX ORDER — PREDNISONE 10 MG/1
TABLET ORAL
Qty: 26 TABLET | Refills: 0 | Status: SHIPPED | OUTPATIENT
Start: 2021-12-31 | End: 2022-01-06

## 2021-12-31 RX ORDER — FAMOTIDINE 20 MG/1
20 TABLET, FILM COATED ORAL DAILY
Qty: 30 TABLET | Refills: 0 | Status: SHIPPED | OUTPATIENT
Start: 2022-01-01 | End: 2022-02-09 | Stop reason: HOSPADM

## 2021-12-31 RX ADMIN — IPRATROPIUM BROMIDE 0.5 MG: 0.5 SOLUTION RESPIRATORY (INHALATION) at 13:10

## 2021-12-31 RX ADMIN — AMLODIPINE BESYLATE 10 MG: 10 TABLET ORAL at 09:54

## 2021-12-31 RX ADMIN — APIXABAN 5 MG: 5 TABLET, FILM COATED ORAL at 09:52

## 2021-12-31 RX ADMIN — LEVALBUTEROL HYDROCHLORIDE 1.25 MG: 1.25 SOLUTION, CONCENTRATE RESPIRATORY (INHALATION) at 07:18

## 2021-12-31 RX ADMIN — IPRATROPIUM BROMIDE 0.5 MG: 0.5 SOLUTION RESPIRATORY (INHALATION) at 07:18

## 2021-12-31 RX ADMIN — FAMOTIDINE 20 MG: 20 TABLET ORAL at 09:52

## 2021-12-31 RX ADMIN — GUAIFENESIN 600 MG: 600 TABLET, EXTENDED RELEASE ORAL at 09:52

## 2021-12-31 RX ADMIN — BUDESONIDE 0.5 MG: 0.5 INHALANT ORAL at 07:18

## 2021-12-31 RX ADMIN — CEFDINIR 300 MG: 300 CAPSULE ORAL at 09:52

## 2021-12-31 RX ADMIN — FOLIC ACID 1 MG: 1 TABLET ORAL at 09:52

## 2021-12-31 RX ADMIN — METOPROLOL SUCCINATE 100 MG: 50 TABLET, EXTENDED RELEASE ORAL at 09:54

## 2021-12-31 RX ADMIN — LEVALBUTEROL HYDROCHLORIDE 1.25 MG: 1.25 SOLUTION, CONCENTRATE RESPIRATORY (INHALATION) at 13:10

## 2021-12-31 RX ADMIN — PREDNISONE 40 MG: 20 TABLET ORAL at 09:52

## 2021-12-31 NOTE — NURSING NOTE
Discharge instructions reviewed with son  Belongings reviewed and taken with  IV and Masimo removed  Patient discharged via wheelchair with portable O2 intact, PCA and son at side

## 2021-12-31 NOTE — ASSESSMENT & PLAN NOTE
· CTA chest shows: segmental pulmonary emboli in the MINAL without right heart strain   · 2D echo reviewed, no significant right heart strain noted  · Discontinue heparin drip, transition to oral Eliquis 10mg BID for 7 days, then 5mg thereafter, plan for 6 months  · Recommend that patient follow-up PCP, obtain all age-appropriate cancer screenings  · Referral given for Hematology-Oncology follow-up outpatient

## 2021-12-31 NOTE — PLAN OF CARE
Problem: Potential for Falls  Goal: Patient will remain free of falls  Description: INTERVENTIONS:  - Educate patient/family on patient safety including physical limitations  - Instruct patient to call for assistance with activity   - Consult OT/PT to assist with strengthening/mobility   - Keep Call bell within reach  - Keep bed low and locked with side rails adjusted as appropriate  - Keep care items and personal belongings within reach  - Initiate and maintain comfort rounds  - Make Fall Risk Sign visible to staff  - Offer Toileting every  Hours, in advance of need  - Initiate/Maintain alarm  - Obtain necessary fall risk management equipment:   - Apply yellow socks and bracelet for high fall risk patients  - Consider moving patient to room near nurses station  Outcome: Progressing     Problem: MOBILITY - ADULT  Goal: Maintain or return to baseline ADL function  Description: INTERVENTIONS:  -  Assess patient's ability to carry out ADLs; assess patient's baseline for ADL function and identify physical deficits which impact ability to perform ADLs (bathing, care of mouth/teeth, toileting, grooming, dressing, etc )  - Assess/evaluate cause of self-care deficits   - Assess range of motion  - Assess patient's mobility; develop plan if impaired  - Assess patient's need for assistive devices and provide as appropriate  - Encourage maximum independence but intervene and supervise when necessary  - Involve family in performance of ADLs  - Assess for home care needs following discharge   - Consider OT consult to assist with ADL evaluation and planning for discharge  - Provide patient education as appropriate  Outcome: Progressing  Goal: Maintains/Returns to pre admission functional level  Description: INTERVENTIONS:  - Perform BMAT or MOVE assessment daily    - Set and communicate daily mobility goal to care team and patient/family/caregiver     - Collaborate with rehabilitation services on mobility goals if consulted  - Perform Range of Motion  times a day  - Reposition patient every  hours    - Dangle patient  times a day  - Stand patient  times a day  - Ambulate patient  times a day  - Out of bed to chair  times a day   - Out of bed for meals  times a day  - Out of bed for toileting  - Record patient progress and toleration of activity level   Outcome: Progressing     Problem: PAIN - ADULT  Goal: Verbalizes/displays adequate comfort level or baseline comfort level  Description: Interventions:  - Encourage patient to monitor pain and request assistance  - Assess pain using appropriate pain scale  - Administer analgesics based on type and severity of pain and evaluate response  - Implement non-pharmacological measures as appropriate and evaluate response  - Consider cultural and social influences on pain and pain management  - Notify physician/advanced practitioner if interventions unsuccessful or patient reports new pain  Outcome: Progressing     Problem: INFECTION - ADULT  Goal: Absence or prevention of progression during hospitalization  Description: INTERVENTIONS:  - Assess and monitor for signs and symptoms of infection  - Monitor lab/diagnostic results  - Monitor all insertion sites, i e  indwelling lines, tubes, and drains  - Monitor endotracheal if appropriate and nasal secretions for changes in amount and color  - [place] appropriate cooling/warming therapies per order  - Administer medications as ordered  - Instruct and encourage patient and family to use good hand hygiene technique  - Identify and instruct in appropriate isolation precautions for identified infection/condition  Outcome: Progressing  Goal: Absence of fever/infection during neutropenic period  Description: INTERVENTIONS:  - Monitor WBC    Outcome: Progressing     Problem: DISCHARGE PLANNING  Goal: Discharge to home or other facility with appropriate resources  Description: INTERVENTIONS:  - Identify barriers to discharge w/patient and caregiver  - Arrange for needed discharge resources and transportation as appropriate  - Identify discharge learning needs (meds, wound care, etc )  - Arrange for interpretive services to assist at discharge as needed  - Refer to Case Management Department for coordinating discharge planning if the patient needs post-hospital services based on physician/advanced practitioner order or complex needs related to functional status, cognitive ability, or social support system  Outcome: Progressing     Problem: Knowledge Deficit  Goal: Patient/family/caregiver demonstrates understanding of disease process, treatment plan, medications, and discharge instructions  Description: Complete learning assessment and assess knowledge base    Interventions:  - Provide teaching at level of understanding  - Provide teaching via preferred learning methods  Outcome: Progressing     Problem: RESPIRATORY - ADULT  Goal: Achieves optimal ventilation and oxygenation  Description: INTERVENTIONS:  - Assess for changes in respiratory status  - Assess for changes in mentation and behavior  - Position to facilitate oxygenation and minimize respiratory effort  - Oxygen administered by appropriate delivery if ordered  - Initiate smoking cessation education as indicated  - Encourage broncho-pulmonary hygiene including cough, deep breathe, Incentive Spirometry  - Assess the need for suctioning and aspirate as needed  - Assess and instruct to report SOB or any respiratory difficulty  - Respiratory Therapy support as indicated  Outcome: Progressing

## 2021-12-31 NOTE — PLAN OF CARE
Problem: Potential for Falls  Goal: Patient will remain free of falls  Description: INTERVENTIONS:  - Educate patient/family on patient safety including physical limitations  - Instruct patient to call for assistance with activity   - Consult OT/PT to assist with strengthening/mobility   - Keep Call bell within reach  - Keep bed low and locked with side rails adjusted as appropriate  - Keep care items and personal belongings within reach  - Initiate and maintain comfort rounds  - Make Fall Risk Sign visible to staff  - Offer Toileting every 2 Hours, in advance of need  - Initiate/Maintain BED alarm  - Obtain necessary fall risk management equipment: ALARM  - Apply yellow socks and bracelet for high fall risk patients  - Consider moving patient to room near nurses station  Outcome: Progressing     Problem: MOBILITY - ADULT  Goal: Maintain or return to baseline ADL function  Description: INTERVENTIONS:  -  Assess patient's ability to carry out ADLs; assess patient's baseline for ADL function and identify physical deficits which impact ability to perform ADLs (bathing, care of mouth/teeth, toileting, grooming, dressing, etc )  - Assess/evaluate cause of self-care deficits   - Assess range of motion  - Assess patient's mobility; develop plan if impaired  - Assess patient's need for assistive devices and provide as appropriate  - Encourage maximum independence but intervene and supervise when necessary  - Involve family in performance of ADLs  - Assess for home care needs following discharge   - Consider OT consult to assist with ADL evaluation and planning for discharge  - Provide patient education as appropriate  Outcome: Progressing  Goal: Maintains/Returns to pre admission functional level  Description: INTERVENTIONS:  - Perform BMAT or MOVE assessment daily    - Set and communicate daily mobility goal to care team and patient/family/caregiver     - Collaborate with rehabilitation services on mobility goals if consulted  - Perform Range of Motion 3 times a day  - Reposition patient every 2 hours    - Dangle patient 3 times a day  - Stand patient 3 times a day  - Ambulate patient 3 times a day  - Out of bed to chair 3 times a day   - Out of bed for meals 3 times a day  - Out of bed for toileting  - Record patient progress and toleration of activity level   Outcome: Progressing     Problem: PAIN - ADULT  Goal: Verbalizes/displays adequate comfort level or baseline comfort level  Description: Interventions:  - Encourage patient to monitor pain and request assistance  - Assess pain using appropriate pain scale  - Administer analgesics based on type and severity of pain and evaluate response  - Implement non-pharmacological measures as appropriate and evaluate response  - Consider cultural and social influences on pain and pain management  - Notify physician/advanced practitioner if interventions unsuccessful or patient reports new pain  Outcome: Progressing     Problem: INFECTION - ADULT  Goal: Absence or prevention of progression during hospitalization  Description: INTERVENTIONS:  - Assess and monitor for signs and symptoms of infection  - Monitor lab/diagnostic results  - Monitor all insertion sites, i e  indwelling lines, tubes, and drains  - Monitor endotracheal if appropriate and nasal secretions for changes in amount and color  - [place] appropriate cooling/warming therapies per order  - Administer medications as ordered  - Instruct and encourage patient and family to use good hand hygiene technique  - Identify and instruct in appropriate isolation precautions for identified infection/condition  Outcome: Progressing  Goal: Absence of fever/infection during neutropenic period  Description: INTERVENTIONS:  - Monitor WBC    Outcome: Progressing     Problem: DISCHARGE PLANNING  Goal: Discharge to home or other facility with appropriate resources  Description: INTERVENTIONS:  - Identify barriers to discharge w/patient and caregiver  - Arrange for needed discharge resources and transportation as appropriate  - Identify discharge learning needs (meds, wound care, etc )  - Arrange for interpretive services to assist at discharge as needed  - Refer to Case Management Department for coordinating discharge planning if the patient needs post-hospital services based on physician/advanced practitioner order or complex needs related to functional status, cognitive ability, or social support system  Outcome: Progressing     Problem: Knowledge Deficit  Goal: Patient/family/caregiver demonstrates understanding of disease process, treatment plan, medications, and discharge instructions  Description: Complete learning assessment and assess knowledge base    Interventions:  - Provide teaching at level of understanding  - Provide teaching via preferred learning methods  Outcome: Progressing     Problem: RESPIRATORY - ADULT  Goal: Achieves optimal ventilation and oxygenation  Description: INTERVENTIONS:  - Assess for changes in respiratory status  - Assess for changes in mentation and behavior  - Position to facilitate oxygenation and minimize respiratory effort  - Oxygen administered by appropriate delivery if ordered  - Initiate smoking cessation education as indicated  - Encourage broncho-pulmonary hygiene including cough, deep breathe, Incentive Spirometry  - Assess the need for suctioning and aspirate as needed  - Assess and instruct to report SOB or any respiratory difficulty  - Respiratory Therapy support as indicated  Outcome: Progressing

## 2021-12-31 NOTE — CASE MANAGEMENT
Case Management Discharge Planning Note    Patient name Alessandra Fuentes  Location East 5 /E5 [address]-* MRN [id number]  : 1945 Date 2021       Current Admission Date: 2021  Current Admission Diagnosis:Acute respiratory failure with hypoxia [facility])   Patient Active Problem List    Diagnosis Date Noted    NSVT (nonsustained ventricular tachycardia) ([facility] [address] ) 2021    Pulmonary embolism ([facility] [address] ) 2021    Emphysema lung ([address] ) 2021    Sepsis ([address] ) 2021    Aortic ectasia, thoracic ([facility] [address] ) 2021    Community acquired pneumonia 2021    Abnormal CT scan 2021    Acute respiratory failure with hypoxia ([facility] [address] ) 2021    Elevated troponin 2021    CATY (acute kidney injury) ([facility] [address] ) 2021    COVID-19 determined by clinical diagnostic criteria 2021    Vitamin D deficiency 10/05/2017    Expressive aphasia 2016    Actinic keratosis 2013    Stenosis of carotid artery 10/29/2013    Hyperglycemia 10/25/2012    Cerebral infarction ([address] ) 10/24/2012    Hyperlipidemia 10/24/2012    Hypertension 10/24/2012    Macular degeneration 10/24/2012      LOS (days): 4  Geometric Mean LOS (GMLOS) (days):   Days to GMLOS:     OBJECTIVE:  Risk of Unplanned Readmission Score: 11      Current admission status: Inpatient   [facility]acy:   [facility] [address] [address]  Phone: [phone] Fax: [phone]    Primary Care Provider: Leann Canas PA-C    Primary Insurance: Eliezer Hamlin [facility]  Secondary Insurance:     DISCHARGE DETAILS:    Cm spoke to son regarding pt being discharge today  Son informed cm that the O2 concentrator was never delivered last night for patient  Son stated that when he called [employer], he was informed that the company was closed for the holiday  Cm called [employer] to inquire about hours of services for the holiday   Rashid was informed that they are open and are able to deliver O2 concentrator today  Referral was made via parachute to [employer]  Agent called dewey and informed that he will deliver O2 tank at bedside  Cm called son an inform of above  Son will transport pt for d/c

## 2021-12-31 NOTE — ASSESSMENT & PLAN NOTE
· Etiology multifactorial, PE versus COPD exacerbation versus pneumonia  · Patient initially hypoxic on admission at 52% oxygen saturation requiring 7L NRB at 97% spO2  · COVID negative   · Wean oxygen as able, currently on 3 L nasal cannula  · Patient declined short-term rehab, home O2 evaluation completed showing a patient needs oxygen prior to discharge

## 2021-12-31 NOTE — DISCHARGE SUMMARY
[facility] [facility]  Discharge- Clara Heart 1945, 67 y o  male MRN: [id number]  Unit/Bed#: E5 -01 Encounter: [id number]  Primary Care Provider: Catrachita Dill PA-C   Date and time admitted to hospital: 12/27/2021  2:36 PM    NSVT (nonsustained ventricular tachycardia) ([facility])  Assessment & Plan  Twenty-one beats of V-tach noted on telemetry, likely in the setting of PE, COPD and pneumonia  Cardiology consulted, appreciate recommendations  Consider increasing Toprol-XL dose from 100 mg once daily to 100 mg in a m  And 50 mg in p m  Recommend sleep study outpatient  No further beats of V-tach noted    CATY (acute kidney injury) ([facility] )  Assessment & Plan    · Resolved    Abnormal CT scan  Assessment & Plan  · CTA chest shows: Asymmetric soft tissue in the right apex compared to the left  This measures 2 4 x 2 4 cm    While this could represent scar tissue, a mass is not excluded particularly in this high risk setting  · Outpatient follow-up CT scan recommended in 3-6 months  · Pulmonary consulted, recommend outpatient evaluation for possible bronchoscopy    Community acquired pneumonia  Assessment & Plan  · CTA chest shows evidence multifocal pneumonia  · Will treat with 5 days of antibiotics, changed to cefdinir  · Sputum culture currently growing mixed brianne  · Blood cultures no growth to date    Aortic ectasia, thoracic (HCC)  Assessment & Plan  · CTA chest shows: Ectasia of the descending thoracic aorta at the diaphragmatic hiatus measuring 4 6 x 2 9 cm   · Recommend f/u CTA in 1-2 months with PCP    Sepsis ([facility] )  Assessment & Plan  · Present on admission as evidenced by tachypnea 36, tachycardia 97, hypoxia 52% oxygen saturation  · Sepsis resolved  · Etiology likely pneumonia versus COPD exacerbation with acute PE      Emphysema lung ([facility] )  Assessment & Plan  · CTA chest shows severe emphysema  · Patient received nebulizer treatment and IV Decadron in the ER due to suspected COPD exacerbation with wheezing  · Continue bronchodilators  · Continue prednisone 40 mg once daily, pulmonary recommends prolonged steroid taper    Pulmonary embolism (HCC)  Assessment & Plan  · CTA chest shows: segmental pulmonary emboli in the MINAL without right heart strain   · 2D echo reviewed, no significant right heart strain noted  · Discontinue heparin drip, transition to oral Eliquis 10mg BID for 7 days, then 5mg thereafter, plan for 6 months  · Recommend that patient follow-up PCP, obtain all age-appropriate cancer screenings  · Referral given for Hematology-Oncology follow-up outpatient    Hypertension  Assessment & Plan  · Blood pressure currently stable  · Continue Norvasc and Toprol-XL home dose    * Acute respiratory failure with hypoxia ([facility] )  Assessment & Plan    · Etiology multifactorial, PE versus COPD exacerbation versus pneumonia  · Patient initially hypoxic on admission at 52% oxygen saturation requiring 7L NRB at 97% spO2  · COVID negative   · Wean oxygen as able, currently on 3 L nasal cannula  · Patient declined short-term rehab, home O2 evaluation completed showing a patient needs oxygen prior to discharge        Discharging Physician / Practitioner: Clayton Santos MD  PCP: Gisell Galvan PA-C  Admission Date:   Admission Orders (From admission, onward)     Ordered        12/27/21 1850  INPATIENT ADMISSION  Once                      Discharge Date: 12/31/21    Medical Problems             Resolved Problems  Date Reviewed: 12/31/2021    None                Consultations During Hospital Stay:  · Pulmonology  · Cardiology    Procedures Performed:   · None    Significant Findings / Test Results:   XR chest portable    Result Date: 12/30/2021  Impression: Bibasilar infiltrates suspicious for pneumonia superimposed on COPD   Workstation performed: [id number]     XR chest 1 view portable    Result Date: 12/27/2021  Impression: Patchy opacities within the mid and lower lung fields likely infectious in nature and should be correlated with Covid-19 testing  Workstation performed: [id number]     CTA ED chest PE study    Result Date: 12/27/2021  · Impression: Segmental pulmonary emboli are noted in the left upper lobe image 2/104 and right lower lobe image 2/179  Measured RV/LV ratio is within normal limits at less than 0 9  Severe background emphysema with scattered groundglass opacities and alveolar opacities in the right middle lobe and left lower lobe could represent pneumonia  Bacterial versus Covid 19 pneumonia are both possibilities  Asymmetric soft tissue in the right apex compared to the left  While this could reflect scar tissue, a follow-up CT scan is recommended in 3-6 months to ensure stability  Ectasia of the descending thoracic aorta at the diaphragmatic hiatus measuring 4 6 x 2 9 cm on the final image of this exam incompletely evaluated  Follow-up CTA of the aorta could be obtained for more evaluation  I personally discussed this study with ISAEL GAMINO on 12/27/2021 at 5:38 PM  Workstation performed: [id number]   ·     Incidental Findings:   · For by 3 cm ectasias of thoracic aorta, recommend repeat CT imaging in 3 6 months    Test Results Pending at Discharge (will require follow up): · None     Outpatient Tests Requested:  · None    Complications:  None    Reason for Admission:  Shortness of breath    Hospital Course:     Gisel Hinds is a 67 y o  male patient who originally presented to the hospital on 12/27/2021 due to shortness of breath  Past medical history of emphysema, hypertension, hyperlipidemia who presented with worsening shortness of breath  Initial findings on CTA showed acute pulmonary embolism, submassive with RV strain less than 0 9  Pulmonology was consulted, patient started on heparin drip  He did require up to 7 L on non-rebreather as his O2 sats on admission was 56%  There was concern of possible pneumonia and abnormal mass on CT    He was empirically treated with antibiotics as recommended by pulmonology, blood cultures and sputum cultures were all negative to date  Patient discharged to complete 5 day course of antibiotics  Steroids was also started given his history of emphysema concern for COPD exacerbation with worsening respiratory failure  His oxygen requirements did continue to improve, he required 3 L nasal cannula at rest, up to 5 L on ambulation after evaluated by respiratory  2D echo completed, showed no significant right heart strain  Patient's heparin drip was transition to eliquis 10mg BID for 7 days then 5mg BID thereafter  Discharged home with [facility] and PT as declining STR  Will need follow up with PCP for all screen age appropriate cancer screenings  Please see above list of diagnoses and related plan for additional information  Condition at Discharge: fair     Discharge Day Visit / Exam:     Subjective:  No events overnight  No complaints  Doing well today with no complaints  Vitals: Blood Pressure: 148/81 (12/31/21 1550)  Pulse: 74 (12/31/21 1550)  Temperature: 99 °F (37 2 °C) (12/31/21 1550)  Temp Source: Axillary (12/31/21 1550)  Respirations: 16 (12/31/21 1550)  Height: 5' 7" (170 2 cm) (12/28/21 1146)  Weight - Scale: 72 1 kg (159 lb) (12/28/21 1146)  SpO2: 94 % (12/31/21 1550)  Exam:   Physical Exam  Vitals and nursing note reviewed  Constitutional:       Appearance: Normal appearance  HENT:      Head: Normocephalic and atraumatic  Eyes:      Conjunctiva/sclera: Conjunctivae normal    Cardiovascular:      Rate and Rhythm: Normal rate and regular rhythm  Heart sounds: Normal heart sounds  Pulmonary:      Effort: Pulmonary effort is normal       Comments: Diminished breath sounds bilaterally  Abdominal:      General: Bowel sounds are normal  There is no distension  Palpations: Abdomen is soft  Tenderness: There is no abdominal tenderness  Musculoskeletal:         General: No swelling  Right lower leg: No edema  Left lower leg: No edema  Skin:     General: Skin is warm and dry  Neurological:      General: No focal deficit present  Mental Status: He is alert  Mental status is at baseline  Discussion with Family:  Patient, son    Discharge instructions/Information to patient and family:   See after visit summary for information provided to patient and family  Provisions for Follow-Up Care:  See after visit summary for information related to follow-up care and any pertinent home health orders  Disposition:     Home with VNA Services    Planned Readmission:  None     Discharge Statement:  I spent 35 minutes discharging the patient  This time was spent on the day of discharge  I had direct contact with the patient on the day of discharge  Greater than 50% of the total time was spent examining patient, answering all patient questions, arranging and discussing plan of care with patient as well as directly providing post-discharge instructions  Additional time then spent on discharge activities  Discharge Medications:  See after visit summary for reconciled discharge medications provided to patient and family        ** Please Note: This note has been constructed using a voice recognition system **

## 2021-12-31 NOTE — CASE MANAGEMENT
Case Management Discharge Planning Note    Patient name Bethany Barron  Location [facility] /E5 [facility]-* MRN [id number]  : 1945 Date 2021       Current Admission Date: 2021  Current Admission Diagnosis:Acute respiratory failure with hypoxia [facility])   Patient Active Problem List    Diagnosis Date Noted    NSVT (nonsustained ventricular tachycardia) ([facility] ) 2021    Pulmonary embolism ([facility] ) 2021    Emphysema lung ([address] ) 2021    Sepsis ([address] ) 2021    Aortic ectasia, thoracic ([facility] ) 2021    Community acquired pneumonia 2021    Abnormal CT scan 2021    Acute respiratory failure with hypoxia ([facility] ) 2021    Elevated troponin 2021    CATY (acute kidney injury) ([facility] ) 2021    COVID-19 determined by clinical diagnostic criteria 2021    Vitamin D deficiency 10/05/2017    Expressive aphasia 2016    Actinic keratosis 2013    Stenosis of carotid artery 10/29/2013    Hyperglycemia 10/25/2012    Cerebral infarction ([facility] ) 10/24/2012    Hyperlipidemia 10/24/2012    Hypertension 10/24/2012    Macular degeneration 10/24/2012      LOS (days): 4  Geometric Mean LOS (GMLOS) (days):   Days to GMLOS:     OBJECTIVE:  Risk of Unplanned Readmission Score: 12         Current admission status: Inpatient   Preferred Pharmacy:   [facility] [address] [address]  Phone: [phone] Fax: [phone]    Primary Care Provider: Chandni Walker PA-C    Primary Insurance: [facility]  Secondary Insurance:     DISCHARGE DETAILS:    IMM Given (Date):: 21  IMM Given to[de-identified] Family     IMM reviewed with patient's caregiver, patient's caregiver agrees with discharge determination  Additional Comments: Review IMM via phone with son  Copy placed in Mr bin for scanning

## 2022-01-01 LAB
BACTERIA BLD CULT: NORMAL
BACTERIA BLD CULT: NORMAL

## 2022-01-03 ENCOUNTER — TRANSITIONAL CARE MANAGEMENT (OUTPATIENT)
Dept: FAMILY MEDICINE CLINIC | Facility: CLINIC | Age: 77
End: 2022-01-03

## 2022-01-04 LAB
DME PARACHUTE DELIVERY DATE ACTUAL: NORMAL
DME PARACHUTE DELIVERY DATE EXPECTED: NORMAL
DME PARACHUTE DELIVERY DATE REQUESTED: NORMAL
DME PARACHUTE ITEM DESCRIPTION: NORMAL
DME PARACHUTE ORDER STATUS: NORMAL
DME PARACHUTE SUPPLIER NAME: NORMAL
DME PARACHUTE SUPPLIER PHONE: NORMAL

## 2022-01-05 ENCOUNTER — TELEPHONE (OUTPATIENT)
Dept: HEMATOLOGY ONCOLOGY | Facility: CLINIC | Age: 77
End: 2022-01-05

## 2022-01-06 ENCOUNTER — OFFICE VISIT (OUTPATIENT)
Dept: PULMONOLOGY | Facility: CLINIC | Age: 77
End: 2022-01-06
Payer: COMMERCIAL

## 2022-01-06 ENCOUNTER — VBI (OUTPATIENT)
Dept: ADMINISTRATIVE | Facility: OTHER | Age: 77
End: 2022-01-06

## 2022-01-06 VITALS
BODY MASS INDEX: 24.8 KG/M2 | OXYGEN SATURATION: 91 % | HEIGHT: 67 IN | DIASTOLIC BLOOD PRESSURE: 82 MMHG | TEMPERATURE: 95.8 F | WEIGHT: 158 LBS | SYSTOLIC BLOOD PRESSURE: 132 MMHG | HEART RATE: 64 BPM

## 2022-01-06 DIAGNOSIS — Z23 NEED FOR COVID-19 VACCINE: ICD-10-CM

## 2022-01-06 DIAGNOSIS — Z23 NEEDS FLU SHOT: ICD-10-CM

## 2022-01-06 DIAGNOSIS — R91.1 LUNG NODULE SEEN ON IMAGING STUDY: ICD-10-CM

## 2022-01-06 DIAGNOSIS — J44.9 CHRONIC OBSTRUCTIVE PULMONARY DISEASE, UNSPECIFIED COPD TYPE (HCC): Primary | ICD-10-CM

## 2022-01-06 DIAGNOSIS — I77.810 AORTIC ECTASIA, THORACIC (HCC): ICD-10-CM

## 2022-01-06 DIAGNOSIS — J96.11 CHRONIC HYPOXEMIC RESPIRATORY FAILURE (HCC): ICD-10-CM

## 2022-01-06 DIAGNOSIS — I26.99 OTHER PULMONARY EMBOLISM WITHOUT ACUTE COR PULMONALE, UNSPECIFIED CHRONICITY (HCC): ICD-10-CM

## 2022-01-06 DIAGNOSIS — R09.82 POST-NASAL DRIP: ICD-10-CM

## 2022-01-06 PROBLEM — R93.89 ABNORMAL CT SCAN: Status: RESOLVED | Noted: 2021-12-27 | Resolved: 2022-01-06

## 2022-01-06 PROBLEM — J18.9 COMMUNITY ACQUIRED PNEUMONIA: Status: RESOLVED | Noted: 2021-12-27 | Resolved: 2022-01-06

## 2022-01-06 PROBLEM — A41.9 SEPSIS (HCC): Status: RESOLVED | Noted: 2021-12-27 | Resolved: 2022-01-06

## 2022-01-06 PROBLEM — R77.8 ELEVATED TROPONIN: Status: RESOLVED | Noted: 2021-12-27 | Resolved: 2022-01-06

## 2022-01-06 PROBLEM — N17.9 AKI (ACUTE KIDNEY INJURY) (HCC): Status: RESOLVED | Noted: 2021-12-27 | Resolved: 2022-01-06

## 2022-01-06 PROCEDURE — 99215 OFFICE O/P EST HI 40 MIN: CPT | Performed by: INTERNAL MEDICINE

## 2022-01-06 PROCEDURE — 1111F DSCHRG MED/CURRENT MED MERGE: CPT | Performed by: INTERNAL MEDICINE

## 2022-01-06 PROCEDURE — 94618 PULMONARY STRESS TESTING: CPT | Performed by: INTERNAL MEDICINE

## 2022-01-06 RX ORDER — ALBUTEROL SULFATE 90 UG/1
2 AEROSOL, METERED RESPIRATORY (INHALATION) EVERY 6 HOURS PRN
Qty: 18 G | Refills: 5 | Status: SHIPPED | OUTPATIENT
Start: 2022-01-06 | End: 2022-01-26

## 2022-01-06 RX ORDER — IPRATROPIUM BROMIDE 21 UG/1
2 SPRAY, METERED NASAL EVERY 12 HOURS
Qty: 30 ML | Refills: 2 | Status: SHIPPED | OUTPATIENT
Start: 2022-01-06

## 2022-01-06 RX ORDER — ALBUTEROL SULFATE 2.5 MG/3ML
2.5 SOLUTION RESPIRATORY (INHALATION) EVERY 6 HOURS PRN
Qty: 75 ML | Refills: 5 | Status: SHIPPED | OUTPATIENT
Start: 2022-01-06

## 2022-01-06 RX ORDER — UMECLIDINIUM BROMIDE AND VILANTEROL TRIFENATATE 62.5; 25 UG/1; UG/1
1 POWDER RESPIRATORY (INHALATION) DAILY
Qty: 60 BLISTER | Refills: 5 | Status: SHIPPED | OUTPATIENT
Start: 2022-01-06 | End: 2022-06-02 | Stop reason: SDUPTHER

## 2022-01-06 NOTE — ASSESSMENT & PLAN NOTE
Follow-up with primary care and cardiology    If CT abdomen needed can be done same time as the CT chest I have ordered

## 2022-01-06 NOTE — ASSESSMENT & PLAN NOTE
Recent hospitalization for PE, pneumonia, and COPD exacerbation  Improving but significant residual dyspnea and hypoxemia  Extensive emphysema on CT    Will stop Breo  Switch to Anoro Ellipta    Hold on ICS given recent pneumonia    Check PFT with bronchodilator and ABG    O2 3 lpm at rest 8 with exertion    Offered pulmonary rehab- patient declines

## 2022-01-06 NOTE — ASSESSMENT & PLAN NOTE
Continue Eliquis  At least 6 months of anticoagulation  Can decide on long term therapy at around that time

## 2022-01-06 NOTE — ASSESSMENT & PLAN NOTE
6MW shows need for 3 lpm at rest of O2 and 8 lpm with ambulation  I ordered this to [address]

## 2022-01-06 NOTE — PROGRESS NOTES
Pulmonary Outpatient Note   Lilia Mills 67 y o  male MRN: [id number]  1/6/2022        Reason for Consultation:    Chief Complaint   Patient presents with    Shortness of Breath         Assessment/Plan:    1  Chronic obstructive pulmonary disease, unspecified COPD type ([facility] )  Assessment & Plan:  Recent hospitalization for PE, pneumonia, and COPD exacerbation  Improving but significant residual dyspnea and hypoxemia  Extensive emphysema on CT    Will stop Breo  Switch to Anoro Ellipta  Hold on ICS given recent pneumonia    Check PFT with bronchodilator and ABG    O2 3 lpm at rest 8 with exertion    Offered pulmonary rehab- patient declines    Orders:  -     POCT 6 minute walk  -     umeclidinium-vilanterol (Anoro Ellipta) 62 5-25 MCG/INH inhaler; Inhale 1 puff daily  -     albuterol (2 5 mg/3 mL) 0 083 % nebulizer solution; Take 3 mL (2 5 mg total) by nebulization every 6 (six) hours as needed for wheezing or shortness of breath  -     albuterol (Ventolin HFA) 90 mcg/act inhaler; Inhale 2 puffs every 6 (six) hours as needed for wheezing  -     Complete PFT with Post Bronchodilator and ABG; Future  -     Nebulizer  -     Oxygen Supplies  -     Home Oxygen with Portability    2  Chronic hypoxemic respiratory failure (HCC)  Assessment & Plan:  6MW shows need for 3 lpm at rest of O2 and 8 lpm with ambulation  I ordered this to [address]  3  Other pulmonary embolism without acute cor pulmonale, unspecified chronicity (HCC)  Assessment & Plan:  Continue Eliquis  At least 6 months of anticoagulation  Can decide on long term therapy at around that time  4  Lung nodule seen on imaging study  Assessment & Plan:  RUL scar vs nodule- repeat CT scan in March at 3 months from prior study    Orders:  -     CT chest wo contrast; Future; Expected date: 03/14/2022    5  Aortic ectasia, thoracic [facility])  Assessment & Plan:  Follow-up with primary care and cardiology    If CT abdomen needed can be done same time as the CT chest I have ordered      6  Post-nasal drip  -     ipratropium (ATROVENT) 0 03 % nasal spray; 2 sprays into each nostril every 12 (twelve) hours    7  Need for COVID-19 vaccine  Comments:  Refuses vaccination  Counseled on safety and efficacy of vaccines and his high risk for severe COVID  Patient did have covid 4/2021    8  Needs flu shot  Comments:  Refuses vaccination        Health Maintenance  There is no immunization history for the selected administration types on file for this patient  COVID vaccine- not vaccinated      Return in about 3 months (around 4/6/2022)  History of Present Illness   HPI:  Jodi Perdomo is a 67 y o  male who has COPD and hypertension , prior stroke, presents as hospital follow-up for his respiratory failure and COPD    Hospitalized from 12/27-12/31 for respiratory failure  Not on oxygen prior to theh ospital stay  Treated for pneumonia, COPD exacerbation, and acute pulmonary embolism  Discharged on oxygen- 3 lpm at rest   On 6 MW today needs 3 lpm at rest and 8 with walking  Takes Breo  No rescue inhaler  Has been on Breo for less than a month  [facility] is a new medication- prescribed by primary care  Prior to that had been on no inhalers  Before recent events was short of breath with exertion, climbing steps, for approximately 2 months  Since leaving the hospital short of breath with exertion  Not at rest   Does cough occasionally  No mucus production or hemoptysis  No chest pain  Occasional lightheadedness  No palpitations        Pulmonary history:    Childhood lung disease/premature birth: No   Diagnosed with COPD recently        Family hx of lung disease: Lung cancer in father    # of ED/hospitalizations this year: 1    Dysphagia/Reflux: No    Leg swelling: No    Exposure history:    Exposure to pets/birds/farm animals: No      Smoking: Quit in 2005- smoked 2 PPD for 45 years    Alcohol, ilicit drugs (inhalational/ IV): No drugs, occasional alcohol    Worked as: Retired- mainframe technician for computers  Was in [employer] 1960s    Occupational gas/asbestos/silica/chemicals/bio-fuels: No      Review of Systems   Constitutional: Negative for chills and fever  HENT: Positive for postnasal drip and rhinorrhea  Negative for ear pain and sore throat  Eyes: Negative for pain and visual disturbance  Respiratory: Positive for cough, shortness of breath and wheezing  Negative for chest tightness  Cardiovascular: Negative for chest pain, palpitations and leg swelling  Gastrointestinal: Negative for abdominal pain and vomiting  Genitourinary: Negative for dysuria and hematuria  Musculoskeletal: Negative for arthralgias and back pain  Skin: Negative for color change and rash  Neurological: Positive for light-headedness  Negative for seizures and syncope  All other systems reviewed and are negative  Historical Information   Past Medical History:   Diagnosis Date    Abnormal CT scan 12/27/2021    CATY (acute kidney injury) ([facility] ) 12/27/2021    Community acquired pneumonia 12/27/2021    COPD (chronic obstructive pulmonary disease) (HCC)     Elevated troponin 12/27/2021    Hypertension     Pulmonary embolism (HCC)     Sepsis ([facility] ) 12/27/2021     Past Surgical History:   Procedure Laterality Date    HERNIA REPAIR       Family History   Problem Relation Age of Onset    Lung cancer Father            Meds/Allergies     Current Outpatient Medications:     amLODIPine (NORVASC) 5 mg tablet, take 1 tablet by mouth once daily, Disp: 90 tablet, Rfl: 3    apixaban (ELIQUIS) 5 mg, Take 2 tablets (10 mg total) by mouth 2 (two) times a day for 7 days, THEN 1 tablet (5 mg total) 2 (two) times a day for 23 days  , Disp: 74 tablet, Rfl: 0    Ascorbic Acid (VITAMIN C) 1000 MG tablet, Take 1,000 mg by mouth daily, Disp: , Rfl:     atorvastatin (LIPITOR) 40 mg tablet, take 1 tablet by mouth once daily, Disp: 90 tablet, Rfl: 3   benazepril (LOTENSIN) 40 MG tablet, take 1 tablet by mouth once daily, Disp: 90 tablet, Rfl: 3    Cholecalciferol (VITAMIN D3) 5000 units CAPS, Take 1,000 Units by mouth daily, Disp: , Rfl:     cyanocobalamin (VITAMIN B-12) 100 mcg tablet, Take by mouth daily, Disp: , Rfl:     folic acid (FOLVITE) 1 mg tablet, Take by mouth daily, Disp: , Rfl:     metoprolol succinate (TOPROL-XL) 100 mg 24 hr tablet, take 1 tablet by mouth once daily, Disp: 90 tablet, Rfl: 3    multivitamin (THERAGRAN) TABS, Take 1 tablet by mouth daily, Disp: , Rfl:     Omega-3 Fatty Acids (FISH OIL) 1,000 mg, Take 1,000 mg by mouth daily, Disp: , Rfl:     vitamin E 100 UNIT capsule, Take 100 Units by mouth daily, Disp: , Rfl:     albuterol (2 5 mg/3 mL) 0 083 % nebulizer solution, Take 3 mL (2 5 mg total) by nebulization every 6 (six) hours as needed for wheezing or shortness of breath, Disp: 75 mL, Rfl: 5    albuterol (Ventolin HFA) 90 mcg/act inhaler, Inhale 2 puffs every 6 (six) hours as needed for wheezing, Disp: 18 g, Rfl: 5    famotidine (PEPCID) 20 mg tablet, Take 1 tablet (20 mg total) by mouth daily (Patient not taking: Reported on 1/6/2022 ), Disp: 30 tablet, Rfl: 0    ipratropium (ATROVENT) 0 03 % nasal spray, 2 sprays into each nostril every 12 (twelve) hours, Disp: 30 mL, Rfl: 2    umeclidinium-vilanterol (Anoro Ellipta) 62 5-25 MCG/INH inhaler, Inhale 1 puff daily, Disp: 60 blister, Rfl: 5  No Known Allergies    Vitals: Blood pressure 132/82, pulse 64, temperature (!) 95 8 °F (35 4 °C), temperature source Tympanic, height 5' 7" (1 702 m), weight 71 7 kg (158 lb), SpO2 91 %  Body mass index is 24 75 kg/m²  Oxygen Therapy  SpO2: 91 %  Oxygen Therapy: Supplemental oxygen  O2 Delivery Method: Nasal cannula  O2 Flow Rate (L/min): 3 L/min      Physical Exam  Physical Exam  Vitals and nursing note reviewed  Constitutional:       General: He is not in acute distress  Appearance: He is well-developed   He is not ill-appearing  HENT:      Head: Normocephalic and atraumatic  Eyes:      Conjunctiva/sclera: Conjunctivae normal    Cardiovascular:      Rate and Rhythm: Normal rate and regular rhythm  Heart sounds: No murmur heard  Pulmonary:      Effort: Pulmonary effort is normal  No respiratory distress  Breath sounds: Decreased breath sounds present  No wheezing  Abdominal:      Palpations: Abdomen is soft  Tenderness: There is no abdominal tenderness  Musculoskeletal:      Cervical back: Neck supple  Right lower leg: Edema present  Left lower leg: Edema present  Skin:     General: Skin is warm and dry  Neurological:      General: No focal deficit present  Mental Status: He is alert and oriented to person, place, and time  Psychiatric:         Mood and Affect: Mood normal          Behavior: Behavior normal          Labs: I have personally reviewed pertinent lab results  ABG: No results found for: PHART, LPB1SST, PO2ART, USI1JBI, T0FXZGVG, BEART, SOURCE,   BNP: No results found for: BNP,   CBC:  Lab Results   Component Value Date    WBC 6 73 12/31/2021    HGB 14 0 12/31/2021    HCT 42 9 12/31/2021     (H) 12/31/2021     12/31/2021    EOSPCT 0 12/31/2021    EOSABS 0 02 12/31/2021    NEUTOPHILPCT 75 12/31/2021    LYMPHOPCT 12 (L) 12/31/2021   ,   CMP:   Lab Results   Component Value Date    SODIUM 135 (L) 12/31/2021    K 4 8 12/31/2021    CL 99 (L) 12/31/2021    CO2 28 12/31/2021    BUN 25 12/31/2021    CREATININE 0 92 12/31/2021    CALCIUM 8 6 12/31/2021    AST 14 12/27/2021    ALT 20 12/27/2021    ALKPHOS 94 12/27/2021    PROT 6 7 09/19/2017    BILITOT 0 4 09/19/2017    EGFR 79 12/31/2021   ,   PT/INR:   Lab Results   Component Value Date    INR 1 13 12/27/2021   ,   Troponin: No results found for: TROPONINI      Imaging and other studies: I have personally reviewed pertinent reports     and I have personally reviewed pertinent films in PACS    CTA Chest 12/27/21  Segmental PE in MINAL and RLL  No RV strain  Severe emphysema   Scattered GGO and opacities in RML and LLL  R apical scar vs nodule  Thoracic aorta ectasia     CXR 12/30/21  Bibasilar infiltrates, emphysema  Apical pleural thickening    CXR 12/27  Patchy opacities wit mid-lower lung fields, background emphysema    LE doppler 12/28  Negative for DVT    Pulmonary function testing:   Pulmonary Functions Testing Results:    No results found for: FEV1, FVC, ETG0BSN, TLC, DLCO      Office 6MW     Office 6-minute walk:  Meters walked: 210  Resting SPO2 on room air: 87%  Lowest SPO2 with ambulation 81% on 6 lpm  Oxygen needed at rest to maintain SPO2 >88%: 3 liters  Oxygen needed with ambulation to maintain SPO2 >88%: 8 liters  Heart rate at rest: 64   Heart rate with ambulation: 75      EKG, Pathology, and Other Studies: I have personally reviewed pertinent reports  and I have personally reviewed pertinent films in PACS    TTE 12/28   Left Ventricle: Left ventricular cavity size is normal  The left ventricular ejection fraction is 65%  Systolic function is normal  Wall motion is normal  Diastolic function is mildly abnormal, consistent with grade I (abnormal) relaxation    Right Ventricle: Right ventricular cavity size is normal  Systolic function is normal     Left Atrium: The atrium is mildly dilated    Right Atrium: The atrium is mildly dilated    Mitral Valve: There is mild regurgitation      UMA Mujica    [facility]

## 2022-01-07 ENCOUNTER — TELEPHONE (OUTPATIENT)
Dept: FAMILY MEDICINE CLINIC | Facility: CLINIC | Age: 77
End: 2022-01-07

## 2022-01-07 NOTE — TELEPHONE ENCOUNTER
Sheron from [facility], calling regarding patient refusing care and son is aware, she wanted to make provider aware she will not go out to see patient again because of refusing care

## 2022-01-12 ENCOUNTER — OFFICE VISIT (OUTPATIENT)
Dept: FAMILY MEDICINE CLINIC | Facility: CLINIC | Age: 77
End: 2022-01-12
Payer: COMMERCIAL

## 2022-01-12 ENCOUNTER — OFFICE VISIT (OUTPATIENT)
Dept: CARDIOLOGY CLINIC | Facility: CLINIC | Age: 77
End: 2022-01-12
Payer: COMMERCIAL

## 2022-01-12 VITALS
BODY MASS INDEX: 24.75 KG/M2 | HEART RATE: 68 BPM | DIASTOLIC BLOOD PRESSURE: 50 MMHG | SYSTOLIC BLOOD PRESSURE: 80 MMHG | WEIGHT: 158 LBS

## 2022-01-12 VITALS
HEIGHT: 67 IN | HEART RATE: 76 BPM | SYSTOLIC BLOOD PRESSURE: 96 MMHG | BODY MASS INDEX: 24.8 KG/M2 | DIASTOLIC BLOOD PRESSURE: 52 MMHG | WEIGHT: 158 LBS

## 2022-01-12 DIAGNOSIS — I26.99 OTHER PULMONARY EMBOLISM WITHOUT ACUTE COR PULMONALE, UNSPECIFIED CHRONICITY (HCC): ICD-10-CM

## 2022-01-12 DIAGNOSIS — I77.810 AORTIC ECTASIA, THORACIC (HCC): ICD-10-CM

## 2022-01-12 DIAGNOSIS — I47.2 V-TACH (HCC): Primary | ICD-10-CM

## 2022-01-12 DIAGNOSIS — I10 PRIMARY HYPERTENSION: ICD-10-CM

## 2022-01-12 DIAGNOSIS — E78.2 MIXED HYPERLIPIDEMIA: ICD-10-CM

## 2022-01-12 DIAGNOSIS — J96.11 CHRONIC HYPOXEMIC RESPIRATORY FAILURE (HCC): ICD-10-CM

## 2022-01-12 DIAGNOSIS — J96.02 ACUTE RESPIRATORY FAILURE WITH HYPOXIA AND HYPERCAPNIA (HCC): Primary | ICD-10-CM

## 2022-01-12 DIAGNOSIS — I77.819 AORTIC ECTASIA (HCC): ICD-10-CM

## 2022-01-12 DIAGNOSIS — J96.01 ACUTE RESPIRATORY FAILURE WITH HYPOXIA AND HYPERCAPNIA (HCC): Primary | ICD-10-CM

## 2022-01-12 DIAGNOSIS — J18.9 COMMUNITY ACQUIRED PNEUMONIA, UNSPECIFIED LATERALITY: ICD-10-CM

## 2022-01-12 DIAGNOSIS — A41.9 SEPSIS, DUE TO UNSPECIFIED ORGANISM, UNSPECIFIED WHETHER ACUTE ORGAN DYSFUNCTION PRESENT (HCC): ICD-10-CM

## 2022-01-12 DIAGNOSIS — I47.2 NSVT (NONSUSTAINED VENTRICULAR TACHYCARDIA) (HCC): ICD-10-CM

## 2022-01-12 PROCEDURE — 93000 ELECTROCARDIOGRAM COMPLETE: CPT | Performed by: INTERNAL MEDICINE

## 2022-01-12 PROCEDURE — 1036F TOBACCO NON-USER: CPT | Performed by: INTERNAL MEDICINE

## 2022-01-12 PROCEDURE — 1160F RVW MEDS BY RX/DR IN RCRD: CPT | Performed by: INTERNAL MEDICINE

## 2022-01-12 PROCEDURE — 1111F DSCHRG MED/CURRENT MED MERGE: CPT | Performed by: INTERNAL MEDICINE

## 2022-01-12 PROCEDURE — 99496 TRANSJ CARE MGMT HIGH F2F 7D: CPT | Performed by: PHYSICIAN ASSISTANT

## 2022-01-12 PROCEDURE — 99214 OFFICE O/P EST MOD 30 MIN: CPT | Performed by: INTERNAL MEDICINE

## 2022-01-12 NOTE — PATIENT INSTRUCTIONS
Please discontinue amlodipine  You will be continuing Toprol  mg daily in addition to benazepril 40 mg daily  Please check your blood pressures twice daily and maintain a log of these blood pressures  If her blood pressure remains persistently low or you becomes symptomatic (dizzy, lightheaded, falling, passing out) please call our office  If you have any questions, please call our office  We will see you in 1 month for follow-up

## 2022-01-12 NOTE — PROGRESS NOTES
Assessment/Plan:   Patient Instructions    Assessment/plan:  1  Community-acquired pneumonia-possibly COVID-19 verses other infectious etiology  Initial testing at the hospital was negative  Patient did present with acute respiratory failure and pulse ox at 52%  2  Acute respiratory failure-patient currently on oxygen therapy  3   Pulmonary emboli- patient stable on Eliquis therapy  4   Nonsustained V-tach -beta-blocker therapy had been increased, metoprolol 100 mg in the morning and 50 mg in the evening  5   Acute kidney injury -resolved   6  Aortic ckeltyh-ckejvk-qz CT of the chest in 1-2 months recommended  Son reports that he does have follow-up scans scheduled in March  7  Abnormal findings of the chest, follow-up CT recommended in 3-6 months  8   Severe COPD - patient to continue follow-up with pulmonary medicine  We will have blood work and a regular follow-up in April  No problem-specific Assessment & Plan notes found for this encounter  There are no diagnoses linked to this encounter  Subjective:     Patient ID: Bethany Barron is a 67 y o  male  HPI:  This is a 58-year-old gentleman that presents to the office for follow-up of recent hospital stay  He presented to the hospital with acute respiratory failure with oxygen level of 52% and tachycardia and tachypneic at 36 breaths per minute  He had imaging of the chest which showed pneumonia, likely COVID-19  He did test negative initially however  His scan also showed pulmonary emboli and he had associated acute kidney injury which has resolved  He is currently on Eliquis therapy for pulmonary emboli  He had some incidental findings of aortic ectasia in the hospital for which follow-up CT is recommended  He also had V-tach and his beta-blocker therapy was increased  He is currently discharged on oxygen therapy  He has been doing well at home for the past 2 weeks  He is having visiting nurse come once per week    They are currently feeling his medication tray for the week  He has refused occupational therapy services as he feels he is doing better  He does have follow-up with Pulmonary Medicine and Cardiology in the coming days and weeks  He is planning to have repeat CT of the chest in March for incidental findings noted in the hospital       Review of Systems   Constitutional: Negative for chills, fatigue and fever  HENT: Negative for congestion, ear pain and sinus pressure  Eyes: Negative for visual disturbance  Respiratory: Positive for shortness of breath  Negative for cough and chest tightness  Cardiovascular: Negative for chest pain and palpitations  Gastrointestinal: Negative for diarrhea, nausea and vomiting  Endocrine: Negative for polyuria  Genitourinary: Negative for dysuria and frequency  Musculoskeletal: Negative for arthralgias and myalgias  Skin: Negative for pallor and rash  Neurological: Negative for dizziness, weakness, light-headedness, numbness and headaches  Psychiatric/Behavioral: Negative for agitation, behavioral problems and sleep disturbance  All other systems reviewed and are negative  Objective:     Physical Exam  Vitals and nursing note reviewed  Constitutional:       General: He is not in acute distress  Appearance: He is well-developed  Comments:   Currently on 5 L of oxygen therapy  HENT:      Head: Normocephalic and atraumatic  Right Ear: External ear normal       Left Ear: External ear normal       Nose: Nose normal       Mouth/Throat:      Pharynx: No oropharyngeal exudate  Eyes:      Conjunctiva/sclera: Conjunctivae normal       Pupils: Pupils are equal, round, and reactive to light  Neck:      Thyroid: No thyromegaly  Trachea: No tracheal deviation  Cardiovascular:      Rate and Rhythm: Normal rate and regular rhythm  Heart sounds: Normal heart sounds  No murmur heard  No friction rub     Pulmonary:      Effort: Pulmonary effort is normal  No respiratory distress  Breath sounds: Normal breath sounds  No wheezing or rales  Abdominal:      General: Bowel sounds are normal  There is no distension  Palpations: Abdomen is soft  Tenderness: There is no abdominal tenderness  There is no guarding or rebound  Musculoskeletal:         General: No tenderness  Normal range of motion  Cervical back: Normal range of motion and neck supple  Lymphadenopathy:      Cervical: No cervical adenopathy  Skin:     General: Skin is warm and dry  Findings: No erythema or rash  Neurological:      Mental Status: He is alert and oriented to person, place, and time  Cranial Nerves: No cranial nerve deficit  Coordination: Coordination normal    Psychiatric:         Behavior: Behavior normal          Thought Content: Thought content normal            Vitals:    01/12/22 1001   BP: 96/52   Pulse: 76   Weight: 71 7 kg (158 lb)   Height: 5' 7" (1 702 m)       Transitional Care Management Review:  Marlee Iglesias is a 67 y o  male here for TCM follow up  During the TCM phone call patient stated:    TCM Call (since 12/12/2021)     Date and time call was made  1/3/2022  3:03 PM    Hospital care reviewed  Records reviewed        Patient was hospitial[facility] at  [facility]        Date of Admission  12/27/21    Date of discharge  12/31/21    Diagnosis  Acute respiratory failure with hypoxia    Disposition  Home    Were the patients medications reviewed and updated  No    Current Symptoms  None      TCM Call (since 12/12/2021)     Post hospital issues  None    Should patient be enrolled in anticoag monitoring? No    Scheduled for follow up?   Yes    Did you obtain your prescribed medications  Yes    Do you need help managing your prescriptions or medications  No    Is transportation to your appointment needed  No    I have advised the patient to call PCP with any new or worsening symptoms  Moiz Anderson MA    [address]    The type of support provided  Physical          Rellawrence Kim PA-C

## 2022-01-12 NOTE — PATIENT INSTRUCTIONS
Assessment/plan:  1  Community-acquired pneumonia-possibly COVID-19 verses other infectious etiology  Initial testing at the hospital was negative  Patient did present with acute respiratory failure and pulse ox at 52%  2  Acute respiratory failure-patient currently on oxygen therapy  3   Pulmonary emboli- patient stable on Eliquis therapy  4   Nonsustained V-tach -beta-blocker therapy had been increased, metoprolol 100 mg in the morning and 50 mg in the evening  5   Acute kidney injury -resolved   6  Aortic lroxjbj-lrrwwc-mr CT of the chest in 1-2 months recommended  Son reports that he does have follow-up scans scheduled in March  7  Abnormal findings of the chest, follow-up CT recommended in 3-6 months  8   Severe COPD - patient to continue follow-up with pulmonary medicine  We will have blood work and a regular follow-up in April

## 2022-01-12 NOTE — PROGRESS NOTES
Cardiology Office Follow Up  Formerly [facility], [facility]  1945  [id number]    ASSESSMENT:  Newly discovered atrial fibrillation (during office visit)   Nonsustained ventricular tachycardia   - 21 beat run of NSVT prior hospitalization  - in the setting of acute exacerbation COPD, pulmonary embolism with hypoxia    - Rx,Toprol  mg daily  Acute pulmonary embolism, December 2021   - Rx, initially required Eliquis 10 mg BID for 7 days with transition to 5 mg BID on 1/7/22  Nonischemic troponin elevation  LVEF 68%, grade 1 diastolic dysfunction, normal RV size and function, mild biatrial dilatation, mild MR, December 2021  Ectasia of descending thoracic aorta measuring 4 6 x 2 9 cm, December 2021  Hypertension   - Rx, Toprol  mg daily, benazepril 40 mg daily, amlodipine 5 mg daily  Dyslipidemia   - Rx, atorvastatin 40 mg daily  - most recent lipid panel 10/5/21: Cholesterol 158, triglycerides 67, HDL 85, LDL 59  Other:  - acute exacerbation of COPD, prior hospitalization  - history of CVA  - chronic hypoxemic respiratory failure, on 3 L O2 chronically + 8 L O2 upon ambulation      PLAN:  · Initial blood pressure reading 80/50, personally checked repeat blood pressure reading 100/60  · Currently on Toprol  mg daily, benazepril 40 mg daily, amlodipine 5 mg daily  Will discontinue amlodipine for now  Patient is asymptomatic  Blood pressure reading today 96/52 at PCP visit  · Patient will need to check his blood pressures twice daily and maintain a log of these blood pressures  If his blood pressure remains persistently low, he must notify the office for further adjustments in his regimen  Patient is agreeable to this  · EKG performed today, revealing patient is currently in atrial fibrillation  This is a new discovery  Patient is on anticoagulation with Eliquis, will continue this  Additionally, patient is also on in AV blocking medication, Toprol  mg daily    · Continue atorvastatin 40 mg daily  · Patient does not wish to pursue outpatient sleep study at this time  · Patient will follow-up in 1 month with Dr Leta Santiago or sooner pending new discovery of atrial fibrillation in addition to hypotension  · Repeat CT scan ordered to assess ectasia of the descending thoracic aorta  Interval History/ HPI:   77-year-old male recently admitted to [facility] on 12/27 through 12/31 secondary to shortness of breath  Initial findings on CTA revealed acute pulmonary embolism, submassive with RV strain less than 0 9  Pulmonary was consulted and patient was initiated on a heparin drip  Patient did require up to 7 L non-rebreather as his oxygen saturations were approximately 56% upon admission  There was concern of possible pneumonia and abnormal mass on CT in which patient was empirically treated with antibiotics  Patient was discharged to complete 5 day course of antibiotics  Cardiology was consulted during hospitalization secondary to 21 beat run of nonsustained ventricular tachycardia in the setting of acute exacerbation of COPD in addition to acute PE with hypoxia  Patient was monitored on telemetry in which revealed no recurrent dysrhythmia  Upon office visit today, EKG completed in revealed newly discovered atrial fibrillation  As above, patient is on anticoagulation and AV blocking medications  Also, patient noted to be hypotensive  Repeat blood pressure improved, however still remained on the lower side  Patient states that he did feel some palpitations post hospitalization, however he did not feel it they were significant  He denied dizziness, lightheadedness, syncope, presyncope, chest pain  In light of hypotension today, patient is asymptomatic  Patient inquiring about shoveling snow and also driving  I have advised patient that he must be cautious with his activities in light of his cardiovascular status in addition to his tenuous respiratory status as well      In light of newly discovered atrial fibrillation, hypotension, patient will follow-up in the office in 1 month or sooner if needed  As always, patient and family have been advised to coli office if they have any questions or concerns       Vitals:  BP (!) 80/50 (BP Location: Right arm, Patient Position: Sitting, Cuff Size: Adult)   Pulse 68   Wt 71 7 kg (158 lb)   BMI 24 75 kg/m²     Past Medical History:   Diagnosis Date    Abnormal CT scan 2021    CATY (acute kidney injury) ([place] [address] ) 2021    Community acquired pneumonia 2021    [facility]     Elevated troponin 2021    Hypertension     Pulmonary embolism (HCC)     Sepsis ([place] [address] ) 2021     Social History     Socioeconomic History    Marital status: /Civil Union     Spouse name: Not on file    Number of children: Not on file    Years of education: Not on file    Highest education level: Not on file   Occupational History    Occupation: Retired   Tobacco Use    Smoking status: Former Smoker     Packs/day: 2 00     Years: 49 00     Pack years: 98 00     Types: Cigarettes     Start date:      Quit date:      Years since quittin 0    Smokeless tobacco: Former User    Tobacco comment: No secondhand smoke exposure   Vaping Use    Vaping Use: Never used   Substance and Sexual Activity    Alcohol use: Yes     Comment: Social drinker; Daily alcohol use per [employer]    Drug use: Not on file    Sexual activity: Not on file   Other Topics Concern    Not on file   Social History Narrative    Single per [employer]     Social Determinants of Health     Financial Resource Strain: Not on file   Food Insecurity: Not on file   Transportation Needs: Not on file   Physical Activity: Not on file   Stress: Not on file   Social Connections: Not on file   Intimate Partner Violence: Not on file   Housing Stability: Not on file      Family History   Problem Relation Age of Onset    Lung cancer Father      Past Surgical History:   Procedure Laterality Date    HERNIA REPAIR         Current Outpatient Medications:     albuterol (2 5 mg/3 mL) 0 083 % nebulizer solution, Take 3 mL (2 5 mg total) by nebulization every 6 (six) hours as needed for wheezing or shortness of breath, Disp: 75 mL, Rfl: 5    albuterol (Ventolin HFA) 90 mcg/act inhaler, Inhale 2 puffs every 6 (six) hours as needed for wheezing, Disp: 18 g, Rfl: 5    amLODIPine (NORVASC) 5 mg tablet, take 1 tablet by mouth once daily, Disp: 90 tablet, Rfl: 3    apixaban (ELIQUIS) 5 mg, Take 2 tablets (10 mg total) by mouth 2 (two) times a day for 7 days, THEN 1 tablet (5 mg total) 2 (two) times a day for 23 days  , Disp: 74 tablet, Rfl: 0    Ascorbic Acid (VITAMIN C) 1000 MG tablet, Take 1,000 mg by mouth daily, Disp: , Rfl:     atorvastatin (LIPITOR) 40 mg tablet, take 1 tablet by mouth once daily, Disp: 90 tablet, Rfl: 3    benazepril (LOTENSIN) 40 MG tablet, take 1 tablet by mouth once daily, Disp: 90 tablet, Rfl: 3    Cholecalciferol (VITAMIN D3) 5000 units CAPS, Take 1,000 Units by mouth daily, Disp: , Rfl:     cyanocobalamin (VITAMIN B-12) 100 mcg tablet, Take by mouth daily, Disp: , Rfl:     folic acid (FOLVITE) 1 mg tablet, Take by mouth daily, Disp: , Rfl:     ipratropium (ATROVENT) 0 03 % nasal spray, 2 sprays into each nostril every 12 (twelve) hours, Disp: 30 mL, Rfl: 2    metoprolol succinate (TOPROL-XL) 100 mg 24 hr tablet, take 1 tablet by mouth once daily, Disp: 90 tablet, Rfl: 3    multivitamin (THERAGRAN) TABS, Take 1 tablet by mouth daily, Disp: , Rfl:     Omega-3 Fatty Acids (FISH OIL) 1,000 mg, Take 1,000 mg by mouth daily, Disp: , Rfl:     umeclidinium-vilanterol (Anoro Ellipta) 62 5-25 MCG/INH inhaler, Inhale 1 puff daily, Disp: 60 blister, Rfl: 5    vitamin E 100 UNIT capsule, Take 100 Units by mouth daily, Disp: , Rfl:     famotidine (PEPCID) 20 mg tablet, Take 1 tablet (20 mg total) by mouth daily (Patient not taking: Reported on 1/6/2022 ), Disp: 30 tablet, Rfl: 0      Review of Systems:  Review of Systems   Constitutional: Negative for appetite change  Respiratory: Positive for shortness of breath  Negative for cough and chest tightness  Cardiovascular: Positive for palpitations  Negative for chest pain and leg swelling  Gastrointestinal: Negative for abdominal distention  Neurological: Negative for dizziness, syncope, weakness and light-headedness  Psychiatric/Behavioral: Negative for confusion  Physical Exam:  Physical Exam  Constitutional:       General: He is not in acute distress  Appearance: He is ill-appearing  Cardiovascular:      Rate and Rhythm: Normal rate  Rhythm irregular  Pulmonary:      Effort: No respiratory distress  Comments: Chronic supplemental oxygen via nasal cannula  Abdominal:      General: Bowel sounds are normal    Musculoskeletal:         General: No swelling  Cervical back: Normal range of motion and neck supple  Right lower leg: No edema  Left lower leg: No edema  Skin:     General: Skin is warm and dry  Neurological:      General: No focal deficit present  Mental Status: He is alert and oriented to person, place, and time  Psychiatric:         Mood and Affect: Mood normal          Behavior: Behavior normal        This note was completed in part utilizing [employer] Direct Software  Grammatical errors, random word insertions, spelling mistakes, and incomplete sentences can be an occasional consequence of this system secondary to software limitations, ambient noise, and hardware issues  If you have any questions or concerns about the content, text, or information contained within the body of this dictation, please contact the provider for clarification

## 2022-01-14 ENCOUNTER — TELEPHONE (OUTPATIENT)
Dept: FAMILY MEDICINE CLINIC | Facility: CLINIC | Age: 77
End: 2022-01-14

## 2022-01-14 NOTE — TELEPHONE ENCOUNTER
[facility] from [facility] vna called wants a neb machine  Explained that pt see's a pulmonary dr  And to call the on service or wait to they reopen on Tuesday

## 2022-01-19 ENCOUNTER — TELEPHONE (OUTPATIENT)
Dept: PULMONOLOGY | Facility: CLINIC | Age: 77
End: 2022-01-19

## 2022-01-19 NOTE — TELEPHONE ENCOUNTER
Vanessa Pritchett [facility]'s VNA called, patient never received nebulizer from [address], said they received O2 orders but not Neb  I resubmitted the order for nebulizer to [address]

## 2022-01-25 ENCOUNTER — TELEPHONE (OUTPATIENT)
Dept: HEMATOLOGY ONCOLOGY | Facility: CLINIC | Age: 77
End: 2022-01-25

## 2022-01-25 NOTE — TELEPHONE ENCOUNTER
New Patient Intake Form   Patient Details:    Yang Hartmann  1945  [id number]    Appointment Information   Who is calling to schedule? Child   If not self, what is the caller's name? Please put name of [facility] nurse as well  [facility]   Referring provider Florentino Mccall MD   What is the diagnosis? Lung mass   Is there a confirmed tissue diagnosis? No   Is patient aware of diagnosis? Yes   Have you had any imaging or labs done? If yes, where? (If imaging done outside of [facility], please remind patient to bring a disk ) No     Scheduled 03/14    Have you been seen by another Oncologist/Hematologist?  If so, who and where? no   Are the records in [facility] or Care Everywhere? yes   Are records needed from an outside facility? no   If yes, Name of facility, city and state where facility is located  n/a   Preferred [place]   Is the patient willing to be seen by another provider?   (This is for breast patients only) n/a   Miscellaneous Information: appt for 03/14

## 2022-01-26 DIAGNOSIS — J44.9 CHRONIC OBSTRUCTIVE PULMONARY DISEASE, UNSPECIFIED COPD TYPE (HCC): ICD-10-CM

## 2022-01-26 LAB
DME PARACHUTE DELIVERY DATE ACTUAL: NORMAL
DME PARACHUTE DELIVERY DATE ACTUAL: NORMAL
DME PARACHUTE DELIVERY DATE REQUESTED: NORMAL
DME PARACHUTE DELIVERY DATE REQUESTED: NORMAL
DME PARACHUTE ITEM DESCRIPTION: NORMAL
DME PARACHUTE ORDER STATUS: NORMAL
DME PARACHUTE ORDER STATUS: NORMAL
DME PARACHUTE SUPPLIER NAME: NORMAL
DME PARACHUTE SUPPLIER NAME: NORMAL
DME PARACHUTE SUPPLIER PHONE: NORMAL
DME PARACHUTE SUPPLIER PHONE: NORMAL

## 2022-01-26 RX ORDER — ALBUTEROL SULFATE 90 UG/1
AEROSOL, METERED RESPIRATORY (INHALATION)
Qty: 25.5 G | Refills: 5 | Status: SHIPPED | OUTPATIENT
Start: 2022-01-26

## 2022-01-28 ENCOUNTER — TELEPHONE (OUTPATIENT)
Dept: CARDIAC SURGERY | Facility: CLINIC | Age: 77
End: 2022-01-28

## 2022-01-30 ENCOUNTER — APPOINTMENT (EMERGENCY)
Dept: RADIOLOGY | Facility: HOSPITAL | Age: 77
DRG: 291 | End: 2022-01-30
Payer: COMMERCIAL

## 2022-01-30 ENCOUNTER — APPOINTMENT (EMERGENCY)
Dept: CT IMAGING | Facility: HOSPITAL | Age: 77
DRG: 291 | End: 2022-01-30
Payer: COMMERCIAL

## 2022-01-30 ENCOUNTER — HOSPITAL ENCOUNTER (INPATIENT)
Facility: HOSPITAL | Age: 77
LOS: 10 days | Discharge: NURSING, REHAB OR LONG-TERM CARE | DRG: 291 | End: 2022-02-09
Attending: EMERGENCY MEDICINE | Admitting: HOSPITALIST
Payer: COMMERCIAL

## 2022-01-30 DIAGNOSIS — J18.9 PNEUMONIA DUE TO INFECTIOUS ORGANISM, UNSPECIFIED LATERALITY, UNSPECIFIED PART OF LUNG: Primary | ICD-10-CM

## 2022-01-30 DIAGNOSIS — J96.21 ACUTE ON CHRONIC RESPIRATORY FAILURE WITH HYPOXIA (HCC): ICD-10-CM

## 2022-01-30 DIAGNOSIS — B35.1 ONYCHOMYCOSIS: ICD-10-CM

## 2022-01-30 DIAGNOSIS — J43.9 PULMONARY EMPHYSEMA, UNSPECIFIED EMPHYSEMA TYPE (HCC): ICD-10-CM

## 2022-01-30 DIAGNOSIS — R77.8 ELEVATED TROPONIN: ICD-10-CM

## 2022-01-30 DIAGNOSIS — R09.02 HYPOXIA: ICD-10-CM

## 2022-01-30 DIAGNOSIS — I48.91 ATRIAL FIBRILLATION, UNSPECIFIED TYPE (HCC): ICD-10-CM

## 2022-01-30 PROBLEM — E87.70 VOLUME OVERLOAD: Status: ACTIVE | Noted: 2022-01-30

## 2022-01-30 LAB
2HR DELTA HS TROPONIN: 8 NG/L
ALBUMIN SERPL BCP-MCNC: 2.8 G/DL (ref 3.5–5)
ALP SERPL-CCNC: 106 U/L (ref 46–116)
ALT SERPL W P-5'-P-CCNC: 33 U/L (ref 12–78)
ANION GAP SERPL CALCULATED.3IONS-SCNC: 10 MMOL/L (ref 4–13)
APTT PPP: 27 SECONDS (ref 23–37)
AST SERPL W P-5'-P-CCNC: 25 U/L (ref 5–45)
ATRIAL RATE: 103 BPM
BASOPHILS # BLD AUTO: 0.03 THOUSANDS/ΜL (ref 0–0.1)
BASOPHILS NFR BLD AUTO: 0 % (ref 0–1)
BILIRUB SERPL-MCNC: 0.67 MG/DL (ref 0.2–1)
BUN SERPL-MCNC: 19 MG/DL (ref 5–25)
CALCIUM ALBUM COR SERPL-MCNC: 9.8 MG/DL (ref 8.3–10.1)
CALCIUM SERPL-MCNC: 8.8 MG/DL (ref 8.3–10.1)
CARDIAC TROPONIN I PNL SERPL HS: 61 NG/L
CARDIAC TROPONIN I PNL SERPL HS: 69 NG/L
CHLORIDE SERPL-SCNC: 102 MMOL/L (ref 100–108)
CO2 SERPL-SCNC: 30 MMOL/L (ref 21–32)
CREAT SERPL-MCNC: 1.31 MG/DL (ref 0.6–1.3)
EOSINOPHIL # BLD AUTO: 0.1 THOUSAND/ΜL (ref 0–0.61)
EOSINOPHIL NFR BLD AUTO: 2 % (ref 0–6)
ERYTHROCYTE [DISTWIDTH] IN BLOOD BY AUTOMATED COUNT: 12.8 % (ref 11.6–15.1)
FLUAV RNA RESP QL NAA+PROBE: NEGATIVE
FLUBV RNA RESP QL NAA+PROBE: NEGATIVE
GFR SERPL CREATININE-BSD FRML MDRD: 52 ML/MIN/1.73SQ M
GLUCOSE SERPL-MCNC: 126 MG/DL (ref 65–140)
HCT VFR BLD AUTO: 46 % (ref 36.5–49.3)
HGB BLD-MCNC: 14.8 G/DL (ref 12–17)
IMM GRANULOCYTES # BLD AUTO: 0.05 THOUSAND/UL (ref 0–0.2)
IMM GRANULOCYTES NFR BLD AUTO: 1 % (ref 0–2)
INR PPP: 1.15 (ref 0.84–1.19)
LACTATE SERPL-SCNC: 1.7 MMOL/L (ref 0.5–2)
LYMPHOCYTES # BLD AUTO: 0.62 THOUSANDS/ΜL (ref 0.6–4.47)
LYMPHOCYTES NFR BLD AUTO: 9 % (ref 14–44)
MCH RBC QN AUTO: 33.9 PG (ref 26.8–34.3)
MCHC RBC AUTO-ENTMCNC: 32.2 G/DL (ref 31.4–37.4)
MCV RBC AUTO: 105 FL (ref 82–98)
MONOCYTES # BLD AUTO: 0.77 THOUSAND/ΜL (ref 0.17–1.22)
MONOCYTES NFR BLD AUTO: 12 % (ref 4–12)
NEUTROPHILS # BLD AUTO: 5.1 THOUSANDS/ΜL (ref 1.85–7.62)
NEUTS SEG NFR BLD AUTO: 76 % (ref 43–75)
NRBC BLD AUTO-RTO: 0 /100 WBCS
NT-PROBNP SERPL-MCNC: 1280 PG/ML
P AXIS: 71 DEGREES
PLATELET # BLD AUTO: 228 THOUSANDS/UL (ref 149–390)
PMV BLD AUTO: 9.3 FL (ref 8.9–12.7)
POTASSIUM SERPL-SCNC: 4.5 MMOL/L (ref 3.5–5.3)
PR INTERVAL: 140 MS
PROT SERPL-MCNC: 7 G/DL (ref 6.4–8.2)
PROTHROMBIN TIME: 14.4 SECONDS (ref 11.6–14.5)
QRS AXIS: 93 DEGREES
QRSD INTERVAL: 76 MS
QT INTERVAL: 332 MS
QTC INTERVAL: 434 MS
RBC # BLD AUTO: 4.37 MILLION/UL (ref 3.88–5.62)
RSV RNA RESP QL NAA+PROBE: NEGATIVE
SARS-COV-2 RNA RESP QL NAA+PROBE: NEGATIVE
SODIUM SERPL-SCNC: 142 MMOL/L (ref 136–145)
T WAVE AXIS: 4 DEGREES
VENTRICULAR RATE: 103 BPM
WBC # BLD AUTO: 6.67 THOUSAND/UL (ref 4.31–10.16)

## 2022-01-30 PROCEDURE — 93010 ELECTROCARDIOGRAM REPORT: CPT

## 2022-01-30 PROCEDURE — 36415 COLL VENOUS BLD VENIPUNCTURE: CPT | Performed by: EMERGENCY MEDICINE

## 2022-01-30 PROCEDURE — 71275 CT ANGIOGRAPHY CHEST: CPT

## 2022-01-30 PROCEDURE — G1004 CDSM NDSC: HCPCS

## 2022-01-30 PROCEDURE — 83605 ASSAY OF LACTIC ACID: CPT | Performed by: EMERGENCY MEDICINE

## 2022-01-30 PROCEDURE — 99285 EMERGENCY DEPT VISIT HI MDM: CPT | Performed by: EMERGENCY MEDICINE

## 2022-01-30 PROCEDURE — 83880 ASSAY OF NATRIURETIC PEPTIDE: CPT | Performed by: EMERGENCY MEDICINE

## 2022-01-30 PROCEDURE — 0241U HB NFCT DS VIR RESP RNA 4 TRGT: CPT | Performed by: EMERGENCY MEDICINE

## 2022-01-30 PROCEDURE — 93005 ELECTROCARDIOGRAM TRACING: CPT

## 2022-01-30 PROCEDURE — 84484 ASSAY OF TROPONIN QUANT: CPT | Performed by: EMERGENCY MEDICINE

## 2022-01-30 PROCEDURE — 99285 EMERGENCY DEPT VISIT HI MDM: CPT

## 2022-01-30 PROCEDURE — 71045 X-RAY EXAM CHEST 1 VIEW: CPT

## 2022-01-30 PROCEDURE — 85730 THROMBOPLASTIN TIME PARTIAL: CPT | Performed by: EMERGENCY MEDICINE

## 2022-01-30 PROCEDURE — 99223 1ST HOSP IP/OBS HIGH 75: CPT | Performed by: HOSPITALIST

## 2022-01-30 PROCEDURE — 80053 COMPREHEN METABOLIC PANEL: CPT | Performed by: EMERGENCY MEDICINE

## 2022-01-30 PROCEDURE — 85610 PROTHROMBIN TIME: CPT | Performed by: EMERGENCY MEDICINE

## 2022-01-30 PROCEDURE — 85025 COMPLETE CBC W/AUTO DIFF WBC: CPT | Performed by: EMERGENCY MEDICINE

## 2022-01-30 PROCEDURE — 87040 BLOOD CULTURE FOR BACTERIA: CPT | Performed by: EMERGENCY MEDICINE

## 2022-01-30 RX ORDER — IPRATROPIUM BROMIDE 21 UG/1
2 SPRAY, METERED NASAL EVERY 12 HOURS
Status: DISCONTINUED | OUTPATIENT
Start: 2022-01-30 | End: 2022-01-30

## 2022-01-30 RX ORDER — ALBUTEROL SULFATE 2.5 MG/3ML
2.5 SOLUTION RESPIRATORY (INHALATION) EVERY 6 HOURS PRN
Status: DISCONTINUED | OUTPATIENT
Start: 2022-01-30 | End: 2022-02-09 | Stop reason: HOSPADM

## 2022-01-30 RX ORDER — FLUTICASONE PROPIONATE 50 MCG
1 SPRAY, SUSPENSION (ML) NASAL DAILY
Status: DISCONTINUED | OUTPATIENT
Start: 2022-01-31 | End: 2022-02-09 | Stop reason: HOSPADM

## 2022-01-30 RX ORDER — AMLODIPINE BESYLATE 5 MG/1
5 TABLET ORAL DAILY
Status: DISCONTINUED | OUTPATIENT
Start: 2022-01-30 | End: 2022-02-09 | Stop reason: HOSPADM

## 2022-01-30 RX ORDER — LISINOPRIL 20 MG/1
40 TABLET ORAL DAILY
Status: DISCONTINUED | OUTPATIENT
Start: 2022-01-30 | End: 2022-01-31

## 2022-01-30 RX ORDER — ATORVASTATIN CALCIUM 40 MG/1
40 TABLET, FILM COATED ORAL DAILY
Status: DISCONTINUED | OUTPATIENT
Start: 2022-01-30 | End: 2022-02-09 | Stop reason: HOSPADM

## 2022-01-30 RX ORDER — METOPROLOL SUCCINATE 100 MG/1
100 TABLET, EXTENDED RELEASE ORAL DAILY
Status: DISCONTINUED | OUTPATIENT
Start: 2022-01-30 | End: 2022-02-09 | Stop reason: HOSPADM

## 2022-01-30 RX ORDER — ONDANSETRON 2 MG/ML
4 INJECTION INTRAMUSCULAR; INTRAVENOUS EVERY 6 HOURS PRN
Status: DISCONTINUED | OUTPATIENT
Start: 2022-01-30 | End: 2022-02-09 | Stop reason: HOSPADM

## 2022-01-30 RX ORDER — ACETAMINOPHEN 325 MG/1
650 TABLET ORAL EVERY 6 HOURS PRN
Status: DISCONTINUED | OUTPATIENT
Start: 2022-01-30 | End: 2022-02-09 | Stop reason: HOSPADM

## 2022-01-30 RX ORDER — FUROSEMIDE 10 MG/ML
40 INJECTION INTRAMUSCULAR; INTRAVENOUS ONCE
Status: COMPLETED | OUTPATIENT
Start: 2022-01-30 | End: 2022-01-30

## 2022-01-30 RX ORDER — FUROSEMIDE 10 MG/ML
40 INJECTION INTRAMUSCULAR; INTRAVENOUS
Status: DISCONTINUED | OUTPATIENT
Start: 2022-01-30 | End: 2022-02-02

## 2022-01-30 RX ORDER — POLYETHYLENE GLYCOL 3350 17 G/17G
17 POWDER, FOR SOLUTION ORAL DAILY
Status: DISCONTINUED | OUTPATIENT
Start: 2022-01-30 | End: 2022-02-09 | Stop reason: HOSPADM

## 2022-01-30 RX ADMIN — AMLODIPINE BESYLATE 5 MG: 5 TABLET ORAL at 16:04

## 2022-01-30 RX ADMIN — IOHEXOL 85 ML: 350 INJECTION, SOLUTION INTRAVENOUS at 11:27

## 2022-01-30 RX ADMIN — ATORVASTATIN CALCIUM 40 MG: 40 TABLET, FILM COATED ORAL at 16:04

## 2022-01-30 RX ADMIN — LISINOPRIL 40 MG: 20 TABLET ORAL at 16:04

## 2022-01-30 RX ADMIN — FUROSEMIDE 40 MG: 10 INJECTION, SOLUTION INTRAMUSCULAR; INTRAVENOUS at 16:05

## 2022-01-30 RX ADMIN — METOPROLOL SUCCINATE 100 MG: 100 TABLET, EXTENDED RELEASE ORAL at 16:23

## 2022-01-30 RX ADMIN — APIXABAN 5 MG: 5 TABLET, FILM COATED ORAL at 17:27

## 2022-01-30 RX ADMIN — CEFEPIME HYDROCHLORIDE 2000 MG: 2 INJECTION, POWDER, FOR SOLUTION INTRAVENOUS at 12:32

## 2022-01-30 NOTE — ASSESSMENT & PLAN NOTE
· Patient with severe COPD  Normally on 3 L O2 at rest and 8 L with exertion  · Presents today with increasing shortness breath  Patient states he has been having worsening shortness of breath since [place], but has become even worse over the last week  Patient now on non-rebreather  Differential, volume overload, pneumonia, COPD exacerbation  · He was recently admitted 12/27-12/31 for pneumonia and COPD exacerbation  · Given elevated BNP and new LE edema, will treat for volume overload  · Check procalcitonin, but doubt recurrent pneumonia  Covid/flu/RSV negative  · Only scant wheezes on exam, will hold on starting steroids for now     · Check updated echocardiogram   · No PE on CTA chest

## 2022-01-30 NOTE — ED PROVIDER NOTES
History  Chief Complaint   Patient presents with    Shortness of Breath     18-year-old male with history of COPD chronically on 8 liters of O2, atrial fibrillation, PE on Eliquis, hypertension presents to the emergency department with increased shortness of breath over baseline  Patient is able to provide some history with mild conversational dyspnea, however the son provides most of the history  Patient was admitted about a month ago for community-acquired pneumonia and respiratory failure  He did not require intubation  Since that time he has had shortness of breath but it seems to be getting worse  No fevers, cough or chest pain  He is compliant with his medications, although he states he did not take his Eliquis today  History provided by:  Patient and relative   used: No    Shortness of Breath  Severity:  Severe  Onset quality:  Gradual  Duration:  3 weeks  Timing:  Constant  Progression:  Worsening  Chronicity:  Chronic  Context: not URI    Relieved by:  Nothing  Worsened by: Activity  Ineffective treatments:  Oxygen  Associated symptoms: PND    Associated symptoms: no abdominal pain, no chest pain, no claudication, no cough, no diaphoresis, no ear pain, no fever, no headaches, no hemoptysis, no neck pain, no sore throat, no sputum production, no syncope, no swollen glands, no vomiting and no wheezing    Risk factors: hx of PE/DVT    Risk factors: no recent alcohol use, no hx of cancer, no obesity, no prolonged immobilization, no recent surgery and no tobacco use        Prior to Admission Medications   Prescriptions Last Dose Informant Patient Reported? Taking?    Ascorbic Acid (VITAMIN C) 1000 MG tablet  Self Yes No   Sig: Take 1,000 mg by mouth daily   Cholecalciferol (VITAMIN D3) 5000 units CAPS  Self Yes No   Sig: Take 1,000 Units by mouth daily   Omega-3 Fatty Acids (FISH OIL) 1,000 mg  Self Yes No   Sig: Take 1,000 mg by mouth daily   albuterol (2 5 mg/3 mL) 0 083 % nebulizer solution   No No   Sig: Take 3 mL (2 5 mg total) by nebulization every 6 (six) hours as needed for wheezing or shortness of breath   albuterol (PROVENTIL HFA,VENTOLIN HFA) 90 mcg/act inhaler   No No   Sig: INHALE 2 PUFFS BY MOUTH AND INTO THE LUNGS EVERY 6 HOURS IF NEEDED FOR WHEEZING   amLODIPine (NORVASC) 5 mg tablet  Self No No   Sig: take 1 tablet by mouth once daily   apixaban (ELIQUIS) 5 mg  Self No No   Sig: Take 2 tablets (10 mg total) by mouth 2 (two) times a day for 7 days, THEN 1 tablet (5 mg total) 2 (two) times a day for 23 days     atorvastatin (LIPITOR) 40 mg tablet  Self No No   Sig: take 1 tablet by mouth once daily   benazepril (LOTENSIN) 40 MG tablet  Self No No   Sig: take 1 tablet by mouth once daily   cyanocobalamin (VITAMIN B-12) 100 mcg tablet  Self Yes No   Sig: Take by mouth daily   famotidine (PEPCID) 20 mg tablet  Self No No   Sig: Take 1 tablet (20 mg total) by mouth daily   Patient not taking: Reported on 2633    folic acid (FOLVITE) 1 mg tablet  Self Yes No   Sig: Take by mouth daily   ipratropium (ATROVENT) 0 03 % nasal spray   No No   Si sprays into each nostril every 12 (twelve) hours   metoprolol succinate (TOPROL-XL) 100 mg 24 hr tablet  Self No No   Sig: take 1 tablet by mouth once daily   multivitamin (THERAGRAN) TABS  Self Yes No   Sig: Take 1 tablet by mouth daily   umeclidinium-vilanterol (Anoro Ellipta) 62 5-25 MCG/INH inhaler   No No   Sig: Inhale 1 puff daily   vitamin E 100 UNIT capsule  Self Yes No   Sig: Take 100 Units by mouth daily      Facility-Administered Medications: None       Past Medical History:   Diagnosis Date    Abnormal CT scan 2021    [facility] ) 2021    Community acquired pneumonia 2021    [facility]     Elevated troponin 2021    Hypertension     [facility] embolism ([facility] )     Sepsis ([facility] ) 2021       Past Surgical History:   Procedure Laterality Date    HERNIA REPAIR         Family History   Problem Relation Age of Onset    Lung cancer Father      I have reviewed and agree with the history as documented  E-Cigarette/Vaping    E-Cigarette Use Never User      E-Cigarette/Vaping Substances     Social History     Tobacco Use    Smoking status: Former Smoker     Packs/day: 2 00     Years: 49 00     Pack years: 98 00     Types: Cigarettes     Start date: 55     Quit date:      Years since quittin 0    Smokeless tobacco: Former User    Tobacco comment: No secondhand smoke exposure   Vaping Use    Vaping Use: Never used   Substance Use Topics    Alcohol use: Yes     Comment: Social drinker; Daily alcohol use per [employer]    Drug use: Not Currently       Review of Systems   Constitutional: Negative  Negative for activity change, appetite change, chills, diaphoresis and fever  HENT: Negative  Negative for ear pain and sore throat  Eyes: Negative  Respiratory: Positive for shortness of breath  Negative for cough, hemoptysis, sputum production, chest tightness and wheezing  Cardiovascular: Positive for leg swelling and PND  Negative for chest pain, palpitations, claudication and syncope  Gastrointestinal: Negative  Negative for abdominal pain and vomiting  Genitourinary: Negative  Musculoskeletal: Negative for neck pain  Skin: Negative  Allergic/Immunologic: Negative  Neurological: Negative  Negative for weakness, numbness and headaches  Hematological: Negative  Psychiatric/Behavioral: Negative  All other systems reviewed and are negative  Physical Exam  Physical Exam  Vitals and nursing note reviewed  Constitutional:       General: He is awake  He is not in acute distress  Appearance: Normal appearance  He is well-developed and normal weight  He is ill-appearing  He is not toxic-appearing or diaphoretic  HENT:      Head: Normocephalic and atraumatic        Right Ear: External ear normal       Left Ear: External ear normal       Nose: Nose normal       Mouth/Throat:      Mouth: Mucous membranes are moist    Eyes:      General: No scleral icterus  Conjunctiva/sclera: Conjunctivae normal       Pupils: Pupils are equal, round, and reactive to light  Neck:      Thyroid: No thyromegaly  Vascular: No JVD  Cardiovascular:      Rate and Rhythm: Regular rhythm  Tachycardia present  Heart sounds: Normal heart sounds  No murmur heard  No gallop  Pulmonary:      Effort: Pulmonary effort is normal  Tachypnea present  No accessory muscle usage, respiratory distress or retractions  Breath sounds: Normal breath sounds  No stridor  No wheezing, rhonchi or rales  Abdominal:      General: Bowel sounds are normal  There is no distension  Palpations: Abdomen is soft  There is no mass  Tenderness: There is no abdominal tenderness  Hernia: No hernia is present  Musculoskeletal:         General: No tenderness or deformity  Normal range of motion  Cervical back: Normal range of motion and neck supple  Right lower leg: Edema present  Left lower leg: Edema present  Lymphadenopathy:      Cervical: No cervical adenopathy  Skin:     General: Skin is warm and dry  Coloration: Skin is not jaundiced or pale  Findings: No bruising, erythema, lesion or rash  Neurological:      General: No focal deficit present  Mental Status: He is alert and oriented to person, place, and time  Motor: No weakness  Deep Tendon Reflexes: Reflexes are normal and symmetric  Psychiatric:         Mood and Affect: Mood normal          Behavior: Behavior is cooperative           Vital Signs  ED Triage Vitals [01/30/22 0924]   Temperature Pulse Respirations Blood Pressure SpO2   (!) 97 4 °F (36 3 °C) 100 (!) 24 (!) 213/107 (!) 80 %      Temp Source Heart Rate Source Patient Position - Orthostatic VS BP Location FiO2 (%)   Oral Monitor Lying Right arm -- Pain Score       No Pain           Vitals:    01/30/22 0924   BP: (!) 213/107   Pulse: 100   Patient Position - Orthostatic VS: Lying         Visual Acuity      ED Medications  Medications - No data to display    Diagnostic Studies  Results Reviewed     Procedure Component Value Units Date/Time    CBC and differential [[id number]]     Lab Status: No result Specimen: Blood     Protime-INR [[id number]]     Lab Status: No result Specimen: Blood     APTT [[id number]]     Lab Status: No result Specimen: Blood     Comprehensive metabolic panel [[id number]]     Lab Status: No result Specimen: Blood     HS Troponin 0hr (reflex protocol) [[id number]]     Lab Status: No result Specimen: Blood     NT-BNP PRO [[id number]]     Lab Status: No result Specimen: Blood     Lactic acid [[id number]]     Lab Status: No result Specimen: Blood     COVID/FLU/RSV - 2 hour TAT [[id number]]     Lab Status: No result Specimen: Nares from Nose                  XR chest 1 view portable    (Results Pending)              Procedures  ECG 12 Lead Documentation Only    Date/Time: 1/30/2022 9:43 AM  Performed by: Artis Arce DO  Authorized by: Artis Arce DO     Indications / Diagnosis:  Sob  ECG reviewed by me, the ED Provider: yes    Patient location:  ED  Interpretation:     Interpretation: abnormal    Rate:     ECG rate:  103    ECG rate assessment: tachycardic    Rhythm:     Rhythm: sinus tachycardia    Ectopy:     Ectopy: PVCs      PVCs:  Infrequent  Conduction:     Conduction: normal    ST segments:     ST segments:  Normal  T waves:     T waves: normal               ED Course                                             MDM  Number of Diagnoses or Management Options  Diagnosis management comments: 56-year-old male with history of COPD on 8 liters of O2 chronically, PE, atrial fibrillation on Eliquis presents to the emergency department with increasing shortness of breath over baseline    According to the patient's son he has been short of breath since leaving the hospital after being admitted here for community-acquired pneumonia and respiratory failure   Over the last few days she can not even sit or lay down without being short of breath  He has had no infectious symptoms  He does refuse to be vaccinated for COVID and flu  No chest pain  On exam he is alert and hypoxic to 81% on his normal 8 liters of O2  Does have mild conversational dyspnea but is in no respiratory distress at this time his lungs are clear  He does have +2 pitting edema bilaterally  Will do cardiac workup  Will start with plain chest x-ray but , most likely will proceed with CT of chest to rule out increased clot burden from PE  Amount and/or Complexity of Data Reviewed  Clinical lab tests: ordered and reviewed  Tests in the radiology section of CPT®: ordered and reviewed  Decide to obtain previous medical records or to obtain history from someone other than the patient: yes  Review and summarize past medical records: yes  Discuss the patient with other providers: yes  Independent visualization of images, tracings, or specimens: yes        Disposition  Final diagnoses:   None     ED Disposition     None      Follow-up Information    None         Patient's Medications   Discharge Prescriptions    No medications on file       No discharge procedures on file      PDMP Review     None          ED Provider  Electronically Signed by           Ivon Montes,   01/30/22 [address] Debbi Burgess DO  01/30/22 4865

## 2022-01-30 NOTE — H&P
[address] [facility]  H&P- Marlee Iglesias 1945, 67 y o  male MRN: [id number]  Unit/Bed#: ED 16 Encounter: [id number]  Primary Care Provider: Siva Denson PA-C   Date and time admitted to hospital: 1/30/2022  9:13 AM    * Acute on chronic respiratory failure with hypoxia [facility])  Assessment & Plan  · Patient with severe COPD  Normally on 3 L O2 at rest and 8 L with exertion  · Presents today with increasing shortness breath  Patient states he has been having worsening shortness of breath since [place], but has become even worse over the last week  Patient now on non-rebreather  Differential, volume overload, pneumonia, COPD exacerbation  · He was recently admitted 12/27-12/31 for pneumonia and COPD exacerbation  · Given elevated BNP and new LE edema, will treat for volume overload  · Check procalcitonin, but doubt recurrent pneumonia  Covid/flu/RSV negative  · Only scant wheezes on exam, will hold on starting steroids for now  · Check updated echocardiogram   · No PE on CTA chest     Volume overload  Assessment & Plan  · Will give IV lasix x 1 and monitor response  · Check updated echo  · Monitor I's and O's  · Daily weights    Chronic obstructive pulmonary disease (HCC)  Assessment & Plan  · Severe COPD without acute exacerbation  · Continue respiratory treatments  · Respiratory protocol    Pulmonary embolism (HCC)  Assessment & Plan  · Continue Eliquis    NSVT (nonsustained ventricular tachycardia) ([facility])  Assessment & Plan  · Continue metoprolol  · Monitor on tele for 24 hours r/o arrhythmia     Aortic ectasia, thoracic (HCC)  Assessment & Plan  · Stable   · Outpatient thoracic follow up    VTE Pharmacologic Prophylaxis: VTE Score: 9 High Risk (Score >/= 5) - Pharmacological DVT Prophylaxis Ordered: apixaban (Eliquis)  Sequential Compression Devices Ordered  Code Status: Prior Level 3 DNR/DNI  Discussion with family: Updated  (son) via phone      Anticipated Length of Stay: Patient will be admitted on an inpatient basis with an anticipated length of stay of greater than 2 midnights secondary to IV lasix  Total Time for Visit, including Counseling / Coordination of Care: 60 minutes Greater than 50% of this total time spent on direct patient counseling and coordination of care  Chief Complaint: Shortness of breath    History of Present Illness:  Alee Zheng is a 67 y o  male with a PMH of COPD, NSVT, PE who presents with worsening SOB  Patient recently admitted for PNA, NSVT, COPD exacerbation  Presents back with worsening shortness of breath  Patient states his breathing really never improved since he was discharged from the hospital in December  Patient's shortness of breath became worse this morning prompting him to come to the hospital   Son states that patient has mobility and oral intake have been poor secondary to his shortness of breath  Unfortunately patient had been referred refusing rehab and VNA services since his last admission  Patient denies cough, fever, chills  Review of Systems:  Review of Systems   Constitutional: Positive for appetite change and fatigue  Negative for activity change, chills, diaphoresis and fever  HENT: Negative for rhinorrhea, sinus pressure, sneezing and sore throat  Eyes: Negative for photophobia, redness and visual disturbance  Respiratory: Positive for shortness of breath  Negative for cough, chest tightness and wheezing  Cardiovascular: Negative for chest pain, palpitations and leg swelling  Gastrointestinal: Positive for constipation  Negative for abdominal distention, abdominal pain, anal bleeding, blood in stool, diarrhea, nausea and vomiting  Genitourinary: Negative for difficulty urinating, dysuria, flank pain, frequency, hematuria and urgency  Musculoskeletal: Negative for back pain and gait problem  Skin: Negative for rash and wound     Allergic/Immunologic: Negative for immunocompromised state    Neurological: Negative for dizziness, tremors, seizures, syncope, facial asymmetry, speech difficulty, weakness, light-headedness, numbness and headaches  Hematological: Negative for adenopathy  Psychiatric/Behavioral: Negative for agitation, confusion and hallucinations  The patient is not nervous/anxious  Past Medical and Surgical History:   Past Medical History:   Diagnosis Date    Abnormal CT scan 12/27/2021    [facility] ([facility] ) 12/27/2021    Community acquired pneumonia 12/27/2021    COPD (chronic obstructive pulmonary disease) ([place] [address] )     Elevated troponin 12/27/2021    Hypertension     Pulmonary embolism ([place] [address] )     Sepsis ([place] [address] ) 12/27/2021       Past Surgical History:   Procedure Laterality Date    HERNIA REPAIR         Meds/Allergies:  Prior to Admission medications    Medication Sig Start Date End Date Taking? Authorizing Provider   albuterol (2 5 mg/3 mL) 0 083 % nebulizer solution Take 3 mL (2 5 mg total) by nebulization every 6 (six) hours as needed for wheezing or shortness of breath 1/6/22   Vicky Russo MD   albuterol (PROVENTIL HFA,VENTOLIN HFA) 90 mcg/act inhaler INHALE 2 PUFFS BY MOUTH AND INTO THE LUNGS EVERY 6 HOURS IF NEEDED FOR WHEEZING 1/26/22   Vicky Russo MD   amLODIPine (NORVASC) 5 mg tablet take 1 tablet by mouth once daily 10/8/21   Leann Canas PA-C   apixaban (ELIQUIS) 5 mg Take 2 tablets (10 mg total) by mouth 2 (two) times a day for 7 days, THEN 1 tablet (5 mg total) 2 (two) times a day for 23 days   12/31/21 1/30/22  Fito Markham MD   Ascorbic Acid (VITAMIN C) 1000 MG tablet Take 1,000 mg by mouth daily    Historical Provider, MD   atorvastatin (LIPITOR) 40 mg tablet take 1 tablet by mouth once daily 11/2/21   Brock Mehta DO   benazepril (LOTENSIN) 40 MG tablet take 1 tablet by mouth once daily 10/8/21   HARJEET LopezC   Cholecalciferol (VITAMIN D3) 5000 units CAPS Take 1,000 Units by mouth daily    Historical Provider, MD   cyanocobalamin (VITAMIN B-12) 100 mcg tablet Take by mouth daily    Historical Provider, MD   famotidine (PEPCID) 20 mg tablet Take 1 tablet (20 mg total) by mouth daily  Patient not taking: Reported on 2022  Madelyn Grey MD   folic acid (FOLVITE) 1 mg tablet Take by mouth daily    Historical Provider, MD   ipratropium (ATROVENT) 0 03 % nasal spray 2 sprays into each nostril every 12 (twelve) hours 22   Treva Joy MD   metoprolol succinate (TOPROL-XL) 100 mg 24 hr tablet take 1 tablet by mouth once daily 10/8/21   Daniel Kimble PA-C   multivitamin [facility]) TABS Take 1 tablet by mouth daily    Historical Provider, MD   Omega-3 Fatty Acids (FISH OIL) 1,000 mg Take 1,000 mg by mouth daily    Historical Provider, MD   umeclidinium-vilanterol (Anoro Ellipta) 62 5-25 MCG/INH inhaler Inhale 1 puff daily 22  Treva Joy MD   vitamin E 100 UNIT capsule Take 100 Units by mouth daily    Historical Provider, MD     I have reviewed home medications with patient personally      Allergies: No Known Allergies    Social History:  Marital Status: /Civil Union   Occupation:   Patient Pre-hospital Living Situation: Home  Patient Pre-hospital Level of Mobility: walks  Patient Pre-hospital Diet Restrictions:   Substance Use History:   Social History     Substance and Sexual Activity   Alcohol Use Yes    Comment: Social drinker; Daily alcohol use per [employer]     Social History     Tobacco Use   Smoking Status Former Smoker    Packs/day: 2 00    Years: 49 00    Pack years: 98 00    Types: Cigarettes    Start date:     Quit date:     Years since quittin 0   Smokeless Tobacco Former User   Tobacco Comment    No secondhand smoke exposure     Social History     Substance and Sexual Activity   Drug Use Not Currently       Family History:  Family History   Problem Relation Age of Onset    Lung cancer Father        Physical Exam:     Vitals: Blood Pressure: 167/85 (01/30/22 1232)  Pulse: 86 (01/30/22 1232)  Temperature: (!) 97 4 °F (36 3 °C) (01/30/22 0924)  Temp Source: Oral (01/30/22 0924)  Respirations: 20 (01/30/22 1232)  Height: 5' 7" (170 2 cm) (01/30/22 0924)  Weight - Scale: 66 1 kg (145 lb 11 6 oz) (01/30/22 0924)  SpO2: 99 % (01/30/22 1232)    Physical Exam  Vitals and nursing note reviewed  Constitutional:       Appearance: He is well-developed  HENT:      Head: Normocephalic and atraumatic  Eyes:      Conjunctiva/sclera: Conjunctivae normal    Cardiovascular:      Rate and Rhythm: Normal rate and regular rhythm  Heart sounds: No murmur heard  Comments: Bilateral lower extremity edema  Pulmonary:      Effort: Respiratory distress (mild) present  Comments: Tubular breath sounds bilaterally  Abdominal:      Palpations: Abdomen is soft  Tenderness: There is no abdominal tenderness  Musculoskeletal:      Cervical back: Neck supple  Skin:     General: Skin is warm and dry  Neurological:      Mental Status: He is alert           Additional Data:     Lab Results:  Results from last 7 days   Lab Units 01/30/22  1018   WBC Thousand/uL 6 67   HEMOGLOBIN g/dL 14 8   HEMATOCRIT % 46 0   PLATELETS Thousands/uL 228   NEUTROS PCT % 76*   LYMPHS PCT % 9*   MONOS PCT % 12   EOS PCT % 2     Results from last 7 days   Lab Units 01/30/22  1018   SODIUM mmol/L 142   POTASSIUM mmol/L 4 5   CHLORIDE mmol/L 102   CO2 mmol/L 30   BUN mg/dL 19   CREATININE mg/dL 1 31*   ANION GAP mmol/L 10   CALCIUM mg/dL 8 8   ALBUMIN g/dL 2 8*   TOTAL BILIRUBIN mg/dL 0 67   ALK PHOS U/L 106   ALT U/L 33   AST U/L 25   GLUCOSE RANDOM mg/dL 126     Results from last 7 days   Lab Units 01/30/22  1018   INR  1 15             Results from last 7 days   Lab Units 01/30/22  1018   LACTIC ACID mmol/L 1 7       Imaging: Reviewed radiology reports from this admission including: chest CT scan and ECHO  CTA ED chest PE study   Final Result by Heather Gaona August Hernandez MD (01/30 1202)      No pulmonary embolus  New minimal multilobar patchy consolidation may represent pneumonia in the appropriate clinical context  New small bilateral pleural effusions  3 5 cm right apical masslike opacity minimally enlarged since December 27, 2021  This may represent underlying scar with minimal new adjacent airspace consolidation  If follow-up with tissue sampling or CT PET is deferred, noncontrast chest CT in 3    months is recommended  Stable descending intrathoracic aortic aneurysm maximum diameter 4 3 cm when measured in the same fashion  Partially visualized infrarenal abdominal aortic aneurysm maximum diameter 5 6 cm  This could be followed up with nonemergent aortic CTA  Severe pulmonary emphysema  This study demonstrates a significant  finding and was documented as such in [facility] for liaison and referring practitioner notification  This study demonstrates a finding requiring imaging follow-up and was documented as such in Epic  Workstation performed: [id number]         XR chest 1 view portable   ED Interpretation by Pato Saab DO (01/30 8740)   Bilateral infiltrates          EKG and Other Studies Reviewed on Admission:   · EKG: NSR  HR 80     ** Please Note: This note has been constructed using a voice recognition system   **

## 2022-01-30 NOTE — ASSESSMENT & PLAN NOTE
· Will give IV lasix x 1 and monitor response  · Check updated echo  · Monitor I's and O's  · Daily weights

## 2022-01-31 ENCOUNTER — APPOINTMENT (INPATIENT)
Dept: NON INVASIVE DIAGNOSTICS | Facility: HOSPITAL | Age: 77
DRG: 291 | End: 2022-01-31
Payer: COMMERCIAL

## 2022-01-31 LAB
ANION GAP SERPL CALCULATED.3IONS-SCNC: 8 MMOL/L (ref 4–13)
AORTIC VALVE MEAN VELOCITY: 8.6 M/S
APICAL FOUR CHAMBER EJECTION FRACTION: 64 %
AV LVOT MEAN GRADIENT: 1 MMHG
AV LVOT PEAK GRADIENT: 3 MMHG
AV MEAN GRADIENT: 3 MMHG
AV PEAK GRADIENT: 6 MMHG
AV VELOCITY RATIO: 0.67
BASOPHILS # BLD AUTO: 0.03 THOUSANDS/ΜL (ref 0–0.1)
BASOPHILS NFR BLD AUTO: 1 % (ref 0–1)
BUN SERPL-MCNC: 23 MG/DL (ref 5–25)
CALCIUM SERPL-MCNC: 8.5 MG/DL (ref 8.3–10.1)
CHLORIDE SERPL-SCNC: 100 MMOL/L (ref 100–108)
CO2 SERPL-SCNC: 32 MMOL/L (ref 21–32)
CREAT SERPL-MCNC: 1.32 MG/DL (ref 0.6–1.3)
DOP CALC AO PEAK VEL: 1.27 M/S
DOP CALC AO VTI: 22.85 CM
DOP CALC LVOT PEAK VEL VTI: 16.23 CM
DOP CALC LVOT PEAK VEL: 0.85 M/S
E WAVE DECELERATION TIME: 265 MS
EOSINOPHIL # BLD AUTO: 0.25 THOUSAND/ΜL (ref 0–0.61)
EOSINOPHIL NFR BLD AUTO: 5 % (ref 0–6)
ERYTHROCYTE [DISTWIDTH] IN BLOOD BY AUTOMATED COUNT: 12.8 % (ref 11.6–15.1)
FRACTIONAL SHORTENING: 33 % (ref 28–44)
GFR SERPL CREATININE-BSD FRML MDRD: 51 ML/MIN/1.73SQ M
GLUCOSE SERPL-MCNC: 96 MG/DL (ref 65–140)
HCT VFR BLD AUTO: 41.3 % (ref 36.5–49.3)
HGB BLD-MCNC: 13.3 G/DL (ref 12–17)
IMM GRANULOCYTES # BLD AUTO: 0.02 THOUSAND/UL (ref 0–0.2)
IMM GRANULOCYTES NFR BLD AUTO: 0 % (ref 0–2)
INTERVENTRICULAR SEPTUM IN DIASTOLE (PARASTERNAL SHORT AXIS VIEW): 1 CM (ref 0.51–0.95)
LEFT INTERNAL DIMENSION IN SYSTOLE: 2.6 CM (ref 2.1–4)
LEFT VENTRICULAR INTERNAL DIMENSION IN DIASTOLE: 3.9 CM (ref 3.97–5.91)
LEFT VENTRICULAR POSTERIOR WALL IN END DIASTOLE: 1 CM (ref 0.49–0.93)
LEFT VENTRICULAR STROKE VOLUME: 39 ML
LYMPHOCYTES # BLD AUTO: 0.72 THOUSANDS/ΜL (ref 0.6–4.47)
LYMPHOCYTES NFR BLD AUTO: 13 % (ref 14–44)
MAGNESIUM SERPL-MCNC: 1.6 MG/DL (ref 1.6–2.6)
MCH RBC QN AUTO: 33.9 PG (ref 26.8–34.3)
MCHC RBC AUTO-ENTMCNC: 32.2 G/DL (ref 31.4–37.4)
MCV RBC AUTO: 105 FL (ref 82–98)
MONOCYTES # BLD AUTO: 0.92 THOUSAND/ΜL (ref 0.17–1.22)
MONOCYTES NFR BLD AUTO: 17 % (ref 4–12)
MV E'TISSUE VEL-LAT: 9 CM/S
MV E'TISSUE VEL-SEP: 5 CM/S
MV PEAK A VEL: 0.75 M/S
MV PEAK E VEL: 59 CM/S
NEUTROPHILS # BLD AUTO: 3.47 THOUSANDS/ΜL (ref 1.85–7.62)
NEUTS SEG NFR BLD AUTO: 64 % (ref 43–75)
NRBC BLD AUTO-RTO: 0 /100 WBCS
PA SYSTOLIC PRESSURE: 70 MMHG
PLATELET # BLD AUTO: 218 THOUSANDS/UL (ref 149–390)
PMV BLD AUTO: 9.6 FL (ref 8.9–12.7)
POTASSIUM SERPL-SCNC: 3.8 MMOL/L (ref 3.5–5.3)
PROCALCITONIN SERPL-MCNC: 0.17 NG/ML
RBC # BLD AUTO: 3.92 MILLION/UL (ref 3.88–5.62)
SL CV LV EF: 60
SL CV PED ECHO LEFT VENTRICLE DIASTOLIC VOLUME (MOD BIPLANE) 2D: 64 ML
SL CV PED ECHO LEFT VENTRICLE SYSTOLIC VOLUME (MOD BIPLANE) 2D: 25 ML
SODIUM SERPL-SCNC: 140 MMOL/L (ref 136–145)
TR MAX PG: 73 MMHG
TRICUSPID VALVE PEAK REGURGITATION VELOCITY: 4.28 M/S
TSH SERPL DL<=0.05 MIU/L-ACNC: 0.74 UIU/ML (ref 0.36–3.74)
WBC # BLD AUTO: 5.41 THOUSAND/UL (ref 4.31–10.16)
Z-SCORE OF INTERVENTRICULAR SEPTUM IN END DIASTOLE: 2.44
Z-SCORE OF LEFT VENTRICULAR DIMENSION IN END SYSTOLE: -2.16
Z-SCORE OF LEFT VENTRICULAR POSTERIOR WALL IN END DIASTOLE: 2.55

## 2022-01-31 PROCEDURE — 93321 DOPPLER ECHO F-UP/LMTD STD: CPT

## 2022-01-31 PROCEDURE — 97163 PT EVAL HIGH COMPLEX 45 MIN: CPT

## 2022-01-31 PROCEDURE — 97166 OT EVAL MOD COMPLEX 45 MIN: CPT

## 2022-01-31 PROCEDURE — 94640 AIRWAY INHALATION TREATMENT: CPT

## 2022-01-31 PROCEDURE — 99232 SBSQ HOSP IP/OBS MODERATE 35: CPT | Performed by: INTERNAL MEDICINE

## 2022-01-31 PROCEDURE — 83735 ASSAY OF MAGNESIUM: CPT | Performed by: HOSPITALIST

## 2022-01-31 PROCEDURE — 80048 BASIC METABOLIC PNL TOTAL CA: CPT | Performed by: PHYSICIAN ASSISTANT

## 2022-01-31 PROCEDURE — 85025 COMPLETE CBC W/AUTO DIFF WBC: CPT | Performed by: HOSPITALIST

## 2022-01-31 PROCEDURE — 93325 DOPPLER ECHO COLOR FLOW MAPG: CPT

## 2022-01-31 PROCEDURE — 84145 PROCALCITONIN (PCT): CPT | Performed by: PHYSICIAN ASSISTANT

## 2022-01-31 PROCEDURE — 99223 1ST HOSP IP/OBS HIGH 75: CPT | Performed by: INTERNAL MEDICINE

## 2022-01-31 PROCEDURE — 93308 TTE F-UP OR LMTD: CPT

## 2022-01-31 PROCEDURE — 94760 N-INVAS EAR/PLS OXIMETRY 1: CPT

## 2022-01-31 PROCEDURE — 84443 ASSAY THYROID STIM HORMONE: CPT | Performed by: NURSE PRACTITIONER

## 2022-01-31 PROCEDURE — 99223 1ST HOSP IP/OBS HIGH 75: CPT

## 2022-01-31 RX ORDER — AMIODARONE HYDROCHLORIDE 200 MG/1
200 TABLET ORAL
Status: DISCONTINUED | OUTPATIENT
Start: 2022-02-08 | End: 2022-02-09 | Stop reason: HOSPADM

## 2022-01-31 RX ORDER — AMIODARONE HYDROCHLORIDE 200 MG/1
200 TABLET ORAL
Status: COMPLETED | OUTPATIENT
Start: 2022-01-31 | End: 2022-02-07

## 2022-01-31 RX ORDER — POTASSIUM CHLORIDE 20 MEQ/1
40 TABLET, EXTENDED RELEASE ORAL ONCE
Status: COMPLETED | OUTPATIENT
Start: 2022-01-31 | End: 2022-01-31

## 2022-01-31 RX ORDER — LEVALBUTEROL 1.25 MG/.5ML
1.25 SOLUTION, CONCENTRATE RESPIRATORY (INHALATION)
Status: DISCONTINUED | OUTPATIENT
Start: 2022-01-31 | End: 2022-02-09 | Stop reason: HOSPADM

## 2022-01-31 RX ADMIN — FUROSEMIDE 40 MG: 10 INJECTION, SOLUTION INTRAMUSCULAR; INTRAVENOUS at 18:07

## 2022-01-31 RX ADMIN — FUROSEMIDE 40 MG: 10 INJECTION, SOLUTION INTRAMUSCULAR; INTRAVENOUS at 08:44

## 2022-01-31 RX ADMIN — LEVALBUTEROL HYDROCHLORIDE 1.25 MG: 1.25 SOLUTION, CONCENTRATE RESPIRATORY (INHALATION) at 19:53

## 2022-01-31 RX ADMIN — IPRATROPIUM BROMIDE 0.5 MG: 0.5 SOLUTION RESPIRATORY (INHALATION) at 19:53

## 2022-01-31 RX ADMIN — POLYETHYLENE GLYCOL 3350 17 G: 17 POWDER, FOR SOLUTION ORAL at 08:43

## 2022-01-31 RX ADMIN — APIXABAN 5 MG: 5 TABLET, FILM COATED ORAL at 18:07

## 2022-01-31 RX ADMIN — METOPROLOL SUCCINATE 100 MG: 100 TABLET, EXTENDED RELEASE ORAL at 08:47

## 2022-01-31 RX ADMIN — POTASSIUM CHLORIDE 40 MEQ: 1500 TABLET, EXTENDED RELEASE ORAL at 08:43

## 2022-01-31 RX ADMIN — AMIODARONE HYDROCHLORIDE 200 MG: 200 TABLET ORAL at 12:02

## 2022-01-31 RX ADMIN — AMIODARONE HYDROCHLORIDE 200 MG: 200 TABLET ORAL at 18:07

## 2022-01-31 RX ADMIN — UMECLIDINIUM BROMIDE AND VILANTEROL TRIFENATATE 1 PUFF: 62.5; 25 POWDER RESPIRATORY (INHALATION) at 08:47

## 2022-01-31 RX ADMIN — APIXABAN 5 MG: 5 TABLET, FILM COATED ORAL at 08:43

## 2022-01-31 RX ADMIN — B-COMPLEX W/ C & FOLIC ACID TAB 1 TABLET: TAB at 08:43

## 2022-01-31 RX ADMIN — AMLODIPINE BESYLATE 5 MG: 5 TABLET ORAL at 08:44

## 2022-01-31 RX ADMIN — ALBUTEROL SULFATE 2.5 MG: 2.5 SOLUTION RESPIRATORY (INHALATION) at 09:34

## 2022-01-31 RX ADMIN — FLUTICASONE PROPIONATE 1 SPRAY: 50 SPRAY, METERED NASAL at 08:47

## 2022-01-31 RX ADMIN — ATORVASTATIN CALCIUM 40 MG: 40 TABLET, FILM COATED ORAL at 08:43

## 2022-01-31 NOTE — UTILIZATION REVIEW
Initial Clinical Review    Admission: Date/Time/Statement:   Admission Orders (From admission, onward)     Ordered        01/30/22 1215  Inpatient Admission  Once                      Orders Placed This Encounter   Procedures    Inpatient Admission     Standing Status:   Standing     Number of Occurrences:   1     Order Specific Question:   Level of Care     Answer:   Med Surg [16]     Order Specific Question:   Estimated length of stay     Answer:   More than 2 Midnights     Order Specific Question:   Certification     Answer:   I certify that inpatient services are medically necessary for this patient for a duration of greater than two midnights  See H&P and MD Progress Notes for additional information about the patient's course of treatment  ED Arrival Information     Expected Arrival Acuity    - 1/30/2022 09:09 Emergent         Means of arrival Escorted by Service Admission type    [facility] Emergency         Arrival complaint    heart problems        Chief Complaint   Patient presents with    Shortness of Breath     Initial Presentation:   Mr Bethany Oliver is a 67 yom who presents to the ED from home with c/o worsening SOB with breathing that never really improved after recent admission for Pneumonia, NSVT, COPD exac  He has poor mobility and oral intake  Rehab had been recommended on last admission but pt went home with VNA services instead  Pt is on home oxygen at 3L @ rest and 8L with exertion  He also notes constipation and fatigue  On exam he is in mild respiratory distress with tubular breath sounds bilat and BLE edema  Has elevated BNP PMH: COPD, NSVT, PE  He is admitted to INPATIENT status with Acute on chronic hypoxic respiratory failure - NRB mask,  Echo  Volume overload - s/p IV Lasix x 1  COPD - severe with exacerbation - respiratory treatments  PE - home Eliquis  NSVT - home Metoprolol, Tele  Date: 1/31   Day 2:   Pt continues with significant ROSALES today  Feels no improvement but appears more comfortable at rest   Remains on NRB mask  1/31 Cardio Consult - Acute on chronic hypoxic resp failure, Acute dCHF, PAF, non sustained VT, PE 1/21, HTN, dyslipidemia, ecstasia of descending thoracic aorta - imaging severe pulm emphsema, multifocal infiltrates on diffuse chronic changes  New minimal multi lobular patchy consolidation possibly representing pneumonia  3 5 cm right apical masslike opacity minimally enlarged since December 2021  Will repeat Echo, continue Lipitor, keep K > 4, Mag >2  Continue Toprol XL , Eliquis, Lipitor  1/31 Pulmonary Consult - acute on chronic hypoxic resp failure on 15L NRB mask, acute on chronic grade 1 dCHF - another IV Lasix 40 mg today  COPD  - Xopenx/atrovent TID  Abnormal Chest CT - unlikely infectious more likely d/t volume  Monitor off antibiotics  Check sputum culture  R apical mass - repeat imaging 3/22, continue Eliquis minimum 6 months    On exam has BLL rales with decreased air movement     ED Triage Vitals [01/30/22 0924]   Temperature Pulse Respirations Blood Pressure SpO2   (!) 97 4 °F (36 3 °C) 100 (!) 24 (!) 213/107 (!) 80 %      Temp Source Heart Rate Source Patient Position - Orthostatic VS BP Location FiO2 (%)   Oral Monitor Lying Right arm --      Pain Score       No Pain          Wt Readings from Last 1 Encounters:   01/31/22 64 8 kg (142 lb 13 7 oz)     Additional Vital Signs:   01/31/22 0935 -- -- -- -- -- 93 % -- -- --   01/31/22 0724 97 3 °F (36 3 °C) Abnormal  70 20 142/79 92 100 % -- Non-rebreather mask Lying   01/31/22 0300 96 7 °F (35 9 °C) Abnormal  75 20 147/65 94 98 % -- -- Lying   01/31/22 0002 96 8 °F (36 °C) Abnormal  73 20 117/56 81 98 % -- -- Lying   01/30/22 2026 98 2 °F (36 8 °C) 100 20 92/54 68 100 % -- Non-rebreather mask Lying   01/30/22 2000 -- -- -- -- -- -- 15 L/min Non-rebreather mask --   01/30/22 1649 98 1 °F (36 7 °C) 85 20 179/92 Abnormal  126 100 % 15 L/min Non-rebreather mask Lying   01/30/22 1232 -- 86 20 167/85 118 99 % -- Non-rebreather mask Lying   01/30/22 1216 -- 81 20 193/86 Abnormal  124 99 % -- Non-rebreather mask Lying   01/30/22 1200 -- 86 20 180/95 Abnormal  127 99 % -- Non-rebreather mask Sitting   01/30/22 1130 -- 94 24 Abnormal  201/91 Abnormal  131 98 % 15 L/min Non-rebreather mask Sitting     Pertinent Labs/Diagnostic Test Results:     1/30 CXR - Multifocal infiltrates superimposed on diffuse chronic changes  Right upper lobe opacification  1/30 CTA ED chest PE study - No pulmonary embolus  New minimal multilobar patchy consolidation may represent pneumonia in the appropriate clinical context  New small bilateral pleural effusions  3 5 cm right apical masslike opacity minimally enlarged since December 27, 2021  This may represent underlying scar with minimal new adjacent airspace consolidation  If follow-up with tissue sampling or CT PET is deferred, noncontrast chest CT in 3 months is recommended  Stable descending intrathoracic aortic aneurysm maximum diameter 4 3 cm when measured in the same fashion  Partially visualized infrarenal abdominal aortic aneurysm maximum diameter 5 6 cm  This could be followed up with nonemergent aortic CTA    Severe pulmonary emphysema    1/30 ECG - Sinus tachycardia with occasional Premature ventricular complexes  1/31 Echo - P    Results from last 7 days   Lab Units 01/30/22  1018   SARS-COV-2  Negative     Results from last 7 days   Lab Units 01/31/22  0546 01/30/22  1018   WBC Thousand/uL 5 41 6 67   HEMOGLOBIN g/dL 13 3 14 8   HEMATOCRIT % 41 3 46 0   PLATELETS Thousands/uL 218 228   NEUTROS ABS Thousands/µL 3 47 5 10         Results from last 7 days   Lab Units 01/31/22  0546 01/30/22  1018   SODIUM mmol/L 140 142   POTASSIUM mmol/L 3 8 4 5   CHLORIDE mmol/L 100 102   CO2 mmol/L 32 30   ANION GAP mmol/L 8 10   BUN mg/dL 23 19   CREATININE mg/dL 1 32* 1 31*   EGFR ml/min/1 73sq m 51 52   CALCIUM mg/dL 8 5 8 8   MAGNESIUM mg/dL 1 6 --      Results from last 7 days   Lab Units 01/30/22  1018   AST U/L 25   ALT U/L 33   ALK PHOS U/L 106   TOTAL PROTEIN g/dL 7 0   ALBUMIN g/dL 2 8*   TOTAL BILIRUBIN mg/dL 0 67         Results from last 7 days   Lab Units 01/31/22  0546 01/30/22  1018   GLUCOSE RANDOM mg/dL 96 126     Results from last 7 days   Lab Units 01/30/22  1222 01/30/22  1018   HS TNI 0HR ng/L  --  61*   HS TNI 2HR ng/L 69*  --    HSTNI D2 ng/L 8  --          Results from last 7 days   Lab Units 01/30/22  1018   PROTIME seconds 14 4   INR  1 15   PTT seconds 27         Results from last 7 days   Lab Units 01/31/22  0546   PROCALCITONIN ng/ml 0 17     Results from last 7 days   Lab Units 01/30/22  1018   LACTIC ACID mmol/L 1 7             Results from last 7 days   Lab Units 01/30/22  1018   NT-PRO BNP pg/mL 1,280*     Results from last 7 days   Lab Units 01/30/22  1018   INFLUENZA A PCR  Negative   INFLUENZA B PCR  Negative   RSV PCR  Negative     Results from last 7 days   Lab Units 01/30/22  1222   BLOOD CULTURE  Received in Microbiology Lab  Culture in Progress  Received in Microbiology Lab  Culture in Progress       ED Treatment:   Medication Administration from 01/30/2022 0909 to 01/30/2022 1505    Date/Time Order Dose Route Action   01/30/2022 1127 iohexol (OMNIPAQUE) 350 MG/ML injection (SINGLE-DOSE) 85 mL 85 mL Intravenous Given   01/30/2022 1232 cefepime (MAXIPIME) 2 g/50 mL dextrose IVPB 2,000 mg Intravenous New Bag        Past Medical History:   Diagnosis Date    Abnormal CT scan 12/27/2021    [facility]) [facility] ) 12/27/2021    Community acquired pneumonia 12/27/2021    [facility]     Elevated troponin 12/27/2021    Hypertension     Pulmonary embolism (HCC)     Sepsis ([facility] ) 12/27/2021     Present on Admission:   Chronic obstructive pulmonary disease ([facility] )   Pulmonary embolism (HCC)   Aortic ectasia, thoracic ([facility] )   Acute on chronic [facility] failure with hypoxia [facility] )   NSVT [facility]nonsustained ventricular tachycardia) ([facility])   Volume overload      Admitting Diagnosis: Hypoxia [R09 02]  Elevated troponin [R77 8]  Heart problem [I51 9]  Acute on chronic respiratory failure with hypoxia (HCC) [J96 21]  Pneumonia due to infectious organism, unspecified laterality, unspecified part of lung [J18 9]  Age/Sex: 67 y o  male  Admission Orders:  Scheduled Medications:  amLODIPine, 5 mg, Oral, Daily  apixaban, 5 mg, Oral, BID  atorvastatin, 40 mg, Oral, Daily  fluticasone, 1 spray, Each Nare, Daily  furosemide, 40 mg, Intravenous, BID (diuretic)  ipratropium, 0 5 mg, Nebulization, TID  levalbuterol, 1 25 mg, Nebulization, TID  metoprolol succinate, 100 mg, Oral, Daily  multivitamin stress formula, 1 tablet, Oral, Daily  polyethylene glycol, 17 g, Oral, Daily      Continuous IV Infusions:     PRN Meds:  acetaminophen, 650 mg, Oral, Q6H PRN  albuterol, 2 5 mg, Nebulization, Q6H PRN -x 1 1/31  ondansetron, 4 mg, Intravenous, Q6H PRN    Tele  OOB as rolando   [address] [facility] oxygen   Echo  IP CONSULT TO PULMONOLOGY  IP CONSULT TO CARDIOLOGY    Network Utilization Review Department  ATTENTION: Please call with any questions or concerns to [phone] and carefully listen to the prompts so that you are directed to the right person  All voicemails are confidential   Rebekah Grand all requests for admission clinical reviews, approved or denied determinations and any other requests to dedicated fax number below belonging to the campus where the patient is receiving treatment   List of dedicated fax numbers for the Facilities:  [address] DENIALS (Administrative/Medical Necessity) [phone]   [address] (Maternity/NICU/Pediatrics) [phone]   [address] [address] [address] [address], [facility] [id number] [address] Enedina [address] [place] [address] [phone]

## 2022-01-31 NOTE — ASSESSMENT & PLAN NOTE
· Conitntosin IV lasix  · Check updated echo  · Monitor I's and O's  · Daily weights  · Cardiology consult

## 2022-01-31 NOTE — ASSESSMENT & PLAN NOTE
· Severe COPD without acute exacerbation  · Continue respiratory treatments  · Respiratory protocol  · Pulmonary input appreciated

## 2022-01-31 NOTE — CONSULTS
Consult - Cardiology   Peter Bowens 67 y o  male MRN: [id number]  Unit/Bed#: E4 -01 Encounter: [id number]        Reason For Consult:  Acute pulmonary edema, acute on chronic respiratory failure            ASSESSMENT:  Acute on chronic hypoxic respiratory failure on 3 L O2 chronically + 8 L O2 upon ambulation  Acute diastolic congestive heart failure     - LVEF 10%, grade 1 diastolic dysfunction, normal RV size and function, mild biatrial dilatation, mild MR, December 2021  Paroxysmal atrial fibrillation    - newly discovered during office visit on 1/12/22  - OP AV blocking Rx, Toprol  mg daily  - anticoagulation with Eliquis 5 mg BID  Nonsustained ventricular tachycardia              - 21 beat run of NSVT prior hospitalization in the setting of acute exacerbation of COPD, pulmonary embolism with hypoxia               - OP Rx,Toprol  mg daily  Pulmonary embolism, December 2021              - Rx, initially required Eliquis 10 mg BID for 7 days with transition to 5 mg BID on 1/7/22  Ectasia of descending thoracic aorta, December 2021  Hypertension              - OP Rx, Toprol  mg daily, benazepril 40 mg daily  Dyslipidemia              - Rx, atorvastatin 40 mg daily  - most recent lipid panel 10/5/21: Cholesterol 158, triglycerides 67, HDL 85, LDL 59       Other:  - COPD  - history of CVA      PLAN/ DISCUSSION:     · Chest x-ray upon admission revealed severe pulmonary emphysema  Multifocal infiltrates superimposed on diffuse chronic changes, bilateral pleural effusions  · CTA revealed no pulmonary embolus  New minimal multi lobular patchy consolidation possibly representing pneumonia  3 5 cm right apical masslike opacity minimally enlarged since December 2021  Stable descending intrathoracic aortic aneurysm maximum diameter 4 3 cm  Partially visualized infrarenal abdominal aortic aneurysm maximum diameter 5 6 cm     · NT pro BNP 1280; S/P furosemide 40 mg IV upon admission  Currently on furosemide 40 mg IV BID, continue same for today and monitor response  · Currently on amlodipine 5 mg daily + Toprol  mg daily, continue same  Agree to hold ACE-inhibitor in light of diuresis and fluctuant blood pressures  · Given prior atrial fibrillation + NSVT, will load with amiodarone 200 mg TID for 7 days with amiodarone 200 mg daily to follow  · Repeat limited echocardiogram ordered to assess cardiac structure and function  · Continue atorvastatin 40 mg daily  · Monitor intake/output, daily weight  · Monitor renal function and electrolytes closely; maintain potassium > 4, magnesium > 2  History Of Present Illness:  25-year-old male presented to [facility] with increased shortness of breath  Per patient, he has been having shortness of breath for the past month but it has been progressively getting worse prompting evaluation  Upon assessment and evaluation, patient is resting in bed  He continues to have significant shortness of breath  He denies chest pain, palpitations, dizziness, lightheadedness  During conversation, he is unsure of his wishes  To note, patient was recently admitted at the end of December with pneumonia and COPD exacerbation  Please see significant cardiac history and problems enumerated above  Patient was last seen on 1/13/22 by Cardiology CRNP  During visit, patient was hypotensive with blood pressure of 100/60; he was asymptomatic  He had seen his primary care physician earlier in the day with a blood pressure of 96/52  Patient did not wish to seek medical attention for this  Amlodipine 5 mg daily discontinued in light of hypotension and patient was in agreeable to check blood pressures twice daily to ensure stability  Additionally, ECG during office visit revealed newly discovered atrial fibrillation  Patient was on Eliquis and AV blocking medications and these were continued   Plan was for patient to follow-up with his primary Cardiologist, Dr Ladon Duverney within one month  Past Medical History:        Past Medical History:   Diagnosis Date    Abnormal CT scan 12/27/2021    [facility] 12/27/2021    Community acquired pneumonia 12/27/2021    [facility])     Elevated troponin 12/27/2021    Hypertension     Pulmonary embolism (HCC)     [facility] ) 12/27/2021      Past Surgical History:   Procedure Laterality Date    HERNIA REPAIR          Allergy:        No Known Allergies    Medications:       Prior to Admission medications    Medication Sig Start Date End Date Taking? Authorizing Provider   albuterol (2 5 mg/3 mL) 0 083 % nebulizer solution Take 3 mL (2 5 mg total) by nebulization every 6 (six) hours as needed for wheezing or shortness of breath 1/6/22  Yes Mulu Morataya MD   albuterol (PROVENTIL HFA,VENTOLIN HFA) 90 mcg/act inhaler INHALE 2 PUFFS BY MOUTH AND INTO THE LUNGS EVERY 6 HOURS IF NEEDED FOR WHEEZING 1/26/22  Yes Mulu Morataya MD   amLODIPine (NORVASC) 5 mg tablet take 1 tablet by mouth once daily 10/8/21  Yes Pricila Fisher PA-C   apixaban (ELIQUIS) 5 mg Take 2 tablets (10 mg total) by mouth 2 (two) times a day for 7 days, THEN 1 tablet (5 mg total) 2 (two) times a day for 23 days   12/31/21 1/30/22 Yes Fito Najera MD   Ascorbic Acid (VITAMIN C) 1000 MG tablet Take 1,000 mg by mouth daily   Yes Historical Provider, MD   atorvastatin (LIPITOR) 40 mg tablet take 1 tablet by mouth once daily 11/2/21  Yes Brock Mehta DO   benazepril (LOTENSIN) 40 MG tablet take 1 tablet by mouth once daily 10/8/21  Yes Pricila Fisher PA-C   Cholecalciferol (VITAMIN D3) 5000 units CAPS Take 1,000 Units by mouth daily   Yes Historical Provider, MD   cyanocobalamin (VITAMIN B-12) 100 mcg tablet Take by mouth daily   Yes Historical Provider, MD   folic acid (FOLVITE) 1 mg tablet Take by mouth daily   Yes Historical Provider, MD   metoprolol succinate (TOPROL-XL) 100 mg 24 hr tablet take 1 tablet by mouth once daily 10/8/21  Yes Fernando Delgadillo PA-C   multivitamin [facility]) TABS Take 1 tablet by mouth daily   Yes Historical Provider, MD   Omega-3 Fatty Acids (FISH OIL) 1,000 mg Take 1,000 mg by mouth daily   Yes Historical Provider, MD   umeclidinium-vilanterol (Anoro Ellipta) 62 5-25 MCG/INH inhaler Inhale 1 puff daily 22 Yes Prosper Jorge MD   vitamin E 100 UNIT capsule Take 100 Units by mouth daily   Yes Historical Provider, MD   famotidine (PEPCID) 20 mg tablet Take 1 tablet (20 mg total) by mouth daily  Patient not taking: Reported on 2022  Jane Ni MD   ipratropium (ATROVENT) 0 03 % nasal spray 2 sprays into each nostril every 12 (twelve) hours 22   Prosper Jorge MD       Family History:     Family History   Problem Relation Age of Onset    Lung cancer Father         Social History:       Social History     Socioeconomic History    Marital status: /Civil Union     Spouse name: None    Number of children: None    Years of education: None    Highest education level: None   Occupational History    Occupation: Retired   Tobacco Use    Smoking status: Former Smoker     Packs/day: 2 00     Years: 49 00     Pack years: 98 00     Types: Cigarettes     Start date:      Quit date:      Years since quittin 0    Smokeless tobacco: Former User    Tobacco comment: No secondhand smoke exposure   Vaping Use    Vaping Use: Never used   Substance and Sexual Activity    Alcohol use: Yes     Comment: Social drinker; Daily alcohol use per [employer]    Drug use: Not Currently    Sexual activity: Not Currently   Other Topics Concern    None   Social History Narrative    Single per [employer]     Social Determinants of Health     Financial Resource Strain: Not on file   Food Insecurity: Not on file   Transportation Needs: Not on file   Physical Activity: Not on file   Stress: Not on file Social Connections: Not on file   Intimate Partner Violence: Not on file   Housing Stability: Not on file       ROS:  Negative except otherwise aforementioned above  Remainder review of systems is negative    Exam:  General: awake, alert, ill-appearing, acute distress with shortness of breath   Head: Normocephalic, atraumatic  Eyes:  EOMI  Pupils - equal, round, reactive to accomodation  No icterus  Normal Conjunctiva  Oropharynx: moist and normal-appearing mucosa  Neck: supple, symmetrical, trachea midline   Heart: irregular rate, regular rhythm  Respiratory effort / Chest Inspection: labored, remains on supplemental oxygen   Lungs: diminished lung sounds, no overt rales  Abdomen: flat, normal findings: bowel sounds normal and soft, non-tender  Lower Limbs: bilateral lower extremity pitting edema     DATA:      ECG:                       Telemetry: sinus tachycardia with PVCs          Echocardiogram completed on 12/28/21:         Left Ventricle: Left ventricular cavity size is normal  The left ventricular ejection fraction is 65%  Systolic function is normal  Wall motion is normal  Diastolic function is mildly abnormal, consistent with grade I (abnormal) relaxation    Right Ventricle: Right ventricular cavity size is normal  Systolic function is normal     Left Atrium: The atrium is mildly dilated    Right Atrium: The atrium is mildly dilated    Mitral Valve: There is mild regurgitation  Weights: Wt Readings from Last 3 Encounters:   01/31/22 64 8 kg (142 lb 13 7 oz)   01/12/22 71 7 kg (158 lb)   01/12/22 71 7 kg (158 lb)   , Body mass index is 22 37 kg/m²           Lab Studies:             Results from last 7 days   Lab Units 01/31/22  0546 01/30/22  1018   WBC Thousand/uL 5 41 6 67   HEMOGLOBIN g/dL 13 3 14 8   HEMATOCRIT % 41 3 46 0   PLATELETS Thousands/uL 218 228   ,   Results from last 7 days   Lab Units 01/31/22  0546 01/30/22  1018   POTASSIUM mmol/L 3 8 4 5   CHLORIDE mmol/L 100 102   CO2 mmol/L 32 30   BUN mg/dL 23 19   CREATININE mg/dL 1 32* 1 31*   CALCIUM mg/dL 8 5 8 8   ALK PHOS U/L  --  106   ALT U/L  --  33   AST U/L  --  25

## 2022-01-31 NOTE — PLAN OF CARE
Problem: OCCUPATIONAL THERAPY ADULT  Goal: Performs self-care activities at highest level of function for planned discharge setting  See evaluation for individualized goals  Description: Treatment Interventions: ADL retraining,Functional transfer training,UE strengthening/ROM,Endurance training,Cognitive reorientation,Patient/family training,Equipment evaluation/education,Compensatory technique education,Activityengagement,Energy conservation          See flowsheet documentation for full assessment, interventions and recommendations  Note: Limitation: Decreased UE strength,Decreased ADL status,Decreased Safe judgement during ADL,Decreased cognition,Decreased endurance,Decreased self-care trans,Decreased high-level ADLs  Prognosis: Good  Assessment: Pt is a 67 y o  male seen for OT evaluation s/p admit to [facility] on 1/30/2022 w/ Acute on chronic respiratory failure with hypoxia ([facility] ), now on 8L midflow and at home 3L-8L O2  Comorbidities affecting pt's functional performance at time of assessment include: acute on chronic CHF, COPD, abnormal chest CT, pulmonary nodule, h/o PE on 12/27/2021, h/o COVID 19 4/2021, nonsustained ventricular tachycardia  Personal factors affecting pt at time of IE include:limited home support, difficulty performing ADLS, difficulty performing IADLS  and flat affect ,lives alone and reports increased struggle caring for self and performing IADLs due to dyspnea  Prior to admission, pt was up until a week ago independent w/ dressing and bathing (reports sponge bathing this past week due to breathing issues), independent w/ functional transfers and mobility w/ no AD, independent  W/ IADLS (struggling this past week due to breathing and decreased endurance) driving   Upon evaluation: Pt requires supervision supine<>sit bed mobility, supervision sit<>stand w/ VCs for hand placement and positioning, MIN assist x1 functional mobility w/ no AD and dyspnea, setup-min assist UB ADLs, MIN-MOD assist LB ADLs 2* the following deficits impacting occupational performance: dyspnea on exertion 86% w/ activity on 8LO2 w/ cues for pursed lip breathing and rest break improved to 91% on 8L midflow, impaired balance, decreased strength and endurance, Poor + 10 minute activity tolerance  Pt to benefit from continued skilled OT tx while in the hospital to address deficits as defined above and maximize level of functional independence w ADL's and functional mobility  Occupational Performance areas to address include: grooming, bathing/shower, toilet hygiene, dressing, health maintenance, functional mobility, clothing management, cleaning and meal prep, EC education  From OT standpoint, recommendation at time of d/c would be short term rehab w/ potential LT placement due to patient living alone and reports difficulty caring for self and performing IADLs  The patient's raw score on the AM-PAC Daily Activity inpatient short form is 18, standardized score is 38 66, less than 39 4  Patients at this level are likely to benefit from discharge to post-acute rehabilitation services  Please refer to the recommendation of the Occupational Therapist for safe discharge planning       OT Discharge Recommendation: Post acute rehabilitation services (w/ potential LT placement)  OT - OK to Discharge:  (when medically stable)

## 2022-01-31 NOTE — ASSESSMENT & PLAN NOTE
· Patient with severe COPD  Normally on 3 L O2 at rest and 8 L with exertion  · Presents today with increasing shortness breath  Patient states he has been having worsening shortness of breath since [place], but has become even worse over the last week  Patient now on non-rebreather  Differential, volume overload, pneumonia, COPD exacerbation  · He was recently admitted 12/27-12/31 for pneumonia and COPD exacerbation  · Given elevated BNP and new LE edema, will treat for volume overload  · Procalcitonin normal, doubt recurrent pneumonia  Covid/flu/RSV negative  Check sputum culture  · Lung exam fairly clear,  will hold on starting steroids for now     · Check updated echocardiogram   · Continue IV lasix  · Pulmonary and Cardiology consult appreciated  · No PE on CTA chest

## 2022-01-31 NOTE — PLAN OF CARE
Problem: PHYSICAL THERAPY ADULT  Goal: Performs mobility at highest level of function for planned discharge setting  See evaluation for individualized goals  Description: Treatment/Interventions: Functional transfer training,LE strengthening/ROM,Elevations,Therapeutic exercise,Endurance training,Patient/family training,Equipment eval/education,Bed mobility,Gait training,Compensatory technique education,Continued evaluation,Spoke to nursing,OT  Equipment Recommended:  (monitor)       See flowsheet documentation for full assessment, interventions and recommendations  Note: Prognosis: Fair  Problem List: Decreased endurance,Impaired balance,Decreased mobility,Decreased strength (cardiopulmonary)  Assessment: Grecia Moscoso is a 67 y o   male who presented to [place] [address]  with complaints of shortness of breath  Patient has past medical history of COPD, PE,CVA, CHF, HTN, dyslipidemia, nonsustained V-tach, and pneumonia  Patient was recently discharged on 12/31/2022 for 5 day treatment of community-acquired pneumonia, NSVT, and PE  Admitted with acute respiratory failure related to acute CHF and COPD  PT consulted  Activity as tolerated orders  Prior to admission resides alone in multistory home with 4 levels and 4 steps between each level  Independent without AD prior to admission  Progressive decline in functional mobility and activity tolerance for ADL performance  Limited local support  2 children approx 45 minutes away  Son visits weekly, otherwise reports limited support  Was driving upon until 1 week ago  Reports use of O2 at 8 L at home  Currently presents with functional limitations related to decreased activity tolerance and hypoxia, decreased functional mobility, balance, generalized strength and locomotion  Pacing with all mobility needed  O2 sats on 8L ranges from 83-91%   Supine to sit drops to 84%, seated EOB 5 minutes prior to transition OOB to chair with improvement in saturations to 91%  Is S for bed mobility and transfers, but Cecilia to ambulate few feet OOB to chair without AD use  Limited by ROSALES  May benefit from trial of rollator walker for energy conservation purposes  Given impairments with functional decline noted will benefit from skilled PT in order to optimize outcomes  The patient's AM-PAC Basic Mobility Inpatient Short Form Raw Score is 20  A Raw score of greater than 16 suggests the patient may benefit from discharge to home  Please also refer to the recommendation of the Physical Therapist for safe discharge planning  Despite AM PAC score, as [facility], progressive functional decline with difficulty in managing ADLS and IADLs and need for Cecilia for limited ambulation, will benefit from STR in order to optimize outcomes  Barriers to Discharge: Inaccessible home environment,Decreased caregiver support  Barriers to Discharge Comments: lives alone in [facility]  PT Discharge Recommendation: Post acute rehabilitation services          See flowsheet documentation for full assessment

## 2022-01-31 NOTE — CASE MANAGEMENT
Case Management Assessment & Discharge Planning Note    Patient name Molly Ledezma  Location East 4 /E4 MS 80-* MRN [id number]  : 1945 Date 2022       Current Admission Date: 2022  Current Admission Diagnosis:Acute on chronic respiratory failure with hypoxia [facility])   Patient Active Problem List    Diagnosis Date Noted    Volume overload 2022    Lung nodule seen on imaging study 2022    NSVT (nonsustained ventricular tachycardia) ([address] ) 2021    Pulmonary embolism ([address] ) 2021    Chronic obstructive pulmonary disease ([address] ) 2021    Aortic ectasia, thoracic ([address] ) 2021    Acute on chronic respiratory failure with hypoxia ([facility] [address] [facility] ) 2021    COVID-19 determined by clinical diagnostic criteria 2021    Vitamin D deficiency 10/05/2017    Expressive aphasia 2016    Actinic keratosis 2013    Stenosis of carotid artery 10/29/2013    Hyperglycemia 10/25/2012    Cerebral infarction ([address] ) 10/24/2012    Hyperlipidemia 10/24/2012    Hypertension 10/24/2012    Macular degeneration 10/24/2012      LOS (days): 1  Geometric Mean LOS (GMLOS) (days):   Days to GMLOS:     OBJECTIVE:  PATIENT READMITTED TO HOSPITAL  Risk of Unplanned Readmission Score: 14         Current admission status: Inpatient  Referral Reason:  (Discharge Planning)    Preferred [facility]acy:   [facility] [address] [address]  Phone: [phone] Fax: [phone]    Primary Care Provider: Ronnie Casanova PA-C    Primary Insurance: [facility]  Secondary Insurance:     ASSESSMENT:  Active Health Care Agents    There are no active Health Care Agents on file           Readmission Root Cause  30 Day Readmission: Yes  Who directed you to return to the hospital?: Agustin Walsh  Did you understand whom to contact if you had questions or problems?: Yes  Did you get your prescriptions before you left the hospital?: Yes  Were you able to get your prescriptions filled when you left the hospital?: Yes  Did you take your medications as prescribed?: Yes  During previous admission, was a post-acute recommendation made?: Yes  What post-acute resources were offered?: STR,Offered, but declined  Patient was readmitted due to: Acute on Chronic Resp Failure w/ Hypoxia / Respiratory protocol- 3L at rest & 8L on exertion O2 NC  Action Plan: Pulm-Cards consults-Revisit STR perhaps Hospice? Son in agreement w/ hopsice but Pt not approached w/ comfort care yet  Patient Information  Admitted from[de-identified] Home  Mental Status: Other (Comment) (Sleeping   As per son Pt is A&O when awake)  During Assessment patient was accompanied by: Not accompanied during assessment  Assessment information provided by[de-identified] Son (CM spoke w/ son Mickey Swain via telephone)  Primary Caregiver: Self  Caregiver's Name[de-identified] Ericka Robert (Son) [phone] Francisca Louder)  Caregiver's Relationship to Patient[de-identified] Family Member  Caregiver's Telephone Number[de-identified] Ericka Robert [facility]) [phone] (V)  Support Systems: [facility] of Residence: [address] do you live in?: [facility]  Type of Current Residence: [address] home  Upon entering residence, is there a bedroom on the main floor (no further steps)?: No  A bedroom is located on the following floor levels of residence (select all that apply):: 2nd Floor  Upon entering residence, is there a bathroom on the main floor (no further steps)?: Yes  Number of steps to 2nd floor from main floor: One Flight  In the last 12 months, was there a time when you were not able to pay the mortgage or rent on time?: No  In the last 12 months, how many places have you lived?: 1  In the last 12 months, was there a time when you did not have a steady place to sleep or slept in a shelter (including now)?: No  Homeless/housing insecurity resource given?: N/A  Living Arrangements: Lives Alone  Is patient a ?: Yes (NAVY)  Is patient active with [facility])?:  (TBD-son is not certain, but believes Pt is connected w/ the [place])    Activities of Daily Living Prior to Admission  Functional Status: Independent  Completes ADLs independently?: Yes  Ambulates independently?: Yes  Does patient have a history of HHC?: Yes ([facility])  Does patient currently have [address]?: Yes ([facility] for SN services only)    Current Home Health Care  Type of Current Home Care Services: Nurse visit  [address] [facility] [address][de-identified] [address] [facility] Provider[de-identified] PCP    Patient Information Continued  Income Source: Pension/assisted  Does patient have prescription coverage?: Yes  Within the past 12 months, you worried that your food would run out before you got the money to buy more : Never true  Within the past 12 months, the food you bought just didnt last and you didnt have money to get more : Never true  Food insecurity resource given?: N/A  Does patient receive dialysis treatments?: No  Does patient have a history of substance abuse?: No  Does patient have a history of Mental Health Diagnosis?: No    Means of Transportation  Means of Transport to Appts[de-identified] Drives Self  In the past 12 months, has lack of transportation kept you from medical appointments or from getting medications?: No  In the past 12 months, has lack of transportation kept you from meetings, work, or from getting things needed for daily living?: No  Was application for public transport provided?: N/A        DISCHARGE DETAILS:    Discharge planning discussed with[de-identified] China Garvey (Son) [phone] (M)  Freedom of Choice: Yes     Comments - Freedom of Choice: CM spoke with Pts son Donnie Sanders to begin discharge plan discussion and to substitute for Pt as Pt was sleeping at time of this assessment  Son reports that his father was recommended to discharge to STR the previous hopsital stay just one month ago  Pt declined STR however was agreeable to [facility]    As per Son Pt did  not allow PT or OT to come more than one time, and reduced SN from 2x/wk to 1x/wk  As per son-Pt lives alone in a 2 story home but has not been able to climb stairs to the 2nd flr more than 1x a day to take medications  Son states that Pt reports Pt must sit 20 minutes to catch breath prior to returning down stairs  As a result Pt reports to his son that Pt does not eat and spends most of his time on the couch on the first flr  Son states his father has lost a significant amount of weight as a result  Son states his father is refusing many services as a defense to accepting physical decline and inability to complete ADLs without using a significant amount of energy  Son also identifies his fathers concerns with spending money of his own-only accepting services that his insurance will cover  Son acknowledges the logic-but states his fathers loss of valuable and much needed interventions as a result  I e  caregivers in the home  CM presented possible outcomes for discharge  STR (as recommended by the therapy team), Home w/ [address]  or home w/ Hospice-to allow Pt to live the way he chooses yet have his Sx better managed and his medications monitored more closely-an active presence of Nursing and HHA services  CM discussed the limitation of Hospice-bringing up the topic of hiring additional caregivers  Or accessing (albeit limited) HHA services through the [facility] or the [place]  Son acknowledged these services and the limited availability-and negative impact of COVID 19 on the [address]  SON REQUESTS that the CM team speak with his father about these things and asking what Pt would prefer  Son is hopeful his father will agree to STR, but if not-Son expressed his hope that his father would choose Hospice-for the benefits this service provides-as stated By son Bob Durand reports he appreciates regular updates -but cannot accept calls until 0, as son is a teacher and unavailable from [phone]      DONNY is ongoing-    Referral sent to [facility] for [address] [facility]STEO, in the event this might be Pts choice  CM will continue to follow  CM sent TT to CARDS to forward son's request for regular updates  CM contacted family/caregiver?: Yes  Were Treatment Team discharge recommendations reviewed with patient/caregiver?: Yes  Did patient/caregiver verbalize understanding of patient care needs?: Yes    Contacts  Patient Contacts: Tan Kennedy (Son) [phone] (M)  Relationship to Patient[de-identified] Family  Contact Method: Phone  Phone Number: Tan Kennedy [facility]) [phone] Eri Ralph  Reason/Outcome: Continuity of [address]         Is the patient interested in [address] at discharge?: Yes (For ARISTEO-IF STR IS DECLINED)  [address] requested[de-identified] [address] Name[de-identified] [address] (for [facility])  [address] Provider[de-identified] PCP  Home Health Services Needed[de-identified] COPD Management,Evaluate Functional Status and Safety,Oxygen Via Nasal Cannula,Other (comment) (Medical Management)  Oxygen LPM Ordered (if applicable based on home health services needed):: 3 LPM (3L at rest; 8L on exertion)  Homebound Criteria Met[de-identified] Immunosuppressed,Requires the Assistance of Another Person for Safe Ambulation or to Leave the Home  Supporting Clincal Findings[de-identified] Fatigues Easliy in Short Distances,Limited Endurance,Requires Oxygen    DME Referral Provided  Referral made for DME?: No    Other Referral/Resources/Interventions Provided:  Interventions: Hospice,Short Term Rehab  Referral Comments: CM discussed possible discharge outcomes  Son in agreement with three possible outcomes  Home w/[facility];  Home w/Hospice; STR    Treatment Team Recommendation: Short Term Rehab,Home with [address]

## 2022-01-31 NOTE — PROGRESS NOTES
[address] [facility]  Progress Note - Cortney Ordonez 1945, 67 y o  male MRN: [id number]  Unit/Bed#: E4 -01 Encounter: [id number]  Primary Care Provider: Stephan Maria PA-C   Date and time admitted to hospital: 1/30/2022  9:13 AM    * Acute on chronic respiratory failure with hypoxia [facility])  Assessment & Plan  · Patient with severe COPD  Normally on 3 L O2 at rest and 8 L with exertion  · Presents today with increasing shortness breath  Patient states he has been having worsening shortness of breath since [place], but has become even worse over the last week  Patient now on non-rebreather  Differential, volume overload, pneumonia, COPD exacerbation  · He was recently admitted 12/27-12/31 for pneumonia and COPD exacerbation  · Given elevated BNP and new LE edema, will treat for volume overload  · Procalcitonin normal, doubt recurrent pneumonia  Covid/flu/RSV negative  Check sputum culture  · Lung exam fairly clear,  will hold on starting steroids for now  · Check updated echocardiogram   · Continue IV lasix  · Pulmonary and Cardiology consult appreciated  · No PE on CTA chest     Volume overload  Assessment & Plan  · Conitnue IV lasix  · Check updated echo  · Monitor I's and O's  · Daily weights  · Cardiology consult    Chronic obstructive pulmonary disease (HCC)  Assessment & Plan  · Severe COPD without acute exacerbation  · Continue respiratory treatments  · Respiratory protocol  · Pulmonary input appreciated    Pulmonary embolism (HCC)  Assessment & Plan  · Continue Eliquis    NSVT (nonsustained ventricular tachycardia) ([facility])  Assessment & Plan  · Continue metoprolol  · Monitor on tele - r/o arrhythmia - no events noted thus far    Aortic ectasia, thoracic (HCC)  Assessment & Plan  · Stable   · Outpatient thoracic follow up          VTE Pharmacologic Prophylaxis: VTE Score: 9 High Risk (Score >/= 5) - Pharmacological DVT Prophylaxis Ordered: apixaban (Eliquis)   Sequential Compression Devices Ordered  Patient Centered Rounds: I performed bedside rounds with nursing staff today  Discussions with Specialists or Other Care Team Provider: Pulmonary, case management    Education and Discussions with Family / Patient: Attempted to update  (son) via phone  Left voicemail  Time Spent for Care: 30 minutes  More than 50% of total time spent on counseling and coordination of care as described above  Current Length of Stay: 1 day(s)  Current Patient Status: Inpatient   Certification Statement: The patient will continue to require additional inpatient hospital stay due to IV lasix  Discharge Plan: Anticipate discharge in 48-72 hrs to discharge location to be determined pending rehab evaluations  Code Status: Level 3 - DNAR and DNI    Subjective:   Feels about the same  Still with significant ROSALES  Appears more comfortable at rest today  Objective:     Vitals:   Temp (24hrs), Av 4 °F (36 3 °C), Min:96 7 °F (35 9 °C), Max:98 2 °F (36 8 °C)    Temp:  [96 7 °F (35 9 °C)-98 2 °F (36 8 °C)] 97 3 °F (36 3 °C)  HR:  [] 70  Resp:  [20-24] 20  BP: ()/() 142/79  SpO2:  [80 %-100 %] 100 %  Body mass index is 22 37 kg/m²  Input and Output Summary (last 24 hours): Intake/Output Summary (Last 24 hours) at 2022 0831  Last data filed at 2022 0601  Gross per 24 hour   Intake 650 ml   Output 1550 ml   Net -900 ml       Physical Exam:   Physical Exam  Vitals and nursing note reviewed  Constitutional:       Appearance: He is well-developed  HENT:      Head: Normocephalic and atraumatic  Eyes:      Conjunctiva/sclera: Conjunctivae normal    Cardiovascular:      Rate and Rhythm: Normal rate and regular rhythm  Heart sounds: No murmur heard  Comments: Bilateral lower extremity edema  Pulmonary:      Effort: Pulmonary effort is normal  No respiratory distress  Breath sounds: Normal breath sounds     Abdominal:      Palpations: Abdomen is soft  Tenderness: There is no abdominal tenderness  Musculoskeletal:      Cervical back: Neck supple  Skin:     General: Skin is warm and dry  Neurological:      Mental Status: He is alert  Additional Data:     Labs:  Results from last 7 days   Lab Units 01/31/22  0546   WBC Thousand/uL 5 41   HEMOGLOBIN g/dL 13 3   HEMATOCRIT % 41 3   PLATELETS Thousands/uL 218   NEUTROS PCT % 64   LYMPHS PCT % 13*   MONOS PCT % 17*   EOS PCT % 5     Results from last 7 days   Lab Units 01/31/22  0546 01/30/22  1018 01/30/22  1018   SODIUM mmol/L 140   < > 142   POTASSIUM mmol/L 3 8   < > 4 5   CHLORIDE mmol/L 100   < > 102   CO2 mmol/L 32   < > 30   BUN mg/dL 23   < > 19   CREATININE mg/dL 1 32*   < > 1 31*   ANION GAP mmol/L 8   < > 10   CALCIUM mg/dL 8 5   < > 8 8   ALBUMIN g/dL  --   --  2 8*   TOTAL BILIRUBIN mg/dL  --   --  0 67   ALK PHOS U/L  --   --  106   ALT U/L  --   --  33   AST U/L  --   --  25   GLUCOSE RANDOM mg/dL 96   < > 126    < > = values in this interval not displayed       Results from last 7 days   Lab Units 01/30/22  1018   INR  1 15             Results from last 7 days   Lab Units 01/31/22  0546 01/30/22  1018   LACTIC ACID mmol/L  --  1 7   PROCALCITONIN ng/ml 0 17  --        Lines/Drains:  Invasive Devices  Report    Peripheral Intravenous Line            Peripheral IV 01/30/22 Left Forearm <1 day    Peripheral IV 01/30/22 Right Antecubital <1 day                  Telemetry:  Telemetry Orders (From admission, onward)             48 Hour Telemetry Monitoring  Continuous x 48 hours        References:    Telemetry Guidelines   Question:  Reason for 48 Hour Telemetry  Answer:  Acute MI, chest pain - R/O MI, or unstable angina                 Telemetry Reviewed: Normal Sinus Rhythm  Indication for Continued Telemetry Use: Arrthymias requiring medical therapy             Imaging: Reviewed radiology reports from this admission including: chest CT scan    Recent Cultures (last 7 days):   Results from last 7 days   Lab Units 01/30/22  1222   BLOOD CULTURE  Received in Microbiology Lab  Culture in Progress  Received in Microbiology Lab  Culture in Progress  Last 24 Hours Medication List:   Current Facility-Administered Medications   Medication Dose Route Frequency Provider Last Rate    acetaminophen  650 mg Oral Q6H PRN Ana Ruth PA-C      albuterol  2 5 mg Nebulization Q6H PRN Ana Ruth PA-C      amLODIPine  5 mg Oral Daily Radha Aguirre PA-C      apixaban  5 mg Oral BID Radha Aguirre PA-C      atorvastatin  40 mg Oral Daily Radha Aguirre PA-C      fluticasone  1 spray Each Nare Daily Zara Parrish      furosemide  40 mg Intravenous BID (diuretic) Sharyn José DO      metoprolol succinate  100 mg Oral Daily Ana Ruth PA-C      multivitamin stress formula  1 tablet Oral Daily Ana Ruth PA-C      ondansetron  4 mg Intravenous Q6H PRN Ana Ruth PA-C      polyethylene glycol  17 g Oral Daily Radha Aguirre PA-C      potassium chloride  40 mEq Oral Once Radha Aguirre PA-C      umeclidinium-vilanterol  1 puff Inhalation Daily Ana Ruth PA-C          Today, Patient Was Seen By: Ana Ruth PA-C    **Please Note: This note may have been constructed using a voice recognition system  **

## 2022-01-31 NOTE — PLAN OF CARE
Problem: PAIN - ADULT  Goal: Verbalizes/displays adequate comfort level or baseline comfort level  Description: Interventions:  - Encourage patient to monitor pain and request assistance  - Assess pain using appropriate pain scale  - Administer analgesics based on type and severity of pain and evaluate response  - Implement non-pharmacological measures as appropriate and evaluate response  - Consider cultural and social influences on pain and pain management  - Notify physician/advanced practitioner if interventions unsuccessful or patient reports new pain  Outcome: Progressing     Problem: SAFETY ADULT  Goal: Patient will remain free of falls  Description: INTERVENTIONS:  - Educate patient/family on patient safety including physical limitations  - Instruct patient to call for assistance with activity   - Consult OT/PT to assist with strengthening/mobility   - Keep Call bell within reach  - Keep bed low and locked with side rails adjusted as appropriate  - Keep care items and personal belongings within reach  - Initiate and maintain comfort rounds  - Make Fall Risk Sign visible to staff  - Offer Toileting every 2 Hours, in advance of need  - Initiate/Maintain bed alarm  - Obtain necessary fall risk management equipment  - Apply yellow socks and bracelet for high fall risk patients  - Consider moving patient to room near nurses station  Outcome: Progressing  Goal: Maintain or return to baseline ADL function  Description: INTERVENTIONS:  -  Assess patient's ability to carry out ADLs; assess patient's baseline for ADL function and identify physical deficits which impact ability to perform ADLs (bathing, care of mouth/teeth, toileting, grooming, dressing, etc )  - Assess/evaluate cause of self-care deficits   - Assess range of motion  - Assess patient's mobility; develop plan if impaired  - Assess patient's need for assistive devices and provide as appropriate  - Encourage maximum independence but intervene and supervise when necessary  - Involve family in performance of ADLs  - Assess for home care needs following discharge   - Consider OT consult to assist with ADL evaluation and planning for discharge  - Provide patient education as appropriate  Outcome: Progressing  Goal: Maintains/Returns to pre admission functional level  Description: INTERVENTIONS:  - Perform BMAT or MOVE assessment daily    - Set and communicate daily mobility goal to care team and patient/family/caregiver  - Collaborate with rehabilitation services on mobility goals if consulted  - Ambulate patient 3 times a day  - Out of bed to chair 3 times a day   - Out of bed for meals 3 times a day  - Out of bed for toileting  - Record patient progress and toleration of activity level   Outcome: Progressing     Problem: DISCHARGE PLANNING  Goal: Discharge to home or other facility with appropriate resources  Description: INTERVENTIONS:  - Identify barriers to discharge w/patient and caregiver  - Arrange for needed discharge resources and transportation as appropriate  - Identify discharge learning needs (meds, wound care, etc )  - Refer to Case Management Department for coordinating discharge planning if the patient needs post-hospital services based on physician/advanced practitioner order or complex needs related to functional status, cognitive ability, or social support system  Outcome: Progressing     Problem: Knowledge Deficit  Goal: Patient/family/caregiver demonstrates understanding of disease process, treatment plan, medications, and discharge instructions  Description: Complete learning assessment and assess knowledge base    Interventions:  - Provide teaching at level of understanding  - Provide teaching via preferred learning methods  Outcome: Progressing     Problem: Potential for Falls  Goal: Patient will remain free of falls  Description: INTERVENTIONS:  - Educate patient/family on patient safety including physical limitations  - Instruct patient to call for assistance with activity   - Consult OT/PT to assist with strengthening/mobility   - Keep Call bell within reach  - Keep bed low and locked with side rails adjusted as appropriate  - Keep care items and personal belongings within reach  - Initiate and maintain comfort rounds  - Make Fall Risk Sign visible to staff  - Offer Toileting every 2 Hours, in advance of need  - Initiate/Maintain bed alarm  - Obtain necessary fall risk management equipment  - Apply yellow socks and bracelet for high fall risk patients  - Consider moving patient to room near nurses station  Outcome: Progressing     Problem: MOBILITY - ADULT  Goal: Maintain or return to baseline ADL function  Description: INTERVENTIONS:  -  Assess patient's ability to carry out ADLs; assess patient's baseline for ADL function and identify physical deficits which impact ability to perform ADLs (bathing, care of mouth/teeth, toileting, grooming, dressing, etc )  - Assess/evaluate cause of self-care deficits   - Assess range of motion  - Assess patient's mobility; develop plan if impaired  - Assess patient's need for assistive devices and provide as appropriate  - Encourage maximum independence but intervene and supervise when necessary  - Involve family in performance of ADLs  - Assess for home care needs following discharge   - Consider OT consult to assist with ADL evaluation and planning for discharge  - Provide patient education as appropriate  Outcome: Progressing  Goal: Maintains/Returns to pre admission functional level  Description: INTERVENTIONS:  - Perform BMAT or MOVE assessment daily    - Set and communicate daily mobility goal to care team and patient/family/caregiver     - Collaborate with rehabilitation services on mobility goals if consulted  - Ambulate patient 3 times a day  - Out of bed to chair 3 times a day   - Out of bed for meals 3 times a day  - Out of bed for toileting  - Record patient progress and toleration of activity level   Outcome: Progressing     Problem: RESPIRATORY - ADULT  Goal: Achieves optimal ventilation and oxygenation  Description: INTERVENTIONS:  - Assess for changes in respiratory status  - Assess for changes in mentation and behavior  - Position to facilitate oxygenation and minimize respiratory effort  - Oxygen administered by appropriate delivery if ordered  - Initiate smoking cessation education as indicated  - Encourage broncho-pulmonary hygiene including cough, deep breathe, Incentive Spirometry  - Assess the need for suctioning and aspirate as needed  - Assess and instruct to report SOB or any respiratory difficulty  - Respiratory Therapy support as indicated  Outcome: Progressing     Problem: METABOLIC, FLUID AND ELECTROLYTES - ADULT  Goal: Electrolytes maintained within normal limits  Description: INTERVENTIONS:  - Monitor labs and assess patient for signs and symptoms of electrolyte imbalances  - Administer electrolyte replacement as ordered  - Monitor response to electrolyte replacements, including repeat lab results as appropriate  - Instruct patient on fluid and nutrition as appropriate  Outcome: Progressing  Goal: Fluid balance maintained  Description: INTERVENTIONS:  - Monitor labs   - Monitor I/O and WT  - Instruct patient on fluid and nutrition as appropriate  - Assess for signs & symptoms of volume excess or deficit  Outcome: Progressing

## 2022-01-31 NOTE — OCCUPATIONAL THERAPY NOTE
Occupational Therapy Evaluation     Patient Name: Grecia Moscoso  [id number] Date: 1/31/2022  Problem List  Principal Problem:    Acute on chronic respiratory failure with hypoxia [facility])  Active Problems:    Pulmonary embolism (HCC)    Chronic obstructive pulmonary disease (HCC)    Aortic ectasia, thoracic (HCC)    NSVT (nonsustained ventricular tachycardia) (HCC)    Volume overload    Past Medical History  Past Medical History:   Diagnosis Date    Abnormal CT scan 12/27/2021    [facility]) [facility] ) 12/27/2021    Community acquired pneumonia 12/27/2021    COPD (chronic obstructive pulmonary disease) ([facility] )     Elevated troponin 12/27/2021    Hypertension     Pulmonary embolism ([facility] )     [facility] 12/27/2021     Past Surgical History  Past Surgical History:   Procedure Laterality Date    HERNIA REPAIR          01/31/22 1237   OT Last Visit   OT Visit Date 01/31/22   Note Type   Note type Evaluation   Restrictions/Precautions   Weight Bearing Precautions Per Order No   Other Precautions Multiple lines;Telemetry; Fall Risk;Pain;O2  (8 L midflow O2)   Pain Assessment   Pain Assessment Tool 0-10   Pain Score No Pain   Home Living   Type of [address] [place] [address] Multi-level;1/2 bath on main level  (0 NUNU, 4Steps between level)   Bathroom Shower/Tub Tub/shower unit  (sponge bathe recently due to fatigue)   Bathroom Toilet Standard   Bathroom Equipment   (no DME)   [address]   (no DME)   Additional Comments pt reports increased difficulty caring for self and doing tasks at home and increased fatigue noted and requring frequent rest breaks   Prior Function   Level of [place] Independent with ADLs and functional mobility   Lives With Alone   Receives Help From Sakshi Knight  (son and daughter live in [place], son visits 1x/wk)   ADL Assistance Independent  (increased difficulty)   IADLs Needs assistance  (requiring assist this past week)   Falls in the last 6 months 0   Vocational Retired   Comments pt reports was driving and going out up until last week when becoming more difficult on 8LO2 at baseline, reports son visits weekly and sometimes his daughter in law has meals for him to reheat   Lifestyle   Autonomy per pt up until last week independent w/ ADLs, independent w/ functional transfers and mobility w/ no AD, independent w/ IADLs w/ increased difficulty   Reciprocal Relationships neighbors, son   Service to Others retired from [address] going [employer], enjoyed [employer] [address] "I just struggle to breathe anymore and I can't do too much"   ADL   Where [phone]  Supervision/Setup   Grooming Assistance 5  Supervision/Setup   UB Bathing Assistance 5  Supervision/Setup   LB Bathing Assistance 3  Moderate Assistance   [facility] [address] ; Requires assistive device for steadying;Steadying;Verbal cueing;Supervision/safety; Increased time to complete; Don/doff L sock; Don/doff R sock  (while in bed pulling LEs up to don/doff sock w/ dyspnea)   Toileting Assistance  4  Minimal Assistance   Functional Assistance 4  Minimal Assistance   Bed Mobility   Supine to Sit 5  Supervision   Additional items Assist x 1; Increased time required;Verbal cues;LE management;HOB elevated; Bedrails   Additional Comments increased time to complete, dyspnea w/ tasks 86% on 8L midflow O2   Transfers   Sit to Stand 5  Supervision   Additional items Assist x 1; Increased time required;Verbal cues;Armrests; Bedrails   Stand to Sit 5  Supervision   Additional items Assist x 1; Increased time required;Verbal cues;Armrests   Additional Comments cues for safety and positioning   Functional Mobility   Functional Mobility 4  Minimal assistance   Additional Comments assist x1 w/ no AD and assist line management   Additional items Rolling walker   Balance   Static Sitting Good   Dynamic Sitting Fair +   Static Standing Fair   Dynamic Standing Fair -   Ambulatory Poor +   Activity Tolerance   Activity Tolerance Patient limited by fatigue;Patient limited by pain;Treatment limited secondary to medical complications (Comment)   Medical Staff Made Aware  [facility]   Nurse Made Aware appropriate to see per RNKomal Assessment   RUE Assessment WFL  (grossly 3+/5)   LUE Assessment   LUE Assessment WFL  (grossly 3+/5)   Hand Function   Gross Motor Coordination Functional   Fine Motor Coordination Functional   Sensation   Light Touch No apparent deficits   Vision-Basic Assessment   Current Vision Wears glasses all the time   Vision - Complex Assessment   Ocular Range of Motion [facility]   Acuity Able to read clock/calendar on wall without difficulty   Perception   Inattention/Neglect Appears intact   Cognition   Overall Cognitive Status [facility]   Arousal/Participation Alert; Cooperative   Attention Within functional limits   Orientation Level Oriented to person;Oriented to place;Oriented to time  (not specific date)   Memory Decreased recall of precautions   Following Commands Follows all commands and directions without difficulty   Comments pleasant and cooperative, flat affect at times, engages in appropriate conversations   Assessment   Limitation Decreased UE strength;Decreased ADL status; Decreased Safe judgement during ADL;Decreased cognition;Decreased endurance;Decreased self-care trans;Decreased high-level ADLs   Prognosis Good   Assessment Pt is a 67 y o  male seen for OT evaluation s/p admit [facility] on 1/30/2022 w/ Acute on chronic respiratory failure with hypoxia ([facility] ), now on 8L midflow and at home 3L-8L O2  Comorbidities affecting pt's functional performance at time of assessment include: acute on chronic CHF, COPD, abnormal chest CT, pulmonary nodule, h/o PE on 12/27/2021, h/o COVID 19 4/2021, nonsustained ventricular tachycardia  Personal factors affecting pt at time of IE include:limited home support, difficulty performing ADLS, difficulty performing IADLS  and flat affect ,lives alone and reports increased struggle caring for self and performing IADLs due to dyspnea  Prior to admission, pt was up until a week ago independent w/ dressing and bathing (reports sponge bathing this past week due to breathing issues), independent w/ functional transfers and mobility w/ no AD, independent  W/ IADLS (struggling this past week due to breathing and decreased endurance) driving  Upon evaluation: Pt requires supervision supine<>sit bed mobility, supervision sit<>stand w/ VCs for hand placement and positioning, MIN assist x1 functional mobility w/ no AD and dyspnea, setup-min assist UB ADLs, MIN-MOD assist LB ADLs 2* the following deficits impacting occupational performance: dyspnea on exertion 86% w/ activity on 8LO2 w/ cues for pursed lip breathing and rest break improved to 91% on 8L midflow, impaired balance, decreased strength and endurance, Poor + 10 minute activity tolerance  Pt to benefit from continued skilled OT tx while in the hospital to address deficits as defined above and maximize level of functional independence w ADL's and functional mobility  Occupational Performance areas to address include: grooming, bathing/shower, toilet hygiene, dressing, health maintenance, functional mobility, clothing management, cleaning and meal prep, EC education  From OT standpoint, recommendation at time of d/c would be short term rehab w/ potential LT placement due to patient living alone and reports difficulty caring for self and performing IADLs  The patient's raw score on the AM-PAC Daily Activity inpatient short form is 18, standardized score is 38 66, less than 39 4  Patients at this level are likely to benefit from discharge to post-acute rehabilitation services   Please refer to the recommendation of the Occupational Therapist for safe discharge planning  Goals   Patient Goals "to find somewhere to live to get more assistance   LTG Time Frame 10-14   Long Term Goal please see below goals   Plan   Treatment Interventions ADL retraining;Functional transfer training;UE strengthening/ROM; Endurance training;Cognitive reorientation;Patient/family training;Equipment evaluation/education; Compensatory technique education; Activityengagement; Energy conservation   Goal Expiration Date 02/14/22   OT Frequency 2-3x/wk   Recommendation   OT Discharge Recommendation Post acute rehabilitation services  (w/ potential LT placement)   OT - OK to Discharge   (when medically stable)   AM-PAC Daily Activity Inpatient   Lower Body Dressing 3   Bathing 2   Toileting 3   Upper Body Dressing 3   Grooming 3   Eating 4   Daily Activity Raw Score 18   Daily Activity Standardized Score (Calc for Raw Score >=11) 38 66   AM-PAC Applied Cognition Inpatient   Following a Speech/Presentation 4   Understanding Ordinary Conversation 4   Taking Medications 4   Remembering Where Things Are Placed or Put Away 4   Remembering List of 4-5 Errands 3   Taking Care of Complicated Tasks 3   Applied Cognition Raw Score 22   Applied Cognition Standardized Score 47 83   Modified [place] Scale   Modified Valeria Scale 4     Occupational Therapy Goals to be met in 10-14 days:  1) Pt will improve activity tolerance to G for 30 min txment sessions  2) Pt will complete ADLs/self care w/ mod I   3) Pt will complete toileting w/ mod I w/ G hygiene/thoroughness using DME PRN  4) Pt will improve functional transfers on/off all surfaces using DME PRN w/ G balance/safety including toileting w/ mod I  5) Pt will improve fx'l mobility during I/ADl/leisure tasks using DME PRN w/ g balance/safety w/ mod I  6) Pt will engage in ongoing cognitive assessment w/ G participation to A w/ safe d/c planning/recommendations  7) Pt will demonstrate G carryover of pt/caregiver education and training as appropriate w/ mod I  w/ G tolerance  8) Pt will engage in depression screen/leisure interest checklist w/ G participation to monitor s/s depression and ID 3 positive coping strategies to A w/ emotional regulation and management  9) Pt will demonstrate 100% carryover of E C  techniques w/ mod I t/o fx'l I/ADL/leisure tasks w/o cues s/p skilled education  10) Pt will engage in activity configuration activity w/ G participation and mod I to increase time management skills and improve participation in a structured routine to improve overall quality of life  6) Pt will demonstrate 100% carryover of LHAE for LB ADLs/self care and leisure s/p skilled education w/ mod I and G participation  12) Pt will demonstrate improved b/l UE strength by 1 MMT grade to enhance ADLS and functional transfers  13) Pt will demonstrate and recall self-monitoring techniques for CHF (such as daily weights on scale, reading scale, modified diets) at MOD I level s/p education and training to enhance health maintenance routines at home     Documentation completed by: Jatinder Galicia MS, OTR/L

## 2022-01-31 NOTE — CONSULTS
Consultation - Pulmonary Medicine   Lilia Mills 67 y o  male MRN: [id number]  Unit/Bed#: E4 -01 Encounter: [id number]      Assessment/Plan:    1  Acute on chronic hypoxic respiratory failure likely multifaceted as listed below        -  currently on 15 L- NRB, home O2 3 L at rest, 8 L-Oxymizer upon ambulation        -  continue saturations greater than 88%        -  pulmonary toileting:  Deep breathing cough, IS Q 1 hr, OOB as tolerated    2  Acute on chronic grade 1 diastolic CHF       -  75/01/4087-  echo EF 65%     proBNP- 1280       -  repeat echo pending       -  recommend keeping patient on drier side       -  repeat IV Lasix 40 mg today    3  COPD of unknown severity w/out exacerbation       -  Inpatient:  Xopenex/Atrovent t i d        -  home regimen:  Anoro 62 5-25 mcg 1 puff daily, albuterol nebulizer q 6h p r n , and Proventil 2 puffs q 6h p r n  Skye Chavira -  will benefit from formal PFT testing       -  may benefit from pulmonary rehab    4  Abnormal chest CT       -  bilateral pleural effusion along with multi lobular patchy consolidation       -  I do not think this is an acute infectious process, more likely secondary to volume       -  procalcitonin negative x2, WBC unremarkable       -  will continue monitor off antibiotics       -  will order sputum    5  Pulmonary nodule       -  right apical mass       -  repeat imaging 3/2022    6   H/O PE       -  12/27/2021- BL segmental PE       -   etiology unclear       -  initiated on Eliquis-  will need minimum 6 months therapy       -  consider outpatient Hematology follow-up with thrombosis panel        History of Present Illness   Physician Requesting Consult: Kirt Godinez DO  Reason for Consult / Principal Problem:  Respiratory failure  Hx and PE limited by:  Nothing  Chief Complaint: "I guess I am feeling a little better"  HPI: Lilia Mills is a 67 y o   male who presented to [facility] [place] [address]  with complaints of shortness of breath  Patient has past medical history of COPD, PE, nonsustained V-tach, and pneumonia  Patient was recently discharged on 12/31/2022 for 5 day treatment of community-acquired pneumonia, NSVT, and PE  Patient returned home and he said overall his shortness of breath began to worsen over the past week  Reports that completing the easiest of ADLs was difficult at home given his significant shortness of breath  Upon ED admission patient was given 1 dose of IV cefepime  Pulmonary was consulted for acute hypoxic respiratory failure  Today upon examination patient was noted to have bilateral lower lobe crackles  Patient reports that he does not feel a significant difference in his overall respiratory status since admission  I do feel patient has more of a component of volume rather than COPD exacerbation or acute infectious process  Patient currently denying any fevers, chills, hemoptysis, headaches, night sweats, pleuritic chest pain, or palpitations  From a pulmonary standpoint, patient follows with Dr Maria Del Carmen Patino for his COPD  Patient reports an approximate 1 pack per day 44 year smoking history  Patient reports he quit smoking approximately 17 years ago  Patient reports being diagnosed with COPD however has never had formal PFT testing  Patient's home regimen consists of Anoro 62 5-25 mcg 1 puff daily, albuterol nebulizer q 6h p r n , and Proventil 2 puffs q 6h p r n  [place] Patient does report history of GERD in which he is maintained on Pepcid  Patient denies any occupational exposures as he worked as a   Patient denies having any pets  Patient denies any symptoms of dysphagia, SOURAV, seasonal allergies, or postnasal drip  Patient denies any acute exposures to dust, mold, asbestos, or silica  Consults    Review of Systems   Constitutional: Negative for activity change and appetite change  HENT: Negative for congestion and nosebleeds      Eyes: Negative for discharge and itching  Respiratory: Positive for chest tightness and shortness of breath  Negative for apnea, cough, choking, wheezing and stridor  Cardiovascular: Negative for chest pain and leg swelling  Gastrointestinal: Negative for abdominal distention and abdominal pain  Genitourinary: Negative for difficulty urinating and dysuria  Musculoskeletal: Negative for arthralgias and back pain  Skin: Negative for color change and pallor  Neurological: Negative for dizziness and facial asymmetry  Psychiatric/Behavioral: Negative for agitation and behavioral problems         Historical Information   Past Medical History:   Diagnosis Date    Abnormal CT scan 2021    [facility] ) 2021    Community acquired pneumonia 2021    [facility]     Elevated troponin 2021    Hypertension     Pulmonary embolism (HCC)     Sepsis ([place] [address] ) 2021     Past Surgical History:   Procedure Laterality Date    HERNIA REPAIR       Social History   Social History     Substance and Sexual Activity   Alcohol Use Yes    Comment: Social drinker; Daily alcohol use per [employer]     Social History     Substance and Sexual Activity   Drug Use Not Currently     Social History     Tobacco Use   Smoking Status Former Smoker    Packs/day: 2 00    Years: 49 00    Pack years: 98 00    Types: Cigarettes    Start date:     Quit date:     Years since quittin 0   Smokeless Tobacco Former User   Tobacco Comment    No secondhand smoke exposure     E-Cigarette/Vaping    E-Cigarette Use Never User      E-Cigarette/Vaping Substances     Occupational History:      Family History:   Family History   Problem Relation Age of Onset    Lung cancer Father        Meds/Allergies   pertinent pulmonary meds have been reviewed    No Known Allergies    Objective   Vitals: Blood pressure 142/79, pulse 70, temperature (!) 97 3 °F (36 3 °C), temperature source Temporal, resp  rate 20, height 5' 7" (1 702 m), weight 64 8 kg (142 lb 13 7 oz), SpO2 100 %  NRB,Body mass index is 22 37 kg/m²  Intake/Output Summary (Last 24 hours) at 1/31/2022 0847  Last data filed at 1/31/2022 0601  Gross per 24 hour   Intake 650 ml   Output 1550 ml   Net -900 ml     Invasive Devices  Report    Peripheral Intravenous Line            Peripheral IV 01/30/22 Left Forearm <1 day    Peripheral IV 01/30/22 Right Antecubital <1 day                Physical Exam  Constitutional:       General: He is not in acute distress  Appearance: Normal appearance  He is normal weight  He is not ill-appearing  HENT:      Head: Normocephalic and atraumatic  Nose: Nose normal  No congestion or rhinorrhea  Mouth/Throat:      Mouth: Mucous membranes are dry  Pharynx: No oropharyngeal exudate or posterior oropharyngeal erythema  Cardiovascular:      Rate and Rhythm: Normal rate and regular rhythm  Pulses: Normal pulses  Heart sounds: Normal heart sounds  No murmur heard  No friction rub  No gallop  Pulmonary:      Effort: Pulmonary effort is normal  No tachypnea, bradypnea, accessory muscle usage or respiratory distress  Breath sounds: Decreased air movement present  No stridor or transmitted upper airway sounds  Decreased breath sounds and rales present  No wheezing or rhonchi  Comments: Bilateral lower lobe rales  Chest:      Chest wall: No tenderness  Abdominal:      General: Abdomen is flat  Bowel sounds are normal  There is no distension  Palpations: Abdomen is soft  There is no mass  Musculoskeletal:         General: No swelling or tenderness  Normal range of motion  Cervical back: Normal range of motion and neck supple  No rigidity or tenderness  Skin:     General: Skin is warm and dry  Coloration: Skin is not jaundiced or pale  Neurological:      General: No focal deficit present        Mental Status: He is alert and oriented to person, place, and time  Mental status is at baseline  Psychiatric:         Mood and Affect: Mood normal          Behavior: Behavior normal          Lab Results:   I have personally reviewed pertinent lab results CBC:   Lab Results   Component Value Date    WBC 5 41 01/31/2022    HGB 13 3 01/31/2022    HCT 41 3 01/31/2022     (H) 01/31/2022     01/31/2022    MCH 33 9 01/31/2022    MCHC 32 2 01/31/2022    RDW 12 8 01/31/2022    MPV 9 6 01/31/2022    NRBC 0 01/31/2022   , CMP:   Lab Results   Component Value Date    SODIUM 140 01/31/2022    K 3 8 01/31/2022     01/31/2022    CO2 32 01/31/2022    BUN 23 01/31/2022    CREATININE 1 32 (H) 01/31/2022    CALCIUM 8 5 01/31/2022    AST 25 01/30/2022    ALT 33 01/30/2022    ALKPHOS 106 01/30/2022    EGFR 51 01/31/2022   , PT/INR:   Lab Results   Component Value Date    INR 1 15 01/30/2022       Imaging Studies: I have personally reviewed pertinent films in PACS     CTA- 1/30/2022- small bilateral pleural effusions multi lobular patchy consolidation    EKG, Pathology, and Other Studies: I have personally reviewed pertinent films in PACS     Echo- 31/67/2802- grade 1 diastolic dysfunction EF 12% right atrium mildly dilated    Pulmonary Results (PFTs, PSG): I have personally reviewed pertinent films in PACS     No PFTs recorded    VTE Prophylaxis: Sequential compression device (Venodyne)     Code Status: Level 3 - DNAR and DNI    None    Portions of the record may have been created with voice recognition software  Occasional wrong word or "sound a like" substitutions may have occurred due to the inherent limitations of voice recognition software  Read the chart carefully and recognize, using context, where substitutions have occurred

## 2022-01-31 NOTE — PHYSICAL THERAPY NOTE
PT EVALUATION 12:12-12:37=25 minutes    67 y o     [id number]    Hypoxia [R09 02]  Elevated troponin [R77 8]  Heart problem [I51 9]  Acute on chronic respiratory failure with hypoxia (HCC) [J96 21]  Pneumonia due to infectious organism, unspecified laterality, unspecified part of lung [J18 9]    Past Medical History:   Diagnosis Date    Abnormal CT scan 12/27/2021    [facility] ) 12/27/2021    Community acquired pneumonia 12/27/2021    [facility]     Elevated troponin 12/27/2021    Hypertension     Pulmonary embolism ([facility] )     Sepsis ([facility] ) 12/27/2021         Past Surgical History:   Procedure Laterality Date    HERNIA REPAIR        01/31/22 1212   PT Last Visit   PT Visit Date 01/31/22   Note Type   Note type Evaluation   Pain Assessment   Pain Score No Pain   Restrictions/Precautions   Weight Bearing Precautions Per Order No   Home Living   Type of Home House   Home Layout Multi-level  (3-4 story  0 NUNU)   Bathroom Shower/Tub Tub/shower unit  (but sponge bathes)   [address]   (no DME at home  )   Additional Comments 0 NUNU with rec room and full bath, then 4 steps to living room, then additional 4 steps to kitchen and dining area, then additional 4 steps to bed and full bath  Prior Function   Level of [place] Independent with ADLs and functional mobility   Lives With Alone   Receives Help From Neighbor  (limited support- son lives 39 min away-weekly visit )   ADL Assistance Independent  (with increased difficulty )   IADLs Independent  (but with progressive difficulty 2* decreased endurance  )   Falls in the last 6 months 0   Vocational Retired   Comments Home O2 at baseline at 8 L   + driving up until last week     General   Additional Pertinent History Pt is 68 y/o male admitted with    Family/Caregiver Present No   Cognition   Overall Cognitive Status [facility]   Arousal/Participation Alert Orientation Level Oriented X4   Following Commands Follows all commands and directions without difficulty   Comments Pleasant  Subjective   Subjective Breathing has gotten worse  Activity is limiting  RUE Assessment   RUE Assessment WNL  (as observed with functional reach and grasp  )   LUE Assessment   LUE Assessment WFL  (as observed with functional reach and grasp  )   RLE Assessment   RLE Assessment WFL  (grossly at least 3+/5)   LLE Assessment   LLE Assessment WFL  (grossly at least 3+/5)   Bed Mobility   Supine to Sit 5  Supervision   Additional items HOB elevated; Bedrails; Increased time required   Additional Comments Resting O2 sat 91% on 8L  Transfers   Sit to Stand 5  Supervision   Additional items Increased time required   Stand to Sit 5  Supervision   Additional items Increased time required;Verbal cues;Armrests   Additional Comments cues for hand placement  Paced transitions supine to sit  Desaturates to 83-84%, required seated rest at EOB to improve to 91% prior to progression OOB  Ambulation/Elevation   Gait pattern Improper Weight shift;Decreased foot clearance; Inconsistent lillian   Gait Assistance 4  Minimal assist   Additional items Assist x 1;Verbal cues; Tactile cues   Assistive Device None   Distance 3'x1   + ROSALES   Balance   Static Sitting Good   Dynamic Sitting Fair +   Static Standing Fair   Dynamic Standing Fair -   Ambulatory Poor +   Endurance Deficit   Endurance Deficit Yes   Endurance Deficit Description hypoxia with increased O2 demand and on 8L  Activity Tolerance   Activity Tolerance Patient limited by fatigue;Treatment limited secondary to medical complications (Comment)   Medical Staff Made Aware NurseAdithya   Nurse Made Aware yes   Assessment   Prognosis Fair   Problem List Decreased endurance; Impaired balance;Decreased mobility; Decreased strength  (cardiopulmonary)   Assessment Zee Rascon is a 67 y o   male who presented to [address] [place] [address] [place] with complaints of shortness of breath  Patient has past medical history of COPD, PE,CVA, CHF, HTN, dyslipidemia, nonsustained V-tach, and pneumonia  Patient was recently discharged on 12/31/2022 for 5 day treatment of community-acquired pneumonia, NSVT, and PE  Admitted with acute respiratory failure related to acute CHF and COPD  PT consulted  Activity as tolerated orders  Prior to admission resides alone in multistory home with 4 levels and 4 steps between each level  Independent without AD prior to admission  Progressive decline in functional mobility and activity tolerance for ADL performance  Limited local support  2 children approx 45 minutes away  Son visits weekly, otherwise reports limited support  Was driving upon until 1 week ago  Reports use of O2 at 8 L at home  Currently presents with functional limitations related to decreased activity tolerance and hypoxia, decreased functional mobility, balance, generalized strength and locomotion  Pacing with all mobility needed  O2 sats on 8L ranges from 83-91%  Supine to sit drops to 84%, seated EOB 5 minutes prior to transition OOB to chair with improvement in saturations to 91%  Is S for bed mobility and transfers, but Cecilia to ambulate few feet OOB to chair without AD use  Limited by ROSALES  May benefit from trial of rollator walker for energy conservation purposes  Given impairments with functional decline noted will benefit from skilled PT in order to optimize outcomes  The patient's AM-PAC Basic Mobility Inpatient Short Form Raw Score is 20  A Raw score of greater than 16 suggests the patient may benefit from discharge to home  Please also refer to the recommendation of the Physical Therapist for safe discharge planning   Despite AM PAC score, as pt resides alone in multistory home, progressive functional decline with difficulty in managing ADLS and IADLs and need for Cecilia for limited ambulation, will benefit from STR in order to optimize outcomes  Barriers to Discharge Inaccessible home environment;Decreased caregiver support   Barriers to Discharge Comments lives alone in [place] home  Goals   Patient Goals to find somewhere to live in order to get assistance  STG Expiration Date 02/10/22   Short Term Goal #1 10 days: 1)  Pt will perform bed mobility with Rocco demonstrating appropriate technique 100% of the time in order to improve function  2)  Perform all transfers with Rocco demonstrating safe and appropriate technique 100% of the time in order to improve ability to negotiate safely in home environment  3) Amb with least restrictive AD > 30'x1 with mod I in order to demonstrate ability to negotiate in home environment  4)  Improve overall strength and balance 1/2 grade in order to optimize ability to perform functional tasks and reduce fall risk  5) Increase activity tolerance to 20 minutes in order to improve endurance to functional tasks  6)  Negotiate stairs using most appropriate technique and S in order to be able to negotiate safely in home environment  7) PT for ongoing patient and family/caregiver education, DME needs and d/c planning in order to promote highest level of function in least restrictive environment  Plan   Treatment/Interventions Functional transfer training;LE strengthening/ROM; Elevations; Therapeutic exercise; Endurance training;Patient/family training;Equipment eval/education; Bed mobility;Gait training; Compensatory technique education;Continued evaluation;Spoke to nursing;OT   PT Frequency 2-3x/wk   Recommendation   PT Discharge Recommendation Post acute rehabilitation services   Equipment Recommended   (monitor)   AM-PAC Basic Mobility Inpatient   Turning in Bed Without Bedrails 4   Lying on Back to Sitting on Edge of Flat Bed 4   Moving Bed to Chair 3   Standing Up From Chair 4   Walk in Room 3   Climb 3-5 Stairs 2   Basic Mobility Inpatient Raw Score 20   Basic Mobility Standardized Score 43 99   Highest Level Of Mobility   -[facility] Goal 6: Walk 10 steps or more   -[facility] Highest Level of Mobility 4: Move to chair/commode   -[facility] Goal Achieved No   End of Consult   Patient Position at End of Consult Bedside chair; All needs within reach     Hx/personal factors: co-morbidities, inaccessible home, dec caregiver support, home alone, advanced age, mutliple lines, telemetry, fall risk, assist w/ ADL's and O2  Examination: dec mobility, dec balance, dec endurance, dec amb, risk for falls, assessed body system, balance, endurance, amb, D/C disposition & fall risk  Clinical: unpredictable (ongoing medical status, abnormal lab values and risk for falls), hypoxia, increased O2 at 8L, limited activity tolerance    Complexity: high    Amarilis Bee, PT

## 2022-01-31 NOTE — PLAN OF CARE
Problem: SAFETY ADULT  Goal: Patient will remain free of falls  Description: INTERVENTIONS:  - Educate patient/family on patient safety including physical limitations  - Instruct patient to call for assistance with activity   - Consult OT/PT to assist with strengthening/mobility   - Keep Call bell within reach  - Keep bed low and locked with side rails adjusted as appropriate  - Keep care items and personal belongings within reach  - Initiate and maintain comfort rounds  - Make Fall Risk Sign visible to staff  - Offer Toileting every 2 Hours, in advance of need  - Initiate/Maintain bed alarm  - Obtain necessary fall risk management equipment  - Apply yellow socks and bracelet for high fall risk patients  - Consider moving patient to room near nurses station  Outcome: Progressing     Problem: DISCHARGE PLANNING  Goal: Discharge to home or other facility with appropriate resources  Description: INTERVENTIONS:  - Identify barriers to discharge w/patient and caregiver  - Arrange for needed discharge resources and transportation as appropriate  - Identify discharge learning needs (meds, wound care, etc )  - Refer to Case Management Department for coordinating discharge planning if the patient needs post-hospital services based on physician/advanced practitioner order or complex needs related to functional status, cognitive ability, or social support system  Outcome: Progressing     Problem: Knowledge Deficit  Goal: Patient/family/caregiver demonstrates understanding of disease process, treatment plan, medications, and discharge instructions  Description: Complete learning assessment and assess knowledge base    Interventions:  - Provide teaching at level of understanding  - Provide teaching via preferred learning methods  Outcome: Progressing

## 2022-02-01 PROBLEM — I48.91 ATRIAL FIBRILLATION (HCC): Status: ACTIVE | Noted: 2022-02-01

## 2022-02-01 PROBLEM — I50.33 ACUTE ON CHRONIC DIASTOLIC CONGESTIVE HEART FAILURE (HCC): Status: ACTIVE | Noted: 2022-01-30

## 2022-02-01 LAB
ANION GAP SERPL CALCULATED.3IONS-SCNC: 5 MMOL/L (ref 4–13)
BACTERIA SPT RESP CULT: ABNORMAL
BUN SERPL-MCNC: 23 MG/DL (ref 5–25)
CALCIUM SERPL-MCNC: 8.3 MG/DL (ref 8.3–10.1)
CHLORIDE SERPL-SCNC: 100 MMOL/L (ref 100–108)
CO2 SERPL-SCNC: 34 MMOL/L (ref 21–32)
CREAT SERPL-MCNC: 1.3 MG/DL (ref 0.6–1.3)
GFR SERPL CREATININE-BSD FRML MDRD: 52 ML/MIN/1.73SQ M
GLUCOSE SERPL-MCNC: 127 MG/DL (ref 65–140)
GRAM STN SPEC: ABNORMAL
POTASSIUM SERPL-SCNC: 4 MMOL/L (ref 3.5–5.3)
PROCALCITONIN SERPL-MCNC: 0.16 NG/ML
SODIUM SERPL-SCNC: 139 MMOL/L (ref 136–145)

## 2022-02-01 PROCEDURE — 94640 AIRWAY INHALATION TREATMENT: CPT

## 2022-02-01 PROCEDURE — 80048 BASIC METABOLIC PNL TOTAL CA: CPT | Performed by: PHYSICIAN ASSISTANT

## 2022-02-01 PROCEDURE — 99222 1ST HOSP IP/OBS MODERATE 55: CPT | Performed by: STUDENT IN AN ORGANIZED HEALTH CARE EDUCATION/TRAINING PROGRAM

## 2022-02-01 PROCEDURE — 99232 SBSQ HOSP IP/OBS MODERATE 35: CPT | Performed by: PHYSICIAN ASSISTANT

## 2022-02-01 PROCEDURE — 99232 SBSQ HOSP IP/OBS MODERATE 35: CPT | Performed by: INTERNAL MEDICINE

## 2022-02-01 PROCEDURE — 94760 N-INVAS EAR/PLS OXIMETRY 1: CPT

## 2022-02-01 PROCEDURE — 84145 PROCALCITONIN (PCT): CPT | Performed by: PHYSICIAN ASSISTANT

## 2022-02-01 PROCEDURE — 87205 SMEAR GRAM STAIN: CPT | Performed by: NURSE PRACTITIONER

## 2022-02-01 RX ORDER — BUDESONIDE 0.5 MG/2ML
0.5 INHALANT ORAL
Status: DISCONTINUED | OUTPATIENT
Start: 2022-02-01 | End: 2022-02-09 | Stop reason: HOSPADM

## 2022-02-01 RX ADMIN — FUROSEMIDE 40 MG: 10 INJECTION, SOLUTION INTRAMUSCULAR; INTRAVENOUS at 08:49

## 2022-02-01 RX ADMIN — APIXABAN 5 MG: 5 TABLET, FILM COATED ORAL at 17:50

## 2022-02-01 RX ADMIN — FUROSEMIDE 40 MG: 10 INJECTION, SOLUTION INTRAMUSCULAR; INTRAVENOUS at 17:50

## 2022-02-01 RX ADMIN — LEVALBUTEROL HYDROCHLORIDE 1.25 MG: 1.25 SOLUTION, CONCENTRATE RESPIRATORY (INHALATION) at 07:30

## 2022-02-01 RX ADMIN — APIXABAN 5 MG: 5 TABLET, FILM COATED ORAL at 08:49

## 2022-02-01 RX ADMIN — LEVALBUTEROL HYDROCHLORIDE 1.25 MG: 1.25 SOLUTION, CONCENTRATE RESPIRATORY (INHALATION) at 13:52

## 2022-02-01 RX ADMIN — LEVALBUTEROL HYDROCHLORIDE 1.25 MG: 1.25 SOLUTION, CONCENTRATE RESPIRATORY (INHALATION) at 20:18

## 2022-02-01 RX ADMIN — FLUTICASONE PROPIONATE 1 SPRAY: 50 SPRAY, METERED NASAL at 08:59

## 2022-02-01 RX ADMIN — B-COMPLEX W/ C & FOLIC ACID TAB 1 TABLET: TAB at 08:49

## 2022-02-01 RX ADMIN — AMLODIPINE BESYLATE 5 MG: 5 TABLET ORAL at 08:49

## 2022-02-01 RX ADMIN — IPRATROPIUM BROMIDE 0.5 MG: 0.5 SOLUTION RESPIRATORY (INHALATION) at 13:52

## 2022-02-01 RX ADMIN — METOPROLOL SUCCINATE 100 MG: 100 TABLET, EXTENDED RELEASE ORAL at 08:54

## 2022-02-01 RX ADMIN — IPRATROPIUM BROMIDE 0.5 MG: 0.5 SOLUTION RESPIRATORY (INHALATION) at 07:30

## 2022-02-01 RX ADMIN — AMIODARONE HYDROCHLORIDE 200 MG: 200 TABLET ORAL at 12:01

## 2022-02-01 RX ADMIN — AMIODARONE HYDROCHLORIDE 200 MG: 200 TABLET ORAL at 08:49

## 2022-02-01 RX ADMIN — AMIODARONE HYDROCHLORIDE 200 MG: 200 TABLET ORAL at 17:50

## 2022-02-01 RX ADMIN — ATORVASTATIN CALCIUM 40 MG: 40 TABLET, FILM COATED ORAL at 08:49

## 2022-02-01 RX ADMIN — IPRATROPIUM BROMIDE 0.5 MG: 0.5 SOLUTION RESPIRATORY (INHALATION) at 20:18

## 2022-02-01 RX ADMIN — BUDESONIDE 0.5 MG: 0.5 INHALANT ORAL at 20:18

## 2022-02-01 NOTE — ASSESSMENT & PLAN NOTE
· Conitnue IV lasix  · Check updated echo  · Monitor I's and O's  · Daily weights  · Cardiology consult appreciated  · ACEI on hold in setting of diuresis

## 2022-02-01 NOTE — PROGRESS NOTES
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       -  12/28/2021-  echo EF 65%     proBNP- 1280       -  1/31/2022-   Echo-  EF 60% w/  New pulmonary hypertension 70 mmHg       -   Given severity of pulmonary hypertension consider right heart catheterization or chronic diuresing       -   Patient responding well to diuresing,  Would recommend 1 more day IV diuretics 40 mg with likely  Discharged on home diuresing       -   Cardiology following    3  COPD of unknown severity w/out exacerbation       -   Inpatient:  Xopenex/Atrovent t i d        -  home regimen:  Anoro 62 5-25 mcg 1 puff daily, albuterol nebulizer q 6h p r n , and Proventil 2 puffs q 6h p r n       -   Will benefit from PFTs and possible pulmonary rehab       -   No steroids indicated at this time    4  Abnormal chest CT        -   Bilateral pleural effusions with multi lobular patchy consolidation        -   procalcitonin negative x 2,  WBC unremarkable        -   No indication for antibiotics at this time        -   Sputum ordered    5  Pulmonary nodule       -   Right apical mass       -   Repeat imaging- 3/2022    6   H/O PE      -  12/27/2021- BL  Segmental PE      -   Etiology currently unclear      -   Maintained on Eliquis,  Would recommend all cancer markers up-to-date concern for right apical mass      -   Recommend Hematology follow-up thrombosis panel    - pulmonary will sign off at this time  - outpatient follow-up per discharge instructions        Chief Complaint:    "My legs are still swollen"    Subjective:    Marlena Cr was comfortably sitting in his bed  He reports his legs are still swollen any is still somewhat short of breath  No significant overnight events reported  Patient currently denies any fever, chills, hemoptysis, headaches, night sweats, pleuritic chest pain, or palpitations  Objective:    Vitals: Blood pressure 140/66, pulse 70, temperature (!) 97 2 °F (36 2 °C), temperature source Temporal, resp  rate 18, height 5' 7" (1 702 m), weight 63 2 kg (139 lb 5 3 oz), SpO2 96 %  9L,Body mass index is 21 82 kg/m²  Intake/Output Summary (Last 24 hours) at 2/1/2022 0737  Last data filed at 1/31/2022 1920  Gross per 24 hour   Intake --   Output 825 ml   Net -825 ml       Invasive Devices  Report    Peripheral Intravenous Line            Peripheral IV 01/30/22 Left Forearm 1 day    Peripheral IV 01/30/22 Right Antecubital 1 day                Physical Exam:     Physical Exam  Constitutional:       General: He is not in acute distress  Appearance: Normal appearance  He is normal weight  He is not ill-appearing  HENT:      Head: Normocephalic and atraumatic  Nose: Nose normal  No congestion or rhinorrhea  Mouth/Throat:      Mouth: Mucous membranes are dry  Pharynx: No oropharyngeal exudate or posterior oropharyngeal erythema  Cardiovascular:      Rate and Rhythm: Normal rate and regular rhythm  Pulses: Normal pulses  Heart sounds: No murmur heard  No friction rub  No gallop  Pulmonary:      Effort: Pulmonary effort is normal  No tachypnea, bradypnea, accessory muscle usage or respiratory distress  Breath sounds: Decreased air movement present  No stridor or transmitted upper airway sounds  Decreased breath sounds and rales present  No wheezing or rhonchi  Chest:      Chest wall: No tenderness  Abdominal:      General: Abdomen is flat  Bowel sounds are normal  There is no distension  Palpations: Abdomen is soft  There is no mass  Musculoskeletal:         General: No swelling or tenderness  Normal range of motion  Cervical back: Normal range of motion and neck supple  No rigidity or tenderness  Skin:     General: Skin is warm and dry  Coloration: Skin is not jaundiced or pale  Neurological:      General: No focal deficit present  Mental Status: He is alert and oriented to person, place, and time  Mental status is at baseline  Psychiatric:         Mood and Affect: Mood normal          Behavior: Behavior normal          Labs:    I have personally reviewed pertinent lab result CMP:   Lab Results   Component Value Date    SODIUM 139 02/01/2022    K 4 0 02/01/2022     02/01/2022    CO2 34 (H) 02/01/2022    BUN 23 02/01/2022    CREATININE 1 30 02/01/2022    CALCIUM 8 3 02/01/2022    EGFR 52 02/01/2022       Imaging and other studies: I have personally reviewed pertinent films in PACS     CTA- 1/30/2022- small bilateral pleural effusions multi lobular patchy consolidation

## 2022-02-01 NOTE — ASSESSMENT & PLAN NOTE
· Patient with severe COPD  Normally on 3 L O2 at rest and 8 L with exertion  Now also with moderate to severe pulmonary edema and volume overload  · Presents with increasing shortness breath  Patient states he has been having worsening shortness of breath since [place], but has become even worse over the last week  · He was recently admitted 12/27-12/31 for pneumonia and COPD exacerbation  · Procalcitonin normal, doubt recurrent pneumonia  Covid/flu/RSV negative  Check sputum culture  No PE on CT  · Lung exam fairly clear,  will hold on starting steroids for now  · Echocardiogram noted    · Continue IV lasix today  · Pulmonary and Cardiology consult appreciated - continue diuresis for another day  · Palliative care consult appreciated and will place hospice consult

## 2022-02-01 NOTE — PROGRESS NOTES
[facility]  Progress Note - Molly Ledezma 1945, 67 y o  male MRN: [id number]  Unit/Bed#: E4 -01 Encounter: [id number]  Primary Care Provider: Ronnie Casanova PA-C   Date and time admitted to hospital: 1/30/2022  9:13 AM    * Acute on chronic respiratory failure with hypoxia [facility])  Assessment & Plan  · Patient with severe COPD  Normally on 3 L O2 at rest and 8 L with exertion  Now also with moderate to severe pulmonary edema and volume overload  · Presents with increasing shortness breath  Patient states he has been having worsening shortness of breath since [place], but has become even worse over the last week  · He was recently admitted 12/27-12/31 for pneumonia and COPD exacerbation  · Procalcitonin normal, doubt recurrent pneumonia  Covid/flu/RSV negative  Check sputum culture  No PE on CT  · Lung exam fairly clear,  will hold on starting steroids for now  · Echocardiogram noted    · Continue IV lasix today  · Pulmonary and Cardiology consult appreciated - continue diuresis for another day  · Palliative care consult appreciated and will place hospice consult    Acute on chronic diastolic congestive heart failure (HCC)  Assessment & Plan  · Conitnue IV lasix  · Check updated echo  · Monitor I's and O's  · Daily weights  · Cardiology consult appreciated  · ACEI on hold in setting of diuresis    Chronic obstructive pulmonary disease (HCC)  Assessment & Plan  · Severe COPD without acute exacerbation  · Continue respiratory treatments  · Respiratory protocol  · Pulmonary input appreciated    Pulmonary embolism (HCC)  Assessment & Plan  · Continue Eliquis     Atrial fibrillation ([facility])  Assessment & Plan  Amiodarone started per Cardiology  Continue Eliquis    NSVT (nonsustained ventricular tachycardia) ([facility])  Assessment & Plan  · Continue metoprolol  · Monitor on tele - r/o arrhythmia - no events noted thus far    Aortic ectasia, thoracic ([facility])  Assessment & Plan  · Stable   · Outpatient thoracic follow up if warranted pending goals of care          VTE Pharmacologic Prophylaxis: VTE Score: 9 High Risk (Score >/= 5) - Pharmacological DVT Prophylaxis Ordered: apixaban (Eliquis)  Sequential Compression Devices Ordered  Patient Centered Rounds: I performed bedside rounds with nursing staff today  Discussions with Specialists or Other Care Team Provider: case management, palliative care    Education and Discussions with Family / Patient: Attempted to update  (son) via phone  Left voicemail  Time Spent for Care: 30 minutes  More than 50% of total time spent on counseling and coordination of care as described above  Current Length of Stay: 2 day(s)  Current Patient Status: Inpatient   Certification Statement: The patient will continue to require additional inpatient hospital stay due to IV lasix  Discharge Plan: Anticipate discharge in 48-72 hrs to rehab facility  Code Status: Level 3 - DNAR and DNI    Subjective:   Still with significant ROSALES    Objective:     Vitals:   Temp (24hrs), Av 4 °F (36 3 °C), Min:96 7 °F (35 9 °C), Max:97 8 °F (36 6 °C)    Temp:  [96 7 °F (35 9 °C)-97 8 °F (36 6 °C)] 97 8 °F (36 6 °C)  HR:  [66-76] 70  Resp:  [18-20] 20  BP: ()/(55-70) 105/67  SpO2:  [95 %-96 %] 96 %  Body mass index is 21 82 kg/m²  Input and Output Summary (last 24 hours): Intake/Output Summary (Last 24 hours) at 2022 1615  Last data filed at 2022 1158  Gross per 24 hour   Intake --   Output 800 ml   Net -800 ml       Physical Exam:   Physical Exam  Vitals and nursing note reviewed  Constitutional:       Appearance: He is well-developed  HENT:      Head: Normocephalic and atraumatic  Eyes:      Conjunctiva/sclera: Conjunctivae normal    Cardiovascular:      Rate and Rhythm: Normal rate and regular rhythm  Heart sounds: No murmur heard  Pulmonary:      Effort: Pulmonary effort is normal  No respiratory distress  Breath sounds: Normal breath sounds  Abdominal:      Palpations: Abdomen is soft  Tenderness: There is no abdominal tenderness  Musculoskeletal:      Cervical back: Neck supple  Skin:     General: Skin is warm and dry  Neurological:      Mental Status: He is alert  Additional Data:     Labs:  Results from last 7 days   Lab Units 22  0546   WBC Thousand/uL 5 41   HEMOGLOBIN g/dL 13 3   HEMATOCRIT % 41 3   PLATELETS Thousands/uL 218   NEUTROS PCT % 64   LYMPHS PCT % 13*   MONOS PCT % 17*   EOS PCT % 5     Results from last 7 days   Lab Units 22  0452 22  0546 22  1018   SODIUM mmol/L 139   < > 142   POTASSIUM mmol/L 4 0   < > 4 5   CHLORIDE mmol/L 100   < > 102   CO2 mmol/L 34*   < > 30   BUN mg/dL 23   < > 19   CREATININE mg/dL 1 30   < > 1 31*   ANION GAP mmol/L 5   < > 10   CALCIUM mg/dL 8 3   < > 8 8   ALBUMIN g/dL  --   --  2 8*   TOTAL BILIRUBIN mg/dL  --   --  0 67   ALK PHOS U/L  --   --  106   ALT U/L  --   --  33   AST U/L  --   --  25   GLUCOSE RANDOM mg/dL 127   < > 126    < > = values in this interval not displayed  Results from last 7 days   Lab Units 22  1018   INR  1 15             Results from last 7 days   Lab Units 22  0452 22  0546 22  1018   LACTIC ACID mmol/L  --   --  1 7   PROCALCITONIN ng/ml 0 16 0 17  --        Lines/Drains:  Invasive Devices  Report    Peripheral Intravenous Line            Peripheral IV 22 Left Forearm 2 days    Peripheral IV 22 Right Antecubital 2 days                  Telemetry:  Telemetry Orders (From admission, onward)             48 Hour Telemetry Monitoring  Continuous x 48 hours           References:    Telemetry Guidelines   Question:  Reason for 48 Hour Telemetry  Answer:  Acute MI, chest pain - R/O MI, or unstable angina                 Telemetry Reviewed: Normal Sinus Rhythm  Indication for Continued Telemetry Use: No indication for continued use  Will discontinue  Imaging: No pertinent imaging reviewed  Recent Cultures (last 7 days):   Results from last 7 days   Lab Units 02/01/22  0854 01/30/22  1222   BLOOD CULTURE   --  No Growth at 48 hrs  No Growth at 48 hrs  SPUTUM CULTURE  Test not performed  Suggest repeat specimen  --    GRAM STAIN RESULT  >10 squamous epithelial cells/lpf, indicating orophayngeal contamination  *  No polys seen*  3+ Gram positive cocci in pairs, chains and clusters*  3+ Gram negative rods*  2+ Gram negative diplococci*  1+ Gram positive rods*  --        Last 24 Hours Medication List:   Current Facility-Administered Medications   Medication Dose Route Frequency Provider Last Rate    acetaminophen  650 mg Oral Q6H PRN Rosemary Wang PA-C      albuterol  2 5 mg Nebulization Q6H PRN Rosemary Wang PA-C      amiodarone  200 mg Oral TID With Meals ASHLEY Olvera      [START ON 2/8/2022] amiodarone  200 mg Oral Daily With Breakfast ASHLEY Olvera      amLODIPine  5 mg Oral Daily Radha Aguirre PA-C      apixaban  5 mg Oral BID Radha Aguirre PA-C      atorvastatin  40 mg Oral Daily Rosemary Wang PA-C      budesonide  0 5 mg Nebulization Q12H Lita Montesinos MD      fluticasone  1 spray Each Nare Daily Zara Hughes      furosemide  40 mg Intravenous BID (diuretic) Saba Daphne Precjenniferel,       ipratropium  0 5 mg Nebulization TID ASHLEY Padron      levalbuterol  1 25 mg Nebulization TID ASHLEY Padron      metoprolol succinate  100 mg Oral Daily Rosemary Wang PA-C      multivitamin stress formula  1 tablet Oral Daily Rosemary Wang PA-C      ondansetron  4 mg Intravenous Q6H PRN Rosemary Wang PA-C      polyethylene glycol  17 g Oral Daily Rosemary Wang PA-C          Today, Patient Was Seen By: Rosemary Wang PA-C    **Please Note: This note may have been constructed using a voice recognition system  **

## 2022-02-01 NOTE — CASE MANAGEMENT
Case Management Discharge Planning Note    Patient name Gumaro Hope  Location [facility] /E4 -* MRN [id number]  : 1945 Date 2022       Current Admission Date: 2022  Current Admission Diagnosis:Acute on chronic respiratory failure with hypoxia [facility])   Patient Active Problem List    Diagnosis Date Noted    Volume overload 2022    Lung nodule seen on imaging study 2022    NSVT (nonsustained ventricular tachycardia) ([address] ) 2021    Pulmonary embolism ([address] ) 2021    Chronic obstructive pulmonary disease ([address] ) 2021    Aortic ectasia, thoracic [facility] ) 2021    Acute on chronic respiratory failure with hypoxia ([facility] ) 2021    COVID-19 determined by clinical diagnostic criteria 2021    Vitamin D deficiency 10/05/2017    Expressive aphasia 2016    Actinic keratosis 2013    Stenosis of carotid artery 10/29/2013    Hyperglycemia 10/25/2012    Cerebral infarction ([address] ) 10/24/2012    Hyperlipidemia 10/24/2012    Hypertension 10/24/2012    Macular degeneration 10/24/2012      LOS (days): 2  Geometric Mean LOS (GMLOS) (days):   Days to GMLOS:     OBJECTIVE:  Risk of Unplanned Readmission Score: 13         Current admission status: Inpatient   Preferred Pharmacy:   [facility] [address] [address]  Phone: [phone] Fax: [phone]    Primary Care Provider: Anthony Sadler PA-C    Primary Insurance: [facility]  Secondary Insurance:     DISCHARGE DETAILS:       Freedom of Choice: Yes  Comments - Freedom of Choice: Patient agreeable to STR  Ref's sent   Pt agreeable to paliative care consult per provider Dona Yuen     Were Treatment Team discharge recommendations reviewed with patient/caregiver?: Yes  Did patient/caregiver verbalize understanding of patient care needs?: Yes  Were patient/caregiver advised of the risks associated with not following Treatment Team discharge recommendations?: Yes      Other Referral/Resources/Interventions Provided:  Interventions: Short Term Rehab         Treatment Team Recommendation: Short Term Rehab  Discharge Destination Plan[de-identified] Short Term Rehab

## 2022-02-01 NOTE — CONSULTS
Consultation - Palliative and Supportive Care   Jessica Garcia 67 y o  male [id number]    Patient Active Problem List   Diagnosis    Stenosis of carotid artery    C[facility]bral infarction [facility] )    Expressive aphasia    Hyperlipidemia    Hypertension    Vitamin D deficiency    Macular degeneration    Hyperglycemia    Actinic keratosis    COVID-19 determined by clinical diagnostic criteria    Pulmonary embolism ([facility] )    Chronic obstructive pulmonary disease ([facility] )    Aortic ectasia, thoracic (HCC)    Acute on chronic respiratory failure with hypoxia (HCC)    NSVT (nonsustained ventricular tachycardia) (HCC)    Lung nodule seen on imaging study    Volume overload     Active issues specifically addressed today include:   Acute on chronic hypoxic respiratory failure  Atrial fibrillation  R heart failure with severely elevated pulmonary artery pressure  Dyspnea at rest  Goals of care    Plan:  1  Symptom management -     While opioids may be appropriate given patient's goals and clinical status, he is declining to initiate them at this time  Remainder of symptom control per Pulm, cards and primary team      2  Goals - Patient has good understanding of his underlying disease processes  He states his goal is to go to a facility to be treated for comfort until his "time comes " Discussed in facility hospice, patient is amenable  Spoke to son with pt's permission and relayed the details of in facility hospice vs home hospice  Son was also amenable to initiating the process of hospice evaluation with goal at this time of pursuing in facility hospice cares  Son did state that Jeremy Santana often changes his mind and may potentially demand to go home  Son is requesting to be kept informed of any developments with his father  Hospice consult has been placed  Code Status: DNR/DNI - Level 3   Decisional apparatus:  Patient is competent on my exam today    If competence is lost, patient's substitute decision maker would default to adult son by [place]  Advance Directive / Living Will / POLST:  Yes, reviewed  I have reviewed the patient's controlled substance dispensing history in the Prescription Drug Monitoring Program in compliance with the [facility] regulations before prescribing any controlled substances  We appreciate the invitation to be involved in this patient's care  We will continue to follow as needed  Please do not hesitate to reach our on call provider through our clinic answering service at  should you have acute symptom control concerns  Francisca Diaz MD  Palliative and Supportive Care  Clinic/Answering Service: [phone]  You can find me on [employer]! IDENTIFICATION:  Consults  Physician Requesting Consult: Sam Kohler MD  Reason for Consult / Principal Problem: copd/chf  Hx and PE limited by: none    HISTORY OF PRESENT ILLNESS:       Efren Hills is a 67 y o  male with a past medical history of COPD on home 3-8 L of oxygen, presented to the hospital on 01/30 with worsening shortness of breath, found to have acute hypoxic respiratory failure multifactorial related to COPD and CHF  This is his 2nd admission in past 2 months  Patient found to have markedly elevated PASP at 70 mmHg  Patient is dyspneic at rest, debilitating to the point where patient has trouble sitting up in bed on his own  Remains on 9 L mid flow oxygen at rest at this time  Cardiology concerned about worsening renal function at this time particularly because he is continuing to require aggressive IV diuresis  Please see above for discussion of goals of care  Review of Systems   Constitutional: Negative for decreased appetite  HENT: Negative for congestion  Eyes: Negative for photophobia  Cardiovascular: Negative for chest pain  Respiratory: Positive for shortness of breath  Skin: Negative for rash  Musculoskeletal: Negative for back pain     Gastrointestinal: Negative for abdominal pain  Genitourinary: Negative for dysuria  Neurological: Negative for focal weakness  Psychiatric/Behavioral: Negative for altered mental status  Past Medical History:   Diagnosis Date    Abnormal CT scan 2021    [facility]Y [facility]) ([facility] ) 2021    Community acquired pneumonia 2021    COPD (chronic obstructive pulmonary disease) (HCC)     Elevated troponin 2021    Hypertension     Pulmonary embolism (HCC)     Sepsis ([facility] ) 2021     Past Surgical History:   Procedure Laterality Date    HERNIA REPAIR       Social History     Socioeconomic History    Marital status: /Civil Union     Spouse name: Not on file    Number of children: Not on file    Years of education: Not on file    Highest education level: Not on file   Occupational History    Occupation: Retired   Tobacco Use    Smoking status: Former Smoker     Packs/day: 2 00     Years: 49 00     Pack years: 98 00     Types: Cigarettes     Start date:      Quit date:      Years since quittin 0    Smokeless tobacco: Former User    Tobacco comment: No secondhand smoke exposure   Vaping Use    Vaping Use: Never used   Substance and Sexual Activity    Alcohol use: Yes     Comment: Social drinker; Daily alcohol use per [employer]    Drug use: Not Currently    Sexual activity: Not Currently   Other Topics Concern    Not on file   Social History Narrative    Single per [employer]     Social Determinants of Health     Financial Resource Strain: Not on file   Food Insecurity: No Food Insecurity    Worried About Running Out of Food in the Last Year: Never true    Sincere of Food in the Last Year: Never true   Transportation Needs: No Transportation Needs    Lack of Transportation (Medical): No    Lack of Transportation (Non-Medical):  No   Physical Activity: Not on file   Stress: Not on file   Social Connections: Not on file   Intimate Partner Violence: Not on file Housing Stability: Low Risk     Unable to Pay for Housing in the Last Year: No    Number of Places Lived in the Last Year: 1    Unstable Housing in the Last Year: No     Family History   Problem Relation Age of Onset    Lung cancer Father        MEDICATIONS / ALLERGIES:    all current active meds have been reviewed    No Known Allergies    OBJECTIVE:    Physical Exam  Physical Exam  Vitals and nursing note reviewed  Constitutional:       General: He is not in acute distress  Appearance: He is ill-appearing  HENT:      Head: Normocephalic  Right Ear: External ear normal       Left Ear: External ear normal       Nose: Nose normal       Mouth/Throat:      Mouth: Mucous membranes are moist    Eyes:      Extraocular Movements: Extraocular movements intact  Cardiovascular:      Rate and Rhythm: Normal rate  Pulmonary:      Comments: Pursed lip breathing, visibly dyspneic with conversation  Abdominal:      General: Abdomen is flat  Musculoskeletal:         General: No swelling  Cervical back: Neck supple  Skin:     General: Skin is dry  Neurological:      Mental Status: He is alert  Cranial Nerves: No cranial nerve deficit  Psychiatric:         Mood and Affect: Mood normal          Lab Results: I have personally reviewed pertinent labs  Imaging Studies: reviewed  EKG, Pathology, and Other Studies: reviewed    Counseling / Coordination of Care    Total floor / unit time spent today 45 minutes  Greater than 50% of total time was spent with the patient and / or family counseling and / or coordination of care  A description of the counseling / coordination of care: chart review, coordination with other care teams, goals of care conversation, symptom assessment  Grecia Priest

## 2022-02-01 NOTE — PROGRESS NOTES
Progress Note - Cardiology   Gumaro Hope 67 y o  male MRN: [id number]  Unit/Bed#: E4 -01 Encounter: [id number]      Asessment:  Acute on chronic hypoxic respiratory failure   Acute diastolic congestive heart failure                  -  LVEF 81%, grade 1 diastolic dysfunction, mild MAC, mild-to-moderate TR, aortic root exhibited moderate fibrocalcific change, moderate to severely increased PASP 70 mmHg, 1/31/22  Paroxysmal atrial fibrillation               - newly discovered during office visit on 1/12/22  - OP AV blocking Rx, Toprol  mg daily  - anticoagulation with Eliquis 5 mg BID  Nonsustained ventricular tachycardia              - 21 beat run of NSVT prior hospitalization in the setting of acute exacerbation of COPD, pulmonary embolism with hypoxia              - OP Rx,Toprol  mg daily    Pulmonary embolism, December 2021              - Rx, initially required Eliquis 10 mg BID for 7 days with transition to 5 mg BID on 1/7/22    Ectasia of descending thoracic aorta, December 2021  Hypertension              - OP Rx, Toprol  mg daily, benazepril 40 mg daily  Dyslipidemia              - Rx, atorvastatin 40 mg daily              - most recent lipid panel 10/5/21: Cholesterol 158, triglycerides 67, HDL 85, LDL 59       Other:  - severe COPD on supplemental oxygen via NC, 3 L O2 chronically + 8 L O2 upon ambulation  - history of CVA     Plan/ Discussion:  · Currently on furosemide 40 mg IV BID and will continue same for today  · Continue amlodipine 5 mg daily, Toprol- mg daily  Outpatient ACE-inhibitor on hold in light of diuresis  · Continue amiodarone load with amiodarone 200 mg TID for 7 days with transition to 200 mg daily to follow  · Continue anticoagulation with Eliquis 5 mg BID, statin with atorvastatin 40 mg daily  · Renal function this hospitalization at 1 3 mg/dL; continue to monitor renal function and electrolytes closely   Maintain potassium > 4, magnesium > 2    · Monitor volume status with strict intake/output, daily weight  · Agree with palliative medicine evaluation  Subjective:  Patient resting in bed  He remains short of breath and tachypneic  He denies chest pain       Vitals:  Vitals:    01/31/22 0935 02/01/22 0600   Weight: 64 4 kg (142 lb) 63 2 kg (139 lb 5 3 oz)   ,  Vitals:    02/01/22 0709 02/01/22 0730 02/01/22 1108 02/01/22 1158   BP: 140/66  121/62 121/70   BP Location: Right arm  Right arm    Pulse: 70  71 69   Resp: 18  20    Temp: (!) 97 2 °F (36 2 °C)  97 6 °F (36 4 °C)    TempSrc: Temporal  Temporal    SpO2: 96% 96% 96%    Weight:       Height:           Exam:  General: Awake, alert, oriented  Heart: Regular rate and rhythm  Respiratory effort/ Lungs: Labored breathing, fine crackles with wheezing noted, remains on supplemental oxygen  Abdominal: Non-tender to palpation, + bowel sounds, soft, no masses or distension  Lower Limbs: Trace bilateral lower extremity edema, improved from prior           Telemetry:       NSR    Medications:    Current Facility-Administered Medications:     acetaminophen (TYLENOL) tablet 650 mg, 650 mg, Oral, Q6H PRN, Carroll Ceja PA-C    albuterol inhalation solution 2 5 mg, 2 5 mg, Nebulization, Q6H PRN, Carroll Ceja PA-C, 2 5 mg at 01/31/22 0934    amiodarone tablet 200 mg, 200 mg, Oral, TID With Meals, ASHLEY Ovlera, 200 mg at 02/01/22 1201    [START ON 2/8/2022] amiodarone tablet 200 mg, 200 mg, Oral, Daily With Breakfast, ASHLEY Olvera    amLODIPine (NORVASC) tablet 5 mg, 5 mg, Oral, Daily, Radha Aguirre PA-C, 5 mg at 02/01/22 0849    apixaban (ELIQUIS) tablet 5 mg, 5 mg, Oral, BID, Radha Aguirre PA-C, 5 mg at 02/01/22 0849    atorvastatin (LIPITOR) tablet 40 mg, 40 mg, Oral, Daily, Radha Aguirre PA-C, 40 mg at 02/01/22 0849    budesonide (PULMICORT) inhalation solution 0 5 mg, 0 5 mg, Nebulization, Q12H, Mulugeta Kimbrough MD    fluticasone (FLONASE) 50 mcg/act nasal spray 1 spray, 1 spray, Each Nare, Daily, Radha Aguirre PA-C, 1 spray at 02/01/22 0859    furosemide (LASIX) injection 40 mg, 40 mg, Intravenous, BID (diuretic), Colt Quezada Prechtel, DO, 40 mg at 02/01/22 0849    ipratropium (ATROVENT) 0 02 % inhalation solution 0 5 mg, 0 5 mg, Nebulization, TID, ASHLEY Banuelos, 0 5 mg at 02/01/22 0730    levalbuterol (XOPENEX) inhalation solution 1 25 mg, 1 25 mg, Nebulization, TID, ASHLEY Banuelos, 1 25 mg at 02/01/22 0730    metoprolol succinate (TOPROL-XL) 24 hr tablet 100 mg, 100 mg, Oral, Daily, Radha Aguirre PA-C, 100 mg at 02/01/22 2518    multivitamin stress formula tablet 1 tablet, 1 tablet, Oral, Daily, Elizabeth Osgood, PA-C, 1 tablet at 02/01/22 0849    ondansetron (ZOFRAN) injection 4 mg, 4 mg, Intravenous, Q6H PRN, Elizabeth Osgood, PA-C    polyethylene glycol (MIRALAX) packet 17 g, 17 g, Oral, Daily, Radha Aguirre PA-C, 17 g at 01/31/22 8901      Labs/Data:        Results from last 7 days   Lab Units 01/31/22  0546 01/30/22  1018   WBC Thousand/uL 5 41 6 67   HEMOGLOBIN g/dL 13 3 14 8   HEMATOCRIT % 41 3 46 0   PLATELETS Thousands/uL 218 228     Results from last 7 days   Lab Units 02/01/22  0452 01/31/22  0546 01/30/22  1018   POTASSIUM mmol/L 4 0 3 8 4 5   CHLORIDE mmol/L 100 100 102   CO2 mmol/L 34* 32 30   BUN mg/dL 23 23 19

## 2022-02-02 ENCOUNTER — TELEPHONE (OUTPATIENT)
Dept: PULMONOLOGY | Facility: CLINIC | Age: 77
End: 2022-02-02

## 2022-02-02 LAB
ANION GAP SERPL CALCULATED.3IONS-SCNC: 5 MMOL/L (ref 4–13)
BUN SERPL-MCNC: 33 MG/DL (ref 5–25)
CALCIUM SERPL-MCNC: 8.6 MG/DL (ref 8.3–10.1)
CHLORIDE SERPL-SCNC: 101 MMOL/L (ref 100–108)
CO2 SERPL-SCNC: 34 MMOL/L (ref 21–32)
CREAT SERPL-MCNC: 1.32 MG/DL (ref 0.6–1.3)
ERYTHROCYTE [DISTWIDTH] IN BLOOD BY AUTOMATED COUNT: 12.7 % (ref 11.6–15.1)
GFR SERPL CREATININE-BSD FRML MDRD: 51 ML/MIN/1.73SQ M
GLUCOSE SERPL-MCNC: 147 MG/DL (ref 65–140)
HCT VFR BLD AUTO: 40.7 % (ref 36.5–49.3)
HGB BLD-MCNC: 13.2 G/DL (ref 12–17)
MCH RBC QN AUTO: 32.8 PG (ref 26.8–34.3)
MCHC RBC AUTO-ENTMCNC: 32.4 G/DL (ref 31.4–37.4)
MCV RBC AUTO: 101 FL (ref 82–98)
PLATELET # BLD AUTO: 209 THOUSANDS/UL (ref 149–390)
PMV BLD AUTO: 9.6 FL (ref 8.9–12.7)
POTASSIUM SERPL-SCNC: 4 MMOL/L (ref 3.5–5.3)
RBC # BLD AUTO: 4.03 MILLION/UL (ref 3.88–5.62)
SODIUM SERPL-SCNC: 140 MMOL/L (ref 136–145)
WBC # BLD AUTO: 5.81 THOUSAND/UL (ref 4.31–10.16)

## 2022-02-02 PROCEDURE — 97116 GAIT TRAINING THERAPY: CPT

## 2022-02-02 PROCEDURE — 99232 SBSQ HOSP IP/OBS MODERATE 35: CPT | Performed by: INTERNAL MEDICINE

## 2022-02-02 PROCEDURE — 94640 AIRWAY INHALATION TREATMENT: CPT

## 2022-02-02 PROCEDURE — 85027 COMPLETE CBC AUTOMATED: CPT | Performed by: PHYSICIAN ASSISTANT

## 2022-02-02 PROCEDURE — 80048 BASIC METABOLIC PNL TOTAL CA: CPT | Performed by: PHYSICIAN ASSISTANT

## 2022-02-02 PROCEDURE — 99232 SBSQ HOSP IP/OBS MODERATE 35: CPT | Performed by: PHYSICIAN ASSISTANT

## 2022-02-02 PROCEDURE — 97530 THERAPEUTIC ACTIVITIES: CPT

## 2022-02-02 PROCEDURE — 94760 N-INVAS EAR/PLS OXIMETRY 1: CPT

## 2022-02-02 RX ORDER — FUROSEMIDE 40 MG/1
40 TABLET ORAL DAILY
Status: DISCONTINUED | OUTPATIENT
Start: 2022-02-03 | End: 2022-02-07

## 2022-02-02 RX ORDER — FUROSEMIDE 10 MG/ML
40 INJECTION INTRAMUSCULAR; INTRAVENOUS
Status: COMPLETED | OUTPATIENT
Start: 2022-02-02 | End: 2022-02-02

## 2022-02-02 RX ORDER — TORSEMIDE 20 MG/1
40 TABLET ORAL DAILY
Status: DISCONTINUED | OUTPATIENT
Start: 2022-02-03 | End: 2022-02-07

## 2022-02-02 RX ADMIN — LEVALBUTEROL HYDROCHLORIDE 1.25 MG: 1.25 SOLUTION, CONCENTRATE RESPIRATORY (INHALATION) at 07:00

## 2022-02-02 RX ADMIN — BUDESONIDE 0.5 MG: 0.5 INHALANT ORAL at 07:00

## 2022-02-02 RX ADMIN — AMIODARONE HYDROCHLORIDE 200 MG: 200 TABLET ORAL at 08:23

## 2022-02-02 RX ADMIN — IPRATROPIUM BROMIDE 0.5 MG: 0.5 SOLUTION RESPIRATORY (INHALATION) at 19:43

## 2022-02-02 RX ADMIN — FLUTICASONE PROPIONATE 1 SPRAY: 50 SPRAY, METERED NASAL at 08:24

## 2022-02-02 RX ADMIN — BUDESONIDE 0.5 MG: 0.5 INHALANT ORAL at 19:43

## 2022-02-02 RX ADMIN — AMIODARONE HYDROCHLORIDE 200 MG: 200 TABLET ORAL at 12:40

## 2022-02-02 RX ADMIN — ATORVASTATIN CALCIUM 40 MG: 40 TABLET, FILM COATED ORAL at 08:23

## 2022-02-02 RX ADMIN — LEVALBUTEROL HYDROCHLORIDE 1.25 MG: 1.25 SOLUTION, CONCENTRATE RESPIRATORY (INHALATION) at 13:00

## 2022-02-02 RX ADMIN — FUROSEMIDE 40 MG: 10 INJECTION, SOLUTION INTRAMUSCULAR; INTRAVENOUS at 17:01

## 2022-02-02 RX ADMIN — AMLODIPINE BESYLATE 5 MG: 5 TABLET ORAL at 08:23

## 2022-02-02 RX ADMIN — LEVALBUTEROL HYDROCHLORIDE 1.25 MG: 1.25 SOLUTION, CONCENTRATE RESPIRATORY (INHALATION) at 19:43

## 2022-02-02 RX ADMIN — APIXABAN 5 MG: 5 TABLET, FILM COATED ORAL at 08:24

## 2022-02-02 RX ADMIN — B-COMPLEX W/ C & FOLIC ACID TAB 1 TABLET: TAB at 08:23

## 2022-02-02 RX ADMIN — IPRATROPIUM BROMIDE 0.5 MG: 0.5 SOLUTION RESPIRATORY (INHALATION) at 13:00

## 2022-02-02 RX ADMIN — AMIODARONE HYDROCHLORIDE 200 MG: 200 TABLET ORAL at 17:01

## 2022-02-02 RX ADMIN — POLYETHYLENE GLYCOL 3350 17 G: 17 POWDER, FOR SOLUTION ORAL at 08:24

## 2022-02-02 RX ADMIN — APIXABAN 5 MG: 5 TABLET, FILM COATED ORAL at 17:01

## 2022-02-02 RX ADMIN — FUROSEMIDE 40 MG: 10 INJECTION, SOLUTION INTRAMUSCULAR; INTRAVENOUS at 08:24

## 2022-02-02 RX ADMIN — METOPROLOL SUCCINATE 100 MG: 100 TABLET, EXTENDED RELEASE ORAL at 08:27

## 2022-02-02 RX ADMIN — IPRATROPIUM BROMIDE 0.5 MG: 0.5 SOLUTION RESPIRATORY (INHALATION) at 07:00

## 2022-02-02 NOTE — ASSESSMENT & PLAN NOTE
· Started on IV lasix - transition to po  · Echo noted  · Monitor I's and O's  · Daily weights  · Cardiology consult appreciated  · ACEI on hold in setting of diuresis

## 2022-02-02 NOTE — PROGRESS NOTES
Progress Note - Cardiology   Yang Hartmann 67 y o  male MRN: [id number]  Unit/Bed#: E4 -01 Encounter: [id number]    Asessment:  Acute on chronic hypoxic respiratory failure   Acute diastolic congestive heart failure                  -  LVEF 50%, grade 1 diastolic dysfunction, mild MAC, mild-to-moderate TR, aortic root exhibited moderate fibrocalcific change, moderate to severely increased PASP 70 mmHg, 1/31/22  Paroxysmal atrial fibrillation               - newly discovered during office visit on 1/12/22               - OP AV blocking Rx, Toprol  mg daily              - anticoagulation with Eliquis 5 mg BID  Nonsustained ventricular tachycardia              - 21 beat run of NSVT prior hospitalization in the setting of acute exacerbation of COPD, pulmonary embolism with hypoxia              - OP Rx, Toprol  mg daily    Pulmonary embolism, December 2021              - Rx, initially required Eliquis 10 mg BID for 7 days with transition to 5 mg BID on 1/7/22    Ectasia of descending thoracic aorta, December 2021  Hypertension              - OP Rx, Toprol  mg daily, benazepril 40 mg daily  Dyslipidemia              - Rx, atorvastatin 40 mg daily              - most recent lipid panel 10/5/21: Cholesterol 158, triglycerides 67, HDL 85, LDL 59       Other:  - severe COPD on supplemental oxygen via NC, 3 L O2 chronically + 8 L O2 upon ambulation  - history of CVA     Plan/ Discussion:  · Would recommend to continue IV diuretics today with transition to oral torsemide 40 mg daily starting tomorrow with anticipation of discharge  · Continue amlodipine 5 mg daily, Toprol- mg daily  · Continued on amiodarone 200 mg TID for 7 days with transition to 200 mg daily thereafter  · Continue anticoagulation with Eliquis 5 mg BID, statin with atorvastatin 40 mg daily  · Renal function remains at 1 3 mg/dL    · Maintain potassium > 4, magnesium > 2    · Awaiting plan regarding possible hospice; patient states he would like to talk with his son today  Subjective:  Patient resting in bed  He states that his breathing has somewhat improved today  As noted above, he would like to speak with his son today regarding his goals      Vitals:  Vitals:    02/01/22 0600 02/02/22 0600   Weight: 63 2 kg (139 lb 5 3 oz) 61 kg (134 lb 7 7 oz)   ,  Vitals:    02/01/22 2212 02/02/22 0600 02/02/22 0700 02/02/22 0813   BP: 109/58   130/62   BP Location:    Right arm   Pulse: 69   82   Resp: 20   20   Temp:    97 5 °F (36 4 °C)   TempSrc:    Temporal   SpO2: 92%  94% 94%   Weight:  61 kg (134 lb 7 7 oz)     Height:           Exam:  General: Alert awake and oriented, no acute distress  Heart:  Regular rate and rhythm, S1, S2  Respiratory effort/ Lungs:  Diminished lung sounds bilateral bases, otherwise clear, remains on supplemental oxygen  Abdominal: Non-tender to palpation, + bowel sounds, soft, no masses or distension  Lower Limbs:  No over bilateral lower extremity edema           Medications:    Current Facility-Administered Medications:     acetaminophen (TYLENOL) tablet 650 mg, 650 mg, Oral, Q6H PRN, Dona Ramos PA-C    albuterol inhalation solution 2 5 mg, 2 5 mg, Nebulization, Q6H PRN, Dona Ramos PA-C, 2 5 mg at 01/31/22 0934    amiodarone tablet 200 mg, 200 mg, Oral, TID With Meals, ASHLEY Olvera, 200 mg at 02/02/22 0823    [START ON 2/8/2022] amiodarone tablet 200 mg, 200 mg, Oral, Daily With Breakfast, ASHLEY Olvera    amLODIPine (NORVASC) tablet 5 mg, 5 mg, Oral, Daily, YUMIKO Valderrama-C, 5 mg at 02/02/22 7654    apixaban (ELIQUIS) tablet 5 mg, 5 mg, Oral, BID, Radha Aguirre PA-C, 5 mg at 02/02/22 8581    atorvastatin (LIPITOR) tablet 40 mg, 40 mg, Oral, Daily, Radha Aguirre PA-C, 40 mg at 02/02/22 0823    budesonide (PULMICORT) inhalation solution 0 5 mg, 0 5 mg, Nebulization, Q12H, Mary Jane Mejia MD, 0 5 mg at 02/02/22 0700    fluticasone (FLONASE) 50 mcg/act nasal spray 1 spray, 1 spray, Each Nare, Daily, Angelica Ode, PA-C, 1 spray at 02/02/22 9462    furosemide (LASIX) injection 40 mg, 40 mg, Intravenous, BID (diuretic), Edita Kraft Prechtel, DO, 40 mg at 02/02/22 5684    ipratropium (ATROVENT) 0 02 % inhalation solution 0 5 mg, 0 5 mg, Nebulization, TID, Wilber Peterson, CRNP, 0 5 mg at 02/02/22 0700    levalbuterol (XOPENEX) inhalation solution 1 25 mg, 1 25 mg, Nebulization, TID, Wilber Peterson, CRNP, 1 25 mg at 02/02/22 0700    metoprolol succinate (TOPROL-XL) 24 hr tablet 100 mg, 100 mg, Oral, Daily, Radha Aguirre, PA-C, 100 mg at 02/02/22 3985    multivitamin stress formula tablet 1 tablet, 1 tablet, Oral, Daily, Angelica Ode, PA-C, 1 tablet at 02/02/22 0823    ondansetron (ZOFRAN) injection 4 mg, 4 mg, Intravenous, Q6H PRN, Angelica Ode, PA-C    polyethylene glycol (MIRALAX) packet 17 g, 17 g, Oral, Daily, Angelica Ode, PA-C, 17 g at 02/02/22 7388      Labs/Data:        Results from last 7 days   Lab Units 02/02/22  0543 01/31/22  0546 01/30/22  1018   WBC Thousand/uL 5 81 5 41 6 67   HEMOGLOBIN g/dL 13 2 13 3 14 8   HEMATOCRIT % 40 7 41 3 46 0   PLATELETS Thousands/uL 209 218 228     Results from last 7 days   Lab Units 02/02/22  0732 02/01/22  0452 01/31/22  0546   POTASSIUM mmol/L 4 0 4 0 3 8   CHLORIDE mmol/L 101 100 100   CO2 mmol/L 34* 34* 32   BUN mg/dL 33* 23 23

## 2022-02-02 NOTE — PHYSICAL THERAPY NOTE
Physical Therapy Treatment Note     02/02/22 0942   PT Last Visit   PT Visit Date 02/02/22   Note Type   Note Type Treatment   Pain Assessment   Pain Assessment Tool 0-10   Pain Score No Pain   Restrictions/Precautions   Weight Bearing Precautions Per Order No   Other Precautions O2;Fall Risk  (SOB)   General   Chart Reviewed Yes   Family/Caregiver Present No   Subjective   Subjective Pt  seated on EOB requested to go to bathroom  pt  on 8L O2  Pt  agreeable to PT   Transfers   Sit to Stand 5  Supervision   Additional items Assist x 1; Increased time required   Stand to Sit 5  Supervision   Additional items Assist x 1; Increased time required   Stand pivot 5  Supervision   Additional items Assist x 1   Toilet transfer 5  Supervision   Additional items Assist x 1; Increased time required;Standard toilet   Ambulation/Elevation   Gait pattern Inconsistent lillian;Decreased foot clearance; Improper Weight shift  (Mild instability)   Gait Assistance 4  Minimal assist   Additional items Assist x 1;Verbal cues   Assistive Device None   Distance 160ft, 80ft x 2   Stair Management Assistance 4  Minimal assist   Additional items Increased time required   Stair Management Technique Two rails; Alternating pattern; Foreward;Reciprocal   Number of Stairs 4   Balance   Static Sitting Normal   Ambulatory Poor +   Endurance Deficit   Endurance Deficit Yes   Endurance Deficit Description ROSALES   Activity Tolerance   Activity Tolerance Patient tolerated treatment well;Patient limited by fatigue   Nurse Made Aware Yes   Assessment   Prognosis Fair   Problem List Decreased endurance; Impaired balance;Decreased mobility   Assessment Pt  able to perform all transfers with Mod I/S  Pt  also performed toileting with S  Pt  needed extended rests between ambualtion due to ROSALES  Pt  on 8L o2 and SpO2 stats at rest 91-92% and post ambulation dropped to 88-91%  Pt  however feels very fatigues with mimimal exertion   Pt  reported he is on 8L O2 at home as well  Noted unsteady gait occasionally  pt  ambulated without AD and extended seated rest between ambulation  No overt LOB noted t/o session  Pt  lives alone and is below his baseline at this time and needs assist for mobility  Will cotninue to follow patient during the stay and progress him as able  Continue with PT per POC to address the mobility deficits  Barriers to Discharge Decreased caregiver support   Goals   Patient Goals None reported   STG Expiration Date 02/10/22   PT Treatment Day 1   Plan   Treatment/Interventions Functional transfer training;Elevations; Patient/family training;Equipment eval/education;Gait training;Spoke to nursing   Progress Slow progress, decreased activity tolerance   PT Frequency 2-3x/wk   Recommendation   PT Discharge Recommendation Post acute rehabilitation services  (pending progress)   AM-PAC Basic Mobility Inpatient   Turning in Bed Without Bedrails 4   Lying on Back to Sitting on Edge of Flat Bed 4   Moving Bed to Chair 4   Standing Up From Chair 4   Walk in Room 3   Climb 3-5 Stairs 3   Basic Mobility Inpatient Raw Score 22   Basic Mobility Standardized Score 47 4   Highest Level Of Mobility   JH-HLM Goal 7: Walk 25 feet or more   JH-HLM Highest Level of Mobility 8: Walk 250 feet ot more   JH-HLM Goal Achieved Yes   End of Consult   Patient Position at End of Consult Seated edge of bed; All needs within reach         En Hartley PTA    An AM-PAC basic mobility standardized score less than 42 9 suggest the patient may benefit from discharge to post-acute rehab services

## 2022-02-02 NOTE — ASSESSMENT & PLAN NOTE
· Patient has history of nonsustained ventricular tachycardia  · No significant episode  · Continue home beta-blocker with Toprol  mg daily  · Patient is also on amiodarone due to his history of paroxysmal atrial fibrillation

## 2022-02-02 NOTE — ASSESSMENT & PLAN NOTE
· Patient has a history of severe COPD, without acute exacerbation  · Was evaluated by the pulmonary service  · Continue Xopenex/Atrovent nebulizer treatments  · Home regimen is Anoro, albuterol nebulizer, and Proventil MDI  · Patient has associated chronic hypoxia  · Discussed with Pulmonary team:  Patient appears medically maximized and may be discharged to short-term rehab if they can accommodate his level of her oxygen with the Oxymizer  · Currently requiring 8 L mid flow at all times

## 2022-02-02 NOTE — ASSESSMENT & PLAN NOTE
Patient has a history of paroxysmal atrial fibrillation, diagnosed January 2022  Patient is currently rate controlled:  Being loading on amiodarone 200 mg t i d  x7 days (until 2/7), then 200 mg once daily starting 2/8  Continue metoprolol  mg daily  Continue anticoagulation with Eliquis 5 mg b i d   Most recent 2D echocardiogram 12/21 without significant valvular heart disease  TSH within normal limits

## 2022-02-02 NOTE — CASE MANAGEMENT
Case Management Discharge Planning Note    Patient name Erika Cain  Location [facility] /E4 -* MRN [id number]  : 1945 Date 2022       Current Admission Date: 2022  Current Admission Diagnosis:Acute on chronic respiratory failure with hypoxia [facility])   Patient Active Problem List    Diagnosis Date Noted    Atrial fibrillation ([facility] ) 2022    Acute on chronic diastolic congestive heart failure ([facility] ) 2022    Lung nodule seen on imaging study 2022    NSVT (nonsustained ventricular tachycardia) ([facility] ) 2021    Pulmonary embolism ([facility] ) 2021    Chronic obstructive pulmonary disease ([facility] ) 2021    Aortic ectasia, thoracic ([facility] ) 2021    Acute on chronic respiratory failure with hypoxia ([facility] ) 2021    COVID-19 determined by clinical diagnostic criteria 2021    Vitamin D deficiency 10/05/2017    Expressive aphasia 2016    Actinic keratosis 2013    Stenosis of carotid artery 10/29/2013    Hyperglycemia 10/25/2012    Cerebral infarction ([facility] ) 10/24/2012    Hyperlipidemia 10/24/2012    Hypertension 10/24/2012    Macular degeneration 10/24/2012      LOS (days): 3  Geometric Mean LOS (GMLOS) (days):   Days to GMLOS:     OBJECTIVE:  Risk of Unplanned Readmission Score: 15         Current admission status: Inpatient   Preferred [facility]acy:   [facility] [address] [address]  Phone: [phone] Fax: [phone]    Primary Care Provider: Ruthann Gibson PA-C    Primary Insurance: [facility]  Secondary Insurance:     DISCHARGE DETAILS:    Discharge planning discussed with[de-identified] Devan Florian of Choice: Yes  Comments - Freedom of Choice: Consult placed for Hospice  [facility] care [facility] [phone] will speak with patient son Jerel Dyer and update CM when able       Were Treatment Team discharge recommendations reviewed with patient/caregiver?: Yes  Did patient/caregiver verbalize understanding of patient care needs?: Yes  Were patient/caregiver advised of the risks associated with not following Treatment Team discharge recommendations?: Yes

## 2022-02-02 NOTE — ASSESSMENT & PLAN NOTE
· Patient with severe COPD  Normally on 3 L O2 at rest and 8 L with exertion (patient now saying he was using 8L all the time, has an optimizer at home)  Now also with moderate to severe pulmonary edema and CHF  · Presents with increasing shortness breath  Patient states he has been having worsening shortness of breath since [place], but has become even worse over the last week  · He was recently admitted 12/27-12/31 for pneumonia and COPD exacerbation  · Procalcitonin normal, doubt recurrent pneumonia  Covid/flu/RSV negative  Check sputum culture  No PE on CT  · Lung exam fairly clear,  will hold on starting steroids for now  · Echocardiogram noted  · Was started on  IV lasix with improvement  Will change to oral lasix 40mg daily and monitor response  · Pulmonary and Cardiology consult appreciated   · Palliative care consult appreciated and will place hospice consult to discuss options  Patient would like to go to rehab

## 2022-02-02 NOTE — UTILIZATION REVIEW
Continued Stay Review    Date: 2/2/22                         Current Patient Class: Inpatient Current Level of Care: Med Surg    HPI:76 y o  male initially admitted on 1/30/21 with acute on chronic hypoxic respiratory failure, volume overload  Patient is on home O2 of 3L at rest and 8L with exertion  2/1 Pulmonology: Acute on chronic hypoxic respiratory failure, acute on chronic grade 1 diastolic CHF:  Improving oxygenation, requires 8-9 LPM compared to 15 L a few days ago  TTE with mean PA pressure of 70 mm Hg, needs  repeat TTE after adequately diuresing him close to his dry weight, possible RHC as an outpatient  Continue nebulized treatment only, wean FiO2 for SpO2 above 88%  Patient responding well to diuresing,  recommend one more day IV diuretics  Lasix 40 mg  Decreased breath sounds and rales present on physical exam  Cardiology: Patient remains short of breath and tachypneic  Labored breathing, fine crackles with wheezing, remains on supplemental oxygen  Trace B/L LE edema, improved from prior  Currently on furosemide 40 mg IV BID and will continue same for today  Continue amlodipine 5 mg daily, Toprol- mg daily  Outpatient ACE-inhibitor on hold in light of diuresis  Continue amiodarone load with amiodarone 200 mg TID for 7 days with transition to 200 mg daily to follow  Continue anticoagulation with Eliquis 5 mg BID, statin with atorvastatin 40 mg daily  Renal function this hospitalization at 1 3 mg/dL; continue to monitor renal function and electrolytes closely  Maintain potassium > 4, magnesium > 2  Monitor volume status with strict intake/output, daily weight  Agree with Palliative medicine evaluation  Palliative Care consult: Patient has good understanding of his underlying disease processes  He states his goal is to go to a facility to be treated for comfort until his "time comes " Discussed in facility hospice, patient is amenable   Spoke to son with pt's permission and relayed the details of in facility hospice vs home hospice  Son was also amenable to initiating the process of hospice evaluation with goal at this time of pursuing in facility hospice cares  Son did state that Marlette often changes his mind and may potentially demand to go home  Son is requesting to be kept informed of any developments with his father  Hospice consult has been placed  2/2 Cardiology: recommend to continue IV diuretics today with transition to oral torsemide 40 mg daily starting tomorrow with anticipation of discharge  Renal function remains at 1 3 mg/dL  Awaiting plan regarding possible hospice; patient states he would like to talk with his son today  Physical exam: diminished lung sounds B/L bases, otherwise clear, remains on supplemental oxygen  No B/L LE edema         Vital Signs:       Date/Time Temp Pulse Resp BP MAP (mmHg) SpO2 O2 Flow Rate (L/min) O2 Device   02/02/22 1300 -- -- -- -- -- 94 % 8 L/min Mid flow nasal cannula   02/02/22 1239 -- -- -- 122/62 -- 93 % 8 L/min Mid flow nasal cannula   02/02/22 1230 -- -- -- -- -- 90 % 7 L/min Mid flow nasal cannula   02/02/22 0813 97 5 °F (36 4 °C) 82 20 130/62 89 94 % 8 L/min Mid flow nasal cannula   02/02/22 0700 -- -- -- -- -- 94 % 8 L/min Mid flow nasal cannula   02/01/22 2212 -- 69 20 109/58 -- 92 % -- --   02/01/22 2018 -- -- -- -- -- 95 % 8 L/min Mid flow nasal cannula   02/01/22 1750 -- -- -- 110/59 -- -- -- --   02/01/22 1516 97 8 °F (36 6 °C) 70 20 105/67 78 96 % 9 L/min Mid flow nasal cannula   02/01/22 1158 -- 69 -- 121/70 -- -- -- --   02/01/22 1108 97 6 °F (36 4 °C) 71 20 121/62 86 96 % 9 L/min Mid flow nasal cannula   02/01/22 0730 -- -- -- -- -- 96 % -- --   02/01/22 0709 97 2 °F (36 2 °C) Abnormal  70 18 140/66 95 96 % 9 L/min Mid flow nasal cannula   02/01/22 0330 97 5 °F (36 4 °C) 66 20 129/60 87 96 % -- --   02/01/22 0013 97 5 °F (36 4 °C) 76 20 114/55 78 96 % -- --   01/31/22 1953 -- -- -- -- -- 95 % 9 L/min Mid flow nasal cannula 01/31/22 1920 96 7 °F (35 9 °C) Abnormal  74 20 95/59 64 96 % -- Mid flow nasal cannula   01/31/22 1147 97 4 °F (36 3 °C) Abnormal  71 20 101/53 74 95 % -- Mid flow nasal cannula   01/31/22 0935 -- -- -- 140/80 -- 93 % -- --   01/31/22 0724 97 3 °F (36 3 °C) Abnormal  70 20 142/79 92 100 % -- Non-rebreather mask   01/31/22 0300 96 7 °F (35 9 °C) Abnormal  75 20 147/65 94 98 % -- --   01/31/22 0002 96 8 °F (36 °C) Abnormal  73 20 117/56 81 98 % -- --         Pertinent Labs/Diagnostic Results:     1/31 ECHO:        Left Ventricle: Left ventricular cavity size is normal  The left ventricular ejection fraction is 60%  Systolic function is normal  Wall motion is normal  Diastolic function is mildly abnormal, consistent with grade I (abnormal) relaxation    Mitral Valve: There is mild annular calcification    Tricuspid Valve: There is mild to moderate regurgitation    Aorta: The aortic root exhibited moderate fibrocalcific change    Pulmonary Artery: The estimated pulmonary artery systolic pressure is 36 7 mmHg   The pulmonary artery systolic pressure is moderate to severely increased        Results from last 7 days   Lab Units 01/30/22  1018   SARS-COV-2  Negative     Results from last 7 days   Lab Units 02/02/22  0543 01/31/22  0546 01/30/22  1018   WBC Thousand/uL 5 81 5 41 6 67   HEMOGLOBIN g/dL 13 2 13 3 14 8   HEMATOCRIT % 40 7 41 3 46 0   PLATELETS Thousands/uL 209 218 228   NEUTROS ABS Thousands/µL  --  3 47 5 10         Results from last 7 days   Lab Units 02/02/22  0732 02/01/22  0452 01/31/22  0546 01/30/22  1018   SODIUM mmol/L 140 139 140 142   POTASSIUM mmol/L 4 0 4 0 3 8 4 5   CHLORIDE mmol/L 101 100 100 102   CO2 mmol/L 34* 34* 32 30   ANION GAP mmol/L 5 5 8 10   BUN mg/dL 33* 23 23 19   CREATININE mg/dL 1 32* 1 30 1 32* 1 31*   EGFR ml/min/1 73sq m 51 52 51 52   CALCIUM mg/dL 8 6 8 3 8 5 8 8   MAGNESIUM mg/dL  --   --  1 6  --      Results from last 7 days   Lab Units 01/30/22  1018   AST U/L 25 ALT U/L 33   ALK PHOS U/L 106   TOTAL PROTEIN g/dL 7 0   ALBUMIN g/dL 2 8*   TOTAL BILIRUBIN mg/dL 0 67         Results from last 7 days   Lab Units 02/02/22  0732 02/01/22  0452 01/31/22  0546 01/30/22  1018   GLUCOSE RANDOM mg/dL 147* 127 96 126             Results from last 7 days   Lab Units 01/30/22  1222 01/30/22  1018   HS TNI 0HR ng/L  --  61*   HS TNI 2HR ng/L 69*  --    HSTNI D2 ng/L 8  --          Results from last 7 days   Lab Units 01/30/22  1018   PROTIME seconds 14 4   INR  1 15   PTT seconds 27     Results from last 7 days   Lab Units 01/31/22  0546   TSH 3RD GENERATON uIU/mL 0 741     Results from last 7 days   Lab Units 02/01/22  0452 01/31/22  0546   PROCALCITONIN ng/ml 0 16 0 17     Results from last 7 days   Lab Units 01/30/22  1018   LACTIC ACID mmol/L 1 7             Results from last 7 days   Lab Units 01/30/22  1018   NT-PRO BNP pg/mL 1,280*         Results from last 7 days   Lab Units 01/30/22  1018   INFLUENZA A PCR  Negative   INFLUENZA B PCR  Negative   RSV PCR  Negative       Results from last 7 days   Lab Units 02/01/22  0854 01/30/22  1222   BLOOD CULTURE   --  No Growth at 72 hrs  No Growth at 72 hrs  SPUTUM CULTURE  Test not performed  Suggest repeat specimen  --    GRAM STAIN RESULT  >10 squamous epithelial cells/lpf, indicating orophayngeal contamination  *  No polys seen*  3+ Gram positive cocci in pairs, chains and clusters*  3+ Gram negative rods*  2+ Gram negative diplococci*  1+ Gram positive rods*  --          Medications:   Scheduled Medications:    amiodarone, 200 mg, Oral, TID With Meals  [START ON 2/8/2022] amiodarone, 200 mg, Oral, Daily With Breakfast  amLODIPine, 5 mg, Oral, Daily  apixaban, 5 mg, Oral, BID  atorvastatin, 40 mg, Oral, Daily  budesonide, 0 5 mg, Nebulization, Q12H  fluticasone, 1 spray, Each Nare, Daily  [START ON 2/3/2022] furosemide, 40 mg, Oral, Daily  ipratropium, 0 5 mg, Nebulization, TID  levalbuterol, 1 25 mg, Nebulization, TID  metoprolol succinate, 100 mg, Oral, Daily  multivitamin stress formula, 1 tablet, Oral, Daily  polyethylene glycol, 17 g, Oral, Daily  [START ON 2/3/2022] torsemide, 40 mg, Oral, Daily      furosemide (LASIX) injection 40 mg  Dose: 40 mg  Freq: 2 times daily (diuretic) Route: IV  Start: 02/02/22 1600 End: 02/03/22 0759      furosemide (LASIX) injection 40 mg  Dose: 40 mg  Freq: 2 times daily (diuretic) Route: IV  Start: 01/30/22 1900 End: 02/02/22 1127        Continuous IV Infusions: None  PRN Meds:  acetaminophen, 650 mg, Oral, Q6H PRN  albuterol, 2 5 mg, Nebulization, Q6H PRN  ondansetron, 4 mg, Intravenous, Q6H PRN        Discharge Plan: D        Network Utilization Review Department  ATTENTION: Please call with any questions or concerns to [phone] and carefully listen to the prompts so that you are directed to the right person  All voicemails are confidential   [facility] all requests for admission clinical reviews, approved or denied determinations and any other requests to dedicated fax number below belonging to the campus where the patient is receiving treatment   List of dedicated fax numbers for the Facilities:  [address] DENIALS (Administrative/Medical Necessity) [phone]   [address] (Maternity/NICU/Pediatrics) [phone]   [address]  [phone]   [address] [id number] [place] [address] [address] [facility] [phone]

## 2022-02-02 NOTE — ASSESSMENT & PLAN NOTE
· Patient with severe history COPD with chronic hypoxic respiratory failure  · Normally on 3 L O2 at rest and 8 L oximizer with exertion (patient now saying he was using 8L)  · Son clarifies pt no longer has oximizer at home, just O2 tank through [address]  · Presented with increasing shortness breath and worsening hypoxia  · He was recently admitted 12/27-12/31 for pneumonia and COPD exacerbation, and recent diagnosis of PE   · Pt was evaluated by Cardiology and Pulmonary teams  · His acute worsening of his chronic respiratory failure with hypoxia was felt to be secondary to acute/chronic congestive heart failure superimposed on his COPD  · Patient was diuresed, given nebulizers and metered-dose inhalers and has stabilized  · Patient is currently requiring 8 L mid flow  · Patient would like to go to short-term rehab    He notes he would be interested in hospice if he were to continue to decline  · Patient and his son both note that patient is not able to return to his current residence as there are multiple floors with stairs, and is too big for him to manage

## 2022-02-02 NOTE — PROGRESS NOTES
[address] [place] [address]  Progress Note - Talia Adam 1945, 67 y o  male MRN: [id number]  Unit/Bed#: E4 -01 Encounter: [id number]  Primary Care Provider: Sherly Summers PA-C   Date and time admitted to hospital: 1/30/2022  9:13 AM    * Acute on chronic respiratory failure with hypoxia [facility])  Assessment & Plan  · Patient with severe COPD  Normally on 3 L O2 at rest and 8 L with exertion (patient now saying he was using 8L all the time, has an optimizer at home)  Now also with moderate to severe pulmonary edema and CHF  · Presents with increasing shortness breath  Patient states he has been having worsening shortness of breath since [place], but has become even worse over the last week  · He was recently admitted 12/27-12/31 for pneumonia and COPD exacerbation  · Procalcitonin normal, doubt recurrent pneumonia  Covid/flu/RSV negative  Check sputum culture  No PE on CT  · Lung exam fairly clear,  will hold on starting steroids for now  · Echocardiogram noted  · Was started on  IV lasix with improvement  Will change to oral lasix 40mg daily and monitor response  · Pulmonary and Cardiology consult appreciated   · Palliative care consult appreciated and will place hospice consult to discuss options  Patient would like to go to rehab  Anticipate patient can go to rehab and then transition to hospice when he declines       Acute on chronic diastolic congestive heart failure (HCC)  Assessment & Plan  · Started on IV lasix - transition to po tomorrow  · Echo noted  · Monitor I's and O's  · Daily weights  · Cardiology consult appreciated  · ACEI on hold in setting of diuresis    Chronic obstructive pulmonary disease (HCC)  Assessment & Plan  · Severe COPD without acute exacerbation  · Continue respiratory treatments  · Respiratory protocol  · Pulmonary input appreciated    Pulmonary embolism (HCC)  Assessment & Plan  · Continue Eliquis     Atrial fibrillation ([address] )  Assessment & Plan  Amiodarone started per Cardiology  Continue Eliquis    NSVT (nonsustained ventricular tachycardia) (HCC)  Assessment & Plan  · Continue metoprolol  · no events on tele    Aortic ectasia, thoracic (HCC)  Assessment & Plan  · Stable   · Outpatient thoracic follow up if warranted pending goals of care        VTE Pharmacologic Prophylaxis: VTE Score: 9 High Risk (Score >/= 5) - Pharmacological DVT Prophylaxis Ordered: apixaban (Eliquis)  Sequential Compression Devices Ordered  Patient Centered Rounds: I performed bedside rounds with nursing staff today  Discussions with Specialists or Other Care Team Provider: case management    Education and Discussions with Family / Patient: Attempted to update  (son) via phone  Left voicemail  Time Spent for Care: 30 minutes  More than 50% of total time spent on counseling and coordination of care as described above  Current Length of Stay: 3 day(s)  Current Patient Status: Inpatient   Certification Statement: The patient will continue to require additional inpatient hospital stay due to O2 support  Discharge Plan: Anticipate discharge in 48 hrs to rehab facility  Code Status: Level 3 - DNAR and DNI    Subjective:   Feels a little better today  Was able to walk more comfortably today  Still with ROSALES  Objective:     Vitals:   Temp (24hrs), Av 7 °F (36 5 °C), Min:97 5 °F (36 4 °C), Max:97 8 °F (36 6 °C)    Temp:  [97 5 °F (36 4 °C)-97 8 °F (36 6 °C)] 97 5 °F (36 4 °C)  HR:  [69-82] 82  Resp:  [20] 20  BP: (105-130)/(58-70) 130/62  SpO2:  [92 %-96 %] 94 %  Body mass index is 21 06 kg/m²  Input and Output Summary (last 24 hours): Intake/Output Summary (Last 24 hours) at 2022 1135  Last data filed at 2022 0900  Gross per 24 hour   Intake --   Output 900 ml   Net -900 ml       Physical Exam:   Physical Exam  Vitals and nursing note reviewed  Constitutional:       Appearance: He is well-developed     HENT:      Head: Normocephalic and atraumatic  Eyes:      Conjunctiva/sclera: Conjunctivae normal    Cardiovascular:      Rate and Rhythm: Normal rate and regular rhythm  Heart sounds: No murmur heard  Pulmonary:      Effort: Pulmonary effort is normal  No respiratory distress  Breath sounds: Normal breath sounds  Abdominal:      Palpations: Abdomen is soft  Tenderness: There is no abdominal tenderness  Musculoskeletal:      Cervical back: Neck supple  Skin:     General: Skin is warm and dry  Neurological:      Mental Status: He is alert  Additional Data:     Labs:  Results from last 7 days   Lab Units 02/02/22  0543 01/31/22  0546 01/31/22  0546   WBC Thousand/uL 5 81   < > 5 41   HEMOGLOBIN g/dL 13 2   < > 13 3   HEMATOCRIT % 40 7   < > 41 3   PLATELETS Thousands/uL 209   < > 218   NEUTROS PCT %  --   --  64   LYMPHS PCT %  --   --  13*   MONOS PCT %  --   --  17*   EOS PCT %  --   --  5    < > = values in this interval not displayed  Results from last 7 days   Lab Units 02/02/22  0732 01/31/22  0546 01/30/22  1018   SODIUM mmol/L 140   < > 142   POTASSIUM mmol/L 4 0   < > 4 5   CHLORIDE mmol/L 101   < > 102   CO2 mmol/L 34*   < > 30   BUN mg/dL 33*   < > 19   CREATININE mg/dL 1 32*   < > 1 31*   ANION GAP mmol/L 5   < > 10   CALCIUM mg/dL 8 6   < > 8 8   ALBUMIN g/dL  --   --  2 8*   TOTAL BILIRUBIN mg/dL  --   --  0 67   ALK PHOS U/L  --   --  106   ALT U/L  --   --  33   AST U/L  --   --  25   GLUCOSE RANDOM mg/dL 147*   < > 126    < > = values in this interval not displayed       Results from last 7 days   Lab Units 01/30/22  1018   INR  1 15             Results from last 7 days   Lab Units 02/01/22  0452 01/31/22  0546 01/30/22  1018   LACTIC ACID mmol/L  --   --  1 7   PROCALCITONIN ng/ml 0 16 0 17  --        Lines/Drains:  Invasive Devices  Report    Peripheral Intravenous Line            Peripheral IV 01/30/22 Right Antecubital 3 days    Peripheral IV 01/30/22 Left Forearm 2 days Imaging: No pertinent imaging reviewed  Recent Cultures (last 7 days):   Results from last 7 days   Lab Units 02/01/22  0854 01/30/22  1222   BLOOD CULTURE   --  No Growth at 48 hrs  No Growth at 48 hrs  SPUTUM CULTURE  Test not performed  Suggest repeat specimen  --    GRAM STAIN RESULT  >10 squamous epithelial cells/lpf, indicating orophayngeal contamination  *  No polys seen*  3+ Gram positive cocci in pairs, chains and clusters*  3+ Gram negative rods*  2+ Gram negative diplococci*  1+ Gram positive rods*  --        Last 24 Hours Medication List:   Current Facility-Administered Medications   Medication Dose Route Frequency Provider Last Rate    acetaminophen  650 mg Oral Q6H PRN Anibal Parker PA-C      albuterol  2 5 mg Nebulization Q6H PRN Anibal Parker PA-C      amiodarone  200 mg Oral TID With Meals ASHLEY Olvera      [START ON 2/8/2022] amiodarone  200 mg Oral Daily With Breakfast ASHLEY Olvera      amLODIPine  5 mg Oral Daily Radha Aguirre PA-C      apixaban  5 mg Oral BID Radha Aguirre PA-C      atorvastatin  40 mg Oral Daily Anibal Parker PA-C      budesonide  0 5 mg Nebulization Q12H Nathan Berg MD      fluticasone  1 spray Each Nare Daily Zara Alvarez      furosemide  40 mg Intravenous BID (diuretic) ASHLEY Olvera      ipratropium  0 5 mg Nebulization TID ASHLEY Fontana      levalbuterol  1 25 mg Nebulization TID ASHLEY Fontana      metoprolol succinate  100 mg Oral Daily Anibal Parker PA-C      multivitamin stress formula  1 tablet Oral Daily Anibal Parker PA-C      ondansetron  4 mg Intravenous Q6H PRN Anibal Parker PA-C      polyethylene glycol  17 g Oral Daily Radha Aguirre PA-C      [START ON 2/3/2022] torsemide  40 mg Oral Daily ASHLEY Olvera          Today, Patient Was Seen By: Anibal Parker PA-C    **Please Note: This note may have been constructed using a voice recognition system  ** none

## 2022-02-02 NOTE — PLAN OF CARE
Problem: PHYSICAL THERAPY ADULT  Goal: Performs mobility at highest level of function for planned discharge setting  See evaluation for individualized goals  Description: Treatment/Interventions: Functional transfer training,LE strengthening/ROM,Elevations,Therapeutic exercise,Endurance training,Patient/family training,Equipment eval/education,Bed mobility,Gait training,Compensatory technique education,Continued evaluation,Spoke to nursing,OT  Equipment Recommended:  (monitor)       See flowsheet documentation for full assessment, interventions and recommendations  Outcome: Progressing  Note: Prognosis: Fair  Problem List: Decreased endurance,Impaired balance,Decreased mobility  Assessment: Pt  able to perform all transfers with Mod I/S  Pt  also performed toileting with S  Pt  needed extended rests between ambualtion due to ROSALES  Pt  on 8L o2 and SpO2 stats at rest 91-92% and post ambulation dropped to 88-91%  Pt  however feels very fatigues with mimimal exertion  Pt  reported he is on 8L O2 at home as well  Noted unsteady gait occasionally  pt  ambulated without AD and extended seated rest between ambulation  No overt LOB noted t/o session  Pt  lives alone and is below his baseline at this time and needs assist for mobility  Will cotninue to follow patient during the stay and progress him as able  Continue with PT per POC to address the mobility deficits  Barriers to Discharge: Decreased caregiver support  Barriers to Discharge Comments: lives alone in [facility]  PT Discharge Recommendation: Post acute rehabilitation services (pending progress)          See flowsheet documentation for full assessment

## 2022-02-02 NOTE — ASSESSMENT & PLAN NOTE
· Patient presented with an acute exacerbation of his chronic diastolic congestive heart failure  · Most recent 2D echocardiogram 12/21 with left ventricular ejection fraction 65%  Normal systolic function  Grade 1 diastolic dysfunction  · Patient was seen in consultation by the cardiology team and diuresed with IV Lasix and improved  · Will be transition to oral diuretics with torsemide 40 mg daily  · Continue beta-blocker  · Patient not currently on an ACE-inhibitor    Cardiology team notes could consider reinstituting ACE-inhibitor as an outpatient if blood pressure tolerates

## 2022-02-02 NOTE — ASSESSMENT & PLAN NOTE
· Patient has a history of pulmonary embolism, diagnosed 12/27/2021  · Unknown etiology  · Anticoagulated with Eliquis

## 2022-02-03 PROBLEM — I27.20 PULMONARY HYPERTENSION (HCC): Status: ACTIVE | Noted: 2022-02-03

## 2022-02-03 PROBLEM — R93.89 ABNORMAL CHEST X-RAY: Status: ACTIVE | Noted: 2022-02-03

## 2022-02-03 LAB
ANION GAP SERPL CALCULATED.3IONS-SCNC: 6 MMOL/L (ref 4–13)
BUN SERPL-MCNC: 25 MG/DL (ref 5–25)
CALCIUM SERPL-MCNC: 9 MG/DL (ref 8.3–10.1)
CHLORIDE SERPL-SCNC: 97 MMOL/L (ref 100–108)
CO2 SERPL-SCNC: 33 MMOL/L (ref 21–32)
CREAT SERPL-MCNC: 1.25 MG/DL (ref 0.6–1.3)
FLUAV RNA RESP QL NAA+PROBE: NEGATIVE
FLUBV RNA RESP QL NAA+PROBE: NEGATIVE
GFR SERPL CREATININE-BSD FRML MDRD: 55 ML/MIN/1.73SQ M
GLUCOSE SERPL-MCNC: 127 MG/DL (ref 65–140)
POTASSIUM SERPL-SCNC: 4 MMOL/L (ref 3.5–5.3)
RSV RNA RESP QL NAA+PROBE: NEGATIVE
SARS-COV-2 RNA RESP QL NAA+PROBE: NEGATIVE
SODIUM SERPL-SCNC: 136 MMOL/L (ref 136–145)

## 2022-02-03 PROCEDURE — 99232 SBSQ HOSP IP/OBS MODERATE 35: CPT | Performed by: INTERNAL MEDICINE

## 2022-02-03 PROCEDURE — 80048 BASIC METABOLIC PNL TOTAL CA: CPT | Performed by: PHYSICIAN ASSISTANT

## 2022-02-03 PROCEDURE — 94760 N-INVAS EAR/PLS OXIMETRY 1: CPT

## 2022-02-03 PROCEDURE — 97535 SELF CARE MNGMENT TRAINING: CPT

## 2022-02-03 PROCEDURE — 97530 THERAPEUTIC ACTIVITIES: CPT

## 2022-02-03 PROCEDURE — 94640 AIRWAY INHALATION TREATMENT: CPT

## 2022-02-03 PROCEDURE — 0241U HB NFCT DS VIR RESP RNA 4 TRGT: CPT | Performed by: INTERNAL MEDICINE

## 2022-02-03 RX ADMIN — FUROSEMIDE 40 MG: 40 TABLET ORAL at 09:00

## 2022-02-03 RX ADMIN — FLUTICASONE PROPIONATE 1 SPRAY: 50 SPRAY, METERED NASAL at 09:00

## 2022-02-03 RX ADMIN — IPRATROPIUM BROMIDE 0.5 MG: 0.5 SOLUTION RESPIRATORY (INHALATION) at 19:30

## 2022-02-03 RX ADMIN — AMLODIPINE BESYLATE 5 MG: 5 TABLET ORAL at 09:00

## 2022-02-03 RX ADMIN — IPRATROPIUM BROMIDE 0.5 MG: 0.5 SOLUTION RESPIRATORY (INHALATION) at 13:20

## 2022-02-03 RX ADMIN — LEVALBUTEROL HYDROCHLORIDE 1.25 MG: 1.25 SOLUTION, CONCENTRATE RESPIRATORY (INHALATION) at 13:20

## 2022-02-03 RX ADMIN — AMIODARONE HYDROCHLORIDE 200 MG: 200 TABLET ORAL at 12:58

## 2022-02-03 RX ADMIN — B-COMPLEX W/ C & FOLIC ACID TAB 1 TABLET: TAB at 09:00

## 2022-02-03 RX ADMIN — LEVALBUTEROL HYDROCHLORIDE 1.25 MG: 1.25 SOLUTION, CONCENTRATE RESPIRATORY (INHALATION) at 07:10

## 2022-02-03 RX ADMIN — APIXABAN 5 MG: 5 TABLET, FILM COATED ORAL at 17:44

## 2022-02-03 RX ADMIN — LEVALBUTEROL HYDROCHLORIDE 1.25 MG: 1.25 SOLUTION, CONCENTRATE RESPIRATORY (INHALATION) at 19:30

## 2022-02-03 RX ADMIN — ATORVASTATIN CALCIUM 40 MG: 40 TABLET, FILM COATED ORAL at 09:00

## 2022-02-03 RX ADMIN — POLYETHYLENE GLYCOL 3350 17 G: 17 POWDER, FOR SOLUTION ORAL at 09:00

## 2022-02-03 RX ADMIN — APIXABAN 5 MG: 5 TABLET, FILM COATED ORAL at 09:00

## 2022-02-03 RX ADMIN — AMIODARONE HYDROCHLORIDE 200 MG: 200 TABLET ORAL at 09:00

## 2022-02-03 RX ADMIN — BUDESONIDE 0.5 MG: 0.5 INHALANT ORAL at 19:30

## 2022-02-03 RX ADMIN — IPRATROPIUM BROMIDE 0.5 MG: 0.5 SOLUTION RESPIRATORY (INHALATION) at 07:10

## 2022-02-03 RX ADMIN — BUDESONIDE 0.5 MG: 0.5 INHALANT ORAL at 07:10

## 2022-02-03 RX ADMIN — METOPROLOL SUCCINATE 100 MG: 100 TABLET, EXTENDED RELEASE ORAL at 09:00

## 2022-02-03 RX ADMIN — TORSEMIDE 40 MG: 20 TABLET ORAL at 09:00

## 2022-02-03 RX ADMIN — AMIODARONE HYDROCHLORIDE 200 MG: 200 TABLET ORAL at 17:44

## 2022-02-03 NOTE — OCCUPATIONAL THERAPY NOTE
Occupational Therapy Treatment Note:         02/03/22 1515   OT Last Visit   OT Visit Date 02/03/22   Note Type   Note Type Treatment   Restrictions/Precautions   Weight Bearing Precautions Per Order No   Other Precautions O2;Fall Risk  (Pt on 5 liters O2 with SOB with exersion )   Pain Assessment   Pain Assessment Tool 0-10   Pain Score No Pain   ADL   Where Assessed Edge of bed   Grooming Assistance 4  Minimal Assistance   Grooming Deficit Setup;Steadying;Verbal cueing; Increased time to complete;Supervision/safety; Wash/dry face; Wash/dry hands   Grooming Comments hands on A due to increased weakness  LB Bathing Assistance 4  Minimal Assistance   LB Bathing Deficit Steadying;Setup;Verbal cueing;Supervision/safety; Increased time to complete;Perineal area; Buttocks; Left lower leg including foot;Right lower leg including foot   LB Bathing Comments hands on A for safety with functional reach  UB Dressing Assistance 5  Supervision/Setup   UB Dressing Deficit Setup   LB Dressing Assistance 4  Minimal Assistance   LB Dressing Deficit Setup;Steadying; Requires assistive device for steadying;Verbal cueing;Supervision/safety; Increased time to complete; Don/doff L sock; Don/doff R sock   LB Dressing Comments Hands on A provided due to weakness  Toileting Assistance  4  Minimal Assistance   Toileting Deficit Setup;Steadying;Supervison/safety;Verbal cueing; Increased time to complete;Grab bar use;Perineal hygiene;Clothing management down;Clothing management up   Toileting Comments hands on A required to maintain safe balance  Functional Standing Tolerance   Time 3 mins   Activity self toileting, functional mobiliity  Comments Pt with extreme SOB following activity  Bed Mobility   Supine to Sit 4  Minimal assistance   Additional items Assist x 1; Increased time required;Verbal cues;LE management   Sit to Supine 4  Minimal assistance   Additional items Assist x 1;Bedrails; Increased time required;Verbal cues;LE management   Transfers   Sit to Stand 5  Supervision   Additional items Assist x 1; Armrests; Bedrails; Increased time required;Verbal cues   Stand to Sit 4  Minimal assistance   Additional items Assist x 1; Armrests; Bedrails; Increased time required;Verbal cues   Stand pivot 4  Minimal assistance   Additional items Assist x 1;Bedrails;Armrests; Increased time required;Verbal cues   Toilet transfer 4  Minimal assistance   Additional items Assist x 1; Armrests; Bedrails; Increased time required;Verbal cues;Standard toilet   Additional Comments hands on A required due to increased weakness and SOB  Functional Mobility   Functional Mobility 4  Minimal assistance   Additional Comments x1   Additional items Rolling walker   Cognition   Overall Cognitive Status WFL   Arousal/Participation Alert; Responsive; Cooperative   Attention Within functional limits   Orientation Level Oriented X4   Memory Decreased recall of precautions   Following Commands Follows multistep commands with increased time or repetition   Comments Pt with improved stablity with use of RW Repetitive training required to encourage good carry over  Additional Activities   Additional Activities Other (Comment)  (reviewed current plan of care  )   Additional Activities Comments Pt reports having better understanding of reviewd breathing techs/energy conservation techs  Activity Tolerance   Activity Tolerance Patient limited by fatigue   Medical Staff Made Aware reported all findings to nursing staff  Assessment   Assessment Pt was seen for skilled OT with focus on completion of bed mobility, functional transfers, review of energy conservation techs/compensatory breathing tech and review of current plan of care  The Pt initially required use of standard toilet in bathroom  Redirection was provided due to abrupt impulsive movements without support of RW  Increase fatigue and SOB noted with activities instance   SpO2 at 91% on 5 liters O2 with extreme SOB upon completion of self toileting with SBA overall  See above levels of A required for all functional tasks  The Pt will benefit from continued therapies due to noted deficits to return to previous levels of functioning  The patient's raw score on the AM-PAC Daily Activity inpatient short form is 18, standardized score is 38 66, less than 39 4  Patients at this level are likely to benefit from discharge to post-acute rehabilitation services  Plan   Treatment Interventions ADL retraining;Functional transfer training; Endurance training;Cognitive reorientation;UE strengthening/ROM; Patient/family training   Goal Expiration Date 02/14/22   OT Treatment Day 1   OT Frequency 2-3x/wk   Recommendation   OT Discharge Recommendation Post acute rehabilitation services   OT - OK to Discharge Yes  (when medically cleared   )   AM-[facility] Daily Activity Inpatient   Lower Body Dressing 3   Bathing 2   Toileting 3   Upper Body Dressing 3   Grooming 3   Eating 4   Daily Activity Raw Score 18   Daily Activity Standardized Score (Calc for Raw Score >=11) 38 66   AM-[facility] Applied Cognition Inpatient   Following a Speech/Presentation 4   Understanding Ordinary Conversation 4   Taking Medications 4   Remembering Where Things Are Placed or Put Away 4   Remembering List of 4-5 Errands 3   Taking Care of Complicated Tasks 3   Applied Cognition Raw Score 22   Applied Cognition Standardized Score 47 83   Sneha Leon, [address]

## 2022-02-03 NOTE — PROGRESS NOTES
[address] [place] [address]  Progress Note - Shayy Orantes 1945, 67 y o  male MRN: [id number]  Unit/Bed#: E4 -01 Encounter: [id number]  Primary Care Provider: Bobby Ortega PA-C   Date and time admitted to hospital: 1/30/2022  9:13 AM    * Acute on chronic respiratory failure with hypoxia ([facility] [address] )  Assessment & Plan  · Patient with severe history COPD with chronic hypoxic respiratory failure  · Normally on 3 L O2 at rest and 8 L oximizer with exertion (patient now saying he was using 8L)  · Son clarifies pt no longer has oximizer at home, just O2 tank through [facility]  · Presented with increasing shortness breath and worsening hypoxia  · He was recently admitted 12/27-12/31 for pneumonia and COPD exacerbation, and recent diagnosis of PE   · Pt was evaluated by Cardiology and Pulmonary teams  · His acute worsening of his chronic respiratory failure with hypoxia was felt to be secondary to acute/chronic congestive heart failure superimposed on his COPD  · Patient was diuresed, given nebulizers and metered-dose inhalers and has stabilized  · Patient is currently requiring 8 L mid flow  · Patient would like to go to short-term rehab    He notes he would be interested in hospice if he were to continue to decline  · Patient and his son both note that patient is not able to return to his current residence as there are multiple floors with stairs, and is too big for him to manage    Atrial fibrillation [facility])  Assessment & Plan  Patient has a history of paroxysmal atrial fibrillation, diagnosed January 2022  Patient is currently rate controlled:  Being loading on amiodarone 200 mg t i d  x7 days (until 2/7), then 200 mg once daily starting 2/8  Continue metoprolol  mg daily  Continue anticoagulation with Eliquis 5 mg b i d   Most recent 2D echocardiogram 12/21 without significant valvular heart disease  TSH within normal limits    Acute on chronic diastolic congestive heart failure [facility])  Assessment & Plan  · Patient presented with an acute exacerbation of his chronic diastolic congestive heart failure  · Most recent 2D echocardiogram 12/21 with left ventricular ejection fraction 65%  Normal systolic function  Grade 1 diastolic dysfunction  · Patient was seen in consultation by the cardiology team and diuresed with IV Lasix and improved  · Will be transition to oral diuretics with torsemide 40 mg daily  · Continue beta-blocker  · Patient not currently on an ACE-inhibitor  Cardiology team notes could consider reinstituting ACE-inhibitor as an outpatient if blood pressure tolerates    Chronic obstructive pulmonary disease ([facility] [address] )  Assessment & Plan  · Patient has a history of severe COPD, without acute exacerbation  · Was evaluated by the pulmonary service  · Continue Xopenex/Atrovent nebulizer treatments  · Home regimen is Anoro, albuterol nebulizer, and Proventil MDI  · Patient has associated chronic hypoxia  · Discussed with Pulmonary team:  Patient appears medically maximized and may be discharged to short-term rehab if they can accommodate his level of her oxygen with the Oxymizer  · Currently requiring 8 L mid flow at all times    Abnormal chest x-ray  Assessment & Plan  Patient's chest x-ray revealed multifocal infiltrates  Patient's procalcitonin was unremarkable making it unlikely these are indicative of lower respiratory tract bacterial infection  Most likely secondary to interstitial edema from congestive heart failure  Patient diuresed and improved  Afebrile with normal white blood cell count    Antibiotics not indicated    Pulmonary hypertension [facility])  Assessment & Plan  Patient has a history of severe pulmonary hypertension  Followed by pulmonary and cardiology team  Continue to titrate oxygen as needed    Lung nodule seen on imaging study  Assessment & Plan  Patient has previously diagnosed history of lung mass  CT scan on admission revealed 3 5 cm right apical masslike opacity, minimally enlarged since December 27, 2021  Was noted this could be indicative of a scar  Patient has outpatient follow-up with thoracic surgery scheduled end of February    NSVT (nonsustained ventricular tachycardia) ([facility] [address] )  Assessment & Plan  · Patient has history of nonsustained ventricular tachycardia  · No significant episode  · Continue home beta-blocker with Toprol  mg daily  · Patient is also on amiodarone due to his history of paroxysmal atrial fibrillation    Aortic ectasia, thoracic (HCC)  Assessment & Plan  · Stable   · Outpatient thoracic follow up if patient desires    Pulmonary embolism [facility])  Assessment & Plan  · Patient has a history of pulmonary embolism, diagnosed 12/27/2021  · Unknown etiology  · Anticoagulated with Eliquis     Hypertension  Assessment & Plan  Patient has history of essential hypertension  Blood pressure currently adequately controlled on amlodipine 5 mg daily, metoprolol  mg daily,  Patient is being diuresed:  currently on torsemide 40 mg daily  Currently being loaded on amiodarone 200 mg t i d  x7 days and then 200 mg daily  Blood pressure adequately controlled  Home benazepril on hold    Hyperlipidemia  Assessment & Plan  Continue statin          Family:  Updated patient's son Gopal Zuleta via phone  Reviewed all test results, and discharge plan  Discussed with patient's nurse  Discussed with   Discussed with Pulmonary team: Kari FRIEDMANNP: Okay to discharge to short-term rehab when bed available if they can accommodate his oxygen needs  Discussed with cardiology team: GILBERTO FRIEDMANNP: Okay to discharge to short-term rehab on oral diuretics    VTE Pharmacologic Prophylaxis: RX contraindicated due to: Eliquis  VTE Mechanical Prophylaxis: sequential compression device        Certification Statement: The patient will continue to require additional inpatient hospital stay due to need for further acute intervention for discharge plan    Status: inpatient ===================================================================    Subjective:  Patient notes he feels better  He denies any shortness of breath at rest   He notes dyspnea with exertion, which she states is chronic  Patient relates he gets out of breath walking around his home  He notes it is quite difficult as his home is multilevel  He feels he is no longer able to handle living at his house and is looking for a smaller, single-level, living environment  Patient denies any pain anywhere at all  He denies any chest congestion or cough  Denies any nausea, vomiting, diarrhea or constipation  Is tolerating p o  Physical Exam:   Temp:  [97 3 °F (36 3 °C)-97 5 °F (36 4 °C)] 97 5 °F (36 4 °C)  HR:  [69-79] 79  Resp:  [18] 18  BP: (131-135)/(65-97) 131/97    Gen:  Pleasant, non-tachypnic, non-dyspnic  Conversant  Heart: regular rate and rhythm, S1S2 present, no murmur, rub or gallop  Lungs:  Decreased breath sounds bilaterally  Diminished air movement  No current wheezes, crackles, rhonchi  No accessory muscle use or respiratory distress  Abd: soft, non-tender, non-distended  NABS, no guarding, rebound or peritoneal signs  Extremities: no clubbing, cyanosis or edema  2+pedal pulses bilaterally  Full range of motion  Neuro: awake, alert and oriented  Fluent speech  Strength globally intact  Skin: warm and dry: no petechiae, purpura and rash  LABS:   Results from last 7 days   Lab Units 02/02/22  0543 01/31/22  0546 01/30/22  1018   WBC Thousand/uL 5 81 5 41 6 67   HEMOGLOBIN g/dL 13 2 13 3 14 8   HEMATOCRIT % 40 7 41 3 46 0   PLATELETS Thousands/uL 209 218 228     Results from last 7 days   Lab Units 02/03/22  0541 02/02/22  0732 02/01/22  0452   POTASSIUM mmol/L 4 0 4 0 4 0   CHLORIDE mmol/L 97* 101 100   CO2 mmol/L 33* 34* 34*   BUN mg/dL 25 33* 23   CREATININE mg/dL 1 25 1 32* 1 30   CALCIUM mg/dL 9 0 8 6 8 3       Hospital Data:    1/30 CTA chest PE study:  No pulmonary embolus    New minimal multilobar patchy consolidation may represent pneumonia in the appropriate clinical context  New small bilateral pleural effusions  3 5 cm right apical masslike opacity minimally enlarged since December 27, 2021  This may represent underlying scar with minimal new adjacent airspace consolidation  If follow-up with tissue sampling or CT PET is deferred, noncontrast chest CT in 3   months is recommended  Stable descending intrathoracic aortic aneurysm maximum diameter 4 3 cm when measured in the same fashion  Partially visualized infrarenal abdominal aortic aneurysm maximum diameter 5 6 cm  This could be followed up with nonemergent aortic CTA  Severe pulmonary emphysema  1/30 chest x-ray  Multifocal infiltrates superimposed on diffuse chronic changes  Right upper lobe opacification      ---------------------------------------------------------------------------------------------------------------  This note has been constructed using a voice recognition system

## 2022-02-03 NOTE — ASSESSMENT & PLAN NOTE
Patient's chest x-ray revealed multifocal infiltrates  Patient's procalcitonin was unremarkable making it unlikely these are indicative of lower respiratory tract bacterial infection  Most likely secondary to interstitial edema from congestive heart failure  Patient diuresed and improved  Afebrile with normal white blood cell count    Antibiotics not indicated

## 2022-02-03 NOTE — PROGRESS NOTES
Progress Note - Cardiology   Leandro Marie 67 y o  male MRN: [id number]  Unit/Bed#: E4 -01 Encounter: [id number]    Asessment:  Acute on chronic hypoxic respiratory failure   Acute diastolic congestive heart failure                  -  LVEF 25%, grade 1 diastolic dysfunction, mild MAC, mild-to-moderate TR, aortic root exhibited moderate fibrocalcific change, moderate to severely increased PASP 70 mmHg, 1/31/22  Paroxysmal atrial fibrillation               - newly discovered during office visit on 1/12/22               - OP AV blocking Rx, Toprol  mg daily              - anticoagulation with Eliquis 5 mg BID  Nonsustained ventricular tachycardia              - 21 beat run of NSVT prior hospitalization in the setting of acute exacerbation of COPD, pulmonary embolism with hypoxia              - OP Rx, Toprol  mg daily    Pulmonary embolism, December 2021              - anticoagulation with Eliquis 5 mg BID    Ectasia of descending thoracic aorta, December 2021  Hypertension              - OP Rx, Toprol  mg daily, benazepril 40 mg daily  Dyslipidemia              - Rx, atorvastatin 40 mg daily              - most recent lipid panel 10/5/21: Cholesterol 158, triglycerides 67, HDL 85, LDL 59       Other:  - severe COPD on supplemental oxygen via [place], 3 L O2 chronically + 8 L O2 upon ambulation  - history of CVA     Plan/ Discussion:  · Patient has been transitioned to oral diuretics today with torsemide 40 mg daily  · Continue amlodipine 5 mg daily, Toprol  mg daily  · Continue amiodarone 200 mg TID with transition to amiodarone 200 mg daily on 2/8  · Continue anticoagulation with Eliquis 5 mg BID, statin with atorvastatin 40 mg daily  · Patient is awaiting rehabilitation placement at this time  If blood pressure remains stable as an outpatient, can consider reinitiating ACE-inhibitor pending clinical course  Subjective:  Patient resting in bed comfortably   He states that he has been walking the hallways with his oxygen  He does become short of breath, but takes breaks  At present, he is without shortness of breath or chest pain  He is awaiting rehab placement       Vitals:  Vitals:    02/02/22 0600 02/03/22 0600   Weight: 61 kg (134 lb 7 7 oz) 61 5 kg (135 lb 9 3 oz)   ,  Vitals:    02/03/22 0015 02/03/22 0600 02/03/22 0710 02/03/22 0740   BP: 135/65   131/97   BP Location: Right arm   Right arm   Pulse: 69   79   Resp: 18   18   Temp: (!) 97 3 °F (36 3 °C)   97 5 °F (36 4 °C)   TempSrc: Temporal   Temporal   SpO2: 97%  91% 95%   Weight:  61 5 kg (135 lb 9 3 oz)     Height:           Exam:  General: Alert awake and oriented, no acute distress  Heart:  Regular rate and rhythm, S1, S2  Respiratory effort/ Lungs: diffuse wheezing throughout, remains on supplemental oxygen  Abdominal: Non-tender to palpation, + bowel sounds, soft, no masses or distension  Lower Limbs: No overt bilateral lower extremity edema           Medications:    Current Facility-Administered Medications:     acetaminophen (TYLENOL) tablet 650 mg, 650 mg, Oral, Q6H PRN, YUMIKO Jasso-BERONICA    albuterol inhalation solution 2 5 mg, 2 5 mg, Nebulization, Q6H PRN, YUMIKO Jasso-C, 2 5 mg at 01/31/22 8866    amiodarone tablet 200 mg, 200 mg, Oral, TID With Meals, ASHLEY Olvera, 200 mg at 02/03/22 0900    [START ON 2/8/2022] amiodarone tablet 200 mg, 200 mg, Oral, Daily With Breakfast, ASHLEY Olvera    amLODIPine (NORVASC) tablet 5 mg, 5 mg, Oral, Daily, YUMIKO Valderrama-C, 5 mg at 02/03/22 0900    apixaban (ELIQUIS) tablet 5 mg, 5 mg, Oral, BID, YUMIKO Valderrama-C, 5 mg at 02/03/22 0900    atorvastatin (LIPITOR) tablet 40 mg, 40 mg, Oral, Daily, Radha Aguirre PA-C, 40 mg at 02/03/22 0900    budesonide (PULMICORT) inhalation solution 0 5 mg, 0 5 mg, Nebulization, Q12H, Claudeen Lan, MD, 0 5 mg at 02/03/22 0710    fluticasone (FLONASE) 50 mcg/act nasal spray 1 spray, 1 spray, Each Nare, Daily, Carroll Ceja PA-C, 1 spray at 02/03/22 0900    furosemide (LASIX) tablet 40 mg, 40 mg, Oral, Daily, Radha Aguirre PA-C, 40 mg at 02/03/22 0900    ipratropium (ATROVENT) 0 02 % inhalation solution 0 5 mg, 0 5 mg, Nebulization, TID, Burton Blakely, CRNP, 0 5 mg at 02/03/22 0710    levalbuterol [facility]) inhalation solution 1 25 mg, 1 25 mg, Nebulization, TID, Burton Blakely CRNP, 1 25 mg at 02/03/22 0710    metoprolol succinate (TOPROL-XL) 24 hr tablet 100 mg, 100 mg, Oral, Daily, Radha Aguirre PA-C, 100 mg at 02/03/22 0900    multivitamin stress formula tablet 1 tablet, 1 tablet, Oral, Daily, Carroll Ceja PA-C, 1 tablet at 02/03/22 0900    ondansetron (ZOFRAN) injection 4 mg, 4 mg, Intravenous, Q6H PRN, Carroll Ceja PA-C    polyethylene glycol (MIRALAX) packet 17 g, 17 g, Oral, Daily, Radha Aguirre PA-C, 17 g at 02/03/22 0900    torsemide (DEMADEX) tablet 40 mg, 40 mg, Oral, Daily, ASHLEY Olvera, 40 mg at 02/03/22 0900      Labs/Data:        Results from last 7 days   Lab Units 02/02/22  0543 01/31/22  0546 01/30/22  1018   WBC Thousand/uL 5 81 5 41 6 67   HEMOGLOBIN g/dL 13 2 13 3 14 8   HEMATOCRIT % 40 7 41 3 46 0   PLATELETS Thousands/uL 209 218 228     Results from last 7 days   Lab Units 02/03/22  0541 02/02/22  0732 02/01/22  0452   POTASSIUM mmol/L 4 0 4 0 4 0   CHLORIDE mmol/L 97* 101 100   CO2 mmol/L 33* 34* 34*   BUN mg/dL 25 33* 23

## 2022-02-03 NOTE — PLAN OF CARE
Problem: OCCUPATIONAL THERAPY ADULT  Goal: Performs self-care activities at highest level of function for planned discharge setting  See evaluation for individualized goals  Description: Treatment Interventions: ADL retraining,Functional transfer training,UE strengthening/ROM,Endurance training,Cognitive reorientation,Patient/family training,Equipment evaluation/education,Compensatory technique education,Activityengagement,Energy conservation          See flowsheet documentation for full assessment, interventions and recommendations  Outcome: Progressing  Note: Limitation: Decreased UE strength,Decreased ADL status,Decreased Safe judgement during ADL,Decreased cognition,Decreased endurance,Decreased self-care trans,Decreased high-level ADLs  Prognosis: Good  Assessment: Pt was seen for skilled OT with focus on completion of bed mobility, functional transfers, review of energy conservation techs/compensatory breathing tech and review of current plan of care  The Pt initially required use of standard toilet in bathroom  Redirection was provided due to abrupt impulsive movements without support of RW  Increase fatigue and SOB noted with activities instance  SpO2 at 91% on 5 liters O2 with extreme SOB upon completion of self toileting with SBA overall  See above levels of A required for all functional tasks  The Pt will benefit from continued therapies due to noted deficits to return to previous levels of functioning  The patient's raw score on the AM-PAC Daily Activity inpatient short form is 18, standardized score is 38 66, less than 39 4  Patients at this level are likely to benefit from discharge to post-acute rehabilitation services        OT Discharge Recommendation: Post acute rehabilitation services  OT - OK to Discharge: Yes (when medically cleared  )

## 2022-02-03 NOTE — CASE MANAGEMENT
Case Management Discharge Planning Note    Patient name Francine Cotter  Location East 4 /E4 MS 80-* MRN [id number]  : 1945 Date 2/3/2022       Current Admission Date: 2022  Current Admission Diagnosis:Acute on chronic respiratory failure with hypoxia [facility])   Patient Active Problem List    Diagnosis Date Noted    Atrial fibrillation ([facility] ) 2022    Acute on chronic diastolic congestive heart failure ([facility] ) 2022    Lung nodule seen on imaging study 2022    NSVT (nonsustained ventricular tachycardia) ([facility] ) 2021    Pulmonary embolism ([facility] ) 2021    Chronic obstructive pulmonary disease ([facility] ) 2021    Aortic ectasia, thoracic ([facility] ) 2021    Acute on chronic respiratory failure with hypoxia ([facility] ) 2021    COVID-19 determined by clinical diagnostic criteria 2021    Vitamin D deficiency 10/05/2017    Expressive aphasia 2016    Actinic keratosis 2013    Stenosis of carotid artery 10/29/2013    Hyperglycemia 10/25/2012    Cerebral infarction ([facility] ) 10/24/2012    Hyperlipidemia 10/24/2012    Hypertension 10/24/2012    Macular degeneration 10/24/2012      LOS (days): 4  Geometric Mean LOS (GMLOS) (days): 3 80  Days to GMLOS:-0 3     OBJECTIVE:  Risk of Unplanned Readmission Score: 13         Current admission status: Inpatient   Preferred Pharmacy:   [facility] [address] [facility], [address] [place] [address]   [address] [facility] [address]  Phone: [phone] Fax: [phone]    Primary Care Provider: Rola Hagen PA-C    Primary Insurance: [facility]  Secondary Insurance:     DISCHARGE DETAILS:       Freedom of Choice: Yes  Comments - Freedom of Choice: Pt agreeable to IP rehab  No longer requesting hospice per provider  Ref's sent   Not vaccinated     Were Treatment Team discharge recommendations reviewed with patient/caregiver?: Yes  Did patient/caregiver verbalize understanding of patient care needs?: Yes  Were patient/caregiver advised of the risks associated with not following Treatment Team discharge recommendations?: Yes    Contacts  Patient Contacts: Mitch Price  Relationship to Patient[de-identified] Family  Contact Method: Phone (left VM earlier this am for call back)  Reason/Outcome: Discharge Planning              Other Referral/Resources/Interventions Provided:  Interventions: Short Term Rehab         Treatment Team Recommendation: Short Term Rehab  Discharge Destination Plan[de-identified] Short Term Rehab      IMM Given (Date):: 02/03/22  IMM Given to[de-identified] Patient (copy given to patient , pt refusing to sign at this time)     Additional Comments: Left VM for patient's son on # listed in chart earlier this am for a return call

## 2022-02-03 NOTE — ASSESSMENT & PLAN NOTE
Patient has previously diagnosed history of lung mass  CT scan on admission revealed 3 5 cm right apical masslike opacity, minimally enlarged since December 27, 2021    Was noted this could be indicative of a scar  Patient has outpatient follow-up with thoracic surgery scheduled end of February

## 2022-02-03 NOTE — ASSESSMENT & PLAN NOTE
Bilateral lower extremity venous duplex study 10/30/2018 7:25 AM

 

Clinical History: Chest pain. Bilateral lower extremity cramping.

 

Comparison: None available

 

Technique: Using a combination of real time ultrasound imaging and 

color-flow and pulse Doppler imaging techniques along with graded 

compression and augmentation, duplex evaluation of the deep venous system 

of the both lower extremities was performed. Multiple images were 

obtained.

 

Findings: There is no sonographic evidence of deep venous thrombosis 

involving the visualized deep venous structures of either lower extremity.

 

Impression: No evidence of deep venous thrombosis involving either lower 

extremity

 

Electronically signed by: Qasim Manuel MD (10/30/2018 9:42 AM) [facility] Continue statin

## 2022-02-03 NOTE — ASSESSMENT & PLAN NOTE
Patient has history of essential hypertension  Blood pressure currently adequately controlled on amlodipine 5 mg daily, metoprolol  mg daily,  Patient is being diuresed:  currently on torsemide 40 mg daily  Currently being loaded on amiodarone 200 mg t i d  x7 days and then 200 mg daily  Blood pressure adequately controlled  Home benazepril on hold

## 2022-02-03 NOTE — ASSESSMENT & PLAN NOTE
Patient has a history of severe pulmonary hypertension  Followed by pulmonary and cardiology team  Continue to titrate oxygen as needed

## 2022-02-04 LAB
BACTERIA BLD CULT: NORMAL
BACTERIA BLD CULT: NORMAL

## 2022-02-04 PROCEDURE — 94760 N-INVAS EAR/PLS OXIMETRY 1: CPT

## 2022-02-04 PROCEDURE — 99232 SBSQ HOSP IP/OBS MODERATE 35: CPT | Performed by: INTERNAL MEDICINE

## 2022-02-04 PROCEDURE — 94640 AIRWAY INHALATION TREATMENT: CPT

## 2022-02-04 PROCEDURE — 97530 THERAPEUTIC ACTIVITIES: CPT

## 2022-02-04 PROCEDURE — 97116 GAIT TRAINING THERAPY: CPT

## 2022-02-04 PROCEDURE — 97110 THERAPEUTIC EXERCISES: CPT

## 2022-02-04 RX ADMIN — AMIODARONE HYDROCHLORIDE 200 MG: 200 TABLET ORAL at 11:36

## 2022-02-04 RX ADMIN — ATORVASTATIN CALCIUM 40 MG: 40 TABLET, FILM COATED ORAL at 08:35

## 2022-02-04 RX ADMIN — BUDESONIDE 0.5 MG: 0.5 INHALANT ORAL at 19:20

## 2022-02-04 RX ADMIN — B-COMPLEX W/ C & FOLIC ACID TAB 1 TABLET: TAB at 08:35

## 2022-02-04 RX ADMIN — AMIODARONE HYDROCHLORIDE 200 MG: 200 TABLET ORAL at 17:18

## 2022-02-04 RX ADMIN — FLUTICASONE PROPIONATE 1 SPRAY: 50 SPRAY, METERED NASAL at 08:35

## 2022-02-04 RX ADMIN — BUDESONIDE 0.5 MG: 0.5 INHALANT ORAL at 07:53

## 2022-02-04 RX ADMIN — APIXABAN 5 MG: 5 TABLET, FILM COATED ORAL at 17:18

## 2022-02-04 RX ADMIN — METOPROLOL SUCCINATE 100 MG: 100 TABLET, EXTENDED RELEASE ORAL at 08:38

## 2022-02-04 RX ADMIN — AMIODARONE HYDROCHLORIDE 200 MG: 200 TABLET ORAL at 08:35

## 2022-02-04 RX ADMIN — AMLODIPINE BESYLATE 5 MG: 5 TABLET ORAL at 08:35

## 2022-02-04 RX ADMIN — FUROSEMIDE 40 MG: 40 TABLET ORAL at 08:35

## 2022-02-04 RX ADMIN — APIXABAN 5 MG: 5 TABLET, FILM COATED ORAL at 08:35

## 2022-02-04 RX ADMIN — LEVALBUTEROL HYDROCHLORIDE 1.25 MG: 1.25 SOLUTION, CONCENTRATE RESPIRATORY (INHALATION) at 13:50

## 2022-02-04 RX ADMIN — IPRATROPIUM BROMIDE 0.5 MG: 0.5 SOLUTION RESPIRATORY (INHALATION) at 07:53

## 2022-02-04 RX ADMIN — LEVALBUTEROL HYDROCHLORIDE 1.25 MG: 1.25 SOLUTION, CONCENTRATE RESPIRATORY (INHALATION) at 07:53

## 2022-02-04 RX ADMIN — IPRATROPIUM BROMIDE 0.5 MG: 0.5 SOLUTION RESPIRATORY (INHALATION) at 19:20

## 2022-02-04 RX ADMIN — IPRATROPIUM BROMIDE 0.5 MG: 0.5 SOLUTION RESPIRATORY (INHALATION) at 13:50

## 2022-02-04 RX ADMIN — TORSEMIDE 40 MG: 20 TABLET ORAL at 08:35

## 2022-02-04 RX ADMIN — LEVALBUTEROL HYDROCHLORIDE 1.25 MG: 1.25 SOLUTION, CONCENTRATE RESPIRATORY (INHALATION) at 19:20

## 2022-02-04 NOTE — PLAN OF CARE
Problem: PHYSICAL THERAPY ADULT  Goal: Performs mobility at highest level of function for planned discharge setting  See evaluation for individualized goals  Description: Treatment/Interventions: Functional transfer training,LE strengthening/ROM,Elevations,Therapeutic exercise,Endurance training,Patient/family training,Equipment eval/education,Bed mobility,Gait training,Compensatory technique education,Continued evaluation,Spoke to nursing,OT  Equipment Recommended:  (monitor)       See flowsheet documentation for full assessment, interventions and recommendations  Outcome: Progressing  Note: Prognosis: Fair  Problem List: Decreased endurance,Impaired balance,Decreased safety awareness,Impaired judgement,Decreased strength (cardiopulmonary compromise)  Assessment: Pt  Seen for progression of mobility  Pt  Rec;d supine in bed  Pt offers no c/o pain  Pt is functioning at supervision level for all mobility, bed mobility, transfers and ambulation on levels with use of rw  Pt  Demonstrates decreased safety and impulsivity requiring verbal cues to slow down during gait training and cues to maintain use of RW at all times especially with approach to bed when turning and backing up  Noted improved activity tolerance and functional endurance with use of Rw  Pt demonstrates fluent reciprocal gait pattern with use of rw  ROSALES noted with ambulation requiring verbal cues for pacing and energy conservation taking rest breaks prior ROSALES  Pt desats to 72% on 8l nc with activity at eob and with ambulation  Pt requires 2-3 minutes rest break in order to rebound back to to 92% on 8 L  Pt  No gross lob noted, though continues to have cardiopulmonary compromise limiting activity and functional endurance  Pt would benefit from continued inpt PT and STR with cardiopulmonary focus in order to maximize functional outcomes, mobility and independence in order to improve quality of life      Barriers to Discharge: Decreased caregiver support  Barriers to Discharge Comments: lives alone in [facility]  PT Discharge Recommendation:  (placement)          See flowsheet documentation for full assessment

## 2022-02-04 NOTE — PHYSICAL THERAPY NOTE
PHYSICAL THERAPY NOTE          Patient Name: Lilia Mills  IHKTP'O Date: 2/4/2022 02/04/22 7205   Note Type   Note Type Treatment   Pain Assessment   Pain Assessment Tool 0-10   Pain Score 5   Pain Location/Orientation Orientation: Bilateral;Location: Foot  (toes due to long nails )   Restrictions/Precautions   Other Precautions O2;Fall Risk;Pain;Multiple lines   General   Chart Reviewed Yes   Cognition   Overall Cognitive Status WFL   Subjective   Subjective I will not be going home from here  IT is getting too much for me to take care of my self and the house  Bed Mobility   Supine to Sit 5  Supervision   Additional items Increased time required;Verbal cues; Bedrails   Sit to Supine 5  Supervision   Additional items Assist x 1;HOB elevated   Transfers   Sit to Stand 5  Supervision   Additional items Assist x 1; Armrests; Increased time required;Verbal cues   Stand to Sit 5  Supervision   Additional items Assist x 1; Armrests; Increased time required;Verbal cues   Stand pivot 5  Supervision   Additional items Assist x 1; Increased time required;Verbal cues   Ambulation/Elevation   Gait pattern   (fluent reciprocal gait)   Gait Assistance 5  Supervision  (cg close supervision  )   Additional items Assist x 1;Verbal cues   Assistive Device Rolling walker   Distance 185' x2, one prolonged seated rest breaks due to fatigue, tucker pt drops to 72% hr 88 bpm, pt rebounds to 91%-92% within 2 minutes   Balance   Static Sitting Normal   Dynamic Sitting Good   Static Standing Good   Dynamic Standing Fair +   Ambulatory Fair +   Endurance Deficit   Endurance Deficit Yes   Endurance Deficit Description tucker   Activity Tolerance   Activity Tolerance Treatment limited secondary to medical complications (Comment)  (tucker hypoxia 72% on 8L nc o2 rebounds to 92% within 2 minutes)   Exercises   Hip Flexion Sitting;10 reps;AROM; Bilateral   Hip Abduction Sitting;10 reps;AROM; Left   Hip Adduction Sitting;10 reps;AROM; Bilateral   Knee AROM Long Arc Quad Sitting;10 reps;AROM; Bilateral   Equipment Use   Comments pt ed on pacing techinques, frequent rest breaks during actvity or mobility  peforming all mobiliity and tasks slowly  Assessment   Prognosis Fair   Problem List Decreased endurance; Impaired balance;Decreased safety awareness; Impaired judgement;Decreased strength  (cardiopulmonary compromise)   Assessment Pt  Seen for progression of mobility  Pt  Rec;d supine in bed  Pt offers no c/o pain  Pt is functioning at supervision level for all mobility, bed mobility, transfers and ambulation on levels with use of rw  Pt  Demonstrates decreased safety and impulsivity requiring verbal cues to slow down during gait training and cues to maintain use of RW at all times especially with approach to bed when turning and backing up  Noted improved activity tolerance and functional endurance with use of Rw  Pt demonstrates fluent reciprocal gait pattern with use of rw  ROSALES noted with ambulation requiring verbal cues for pacing and energy conservation taking rest breaks prior ROSALES  Pt desats to 72% on 8l nc with activity at eob and with ambulation  Pt requires 2-3 minutes rest break in order to rebound back to to 92% on 8 L  Pt  No gross lob noted, though continues to have cardiopulmonary compromise limiting activity and functional endurance  Pt would benefit from continued inpt PT and STR with cardiopulmonary focus in order to maximize functional outcomes, mobility and independence in order to improve quality of life  Goals   Patient Goals To be placed in a home where there is someone to help help if I need it  STG Expiration Date 02/10/22   PT Treatment Day 2   Plan   Treatment/Interventions Functional transfer training;LE strengthening/ROM; Therapeutic exercise; Endurance training;Patient/family training;Equipment eval/education; Bed mobility;Gait training   Progress Progressing toward goals   PT Frequency 2-3x/wk   Recommendation   PT Discharge Recommendation   (placement)   AM-PAC Basic Mobility Inpatient   Turning in Bed Without Bedrails 4   Lying on Back to Sitting on Edge of Flat Bed 4   Moving Bed to Chair 4   Standing Up From Chair 4   Walk in Room 3   Climb 3-5 Stairs 3   Basic Mobility Inpatient Raw Score 22   Basic Mobility Standardized Score 47 4   Highest Level Of Mobility   JH-HLM Goal 7: Walk 25 feet or more   JH-HLM Highest Level of Mobility 8: Walk 250 feet ot more   JH-HLM Goal Achieved Yes   End of Consult   Patient Position at End of Consult Supine; All needs within reach;Bed/Chair alarm activated   Dimitris Montemayor, PTA

## 2022-02-04 NOTE — PROGRESS NOTES
Progress Note - Pop Morris 67 y o  male MRN: [id number]    Unit/Bed#: E4 -01 Encounter: [id number]    Assessment/Plan:    Acute/chronic resp failure  hypoxic with history of severe COPD, now on 8 liters, recent pneumonia, PE, with heart failure all related to hypoxia continue oxygen and follow up with Pulmonary    AFib     rate control with metoprolol and amiodarone, anticoagulation Eliquis    Acute/chronic diastolic HF  continue Demadex with metoprolol    COPD     chronic oxygen requirement, continue outpatient follow-up with Pulmonary with inhalers and neb treatment    Severe pulmonary hypertension continue Pulmonary follow-up    PE     continue Eliquis    Hypertension    controlled with current medication    Dyslipidemia    continue statin    Subjective:   Still short of breath with any activity, no chest pain, no nausea vomiting no fevers chills appetite stable      Objective:     Vitals: Blood pressure 130/67, pulse 66, temperature 97 5 °F (36 4 °C), temperature source Temporal, resp  rate 20, height 5' 7" (1 702 m), weight 60 9 kg (134 lb 4 2 oz), SpO2 96 %  ,Body mass index is 21 03 kg/m²  Results from last 7 days   Lab Units 02/02/22  0543 01/31/22  0546 01/30/22  1018   WBC Thousand/uL 5 81   < > 6 67   HEMOGLOBIN g/dL 13 2   < > 14 8   HEMATOCRIT % 40 7   < > 46 0   PLATELETS Thousands/uL 209   < > 228   INR   --   --  1 15    < > = values in this interval not displayed  Results from last 7 days   Lab Units 02/03/22  0541 01/31/22  0546 01/30/22  1018   POTASSIUM mmol/L 4 0   < > 4 5   CHLORIDE mmol/L 97*   < > 102   CO2 mmol/L 33*   < > 30   BUN mg/dL 25   < > 19   CREATININE mg/dL 1 25   < > 1 31*   CALCIUM mg/dL 9 0   < > 8 8   ALK PHOS U/L  --   --  106   ALT U/L  --   --  33   AST U/L  --   --  25    < > = values in this interval not displayed         Scheduled Meds:  Current Facility-Administered Medications   Medication Dose Route Frequency Provider Last Rate    acetaminophen 650 mg Oral Q6H PRN Nemo Duncan PA-C      albuterol  2 5 mg Nebulization Q6H PRN Nemo Duncan PA-C      amiodarone  200 mg Oral TID With Meals ASHLEY Olvera      [START ON 2/8/2022] amiodarone  200 mg Oral Daily With Breakfast ASHLEY Olvera      amLODIPine  5 mg Oral Daily Radha Aguirre PA-C      apixaban  5 mg Oral BID Radha Aguirre PA-C      atorvastatin  40 mg Oral Daily Nemo Duncan PA-C      budesonide  0 5 mg Nebulization Q12H Robin Talbot MD      fluticasone  1 spray Each Nare Daily Zara Bonilla      furosemide  40 mg Oral Daily Zara Bonilla      ipratropium  0 5 mg Nebulization TID ASHLEY Butt      levalbuterol  1 25 mg Nebulization TID ASHLEY Butt      metoprolol succinate  100 mg Oral Daily Nemo Duncan PA-C      multivitamin stress formula  1 tablet Oral Daily Nemo Duncan PA-C      ondansetron  4 mg Intravenous Q6H PRN Nemo Duncan PA-C      polyethylene glycol  17 g Oral Daily Radha Aguirre PA-C      torsemide  40 mg Oral Daily ASHLEY Olvera         Continuous Infusions:         Physical exam:  General appearance:  Alert no distress interaction appropriate elderly  Head/Eyes:  Nonicteric PERRL EOMI  Neck:  Supple  Lungs:  Decreased BS bilateral no wheezing rhonchi or rales  Heart: normal S1 S2 irregular  Abdomen: Soft nontender with bowel sounds  Extremities: no edema  Skin: no rash    Invasive Devices  Report    Peripheral Intravenous Line            Peripheral IV 02/03/22 Dorsal (posterior); Left Forearm 1 day                  VTE Pharmacologic Prophylaxis:  Eliquis      Counseling / Coordination of Care  Total floor / unit time spent today 30  minutes  Greater than 50% of total time was spent with the patient and / or family counseling and / or coordination of care    A description of the counseling / coordination of care:

## 2022-02-05 PROCEDURE — 99232 SBSQ HOSP IP/OBS MODERATE 35: CPT | Performed by: INTERNAL MEDICINE

## 2022-02-05 PROCEDURE — 94760 N-INVAS EAR/PLS OXIMETRY 1: CPT

## 2022-02-05 PROCEDURE — 94640 AIRWAY INHALATION TREATMENT: CPT

## 2022-02-05 RX ADMIN — IPRATROPIUM BROMIDE 0.5 MG: 0.5 SOLUTION RESPIRATORY (INHALATION) at 07:03

## 2022-02-05 RX ADMIN — APIXABAN 5 MG: 5 TABLET, FILM COATED ORAL at 09:14

## 2022-02-05 RX ADMIN — IPRATROPIUM BROMIDE 0.5 MG: 0.5 SOLUTION RESPIRATORY (INHALATION) at 19:41

## 2022-02-05 RX ADMIN — BUDESONIDE 0.5 MG: 0.5 INHALANT ORAL at 07:03

## 2022-02-05 RX ADMIN — LEVALBUTEROL HYDROCHLORIDE 1.25 MG: 1.25 SOLUTION, CONCENTRATE RESPIRATORY (INHALATION) at 13:19

## 2022-02-05 RX ADMIN — METOPROLOL SUCCINATE 100 MG: 100 TABLET, EXTENDED RELEASE ORAL at 09:14

## 2022-02-05 RX ADMIN — FUROSEMIDE 40 MG: 40 TABLET ORAL at 09:14

## 2022-02-05 RX ADMIN — AMLODIPINE BESYLATE 5 MG: 5 TABLET ORAL at 09:14

## 2022-02-05 RX ADMIN — AMIODARONE HYDROCHLORIDE 200 MG: 200 TABLET ORAL at 09:14

## 2022-02-05 RX ADMIN — AMIODARONE HYDROCHLORIDE 200 MG: 200 TABLET ORAL at 12:30

## 2022-02-05 RX ADMIN — BUDESONIDE 0.5 MG: 0.5 INHALANT ORAL at 19:42

## 2022-02-05 RX ADMIN — B-COMPLEX W/ C & FOLIC ACID TAB 1 TABLET: TAB at 09:14

## 2022-02-05 RX ADMIN — LEVALBUTEROL HYDROCHLORIDE 1.25 MG: 1.25 SOLUTION, CONCENTRATE RESPIRATORY (INHALATION) at 19:41

## 2022-02-05 RX ADMIN — TORSEMIDE 40 MG: 20 TABLET ORAL at 09:14

## 2022-02-05 RX ADMIN — LEVALBUTEROL HYDROCHLORIDE 1.25 MG: 1.25 SOLUTION, CONCENTRATE RESPIRATORY (INHALATION) at 07:03

## 2022-02-05 RX ADMIN — ATORVASTATIN CALCIUM 40 MG: 40 TABLET, FILM COATED ORAL at 09:14

## 2022-02-05 RX ADMIN — APIXABAN 5 MG: 5 TABLET, FILM COATED ORAL at 16:24

## 2022-02-05 RX ADMIN — AMIODARONE HYDROCHLORIDE 200 MG: 200 TABLET ORAL at 16:24

## 2022-02-05 RX ADMIN — IPRATROPIUM BROMIDE 0.5 MG: 0.5 SOLUTION RESPIRATORY (INHALATION) at 13:19

## 2022-02-05 NOTE — PROGRESS NOTES
Progress Note - Chelly Inch 67 y o  male MRN: [id number]    Unit/Bed#: E4 -01 Encounter: [id number]    Assessment/Plan:    Acute/chronic hypoxic resp failure  patient with severe COPD recent pneumonia and PE with heart failure all related to hypoxic respiratory failure, continue oxygen titrate to saturation at 90    Ambulatory dysfunction   awaiting rehab    AFib      rate control with metoprolol and amiodarone anticoagulation is Eliquis    Acute/chronic diastolic HF   appears compensated with Demadex and metoprolol    COPD      chronic oxygen, continue outpatient follow-up with Pulmonary with nebulizers and inhalers    Severe pulmonary hypertension  continue Pulmonary follow-up    History of PE     anticoagulation Eliquis    Hypertension     controlled with current medication    Dyslipidemia     continue statin for LDL control      Subjective:   Feels better less short of breath, no chest pain no nausea vomiting no fevers chills appetite stable      Objective:     Vitals: Blood pressure 117/59, pulse 74, temperature 97 8 °F (36 6 °C), temperature source Temporal, resp  rate 18, height 5' 7" (1 702 m), weight 60 7 kg (133 lb 13 1 oz), SpO2 94 %  ,Body mass index is 20 96 kg/m²  Results from last 7 days   Lab Units 02/02/22  0543 01/31/22  0546 01/30/22  1018   WBC Thousand/uL 5 81   < > 6 67   HEMOGLOBIN g/dL 13 2   < > 14 8   HEMATOCRIT % 40 7   < > 46 0   PLATELETS Thousands/uL 209   < > 228   INR   --   --  1 15    < > = values in this interval not displayed  Results from last 7 days   Lab Units 02/03/22  0541 01/31/22  0546 01/30/22  1018   POTASSIUM mmol/L 4 0   < > 4 5   CHLORIDE mmol/L 97*   < > 102   CO2 mmol/L 33*   < > 30   BUN mg/dL 25   < > 19   CREATININE mg/dL 1 25   < > 1 31*   CALCIUM mg/dL 9 0   < > 8 8   ALK PHOS U/L  --   --  106   ALT U/L  --   --  33   AST U/L  --   --  25    < > = values in this interval not displayed         Scheduled Meds:  Current Facility-Administered Medications   Medication Dose Route Frequency Provider Last Rate    acetaminophen  650 mg Oral Q6H PRN Yeni Callahan PA-C      albuterol  2 5 mg Nebulization Q6H PRN Yeni Callahan PA-C      amiodarone  200 mg Oral TID With Meals ASHLEY Olvera      [START ON 2/8/2022] amiodarone  200 mg Oral Daily With Breakfast ASHLEY Olvera      amLODIPine  5 mg Oral Daily Radha Aguirre PA-C      apixaban  5 mg Oral BID Radha Aguirre PA-C      atorvastatin  40 mg Oral Daily Yeni Callahan PA-C      budesonide  0 5 mg Nebulization Q12H Eulogio Burnham MD      fluticasone  1 spray Each Nare Daily Zara Zimmerman      furosemide  40 mg Oral Daily Zara Zimmerman      ipratropium  0 5 mg Nebulization TID ASHLEY Mendiola      levalbuterol  1 25 mg Nebulization TID ASHLEY Mendiola      metoprolol succinate  100 mg Oral Daily Yeni Callahan PA-C      multivitamin stress formula  1 tablet Oral Daily Yeni Callahan PA-C      ondansetron  4 mg Intravenous Q6H PRN Yeni Callahan PA-C      polyethylene glycol  17 g Oral Daily Radha Aguirre PA-C      torsemide  40 mg Oral Daily ASHLEY Olvera         Continuous Infusions:         Physical exam:  General appearance:  Alert no distress interaction appropriate  Head/Eyes:  Nonicteric PERRL EOMI  Neck:  Supple  Lungs:  Very decreased BS bilateral no wheezing rhonchi or rales  Heart: normal S1 S2 regular  Abdomen: Soft nontender with bowel sounds  Extremities: no edema  Skin: no rash    Invasive Devices  Report    Peripheral Intravenous Line            Peripheral IV 02/03/22 Dorsal (posterior); Left Forearm 2 days                  VTE Pharmacologic Prophylaxis:  Eliquis      Counseling / Coordination of Care  Total floor / unit time spent today 30  minutes  Greater than 50% of total time was spent with the patient and / or family counseling and / or coordination of care    A description of the counseling / coordination of care:

## 2022-02-05 NOTE — PLAN OF CARE
Coronavirus (COVID-19) Notification     Reason for call  Patient requesting results     Lab Result    Lab test 2019-nCoV rRt-PCR in process        RN Recommendations/Instructions per [facility]  Continue to quarantine and follow the instructions given at your testing visit until you receive the results.     Please Contact your PCP clinic or return to the Emergency department if your:    Symptoms worsen or other concerning symptom's.     Patient informed that if test for COVID19 is POSITIVE,  you will receive a call typically within 48 hours from the test date (date lab collected).  If NEGATIVE result, you will receive a letter in the mail or MyChart.      Shiela Kelly RN     Problem: PAIN - ADULT  Goal: Verbalizes/displays adequate comfort level or baseline comfort level  Description: Interventions:  - Encourage patient to monitor pain and request assistance  - Assess pain using appropriate pain scale  - Administer analgesics based on type and severity of pain and evaluate response  - Implement non-pharmacological measures as appropriate and evaluate response  - Consider cultural and social influences on pain and pain management  - Notify physician/advanced practitioner if interventions unsuccessful or patient reports new pain  Outcome: Progressing     Problem: SAFETY ADULT  Goal: Patient will remain free of falls  Description: INTERVENTIONS:  - Educate patient/family on patient safety including physical limitations  - Instruct patient to call for assistance with activity   - Consult OT/PT to assist with strengthening/mobility   - Keep Call bell within reach  - Keep bed low and locked with side rails adjusted as appropriate  - Keep care items and personal belongings within reach  - Initiate and maintain comfort rounds  - Make Fall Risk Sign visible to staff  - Offer Toileting every 2 Hours, in advance of need  - Initiate/Maintain bed alarm  - Obtain necessary fall risk management equipment  - Apply yellow socks and bracelet for high fall risk patients  - Consider moving patient to room near nurses station  Outcome: Progressing  Goal: Maintain or return to baseline ADL function  Description: INTERVENTIONS:  -  Assess patient's ability to carry out ADLs; assess patient's baseline for ADL function and identify physical deficits which impact ability to perform ADLs (bathing, care of mouth/teeth, toileting, grooming, dressing, etc )  - Assess/evaluate cause of self-care deficits   - Assess range of motion  - Assess patient's mobility; develop plan if impaired  - Assess patient's need for assistive devices and provide as appropriate  - Encourage maximum independence but intervene and Subjective:      Please note, pt was seen as a virtual visit.  Allergies,medications, PMH,FH and SH were reviewed.  Physical exam and vital signs were limited at this time.  Pt was in the [facility] for this visit.       Patient ID: Eli Garrett is a 58 y.o. female.    Chief Complaint: dog bite      Eli Garrett is a 58 y.o. female who presents today for dog bite.  She was at home yesterday evening when 2 dogs in her household got into a fight.  The pt moved to separate the dogs and was inadvertently bitten on her right forearm.  She washed the area with copious amounts of water/hydrogen peroxide.  She had bactroban and applied this to the area and covered it with a band aid.   As she presents today she has tenderness and some redness in her arm.  She is able to make a fist, move her fingers independently but it is tender.  She did not have much bleeding after the bite and denies any real lacerations, but has 2 puncture wounds.  She is trying to avoid going to the ER with the covid-19 crisis.  She does have some nausea on occasion, but she didn't eat breakfast this morning, and she is thinking that the pain from the bite might be making her a little nauseous as well.      Review of Systems   Constitutional: Negative for activity change, fever and unexpected weight change.   HENT: Negative for hearing loss.    Eyes: Negative for visual disturbance.   Respiratory: Negative for shortness of breath and wheezing.    Cardiovascular: Negative for chest pain, palpitations and leg swelling.   Gastrointestinal: Positive for nausea. Negative for abdominal pain, constipation and diarrhea.   Genitourinary: Negative for dyspareunia, dysuria, frequency, urgency, vaginal bleeding and vaginal discharge.   Musculoskeletal: Negative for gait problem and neck pain.   Skin: Positive for wound. Negative for rash.   Allergic/Immunologic: Negative for immunocompromised state.   Neurological: Negative for tremors, speech  difficulty and weakness.   Hematological: Does not bruise/bleed easily.   Psychiatric/Behavioral: Negative for agitation and confusion.       Objective:      Physical Exam   Constitutional: She is oriented to person, place, and time. She appears well-developed and well-nourished. No distress.   HENT:   Head: Normocephalic and atraumatic.   Pulmonary/Chest: Effort normal. No respiratory distress.   Musculoskeletal: Normal range of motion.   Pt is able to pronate/supinate arm.  Pt is able to make a fist and move each finger independently.  She has some tenderness with these motions.   Neurological: She is alert and oriented to person, place, and time.   Skin: Skin is warm and dry. No rash noted.        2 small puncture wounds as noted.  No drainage/discharge noted from the wound.  Redness and swelling noted.     Psychiatric: She has a normal mood and affect. Her behavior is normal.       Assessment:       1. Dog bite of arm, right, initial encounter    2. Cellulitis of right upper extremity        Plan:       Dog bite of arm, right, initial encounter- I'm sending in augmentin for 10 days as noted below.  Redness outlined with sharpie marker and pt instructed to monitor for increasing redness/swelling.  Pt also instructed to soak arm in water/hibicleanse mix and cleanse with betadine.  She can continue to use bactroban to site TID.  Dressing to area while working  -     Discontinue: amoxicillin-clavulanate 875-125mg (AUGMENTIN) 875-125 mg per tablet; Take 1 tablet by mouth 2 (two) times daily. for 5 days  Dispense: 10 tablet; Refill: 0  -     amoxicillin-clavulanate 875-125mg (AUGMENTIN) 875-125 mg per tablet; Take 1 tablet by mouth 2 (two) times daily. for 10 days  Dispense: 20 tablet; Refill: 0    Cellulitis of right upper extremity- as noted above.  If pt has any further swelling/redness that passes outline or any increase in tenderness or difficulty moving arm/fingers etc she will have to be seen in the ER.    RTC  supervise when necessary  - Involve family in performance of ADLs  - Assess for home care needs following discharge   - Consider OT consult to assist with ADL evaluation and planning for discharge  - Provide patient education as appropriate  Outcome: Progressing  Goal: Maintains/Returns to pre admission functional level  Description: INTERVENTIONS:  - Perform BMAT or MOVE assessment daily    - Set and communicate daily mobility goal to care team and patient/family/caregiver  - Collaborate with rehabilitation services on mobility goals if consulted  - Ambulate patient 3 times a day  - Out of bed to chair 3 times a day   - Out of bed for meals 3 times a day  - Out of bed for toileting  - Record patient progress and toleration of activity level   Outcome: Progressing     Problem: DISCHARGE PLANNING  Goal: Discharge to home or other facility with appropriate resources  Description: INTERVENTIONS:  - Identify barriers to discharge w/patient and caregiver  - Arrange for needed discharge resources and transportation as appropriate  - Identify discharge learning needs (meds, wound care, etc )  - Refer to Case Management Department for coordinating discharge planning if the patient needs post-hospital services based on physician/advanced practitioner order or complex needs related to functional status, cognitive ability, or social support system  Outcome: Progressing     Problem: Knowledge Deficit  Goal: Patient/family/caregiver demonstrates understanding of disease process, treatment plan, medications, and discharge instructions  Description: Complete learning assessment and assess knowledge base    Interventions:  - Provide teaching at level of understanding  - Provide teaching via preferred learning methods  Outcome: Progressing     Problem: Potential for Falls  Goal: Patient will remain free of falls  Description: INTERVENTIONS:  - Educate patient/family on patient safety including physical limitations  - Instruct PRN         patient to call for assistance with activity   - Consult OT/PT to assist with strengthening/mobility   - Keep Call bell within reach  - Keep bed low and locked with side rails adjusted as appropriate  - Keep care items and personal belongings within reach  - Initiate and maintain comfort rounds  - Make Fall Risk Sign visible to staff  - Offer Toileting every 2 Hours, in advance of need  - Initiate/Maintain bed alarm  - Obtain necessary fall risk management equipment  - Apply yellow socks and bracelet for high fall risk patients  - Consider moving patient to room near nurses station  Outcome: Progressing     Problem: MOBILITY - ADULT  Goal: Maintain or return to baseline ADL function  Description: INTERVENTIONS:  -  Assess patient's ability to carry out ADLs; assess patient's baseline for ADL function and identify physical deficits which impact ability to perform ADLs (bathing, care of mouth/teeth, toileting, grooming, dressing, etc )  - Assess/evaluate cause of self-care deficits   - Assess range of motion  - Assess patient's mobility; develop plan if impaired  - Assess patient's need for assistive devices and provide as appropriate  - Encourage maximum independence but intervene and supervise when necessary  - Involve family in performance of ADLs  - Assess for home care needs following discharge   - Consider OT consult to assist with ADL evaluation and planning for discharge  - Provide patient education as appropriate  Outcome: Progressing  Goal: Maintains/Returns to pre admission functional level  Description: INTERVENTIONS:  - Perform BMAT or MOVE assessment daily    - Set and communicate daily mobility goal to care team and patient/family/caregiver     - Collaborate with rehabilitation services on mobility goals if consulted  - Ambulate patient 3 times a day  - Out of bed to chair 3 times a day   - Out of bed for meals 3 times a day  - Out of bed for toileting  - Record patient progress and toleration of activity level   Outcome: Progressing     Problem: RESPIRATORY - ADULT  Goal: Achieves optimal ventilation and oxygenation  Description: INTERVENTIONS:  - Assess for changes in respiratory status  - Assess for changes in mentation and behavior  - Position to facilitate oxygenation and minimize respiratory effort  - Oxygen administered by appropriate delivery if ordered  - Initiate smoking cessation education as indicated  - Encourage broncho-pulmonary hygiene including cough, deep breathe, Incentive Spirometry  - Assess the need for suctioning and aspirate as needed  - Assess and instruct to report SOB or any respiratory difficulty  - Respiratory Therapy support as indicated  Outcome: Progressing     Problem: METABOLIC, FLUID AND ELECTROLYTES - ADULT  Goal: Electrolytes maintained within normal limits  Description: INTERVENTIONS:  - Monitor labs and assess patient for signs and symptoms of electrolyte imbalances  - Administer electrolyte replacement as ordered  - Monitor response to electrolyte replacements, including repeat lab results as appropriate  - Instruct patient on fluid and nutrition as appropriate  Outcome: Progressing  Goal: Fluid balance maintained  Description: INTERVENTIONS:  - Monitor labs   - Monitor I/O and WT  - Instruct patient on fluid and nutrition as appropriate  - Assess for signs & symptoms of volume excess or deficit  Outcome: Progressing

## 2022-02-06 PROCEDURE — RECHECK: Performed by: INTERNAL MEDICINE

## 2022-02-06 PROCEDURE — 99232 SBSQ HOSP IP/OBS MODERATE 35: CPT | Performed by: INTERNAL MEDICINE

## 2022-02-06 PROCEDURE — 94640 AIRWAY INHALATION TREATMENT: CPT

## 2022-02-06 RX ADMIN — LEVALBUTEROL HYDROCHLORIDE 1.25 MG: 1.25 SOLUTION, CONCENTRATE RESPIRATORY (INHALATION) at 07:03

## 2022-02-06 RX ADMIN — AMIODARONE HYDROCHLORIDE 200 MG: 200 TABLET ORAL at 09:29

## 2022-02-06 RX ADMIN — FUROSEMIDE 40 MG: 40 TABLET ORAL at 09:29

## 2022-02-06 RX ADMIN — AMIODARONE HYDROCHLORIDE 200 MG: 200 TABLET ORAL at 12:46

## 2022-02-06 RX ADMIN — BUDESONIDE 0.5 MG: 0.5 INHALANT ORAL at 07:03

## 2022-02-06 RX ADMIN — APIXABAN 5 MG: 5 TABLET, FILM COATED ORAL at 17:42

## 2022-02-06 RX ADMIN — LEVALBUTEROL HYDROCHLORIDE 1.25 MG: 1.25 SOLUTION, CONCENTRATE RESPIRATORY (INHALATION) at 13:21

## 2022-02-06 RX ADMIN — AMIODARONE HYDROCHLORIDE 200 MG: 200 TABLET ORAL at 17:42

## 2022-02-06 RX ADMIN — IPRATROPIUM BROMIDE 0.5 MG: 0.5 SOLUTION RESPIRATORY (INHALATION) at 07:03

## 2022-02-06 RX ADMIN — FLUTICASONE PROPIONATE 1 SPRAY: 50 SPRAY, METERED NASAL at 09:30

## 2022-02-06 RX ADMIN — ATORVASTATIN CALCIUM 40 MG: 40 TABLET, FILM COATED ORAL at 09:29

## 2022-02-06 RX ADMIN — APIXABAN 5 MG: 5 TABLET, FILM COATED ORAL at 09:29

## 2022-02-06 RX ADMIN — IPRATROPIUM BROMIDE 0.5 MG: 0.5 SOLUTION RESPIRATORY (INHALATION) at 13:21

## 2022-02-06 RX ADMIN — AMLODIPINE BESYLATE 5 MG: 5 TABLET ORAL at 09:29

## 2022-02-06 RX ADMIN — METOPROLOL SUCCINATE 100 MG: 100 TABLET, EXTENDED RELEASE ORAL at 09:32

## 2022-02-06 RX ADMIN — TORSEMIDE 40 MG: 20 TABLET ORAL at 09:29

## 2022-02-06 RX ADMIN — B-COMPLEX W/ C & FOLIC ACID TAB 1 TABLET: TAB at 09:29

## 2022-02-06 NOTE — PROGRESS NOTES
Progress Note - Gearldine Ahumada 67 y o  male MRN: [id number]    Unit/Bed#: E4 -01 Encounter: [id number]    Assessment/Plan:    Acute/chronic hypoxic resp failure  patient with severe COPD and recent pneumonia with PE and heart failure all contribute to hypoxic respiratory failure, now stable on 6 liters continue to titrate oxygen, stress incentive spirometer use    Ambulatory dysfunction   awaiting rehab    AFib      rate is controlled with metoprolol and amiodarone, continue Eliquis    Acute/chronic diastolic HF   appears compensated with Demadex and metoprolol    COPD      requires chronic oxygen continue pulmonary follow-up, continue nebulizer and inhalers    Severe pulmonary hypertension  continue Pulmonary follow-up    History of PE     maintain anticoagulation with Eliquis    Subjective:   Still short of breath with any activity, no chest pain no nausea vomiting no fevers chills appetite good      Objective:     Vitals: Blood pressure 126/60, pulse 67, temperature (!) 97 °F (36 1 °C), temperature source Temporal, resp  rate 18, height 5' 7" (1 702 m), weight 59 2 kg (130 lb 8 2 oz), SpO2 97 %  ,Body mass index is 20 44 kg/m²  Results from last 7 days   Lab Units 02/02/22  0543 01/31/22  0546 01/30/22  1018   WBC Thousand/uL 5 81   < > 6 67   HEMOGLOBIN g/dL 13 2   < > 14 8   HEMATOCRIT % 40 7   < > 46 0   PLATELETS Thousands/uL 209   < > 228   INR   --   --  1 15    < > = values in this interval not displayed  Results from last 7 days   Lab Units 02/03/22  0541 01/31/22  0546 01/30/22  1018   POTASSIUM mmol/L 4 0   < > 4 5   CHLORIDE mmol/L 97*   < > 102   CO2 mmol/L 33*   < > 30   BUN mg/dL 25   < > 19   CREATININE mg/dL 1 25   < > 1 31*   CALCIUM mg/dL 9 0   < > 8 8   ALK PHOS U/L  --   --  106   ALT U/L  --   --  33   AST U/L  --   --  25    < > = values in this interval not displayed         Scheduled Meds:  Current Facility-Administered Medications   Medication Dose Route Frequency Provider Last Rate    acetaminophen  650 mg Oral Q6H PRN [facility], [place]      albuterol  2 5 mg Nebulization Q6H PRN [facility], [place]      amiodarone  200 mg Oral TID With Meals ASHLEY Olvera      [START ON 2/8/2022] amiodarone  200 mg Oral Daily With Breakfast ASHLEY Olvera      amLODIPine  5 mg Oral Daily Radha Aguirre, [place]      apixaban  5 mg Oral BID Radha Aguirre PA-C      atorvastatin  40 mg Oral Daily [facility], [place]      budesonide  0 5 mg Nebulization Q12H Ronny Bishop MD      fluticasone  1 spray Each Nare Daily [place], [place]      furosemide  40 mg Oral Daily [place], [place]      ipratropium  0 5 mg Nebulization TID ASHLEY Landa      levalbuterol  1 25 mg Nebulization TID ASHLEY Landa      metoprolol succinate  100 mg Oral Daily Stormy Neighbor, [place]      multivitamin stress formula  1 tablet [facility]      ondansetron  4 mg Intravenous Q6H PRN Stormy Neighbor, [place]      polyethylene glycol  17 g Oral Daily Radha Aguirre PA-C      torsemide  40 mg Oral Daily ASHLEY Olvera         Continuous Infusions:         Physical exam:  General appearance:  Alert no distress interaction appropriate  Head/Eyes:  Nonicteric PERRL EOMI  Neck:  Supple  Lungs:  Decreased BS bilateral few basilar crackles  Heart: normal S1 S2 irregular  Abdomen: Soft nontender with bowel sounds  Extremities: no edema  Skin: no rash    Invasive Devices  Report    Peripheral Intravenous Line            Peripheral IV 02/03/22 Dorsal (posterior); Left Forearm 3 days                  VTE Pharmacologic Prophylaxis:  Eliquis      Counseling / Coordination of Care  Total floor / unit time spent today 30  minutes  Greater than 50% of total time was spent with the patient and / or family counseling and / or coordination of care    A description of the counseling / coordination of care:   Awaiting rehab

## 2022-02-06 NOTE — NURSING NOTE
During assessment, patient noted to have IV site that appears to be from admission on 1/30/22  Immediately removed  No pain, redness, swelling or other signs of infection noted  On-call provider and hospital supervisor made aware

## 2022-02-07 PROCEDURE — 94640 AIRWAY INHALATION TREATMENT: CPT

## 2022-02-07 PROCEDURE — 94760 N-INVAS EAR/PLS OXIMETRY 1: CPT

## 2022-02-07 PROCEDURE — 0HBRXZZ EXCISION OF TOE NAIL, EXTERNAL APPROACH: ICD-10-PCS | Performed by: STUDENT IN AN ORGANIZED HEALTH CARE EDUCATION/TRAINING PROGRAM

## 2022-02-07 PROCEDURE — 99232 SBSQ HOSP IP/OBS MODERATE 35: CPT | Performed by: STUDENT IN AN ORGANIZED HEALTH CARE EDUCATION/TRAINING PROGRAM

## 2022-02-07 RX ORDER — TORSEMIDE 20 MG/1
40 TABLET ORAL DAILY
Status: DISCONTINUED | OUTPATIENT
Start: 2022-02-08 | End: 2022-02-09 | Stop reason: HOSPADM

## 2022-02-07 RX ADMIN — IPRATROPIUM BROMIDE 0.5 MG: 0.5 SOLUTION RESPIRATORY (INHALATION) at 07:32

## 2022-02-07 RX ADMIN — BUDESONIDE 0.5 MG: 0.5 INHALANT ORAL at 07:32

## 2022-02-07 RX ADMIN — B-COMPLEX W/ C & FOLIC ACID TAB 1 TABLET: TAB at 09:06

## 2022-02-07 RX ADMIN — IPRATROPIUM BROMIDE 0.5 MG: 0.5 SOLUTION RESPIRATORY (INHALATION) at 13:46

## 2022-02-07 RX ADMIN — APIXABAN 5 MG: 5 TABLET, FILM COATED ORAL at 17:34

## 2022-02-07 RX ADMIN — LEVALBUTEROL HYDROCHLORIDE 1.25 MG: 1.25 SOLUTION, CONCENTRATE RESPIRATORY (INHALATION) at 07:32

## 2022-02-07 RX ADMIN — AMIODARONE HYDROCHLORIDE 200 MG: 200 TABLET ORAL at 09:10

## 2022-02-07 RX ADMIN — APIXABAN 5 MG: 5 TABLET, FILM COATED ORAL at 09:04

## 2022-02-07 RX ADMIN — FLUTICASONE PROPIONATE 1 SPRAY: 50 SPRAY, METERED NASAL at 09:07

## 2022-02-07 RX ADMIN — LEVALBUTEROL HYDROCHLORIDE 1.25 MG: 1.25 SOLUTION, CONCENTRATE RESPIRATORY (INHALATION) at 13:46

## 2022-02-07 RX ADMIN — ATORVASTATIN CALCIUM 40 MG: 40 TABLET, FILM COATED ORAL at 09:05

## 2022-02-07 NOTE — ASSESSMENT & PLAN NOTE
Patient has history of essential hypertension  Blood pressure currently adequately controlled on amlodipine 5 mg daily, metoprolol  mg daily

## 2022-02-07 NOTE — ASSESSMENT & PLAN NOTE
· Patient has a history of severe COPD, without acute exacerbation  · Was evaluated by the pulmonary service  · Continue bronchodilators  · Currently requiring 5-6 L nasal cannula

## 2022-02-07 NOTE — ASSESSMENT & PLAN NOTE
· Patient presented with an acute exacerbation of his chronic diastolic congestive heart failure  · Most recent 2D echocardiogram 12/21 with left ventricular ejection fraction 65%  Normal systolic function  Grade 1 diastolic dysfunction    · Patient was seen in consultation by the cardiology team and diuresed with IV Lasix and improved  · Will be transition to oral diuretics with torsemide 40 mg daily  · Discontinue Ace inhibitors due to soft blood pressure

## 2022-02-07 NOTE — ASSESSMENT & PLAN NOTE
Patient has a history of paroxysmal atrial fibrillation, diagnosed January 2022  Patient is currently rate controlled:  Continue amiodarone 200 mg once daily, metoprolol, and Eliquis for anticoagulation

## 2022-02-07 NOTE — ASSESSMENT & PLAN NOTE
· Patient with severe history COPD with chronic hypoxic respiratory failure  · At baseline, requires 3 L O2 at rest and 8 L oximizer with exertion  · He was recently admitted 12/27-12/31 for pneumonia and COPD exacerbation, and recent diagnosis of PE   · Pt was evaluated by Cardiology and Pulmonary teams  · Patient has noted interest in hospice if continues to decline  · Currently awaiting placement for short-term rehab

## 2022-02-07 NOTE — PROGRESS NOTES
[facility]  Progress Note - Shayy Orantes 1945, 67 y o  male MRN: [id number]  Unit/Bed#: E4 -01 Encounter: [id number]  Primary Care Provider: Bobby Ortega PA-C   Date and time admitted to hospital: 1/30/2022  9:13 AM    * Acute on chronic respiratory failure with hypoxia [facility])  Assessment & Plan  · Patient with severe history COPD with chronic hypoxic respiratory failure  · At baseline, requires 3 L O2 at rest and 8 L oximizer with exertion  · He was recently admitted 12/27-12/31 for pneumonia and COPD exacerbation, and recent diagnosis of PE   · Pt was evaluated by Cardiology and Pulmonary teams  · Patient has noted interest in hospice if continues to decline  · Currently awaiting placement for short-term rehab    Atrial fibrillation [facility])  Assessment & Plan  Patient has a history of paroxysmal atrial fibrillation, diagnosed January 2022  Patient is currently rate controlled:  Continue amiodarone 200 mg once daily, metoprolol, and Eliquis for anticoagulation    Acute on chronic diastolic congestive heart failure ([address] )  Assessment & Plan  · Patient presented with an acute exacerbation of his chronic diastolic congestive heart failure  · Most recent 2D echocardiogram 12/21 with left ventricular ejection fraction 65%  Normal systolic function  Grade 1 diastolic dysfunction  · Patient was seen in consultation by the cardiology team and diuresed with IV Lasix and improved  · Will be transition to oral diuretics with torsemide 40 mg daily  · Discontinue Ace inhibitors due to soft blood pressure    Lung nodule seen on imaging study  Assessment & Plan  Patient has previously diagnosed history of lung mass  CT scan on admission revealed 3 5 cm right apical masslike opacity, minimally enlarged since December 27, 2021    Was noted this could be indicative of a scar  Patient has outpatient follow-up with thoracic surgery scheduled end of February    Aortic ectasia, thoracic [facility])  Assessment & Plan  · Stable   · Outpatient thoracic follow up    Chronic obstructive pulmonary disease ([address] )  Assessment & Plan  · Patient has a history of severe COPD, without acute exacerbation  · Was evaluated by the pulmonary service  · Continue bronchodilators  · Currently requiring 5-6 L nasal cannula    Pulmonary embolism ([address] )  Assessment & Plan  · Patient has a history of pulmonary embolism, diagnosed 2021  · Unknown etiology  · Anticoagulated with Eliquis     Hypertension  Assessment & Plan  Patient has history of essential hypertension  Blood pressure currently adequately controlled on amlodipine 5 mg daily, metoprolol  mg daily    Hyperlipidemia  Assessment & Plan  Continue statin      VTE Pharmacologic Prophylaxis:   Pharmacologic: Apixaban (Eliquis)  Mechanical VTE Prophylaxis in Place: Yes    Patient Centered Rounds: I have performed bedside rounds with nursing staff today  Discussions with Specialists or Other Care Team Provider: [place]    Education and Discussions with Family / Patient: patient, son on phone    Time Spent for Care: 30 minutes  More than 50% of total time spent on counseling and coordination of care as described above  Current Length of Stay: 8 day(s)    Current Patient Status: Inpatient   Certification Statement: The patient will continue to require additional inpatient hospital stay due to pending placement for STR    Discharge Plan: pending    Code Status: Level 3 - DNAR and DNI      Subjective:   No events overnight  Reports breathing has improved  No complaints  Tolerating diet  Call and updated son by phone  Objective:     Vitals:   Temp (24hrs), Av °F (36 7 °C), Min:97 5 °F (36 4 °C), Max:98 4 °F (36 9 °C)    Temp:  [97 5 °F (36 4 °C)-98 4 °F (36 9 °C)] 98 4 °F (36 9 °C)  HR:  [68-71] 71  Resp:  [18] 18  BP: (108-115)/(56-63) 108/56  SpO2:  [94 %-97 %] 96 %  Body mass index is 19 99 kg/m²       Input and Output Summary (last 24 hours): Intake/Output Summary (Last 24 hours) at 2/7/2022 1530  Last data filed at 2/7/2022 1001  Gross per 24 hour   Intake --   Output 1700 ml   Net -1700 ml       Physical Exam:     Physical Exam  Vitals and nursing note reviewed  HENT:      Head: Normocephalic and atraumatic  Eyes:      General: No scleral icterus  Conjunctiva/sclera: Conjunctivae normal    Cardiovascular:      Rate and Rhythm: Normal rate  Pulmonary:      Effort: Pulmonary effort is normal  No respiratory distress  Breath sounds: No rhonchi  Comments: Decreased breath sounds bilaterally  Abdominal:      General: Bowel sounds are normal  There is no distension  Palpations: Abdomen is soft  Musculoskeletal:         General: No swelling  Right lower leg: No edema  Left lower leg: No edema  Skin:     General: Skin is warm and dry  Neurological:      Mental Status: He is alert  Mental status is at baseline  Additional Data:     Labs:    Results from last 7 days   Lab Units 02/02/22  0543   WBC Thousand/uL 5 81   HEMOGLOBIN g/dL 13 2   HEMATOCRIT % 40 7   PLATELETS Thousands/uL 209     Results from last 7 days   Lab Units 02/03/22  0541   SODIUM mmol/L 136   POTASSIUM mmol/L 4 0   CHLORIDE mmol/L 97*   CO2 mmol/L 33*   BUN mg/dL 25   CREATININE mg/dL 1 25   ANION GAP mmol/L 6   CALCIUM mg/dL 9 0   GLUCOSE RANDOM mg/dL 127                 Results from last 7 days   Lab Units 02/01/22  0452   PROCALCITONIN ng/ml 0 16           * I Have Reviewed All Lab Data Listed Above  * Additional Pertinent Lab Tests Reviewed: Krist[facility] Admission Reviewed    Imaging:    XR chest 1 view portable    Result Date: 1/31/2022  Impression: Multifocal infiltrates superimposed on diffuse chronic changes  Right upper lobe opacification  Workstation performed: [id number]     CTA ED chest PE study    Result Date: 1/30/2022  Impression: No pulmonary embolus   New minimal multilobar patchy consolidation may represent pneumonia in the appropriate clinical context  New small bilateral pleural effusions  3 5 cm right apical masslike opacity minimally enlarged since December 27, 2021  This may represent underlying scar with minimal new adjacent airspace consolidation  If follow-up with tissue sampling or CT PET is deferred, noncontrast chest CT in 3 months is recommended  Stable descending intrathoracic aortic aneurysm maximum diameter 4 3 cm when measured in the same fashion  Partially visualized infrarenal abdominal aortic aneurysm maximum diameter 5 6 cm  This could be followed up with nonemergent aortic CTA  Severe pulmonary emphysema  This study demonstrates a significant  finding and was documented as such in [facility] for liaison and referring practitioner notification  This study demonstrates a finding requiring imaging follow-up and was documented as such in Epic  Workstation performed: [id number]       Recent Cultures (last 7 days):     Results from last 7 days   Lab Units 02/01/22  0854   SPUTUM CULTURE  Test not performed  Suggest repeat specimen  GRAM STAIN RESULT  >10 squamous epithelial cells/lpf, indicating orophayngeal contamination  *  No polys seen*  3+ Gram positive cocci in pairs, chains and clusters*  3+ Gram negative rods*  2+ Gram negative diplococci*  1+ Gram positive rods*       Last 24 Hours Medication List:   Current Facility-Administered Medications   Medication Dose Route Frequency Provider Last Rate    acetaminophen  650 mg Oral Q6H PRN Leah Wiley PA-C      albuterol  2 5 mg Nebulization Q6H PRN Leah Wiley PA-C      [START ON 2/8/2022] amiodarone  200 mg Oral Daily With Breakfast ASHLEY Olvera      amLODIPine  5 mg Oral Daily Radha Aguirre PA-C      apixaban  5 mg Oral BID Radha Aguirre PA-C      atorvastatin  40 mg Oral Daily Leah Wiley PA-C      budesonide  0 5 mg Nebulization Q12H Claudeen Lan, MD      fluticasone  1 spray Each Nare Daily Ronnie Orozco PA-C      ipratropium  0 5 mg Nebulization TID ASHLEY Goode      levalbuterol  1 25 mg Nebulization TID ASHLEY Goode      metoprolol succinate  100 mg Oral Daily Zara Taveras      multivitamin stress formula  1 tablet Oral Daily Ronnie Orozco PA-C      ondansetron  4 mg Intravenous Q6H PRN Ronnie Orozco PA-C      polyethylene glycol  17 g Oral Daily Radha Aguirre PA-C      torsemide  40 mg Oral Daily ASHLEY Olvera          Today, Patient Was Seen By: Carmen Toribio MD    ** Please Note: Dictation voice to text software may have been used in the creation of this document   **

## 2022-02-07 NOTE — CONSULTS
Podiatry - Consultation    Patient Information:   Cordell Draper 67 y o  male MRN: [id number]  Unit/Bed#: E4 -01 Encounter: [id number]  PCP: Mendy Villalobos PA-C  Date of Admission:  1/30/2022  Date of Consultation: 02/07/22  Requesting Physician: Kirt Fitch MD      ASSESSMENT:    Cordell Draper is a 67 y o  male with:    1  Onychomycosis     PLAN:     Toenails x10 were excisionally debrided using a large nipper to normal length and thickness without incident   Weight bearing as tolerated   Rest of Medical care per primary team    Podiatry signing off, thank you for the consult! Please contact with any questions or concerns  SUBJECTIVE:    History of Present Illness:    Cordell Draper is a 67 y o  male who is originally admitted 1/30/2022 due to shortness of breath with a past medical history of COPD, NSVT, PE  We are consulted for painful, elongated toenails x 10  They state that they have difficulty applying their socks and shoes due to the elongation of the nails  They report pain with palpation and pressure within their shoe gear  They have been unable to cut their nails adequately  Patient has no further pedal complaints at this time  Review of Systems:    Constitutional: Negative  HENT: Negative  Eyes: Negative  Respiratory: Negative  Cardiovascular: Negative  Gastrointestinal: Negative  Musculoskeletal: mild tenderness to toe nails  Skin: Thickened, elongated toenails  Neurological: negative  Psych: Negative       Past Medical and Surgical History:     Past Medical History:   Diagnosis Date    Abnormal CT scan 12/27/2021    [facility]Y (acute kidney [facility]) [facility] ) 12/27/2021    Community acquired pneumonia 12/27/2021    COPD (chronic obstructive pulmonary disease) ([facility] )     Elevated troponin 12/27/2021    Hypertension     Pulmonary embolism ([address] )     Sepsis ([facility] ) 12/27/2021       Past Surgical History:   Procedure Laterality Date    HERNIA REPAIR Meds/Allergies:    Medications Prior to Admission   Medication    albuterol (2 5 mg/3 mL) 0 083 % nebulizer solution    albuterol (PROVENTIL HFA,VENTOLIN HFA) 90 mcg/act inhaler    amLODIPine (NORVASC) 5 mg tablet    apixaban (ELIQUIS) 5 mg    Ascorbic Acid (VITAMIN C) 1000 MG tablet    atorvastatin (LIPITOR) 40 mg tablet    benazepril (LOTENSIN) 40 MG tablet    Cholecalciferol (VITAMIN D3) 5000 units CAPS    cyanocobalamin (VITAMIN B-12) 100 mcg tablet    folic acid (FOLVITE) 1 mg tablet    metoprolol succinate (TOPROL-XL) 100 mg 24 hr tablet    multivitamin (THERAGRAN) TABS    Omega-3 Fatty Acids (FISH OIL) 1,000 mg    umeclidinium-vilanterol (Anoro Ellipta) 62 5-25 MCG/INH inhaler    vitamin E 100 UNIT capsule    famotidine (PEPCID) 20 mg tablet    ipratropium (ATROVENT) 0 03 % nasal spray       No Known Allergies    Social History:     Marital Status: /Civil Union    Substance Use History:   Social History     Substance and Sexual Activity   Alcohol Use Yes    Comment: Social drinker; Daily alcohol use per [employer]     Social History     Tobacco Use   Smoking Status Former Smoker    Packs/day: 2 00    Years: 49 00    Pack years: 98 00    Types: Cigarettes    Start date:     Quit date:     Years since quittin 1   Smokeless Tobacco Former User   Tobacco Comment    No secondhand smoke exposure     Social History     Substance and Sexual Activity   Drug Use Not Currently       Family History:    Family History   Problem Relation Age of Onset    Lung cancer Father          OBJECTIVE:    Vitals:   Blood Pressure: 108/56 (22 0855)  Pulse: 71 (22 0855)  Temperature: 98 4 °F (36 9 °C) (22 0855)  Temp Source: Temporal (22)  Respirations: 18 (226)  Height: 5' 7" (170 2 cm) (22 0935)  Weight - Scale: 57 9 kg (127 lb 10 3 oz) (22 0544)  SpO2: 96 % (22 0732)    Physical Exam:    General Appearance: Alert, cooperative, no distress  HEENT: Head normocephalic, atraumatic, without obvious abnormality  Heart: Normal rate and rhythm  Lungs: Non-labored breathing  No respiratory distress  Abdomen: Without distension  Psychiatric: AAOx3  Lower Extremity:  Vascular:   DP and PT pulses are dopplerble b/l  CRT < 3 seconds at the digits  +0/4 edema noted at bilateral lower extremities  Skin temperature is WNL bilaterally  Musculoskeletal:  MMT is 4/5 in all muscle compartments bilaterally  ROM at the ankle joint is decreased bilaterally with the leg extended  Mild Pain on palpation of toe nails  No gross deformities noted  Dermatological:  Elongated, discolored, dystrophic nails x 10 that are positive for subungual debris  Neurological:  Gross sensation is intact  Protective sensation is intact  Additional data:     Lab Results: I have personally reviewed pertinent labs including:    Results from last 7 days   Lab Units 02/02/22  0543   WBC Thousand/uL 5 81   HEMOGLOBIN g/dL 13 2   HEMATOCRIT % 40 7   PLATELETS Thousands/uL 209     Results from last 7 days   Lab Units 02/03/22  0541   POTASSIUM mmol/L 4 0   CHLORIDE mmol/L 97*   CO2 mmol/L 33*   BUN mg/dL 25   CREATININE mg/dL 1 25   CALCIUM mg/dL 9 0           Cultures: I have personally reviewed pertinent cultures including:    Results from last 7 days   Lab Units 02/01/22  0854   SPUTUM CULTURE  Test not performed  Suggest repeat specimen  GRAM STAIN RESULT  >10 squamous epithelial cells/lpf, indicating orophayngeal contamination  *  No polys seen*  3+ Gram positive cocci in pairs, chains and clusters*  3+ Gram negative rods*  2+ Gram negative diplococci*  1+ Gram positive rods*           Imaging: I have personally reviewed pertinent reports in PACS  EKG, Pathology, and Other Studies: I have personally reviewed pertinent reports  ** Please Note: Portions of the record may have been created with voice recognition software   Occasional wrong word or "sound a like" substitutions may have occurred due to the inherent limitations of voice recognition software  Read the chart carefully and recognize, using context, where substitutions have occurred   **

## 2022-02-08 PROCEDURE — 94760 N-INVAS EAR/PLS OXIMETRY 1: CPT

## 2022-02-08 PROCEDURE — 97530 THERAPEUTIC ACTIVITIES: CPT

## 2022-02-08 PROCEDURE — 94640 AIRWAY INHALATION TREATMENT: CPT

## 2022-02-08 PROCEDURE — 97110 THERAPEUTIC EXERCISES: CPT

## 2022-02-08 PROCEDURE — 97116 GAIT TRAINING THERAPY: CPT

## 2022-02-08 PROCEDURE — 87635 SARS-COV-2 COVID-19 AMP PRB: CPT | Performed by: STUDENT IN AN ORGANIZED HEALTH CARE EDUCATION/TRAINING PROGRAM

## 2022-02-08 PROCEDURE — 99232 SBSQ HOSP IP/OBS MODERATE 35: CPT | Performed by: STUDENT IN AN ORGANIZED HEALTH CARE EDUCATION/TRAINING PROGRAM

## 2022-02-08 RX ORDER — DOCUSATE SODIUM 100 MG/1
100 CAPSULE, LIQUID FILLED ORAL 2 TIMES DAILY
Status: DISCONTINUED | OUTPATIENT
Start: 2022-02-09 | End: 2022-02-09

## 2022-02-08 RX ADMIN — METOPROLOL SUCCINATE 100 MG: 100 TABLET, EXTENDED RELEASE ORAL at 07:54

## 2022-02-08 RX ADMIN — APIXABAN 5 MG: 5 TABLET, FILM COATED ORAL at 17:50

## 2022-02-08 RX ADMIN — BUDESONIDE 0.5 MG: 0.5 INHALANT ORAL at 07:00

## 2022-02-08 RX ADMIN — APIXABAN 5 MG: 5 TABLET, FILM COATED ORAL at 07:47

## 2022-02-08 RX ADMIN — POLYETHYLENE GLYCOL 3350 17 G: 17 POWDER, FOR SOLUTION ORAL at 17:56

## 2022-02-08 RX ADMIN — LEVALBUTEROL HYDROCHLORIDE 1.25 MG: 1.25 SOLUTION, CONCENTRATE RESPIRATORY (INHALATION) at 13:00

## 2022-02-08 RX ADMIN — AMIODARONE HYDROCHLORIDE 200 MG: 200 TABLET ORAL at 07:47

## 2022-02-08 RX ADMIN — AMLODIPINE BESYLATE 5 MG: 5 TABLET ORAL at 07:47

## 2022-02-08 RX ADMIN — FLUTICASONE PROPIONATE 1 SPRAY: 50 SPRAY, METERED NASAL at 07:48

## 2022-02-08 RX ADMIN — LEVALBUTEROL HYDROCHLORIDE 1.25 MG: 1.25 SOLUTION, CONCENTRATE RESPIRATORY (INHALATION) at 07:00

## 2022-02-08 RX ADMIN — ATORVASTATIN CALCIUM 40 MG: 40 TABLET, FILM COATED ORAL at 07:47

## 2022-02-08 RX ADMIN — TORSEMIDE 40 MG: 20 TABLET ORAL at 07:54

## 2022-02-08 RX ADMIN — IPRATROPIUM BROMIDE 0.5 MG: 0.5 SOLUTION RESPIRATORY (INHALATION) at 07:00

## 2022-02-08 RX ADMIN — IPRATROPIUM BROMIDE 0.5 MG: 0.5 SOLUTION RESPIRATORY (INHALATION) at 13:00

## 2022-02-08 RX ADMIN — B-COMPLEX W/ C & FOLIC ACID TAB 1 TABLET: TAB at 07:48

## 2022-02-08 NOTE — PHYSICAL THERAPY NOTE
Physical Therapy Treatment Note     02/08/22 1152   PT Last Visit   PT Visit Date 02/07/22   Note Type   Note Type Treatment   Pain Assessment   Pain Assessment Tool 0-10   Pain Score No Pain   Restrictions/Precautions   Weight Bearing Precautions Per Order No   Other Precautions O2  (ROSALES)   General   Chart Reviewed Yes   Family/Caregiver Present No   Subjective   Subjective pt  sleeping in bed upon entry  Pt  agreeable to PT  Bed Mobility   Supine to Sit 6  Modified independent   Transfers   Sit to Stand 5  Supervision   Additional items Assist x 1;Verbal cues   Stand to Sit 5  Supervision   Additional items Assist x 1;Verbal cues   Stand pivot 5  Supervision   Additional items   (with and witout RW)   Toilet transfer 5  Supervision   Additional items Assist x 1; Increased time required;Standard toilet  (grab bars)   Ambulation/Elevation   Gait pattern   (Continous reciprocal gait)   Gait Assistance 5  Supervision   Additional items Assist x 1   Assistive Device Rolling walker   Distance 80ft x 2, 70ft, 30ft   Stair Management Assistance 5  Supervision   Additional items Assist x 1   Stair Management Technique Two rails; Alternating pattern;Reciprocal;Foreward   Number of Stairs 4   Balance   Static Sitting Normal   Dynamic Sitting Good   Ambulatory Fair +   Endurance Deficit   Endurance Deficit Yes   Endurance Deficit Description ROSALES   Activity Tolerance   Activity Tolerance Patient tolerated treatment well;Patient limited by fatigue   Nurse Made Aware Yes   Exercises   THR Supine;Bilateral;AROM;20 reps  (SLR, HS, hip abd, quad sets)   Assessment   Prognosis Fair   Problem List Decreased endurance;Decreased mobility   Assessment Pt  progressing well with mobility however patient is limited due to ROSALES  Therapist managed the O2 line t/o session  Ambualtion distances was intentionally kept short distances from last session due to patient's ROSALES with exertion/activity   Pt  needed seated rest after all ambulations due to ROSALES  Pt  is below his PLOF due to ROSALES  Pt  on 4L O2 as he was initially in room  Pt  positioned on toilet post session and instructed to call for staff when done  Pt  was given VCs, hand placement during transfers, for pacing and energy conservation t/o session  Will continue to follow during the stay to maximize functional mobility  Pt  will benefit from rehab with focus on pulmonary rehab  Continue per current PT POC  Barriers to Discharge None   Goals   Patient Goals None reported   STG Expiration Date 02/10/22   PT Treatment Day 3   Plan   Treatment/Interventions Functional transfer training;LE strengthening/ROM; Elevations; Therapeutic exercise;Patient/family training;Equipment eval/education; Bed mobility;Gait training;Spoke to nursing   Progress Progressing toward goals   PT Frequency 2-3x/wk   Recommendation   PT Discharge Recommendation Post acute rehabilitation services  (with focus on pulmonary rehab )   Equipment Recommended [place] walker   AM-PAC Basic Mobility Inpatient   Turning in Bed Without Bedrails 4   Lying on Back to Sitting on Edge of Flat Bed 4   Moving Bed to Chair 4   Standing Up From Chair 4   Walk in Room 4   Climb 3-5 Stairs 3   Basic Mobility Inpatient Raw Score 23   Basic Mobility Standardized Score 50 88   Highest Level Of Mobility   -[facility] Goal 7: Walk 25 feet or more         Molly Turpin PTA    An AM-PAC basic mobility standardized score less than 42 9 suggest the patient may benefit from discharge to post-acute rehab services

## 2022-02-08 NOTE — ASSESSMENT & PLAN NOTE
Patient has a history of paroxysmal atrial fibrillation, diagnosed January 2022  Patient is currently rate controlled  Continue amiodarone 200 mg once daily, metoprolol, and Eliquis for anticoagulation

## 2022-02-08 NOTE — CASE MANAGEMENT
Case Management Discharge Planning Note    Patient name Tab Goldsmith  Location [place] 4 /E4 [place] 80-* MRN [id number]  : 1945 Date 2022       Current Admission Date: 2022  Current Admission Diagnosis:Acute on chronic respiratory failure with hypoxia [facility])   Patient Active Problem List    Diagnosis Date Noted    Pulmonary hypertension ([address] ) 2022    Abnormal chest x-ray 2022    Atrial fibrillation ([address] ) 2022    Acute on chronic diastolic congestive heart failure ([address] ) 2022    Lung nodule seen on imaging study 2022    NSVT (nonsustained ventricular tachycardia) ([address] ) 2021    Pulmonary embolism ([address] ) 2021    Chronic obstructive pulmonary disease ([address] ) 2021    Aortic ectasia, thoracic ([facility] [address] ) 2021    Acute on chronic respiratory failure with hypoxia ([address] ) 2021    COVID-19 determined by clinical diagnostic criteria 2021    Vitamin D deficiency 10/05/2017    Expressive aphasia 2016    Actinic keratosis 2013    Stenosis of carotid artery 10/29/2013    Hyperglycemia 10/25/2012    Cerebral infarction ([address] ) 10/24/2012    Hyperlipidemia 10/24/2012    Hypertension 10/24/2012    Macular degeneration 10/24/2012      LOS (days): 9  Geometric Mean LOS (GMLOS) (days): 3 80  Days to GMLOS:-5 2     OBJECTIVE:  Risk of Unplanned Readmission Score: 14         Current admission status: Inpatient   Preferred Pharmacy:   [facility], [address] [address]  Phone: [phone] Fax: [phone]    Primary Care Provider: Any Nguyen PA-C    Primary Insurance: Massimo Moses [facility]  Secondary Insurance:     DISCHARGE DETAILS:          Comments - [place] of Choice: [facility] and [facility] both can accept pt if insurance auth  Left message with son to question which one is his chocie  Did ask PT/OT for updated notes   Pt will need COVID test prior to discharge

## 2022-02-08 NOTE — ASSESSMENT & PLAN NOTE
· Patient presented with an acute exacerbation of his chronic diastolic congestive heart failure  · Most recent 2D echocardiogram 12/21 with left ventricular ejection fraction 65%  Normal systolic function  Grade 1 diastolic dysfunction    · Seen and evaluated by Cardiology, diuresed with IV Lasix  · Transition to torsemide 40 mg daily  · Currently at 127lbs standing weight, euvolemic  · Discontinue Ace inhibitors due to soft blood pressure

## 2022-02-08 NOTE — CASE MANAGEMENT
Case Management Discharge Planning Note    Patient name Erika Cain  Location [place] 4 /E4 [place] 80-* MRN [id number]  : 1945 Date 2022       Current Admission Date: 2022  Current Admission Diagnosis:Acute on chronic respiratory failure with hypoxia [facility])   Patient Active Problem List    Diagnosis Date Noted    Pulmonary hypertension ([facility] ) 2022    Abnormal chest x-ray 2022    Atrial fibrillation ([address] ) 2022    Acute on chronic diastolic congestive heart failure ([address] ) 2022    Lung nodule seen on imaging study 2022    NSVT (nonsustained ventricular tachycardia) ([address] ) 2021    Pulmonary embolism ([facility] [address] ) 2021    Chronic obstructive pulmonary disease ([address] ) 2021    Aortic ectasia, thoracic ([facility] [address] ) 2021    Acute on chronic respiratory failure with hypoxia ([facility] [address] ) 2021    COVID-19 determined by clinical diagnostic criteria 2021    Vitamin D deficiency 10/05/2017    Expressive aphasia 2016    Actinic keratosis 2013    Stenosis of carotid artery 10/29/2013    Hyperglycemia 10/25/2012    Cerebral infarction ([address] ) 10/24/2012    Hyperlipidemia 10/24/2012    Hypertension 10/24/2012    Macular degeneration 10/24/2012      LOS (days): 9  Geometric Mean LOS (GMLOS) (days): 3 80  Days to GMLOS:-5 3     OBJECTIVE:  Risk of Unplanned Readmission Score: 14         Current admission status: Inpatient   Preferred Pharmacy:   [facility] [address] [facility] [address] [facility]   [address] [facility] [address]  Phone: [phone] Fax: [phone]    Primary Care Provider: Ruthann Gibson PA-C    Primary Insurance: Ruba Mcneill [facility]  Secondary Insurance:     DISCHARGE DETAILS:    Comments - [place] of Choice: Rec a call back from son and he is agreeable to [facility] in [place]  PT/OT saw pt today and [address] is aware and will work on [facility]   Pt will need COVID test

## 2022-02-08 NOTE — OCCUPATIONAL THERAPY NOTE
Occupational Therapy Progress Note     Patient Name: Nicole Caro  QGLGB'A Date: 2/8/2022  Problem List  Principal Problem:    Acute on chronic respiratory failure with hypoxia [facility])  Active Problems:    Hyperlipidemia    Hypertension    Pulmonary embolism (HCC)    Chronic obstructive pulmonary disease (HCC)    Aortic ectasia, thoracic (HCC)    NSVT (nonsustained ventricular tachycardia) (HCC)    Lung nodule seen on imaging study    Acute on chronic diastolic congestive heart failure (HCC)    Atrial fibrillation ([facility] [address] )    Pulmonary hypertension ([facility] [address] )    Abnormal chest x-ray            02/08/22 1355   OT Last Visit   OT Visit Date 02/08/22   Note Type   Note Type Treatment for insurance authorization   Restrictions/Precautions   Weight Bearing Precautions Per Order No   Other Precautions O2;Fall Risk  (4 LO2 midflow)   Pain Assessment   Pain Assessment Tool 0-10   Pain Score No Pain   ADL   Where Assessed Edge of bed   Grooming Assistance 5  Supervision/Setup   Grooming Deficit Setup;Supervision/safety;Verbal cueing; Increased time to complete   Grooming Comments close supervision while at sink for grooming   UB Dressing Assistance 5  Supervision/Setup   UB Dressing Deficit Setup;Verbal cueing; Increased time to complete;Supervision/safety   LB Dressing Assistance 4  Minimal Assistance   LB Dressing Deficit Setup; Requires assistive device for steadying;Steadying;Verbal cueing;Supervision/safety; Increased time to complete; Thread RLE into underwear; Thread LLE into underwear;Pull up over hips   LB Dressing Comments steadying support to pull up over hips   Toileting Assistance  5  Supervision/Setup   Toileting Deficit Setup; Increased time to complete;Verbal cueing;Supervison/safety   Functional Standing Tolerance   Time 2 minutes   Activity ADL tasks w/ no LOB    Comments pt receptive w/ cues for proper breathing   Bed Mobility   Supine to Sit 5  Supervision   Additional items Increased time required;Verbal cues;Bedrails;HOB elevated   Sit to Supine 5  Supervision   Additional items Increased time required;Verbal cues; Bedrails   Additional Comments increased time to complete to complete w/ rest break and cues for proper breathing   Transfers   Sit to Stand 5  Supervision   Additional items Increased time required;Verbal cues; Assist x 1;Bedrails   Stand to Sit 5  Supervision   Additional items Assist x 1; Increased time required;Verbal cues   Toilet transfer 5  Supervision   Additional items Increased time required;Verbal cues; Assist x 1  (grab bar use)   Additional Comments cues for hand placement and for initiating EC techniques   Functional Mobility   Functional Mobility 5  Supervision   Additional Comments assist x1 w/ increased time and assist O2 line management   Additional items Rolling walker   Toilet Transfers   Toilet Transfer From Rolling walker   Toilet Transfer Type To and from   Toilet Transfer to Standard toilet   Toilet Transfer Technique Ambulating   Toilet Transfers Supervision;Verbal cues   Toilet Transfers Comments grab bar use   Therapeutic Exercise - ROM   UE-ROM Yes   ROM- Right Upper Extremities   R Shoulder AROM; Flexion; Extension;Horizontal ABduction   R Elbow AROM;Elbow flexion;Elbow extension  (forearm pronation/supination)   R Weight/Reps/Sets 2 sets x 12 reps tolerated well   RUE ROM Comment w/ rest breaks between tasks and cues for proper breathing techniques   ROM - Left Upper Extremities    L Shoulder AROM; Flexion; Extension;Horizontal ABduction   L Elbow AROM;Elbow flexion;Elbow extension  (forearm pronation/supination)   L Weight/Reps/Sets 2 sets x 12 reps tolerated well   LUE ROM Comment w/ rest breaks between tasks and cues for proper breathing techniques   Cognition   Overall Cognitive Status WFL   Arousal/Participation Alert; Cooperative   Attention Within functional limits   Orientation Level Oriented X4   Memory Decreased recall of precautions   Following Commands Follows all commands and directions without difficulty   Comments pt engages in appropriate conversations, encouragement to participate and then wanting to complete and get better, cues to initiate EC techniques   Additional Activities   Additional Activities   (education on EC techniques)   Additional Activities Comments  pt receptive   Activity Tolerance   Activity Tolerance Patient limited by fatigue   Medical Staff Made Aware appropriate to see per RNCassi   Assessment   Assessment Pt seen for skilled OT session focused on ADLs, functional transfers and mobility, UE exercises  Pt currently on 4 L midflow SPO2  Pt w/ supervision supine>sit bed mobility w/ increased time to complete and required a short seated rest break which is an improvement since initial eval  Pt while seated EOB supervision to thread LEs through underpants and contact guard assist steadying to pull up over hips  Pt w/ supervision sit>stand from bed w/ increased time to complete  Pt w/ supervision functional mobility w/ RW to bathroom w/ assist w/ O2 line management and close supervision transfer on/off toilet w/ grab bar use  Pt w/ supervision at sink  Pt w/ cues for pursed lip breathing when returned to seated position EOB due to dyspnea on exertion  Pt w/ cues for pacing self t/o tasks  Pt w/ supervision sit>supine bed mobility w/ increased time to complete  Pt while seated upright in bed completed b/l UE exercises seen above to increase strength and endurance for ADLS and functional transfers w/ cues for correct techniques and proper breathing techniques  Pt seated upright in bed w/ all needs met  Pt continues to be limited due to decreased strength and endurance, impaired balance, impaired functional reach, dyspnea on exertion, cues to initiate EC techniques, multiple lines all causing a decline in ADLs, functional transfers and mobility  Recommend STR w/ potential LT Placement   The patient's raw score on the AM-PAC Daily Activity inpatient short form is 19, standardized score is 40 22, greater than 39 4  Patients at this level are likely to benefit from discharge to home  Please refer to the recommendation of the Occupational Therapist for safe discharge planning  Plan   Treatment Interventions ADL retraining;Functional transfer training;UE strengthening/ROM; Endurance training;Cognitive reorientation;Patient/family training;Equipment evaluation/education; Compensatory technique education; Activityengagement; Energy conservation   Goal Expiration Date 02/14/22   OT Treatment Day 2   OT Frequency 2-3x/wk   Recommendation   OT Discharge Recommendation Post acute rehabilitation services  (w/ potential LT placement)   OT - OK to Discharge Yes  (when medically stable)   AM-PAC Daily Activity Inpatient   Lower Body Dressing 3   Bathing 3   Toileting 3   Upper Body Dressing 3   Grooming 3   Eating 4   Daily Activity Raw Score 19   Daily Activity Standardized Score (Calc for Raw Score >=11) 40 22   AM-PAC Applied Cognition Inpatient   Following a Speech/Presentation 4   Understanding Ordinary Conversation 4   Taking Medications 4   Remembering Where Things Are Placed or Put Away 4   Remembering List of 4-5 Errands 3   Taking Care of Complicated Tasks 3   Applied Cognition Raw Score 22   Applied Cognition Standardized Score 47 83     Documentation completed by: Gerson Lopez MS, OTR/L

## 2022-02-08 NOTE — CASE MANAGEMENT
Case Management Discharge Planning Note    Patient name Ronnie Guo  Location East 4 /E4 MS 80-* MRN [id number]  : 1945 Date 2022       Current Admission Date: 2022  Current Admission Diagnosis:Acute on chronic respiratory failure with hypoxia [facility])   Patient Active Problem List    Diagnosis Date Noted    Pulmonary hypertension ([facility] [address] ) 2022    Abnormal chest x-ray 2022    Atrial fibrillation ([address] ) 2022    Acute on chronic diastolic congestive heart failure ([address] ) 2022    Lung nodule seen on imaging study 2022    NSVT (nonsustained ventricular tachycardia) ([address] ) 2021    Pulmonary embolism ([facility] [address] ) 2021    Chronic obstructive pulmonary disease ([facility] [address] ) 2021    Aortic ectasia, thoracic ([facility] [address] ) 2021    Acute on chronic respiratory failure with hypoxia ([facility] [address] ) 2021    COVID-19 determined by clinical diagnostic criteria 2021    Vitamin D deficiency 10/05/2017    Expressive aphasia 2016    Actinic keratosis 2013    Stenosis of carotid artery 10/29/2013    Hyperglycemia 10/25/2012    Cerebral infarction ([address] ) 10/24/2012    Hyperlipidemia 10/24/2012    Hypertension 10/24/2012    Macular degeneration 10/24/2012      LOS (days): 9  Geometric Mean LOS (GMLOS) (days): 3 80  Days to GMLOS:-5 1     OBJECTIVE:  Risk of Unplanned Readmission Score: 14      Current admission status: Inpatient   [facility]:   [facility] [address] [facility] [address] [facility]   [address] [facility] [address]  Phone: [phone] Fax: [phone]    Primary Care Provider: Daniel Kimble PA-C    Primary Insurance: [facility]  Secondary Insurance:     DISCHARGE DETAILS:    Comments - Freedom of Choice: Unable to find SNF and advanced the search today, pt is not vaccinated and 4-5 L 02

## 2022-02-08 NOTE — PROGRESS NOTES
[facility]  Progress Note - Alessandra Fuentes 1945, 67 y o  male MRN: [id number]  Unit/Bed#: E4 -01 Encounter: [id number]  Primary Care Provider: Leann Canas PA-C   Date and time admitted to hospital: 1/30/2022  9:13 AM    * Acute on chronic respiratory failure with hypoxia [facility])  Assessment & Plan  · Patient with severe history COPD with chronic hypoxic respiratory failure  · At baseline, requires 3 L O2 at rest and 8 L oximizer with exertion  · He was recently admitted 12/27-12/31 for pneumonia and COPD exacerbation, and recent diagnosis of PE   · Pt was evaluated by Cardiology and Pulmonary teams  · Patient has noted interest in hospice if continues to decline  · Currently awaiting placement for short-term rehab, family requesting LTC facility after rehab    Atrial fibrillation [facility])  Assessment & Plan  Patient has a history of paroxysmal atrial fibrillation, diagnosed January 2022  Patient is currently rate controlled  Continue amiodarone 200 mg once daily, metoprolol, and Eliquis for anticoagulation    Acute on chronic diastolic congestive heart failure ([facility] [address] )  Assessment & Plan  · Patient presented with an acute exacerbation of his chronic diastolic congestive heart failure  · Most recent 2D echocardiogram 12/21 with left ventricular ejection fraction 65%  Normal systolic function  Grade 1 diastolic dysfunction  · Seen and evaluated by Cardiology, diuresed with IV Lasix  · Transition to torsemide 40 mg daily  · Currently at 127lbs standing weight, euvolemic  · Discontinue Ace inhibitors due to soft blood pressure    Lung nodule seen on imaging study  Assessment & Plan  Patient has previously diagnosed history of lung mass  CT scan on admission revealed 3 5 cm right apical masslike opacity, minimally enlarged since December 27, 2021    Was noted this could be indicative of a scar  Patient has outpatient follow-up with thoracic surgery scheduled end of February    Aortic ectasia, thoracic (HCC)  Assessment & Plan  · Stable on CT imaging  · Outpatient thoracic follow up    Chronic obstructive pulmonary disease (HCC)  Assessment & Plan  · Patient has a history of severe COPD, without acute exacerbation  · Was evaluated by the pulmonary service  · Continue bronchodilators    Pulmonary embolism ([facility] [address] )  Assessment & Plan  · Patient has a history of pulmonary embolism, diagnosed 2021  · Anticoagulated with Eliquis     Hypertension  Assessment & Plan  Patient has history of essential hypertension  Blood pressure currently adequately controlled on amlodipine 5 mg daily, metoprolol  mg daily    Hyperlipidemia  Assessment & Plan  Continue statin      VTE Pharmacologic Prophylaxis:   Pharmacologic: Apixaban (Eliquis)  Mechanical VTE Prophylaxis in Place: Yes    Patient Centered Rounds: I have performed bedside rounds with nursing staff today  Discussions with Specialists or Other Care Team Provider: CM    Education and Discussions with Family / Patient: patient, son on phone    Time Spent for Care: 30 minutes  More than 50% of total time spent on counseling and coordination of care as described above  Current Length of Stay: 9 day(s)    Current Patient Status: Inpatient   Certification Statement: The patient will continue to require additional inpatient hospital stay due to pending STR, family requesting LTC afterwards    Discharge Plan: pending, search for short-term rehab extended as per case management    Code Status: Level 3 - DNAR and DNI      Subjective:   Seen and examined at bedside  Reports breathing has improved  No complaints today  Has been ambulating without too much difficulty  Good appetite      Objective:     Vitals:   Temp (24hrs), Av °F (36 7 °C), Min:97 9 °F (36 6 °C), Max:98 °F (36 7 °C)    Temp:  [97 9 °F (36 6 °C)-98 °F (36 7 °C)] 97 9 °F (36 6 °C)  HR:  [66-76] 76  Resp:  [18] 18  BP: (124-130)/(60-74) 130/74  SpO2:  [96 %-98 %] 98 %  Body mass index is 20 03 kg/m²  Input and Output Summary (last 24 hours): Intake/Output Summary (Last 24 hours) at 2/8/2022 1235  Last data filed at 2/7/2022 1601  Gross per 24 hour   Intake --   Output 250 ml   Net -250 ml       Physical Exam:     Physical Exam  Vitals and nursing note reviewed  HENT:      Head: Normocephalic and atraumatic  Eyes:      General: No scleral icterus  Conjunctiva/sclera: Conjunctivae normal    Cardiovascular:      Rate and Rhythm: Normal rate  Pulmonary:      Effort: Pulmonary effort is normal  No respiratory distress  Breath sounds: Rales (mild on right lung base) present  Comments: Decreased breath sounds bilaterally  Abdominal:      General: Bowel sounds are normal  There is no distension  Palpations: Abdomen is soft  Musculoskeletal:         General: No swelling  Right lower leg: No edema  Left lower leg: No edema  Skin:     General: Skin is warm and dry  Neurological:      Mental Status: He is alert  Mental status is at baseline  Additional Data:     Labs:    Results from last 7 days   Lab Units 02/02/22  0543   WBC Thousand/uL 5 81   HEMOGLOBIN g/dL 13 2   HEMATOCRIT % 40 7   PLATELETS Thousands/uL 209     Results from last 7 days   Lab Units 02/03/22  0541   SODIUM mmol/L 136   POTASSIUM mmol/L 4 0   CHLORIDE mmol/L 97*   CO2 mmol/L 33*   BUN mg/dL 25   CREATININE mg/dL 1 25   ANION GAP mmol/L 6   CALCIUM mg/dL 9 0   GLUCOSE RANDOM mg/dL 127                           * I Have Reviewed All Lab Data Listed Above  * Additional Pertinent Lab Tests Reviewed: Kristopher 66 Admission Reviewed    Imaging:    XR chest 1 view portable    Result Date: 1/31/2022  Impression: Multifocal infiltrates superimposed on diffuse chronic changes  Right upper lobe opacification  Workstation performed: [id number]     CTA ED chest PE study    Result Date: 1/30/2022  Impression: No pulmonary embolus   New minimal multilobar patchy consolidation may represent pneumonia in the appropriate clinical context  New small bilateral pleural effusions  3 5 cm right apical masslike opacity minimally enlarged since December 27, 2021  This may represent underlying scar with minimal new adjacent airspace consolidation  If follow-up with tissue sampling or CT PET is deferred, noncontrast chest CT in 3 months is recommended  Stable descending intrathoracic aortic aneurysm maximum diameter 4 3 cm when measured in the same fashion  Partially visualized infrarenal abdominal aortic aneurysm maximum diameter 5 6 cm  This could be followed up with nonemergent aortic CTA  Severe pulmonary emphysema  This study demonstrates a significant  finding and was documented as such in [facility] for liaison and referring practitioner notification  This study demonstrates a finding requiring imaging follow-up and was documented as such in Epic   Workstation performed: [id number]       Recent Cultures (last 7 days):           Last 24 Hours Medication List:   Current Facility-Administered Medications   Medication Dose Route Frequency Provider Last Rate    acetaminophen  650 mg Oral Q6H PRN Dustin Counts, PA-C      albuterol  2 5 mg Nebulization Q6H PRN Dustin Counts, PA-BERONICA      amiodarone  200 mg Oral Daily With Breakfast ASHLEY Olvera      amLODIPine  5 mg Oral Daily Radha Aguirre PA-C      apixaban  5 mg Oral BID Radha Aguirre PA-C      atorvastatin  40 mg Oral Daily Dustin Jackson [place]      budesonide  0 5 mg Nebulization Q12H Pelon Law MD      fluticasone  1 spray Each Nare Daily Dustin Jac[place], [place]      ipratropium  0 5 mg Nebulization TID ASHLEY Walker      levalbuterol  1 25 mg Nebulization TID ASHLEY Walker      metoprolol succinate  100 mg Oral Daily Dustin Counts, PA-BERONICA      multivitamin stress formula  1 tablet Oral Daily Dustin Counts, PA-C      ondansetron  4 mg Intravenous Q6H PRN Dustin Counts, PA-C      polyethylene glycol  17 g Oral Daily Rosemary Wang PA-C      torsemide  40 mg Oral Daily Naida Garrison MD          Today, Patient Was Seen By: Naida Garrison MD    ** Please Note: Dictation voice to text software may have been used in the creation of this document   **

## 2022-02-08 NOTE — ASSESSMENT & PLAN NOTE
· Patient has a history of severe COPD, without acute exacerbation  · Was evaluated by the pulmonary service  · Continue bronchodilators

## 2022-02-08 NOTE — CASE MANAGEMENT
Case Management Discharge Planning Note    Patient name Efren Hills  Location East 4 /E4 MS 80-* MRN [id number]  : 1945 Date 2022       Current Admission Date: 2022  Current Admission Diagnosis:Acute on chronic respiratory failure with hypoxia [facility])   Patient Active Problem List    Diagnosis Date Noted    Pulmonary hypertension ([facility] [address] ) 2022    Abnormal chest x-ray 2022    Atrial fibrillation ([address] ) 2022    Acute on chronic diastolic congestive heart failure ([address] ) 2022    Lung nodule seen on imaging study 2022    NSVT (nonsustained ventricular tachycardia) ([address] ) 2021    Pulmonary embolism ([address] ) 2021    Chronic obstructive pulmonary disease ([address] ) 2021    Aortic ectasia, thoracic ([facility] [address] ) 2021    Acute on chronic respiratory failure with hypoxia ([facility] [address] ) 2021    COVID-19 determined by clinical diagnostic criteria 2021    Vitamin D deficiency 10/05/2017    Expressive aphasia 2016    Actinic keratosis 2013    Stenosis of carotid artery 10/29/2013    Hyperglycemia 10/25/2012    Cerebral infarction ([address] ) 10/24/2012    Hyperlipidemia 10/24/2012    Hypertension 10/24/2012    Macular degeneration 10/24/2012      LOS (days): 9  Geometric Mean LOS (GMLOS) (days): 3 80  Days to GMLOS:-5 4     OBJECTIVE:  Risk of Unplanned Readmission Score: 14         Current admission status: Inpatient   [facility]erred [facility]acy:   [facility] [address] [facility] [address]   [address] [facility] [address]  Phone: [phone] Fax: [phone]    Primary Care Provider: Susy Cowan PA-C    Primary Insurance: [employer] [facility] [facility]  Secondary Insurance:     DISCHARGE DETAILS:    Comments - Freedom of Choice: Son stated pt told him he does not want to return home after therapy and was interesterd in custodial  Son wanted this LSW to speak to pt about this   Met with pt and gave him the custodial list along with [facility] who can assist with this  Pt stated he does not know what he wants to do and it will depend after his IP Rehab  TC to [facility] and asked her to reach out to son to assist with finding [facility]  Completed PASRR and fax to SNF this evening      IMM Given (Date):: 02/08/22  IMM Given to[de-identified] Patient (Read IMM, copy given,  pt refused to sign form and copy put in bin to be scan )

## 2022-02-08 NOTE — CASE MANAGEMENT
Case Management Discharge Planning Note    Patient name Nikita Kendall  Location East 4 /E4 MS 80-* MRN [id number]  : 1945 Date 2022       Current Admission Date: 2022  Current Admission Diagnosis:Acute on chronic respiratory failure with hypoxia [facility])   Patient Active Problem List    Diagnosis Date Noted    Pulmonary hypertension ([facility] [address] ) 2022    Abnormal chest x-ray 2022    Atrial fibrillation ([address] ) 2022    Acute on chronic diastolic congestive heart failure ([address] ) 2022    Lung nodule seen on imaging study 2022    NSVT (nonsustained ventricular tachycardia) ([address] ) 2021    Pulmonary embolism ([address] ) 2021    Chronic obstructive pulmonary disease ([address] ) 2021    Aortic ectasia, thoracic ([facility] [address] ) 2021    Acute on chronic respiratory failure with hypoxia ([facility] [address] ) 2021    COVID-19 determined by clinical diagnostic criteria 2021    Vitamin D deficiency 10/05/2017    Expressive aphasia 2016    Actinic keratosis 2013    Stenosis of carotid artery 10/29/2013    Hyperglycemia 10/25/2012    Cerebral infarction ([address] ) 10/24/2012    Hyperlipidemia 10/24/2012    Hypertension 10/24/2012    Macular degeneration 10/24/2012      LOS (days): 9  Geometric Mean LOS (GMLOS) (days): 3 80  Days to GMLOS:-5 4     OBJECTIVE:  Risk of Unplanned Readmission Score: 14         Current admission status: Inpatient   Preferred Pharmacy:   [facility] [address] [facility] [address]   [address] [facility] [address]  Phone: [phone] Fax: [phone]    Primary Care Provider: Mike Green PA-C    Primary Insurance: [facility]  Secondary Insurance:     DISCHARGE DETAILS:    Comments - Freedom of Choice: Son stated pt told him he does not want to return home after therapy and was interesterd in NANCY  Son wanted this LSW to speak to pt about this   Met with pt and gave him the custodial list along with [facility] [employer] who can assist with this  Pt stated he does not know what he wants to do and it will depend after his IP Rehab  TC to [facility] and asked her to reach out to son to assist with finding penitent[facility]  Completed PASRR and fax to SNF this evening

## 2022-02-08 NOTE — ASSESSMENT & PLAN NOTE
· Patient with severe history COPD with chronic hypoxic respiratory failure  · At baseline, requires 3 L O2 at rest and 8 L oximizer with exertion  · He was recently admitted 12/27-12/31 for pneumonia and COPD exacerbation, and recent diagnosis of PE   · Pt was evaluated by Cardiology and Pulmonary teams  · Patient has noted interest in hospice if continues to decline  · Currently awaiting placement for short-term rehab, family requesting LTC facility after rehab

## 2022-02-08 NOTE — PLAN OF CARE
Problem: PHYSICAL THERAPY ADULT  Goal: Performs mobility at highest level of function for planned discharge setting  See evaluation for individualized goals  Description: Treatment/Interventions: Functional transfer training,LE strengthening/ROM,Elevations,Therapeutic exercise,Endurance training,Patient/family training,Equipment eval/education,Bed mobility,Gait training,Compensatory technique education,Continued evaluation,Spoke to nursing,OT  Equipment Recommended:  (monitor)       See flowsheet documentation for full assessment, interventions and recommendations  Outcome: Progressing  Note: Prognosis: Fair  Problem List: Decreased endurance,Decreased mobility  Assessment: Pt  progressing well with mobility however patient is limited due to ROSALES  Therapist managed the O2 line t/o session  Ambualtion distances was intentionally kept short distances from last session due to patient's ROSALES with exertion/activity  Pt  needed seated rest after all ambulations due to ROSALES  Pt  is below his PLOF due to ROSALES  Pt  on 4L O2 as he was initially in room  Pt  positioned on toilet post session and instructed to call for staff when done  Pt  was given VCs, hand placement during transfers, for pacing and energy conservation t/o session  Will continue to follow during the stay to maximize functional mobility  Pt  will benefit from rehab with focus on pulmonary rehab  Continue per current PT POC  Barriers to Discharge: None  Barriers to Discharge Comments: lives alone in [facility]  PT Discharge Recommendation: Post acute rehabilitation services (with focus on pulmonary rehab )          See flowsheet documentation for full assessment

## 2022-02-08 NOTE — PLAN OF CARE
Problem: OCCUPATIONAL THERAPY ADULT  Goal: Performs self-care activities at highest level of function for planned discharge setting  See evaluation for individualized goals  Description: Treatment Interventions: ADL retraining,Functional transfer training,UE strengthening/ROM,Endurance training,Cognitive reorientation,Patient/family training,Equipment evaluation/education,Compensatory technique education,Activityengagement,Energy conservation          See flowsheet documentation for full assessment, interventions and recommendations  Outcome: Progressing  Note: Limitation: Decreased UE strength,Decreased ADL status,Decreased Safe judgement during ADL,Decreased cognition,Decreased endurance,Decreased self-care trans,Decreased high-level ADLs  Prognosis: Good  Assessment: Pt seen for skilled OT session focused on ADLs, functional transfers and mobility, UE exercises  Pt currently on 4 L midflow SPO2  Pt w/ supervision supine>sit bed mobility w/ increased time to complete and required a short seated rest break which is an improvement since initial eval  Pt while seated EOB supervision to thread LEs through underpants and contact guard assist steadying to pull up over hips  Pt w/ supervision sit>stand from bed w/ increased time to complete  Pt w/ supervision functional mobility w/ RW to bathroom w/ assist w/ O2 line management and close supervision transfer on/off toilet w/ grab bar use  Pt w/ supervision at sink  Pt w/ cues for pursed lip breathing when returned to seated position EOB due to dyspnea on exertion  Pt w/ cues for pacing self t/o tasks  Pt w/ supervision sit>supine bed mobility w/ increased time to complete  Pt while seated upright in bed completed b/l UE exercises seen above to increase strength and endurance for ADLS and functional transfers w/ cues for correct techniques and proper breathing techniques  Pt seated upright in bed w/ all needs met   Pt continues to be limited due to decreased strength and endurance, impaired balance, impaired functional reach, dyspnea on exertion, cues to initiate EC techniques, multiple lines all causing a decline in ADLs, functional transfers and mobility  Recommend STR w/ potential LT Placement  The patient's raw score on the AM-PAC Daily Activity inpatient short form is 19, standardized score is 40 22, greater than 39 4  Patients at this level are likely to benefit from discharge to home  Please refer to the recommendation of the Occupational Therapist for safe discharge planning       OT Discharge Recommendation: Post acute rehabilitation services (w/ potential LT placement)  OT - OK to Discharge: Yes (when medically stable)

## 2022-02-09 VITALS
SYSTOLIC BLOOD PRESSURE: 101 MMHG | WEIGHT: 126.76 LBS | HEART RATE: 74 BPM | RESPIRATION RATE: 18 BRPM | OXYGEN SATURATION: 96 % | DIASTOLIC BLOOD PRESSURE: 76 MMHG | BODY MASS INDEX: 19.9 KG/M2 | HEIGHT: 67 IN | TEMPERATURE: 98.3 F

## 2022-02-09 LAB — SARS-COV-2 RNA RESP QL NAA+PROBE: NEGATIVE

## 2022-02-09 PROCEDURE — 94760 N-INVAS EAR/PLS OXIMETRY 1: CPT

## 2022-02-09 PROCEDURE — 99239 HOSP IP/OBS DSCHRG MGMT >30: CPT | Performed by: STUDENT IN AN ORGANIZED HEALTH CARE EDUCATION/TRAINING PROGRAM

## 2022-02-09 PROCEDURE — 94640 AIRWAY INHALATION TREATMENT: CPT

## 2022-02-09 RX ORDER — AMIODARONE HYDROCHLORIDE 200 MG/1
200 TABLET ORAL
Refills: 0
Start: 2022-02-10

## 2022-02-09 RX ORDER — AMOXICILLIN 250 MG
1 CAPSULE ORAL 2 TIMES DAILY
Status: DISCONTINUED | OUTPATIENT
Start: 2022-02-09 | End: 2022-02-09 | Stop reason: HOSPADM

## 2022-02-09 RX ORDER — MAGNESIUM CARB/ALUMINUM HYDROX 105-160MG
296 TABLET,CHEWABLE ORAL ONCE
Status: COMPLETED | OUTPATIENT
Start: 2022-02-09 | End: 2022-02-09

## 2022-02-09 RX ORDER — TORSEMIDE 20 MG/1
40 TABLET ORAL DAILY
Refills: 0
Start: 2022-02-10

## 2022-02-09 RX ADMIN — LEVALBUTEROL HYDROCHLORIDE 1.25 MG: 1.25 SOLUTION, CONCENTRATE RESPIRATORY (INHALATION) at 07:05

## 2022-02-09 RX ADMIN — METOPROLOL SUCCINATE 100 MG: 100 TABLET, EXTENDED RELEASE ORAL at 09:07

## 2022-02-09 RX ADMIN — IPRATROPIUM BROMIDE 0.5 MG: 0.5 SOLUTION RESPIRATORY (INHALATION) at 13:00

## 2022-02-09 RX ADMIN — FLUTICASONE PROPIONATE 1 SPRAY: 50 SPRAY, METERED NASAL at 09:08

## 2022-02-09 RX ADMIN — LEVALBUTEROL HYDROCHLORIDE 1.25 MG: 1.25 SOLUTION, CONCENTRATE RESPIRATORY (INHALATION) at 13:00

## 2022-02-09 RX ADMIN — MAGNESIUM CITRATE 296 ML: 1.75 LIQUID ORAL at 09:28

## 2022-02-09 RX ADMIN — ATORVASTATIN CALCIUM 40 MG: 40 TABLET, FILM COATED ORAL at 09:07

## 2022-02-09 RX ADMIN — AMIODARONE HYDROCHLORIDE 200 MG: 200 TABLET ORAL at 09:15

## 2022-02-09 RX ADMIN — DOCUSATE SODIUM 50MG AND SENNOSIDES 8.6MG 1 TABLET: 8.6; 5 TABLET, FILM COATED ORAL at 09:06

## 2022-02-09 RX ADMIN — BUDESONIDE 0.5 MG: 0.5 INHALANT ORAL at 07:05

## 2022-02-09 RX ADMIN — APIXABAN 5 MG: 5 TABLET, FILM COATED ORAL at 09:07

## 2022-02-09 RX ADMIN — IPRATROPIUM BROMIDE 0.5 MG: 0.5 SOLUTION RESPIRATORY (INHALATION) at 07:05

## 2022-02-09 RX ADMIN — B-COMPLEX W/ C & FOLIC ACID TAB 1 TABLET: TAB at 09:07

## 2022-02-09 RX ADMIN — TORSEMIDE 40 MG: 20 TABLET ORAL at 09:07

## 2022-02-09 RX ADMIN — AMLODIPINE BESYLATE 5 MG: 5 TABLET ORAL at 09:07

## 2022-02-09 RX ADMIN — APIXABAN 5 MG: 5 TABLET, FILM COATED ORAL at 17:08

## 2022-02-09 NOTE — ASSESSMENT & PLAN NOTE
· Patient presented with an acute exacerbation of his chronic diastolic congestive heart failure  · Most recent 2D echocardiogram 12/21 with left ventricular ejection fraction 65%  Normal systolic function  Grade 1 diastolic dysfunction    · Seen and evaluated by Cardiology, diuresed with IV Lasix  · Transition to torsemide 40 mg daily  · Currently at 126lbs standing weight, euvolemic  · Discontinue Ace inhibitors due to soft blood pressure

## 2022-02-09 NOTE — ASSESSMENT & PLAN NOTE
Patient has a history of severe pulmonary hypertension  Followed by pulmonary and cardiology team  Fall with pulmonology outpatient, patient is near euvolemic  Pulmonary recommended possible right heart catheterization, and repeat TTE outpatient

## 2022-02-09 NOTE — DISCHARGE SUMMARY
Rayna 47  Discharge- April Alvarado 1945, 67 y o  male MRN: [id number]  Unit/Bed#: E4 -01 Encounter: [id number]  Primary Care Provider: Zoey Odom PA-C   Date and time admitted to hospital: 1/30/2022  9:13 AM    * Acute on chronic respiratory failure with hypoxia [facility])  Assessment & Plan  · Patient with severe history COPD with chronic hypoxic respiratory failure  · At baseline, requires 3 L O2 at rest and 8 L oximizer with exertion  · He was recently admitted 12/27-12/31 for pneumonia and COPD exacerbation, and recent diagnosis of PE   · Pt was evaluated by Cardiology and Pulmonary teams  · Patient has noted interest in hospice if continues to decline  · Accepts to rehab, discharged today    Pulmonary hypertension [facility])  Assessment & Plan  Patient has a history of severe pulmonary hypertension  Followed by pulmonary and cardiology team  Fall with pulmonology outpatient, patient is near euvolemic  Pulmonary recommended possible right heart catheterization, and repeat TTE outpatient    Atrial fibrillation [facility])  Assessment & Plan  Patient has a history of paroxysmal atrial fibrillation, diagnosed January 2022  Patient is currently rate controlled  Continue amiodarone 200 mg once daily, metoprolol, and Eliquis for anticoagulation    Acute on chronic diastolic congestive heart failure ([facility] )  Assessment & Plan  · Patient presented with an acute exacerbation of his chronic diastolic congestive heart failure  · Most recent 2D echocardiogram 12/21 with left ventricular ejection fraction 65%  Normal systolic function  Grade 1 diastolic dysfunction    · Seen and evaluated by Cardiology, diuresed with IV Lasix  · Transition to torsemide 40 mg daily  · Currently at 126lbs standing weight, euvolemic  · Discontinue Ace inhibitors due to soft blood pressure    Lung nodule seen on imaging study  Assessment & Plan  Patient has previously diagnosed history of lung mass  CT scan on admission revealed 3 5 cm right apical masslike opacity, minimally enlarged since December 27, 2021  Was noted this could be indicative of a scar  Patient has outpatient follow-up with thoracic surgery scheduled end of February    Aortic ectasia, thoracic ([facility] )  Assessment & Plan  · Stable on CT imaging  · Outpatient thoracic follow up    Chronic obstructive pulmonary disease (HCC)  Assessment & Plan  · Patient has a history of severe COPD, without acute exacerbation  · Was evaluated by the pulmonary service  · Continue bronchodilators    Pulmonary embolism ([facility] )  Assessment & Plan  · Patient has a history of pulmonary embolism, diagnosed 12/27/2021  · Anticoagulated with Eliquis     Hypertension  Assessment & Plan  Patient has history of essential hypertension  Blood pressure currently adequately controlled on amlodipine 5 mg daily, metoprolol  mg daily    Hyperlipidemia  Assessment & Plan  Continue statin        Discharging Physician / Practitioner: Kamryn Sales MD  PCP: Catrachita Dill PA-C  Admission Date:   Admission Orders (From admission, onward)     Ordered        01/30/22 1215  Inpatient Admission  Once                      Discharge Date: 02/09/22    Medical Problems             Resolved Problems  Date Reviewed: 2/9/2022    None                Consultations During Hospital Stay:  · Pulmonology  · Cardiology    Procedures Performed:     2D Echo  Interpretation Summary         Left Ventricle: Left ventricular cavity size is normal  The left ventricular ejection fraction is 60%  Systolic function is normal  Wall motion is normal  Diastolic function is mildly abnormal, consistent with grade I (abnormal) relaxation    Mitral Valve: There is mild annular calcification    Tricuspid Valve: There is mild to moderate regurgitation    Aorta: The aortic root exhibited moderate fibrocalcific change    Pulmonary Artery: The estimated pulmonary artery systolic pressure is 82 2 mmHg   The pulmonary artery systolic pressure is moderate to severely increased  Significant Findings / Test Results:   XR chest 1 view portable    Result Date: 1/31/2022  Impression: Multifocal infiltrates superimposed on diffuse chronic changes  Right upper lobe opacification  Workstation performed: [id number]     CTA ED chest PE study    Result Date: 1/30/2022  · Impression: No pulmonary embolus  New minimal multilobar patchy consolidation may represent pneumonia in the appropriate clinical context  New small bilateral pleural effusions  3 5 cm right apical masslike opacity minimally enlarged since December 27, 2021  This may represent underlying scar with minimal new adjacent airspace consolidation  If follow-up with tissue sampling or CT PET is deferred, noncontrast chest CT in 3 months is recommended  Stable descending intrathoracic aortic aneurysm maximum diameter 4 3 cm when measured in the same fashion  Partially visualized infrarenal abdominal aortic aneurysm maximum diameter 5 6 cm  This could be followed up with nonemergent aortic CTA  Severe pulmonary emphysema  This study demonstrates a significant  finding and was documented as such in [facility] for liaison and referring practitioner notification  This study demonstrates a finding requiring imaging follow-up and was documented as such in Epic  Workstation performed: [id number]       Incidental Findings:   · None     Test Results Pending at Discharge (will require follow up): · None     Outpatient Tests Requested:  · None    Complications:  None    Reason for Admission: SOB    Hospital Course:     Erika Cain is a 67 y o  male patient who originally presented to the hospital on 1/30/2022 due to shortness of breath  Patient with past medical history diastolic heart failure, severe COPD, chronic respiratory failure requiring 3 L at baseline and a L on exertion  Evaluated at time of admission started with IV diuretics Lasix 40 mg   Patient has significant improvement with IV Lasix 40 mg b i d , transition to torsemide 40 mg upon discharge once daily  Initial weight on presentation was 142 lb, diuresed down to 126 lb which is patient's current dry weight at discharge  His oxygen levels improved, he had been on 2-3 L nasal cannula with O2 sats around 95-96%  PT and OT did recommend rehab and patient was discharged to [address] [place] [address] rehab with plans for SNF long term  Please see above list of diagnoses and related plan for additional information  Condition at Discharge: fair     Discharge Day Visit / Exam:     Subjective:  No events overnight  Reports doing well  Has not had a bowel movement but has declined for suppository  Requesting only PO options and had been taking miralax and senna  Discharged to rehab  Vitals: Blood Pressure: 101/76 (02/09/22 1522)  Pulse: 74 (02/09/22 1522)  Temperature: 98 3 °F (36 8 °C) (02/09/22 1522)  Temp Source: Temporal (02/09/22 1522)  Respirations: 18 (02/09/22 1522)  Height: 5' 7" (170 2 cm) (01/31/22 0935)  Weight - Scale: 57 5 kg (126 lb 12 2 oz) (02/09/22 0554)  SpO2: 96 % (02/09/22 1522)    Exam:   Physical Exam  Vitals and nursing note reviewed  HENT:      Head: Normocephalic and atraumatic  Eyes:      General: No scleral icterus  Conjunctiva/sclera: Conjunctivae normal    Cardiovascular:      Rate and Rhythm: Normal rate  Pulmonary:      Effort: Pulmonary effort is normal  No respiratory distress  Breath sounds: Rales (mild on right lung base) present  Comments: Decreased breath sounds bilaterally  Abdominal:      General: Bowel sounds are normal  There is no distension  Palpations: Abdomen is soft  Musculoskeletal:         General: No swelling  Right lower leg: No edema  Left lower leg: No edema  Skin:     General: Skin is warm and dry  Neurological:      Mental Status: He is alert  Mental status is at baseline         Discussion with Family: patient, son    Discharge instructions/Information to patient and family:   See after visit summary for information provided to patient and family  Provisions for Follow-Up Care:  See after visit summary for information related to follow-up care and any pertinent home health orders  Disposition:     Acute Rehab at [address] [place] [address]    Planned Readmission: None     Discharge Statement:    I spent 35 minutes discharging the patient  This time was spent on the day of discharge  I had direct contact with the patient on the day of discharge  Greater than 50% of the total time was spent examining patient, answering all patient questions, arranging and discussing plan of care with patient as well as directly providing post-discharge instructions  Additional time then spent on discharge activities  Discharge Medications:  See after visit summary for reconciled discharge medications provided to patient and family        ** Please Note: This note has been constructed using a voice recognition system **

## 2022-02-09 NOTE — CASE MANAGEMENT
Case Management Discharge Planning Note    Patient name Nicole Caro  [facility] 4 /E4 -* MRN [id number]  : 1945 Date 2022       Current Admission Date: 2022  Current Admission Diagnosis:Acute on chronic respiratory failure with hypoxia [facility])   Patient Active Problem List    Diagnosis Date Noted    Pulmonary hypertension ([facility] ) 2022    Abnormal chest x-ray 2022    Atrial fibrillation ([address] ) 2022    Acute on chronic diastolic congestive heart failure ([address] ) 2022    Lung nodule seen on imaging study 2022    NSVT (nonsustained ventricular tachycardia) ([facility] ) 2021    Pulmonary embolism ([facility] ) 2021    Chronic obstructive pulmonary disease ([facility] ) 2021    Aortic ectasia, thoracic ([facility] ) 2021    Acute on chronic respiratory failure with hypoxia ([facility] ) 2021    COVID-19 determined by clinical diagnostic criteria 2021    Vitamin D deficiency 10/05/2017    Expressive aphasia 2016    Actinic keratosis 2013    Stenosis of carotid artery 10/29/2013    Hyperglycemia 10/25/2012    Cerebral infarction ([facility] ) 10/24/2012    Hyperlipidemia 10/24/2012    Hypertension 10/24/2012    Macular degeneration 10/24/2012      LOS (days): 10  Geometric Mean LOS (GMLOS) (days): 3 80  Days to GMLOS:-6 2     OBJECTIVE:  Risk of Unplanned Readmission Score: 14         Current admission status: Inpatient   [facility] Pharmacy:   [facility] [address] [facility] [address]   [address] [facility] [address]  Phone: [phone] Fax: [phone]    Primary Care Provider: Tere Waddell PA-C    Primary Insurance: [facility]  Secondary Insurance:     DISCHARGE DETAILS:    Comments - [employer] of Choice: Waiting on insurance auth for [facility], Completed [employer] form and copy put on chart  Will need to set up BLS       Transport at Discharge : BLS Ambulance  Dispatcher Contacted: Yes     ETA of Transport (Date): 02/09/22    Accepting Facility Name, [address] : [facility]

## 2022-02-09 NOTE — CASE MANAGEMENT
Case Management Discharge Planning Note    Patient name Alee Zheng  [facility] 4 /E4 -* MRN [id number]  : 1945 Date 2022       Current Admission Date: 2022  Current Admission Diagnosis:Acute on chronic respiratory failure with hypoxia [facility])   Patient Active Problem List    Diagnosis Date Noted    Pulmonary hypertension ([facility] ) 2022    Abnormal chest x-ray 2022    Atrial fibrillation ([address] ) 2022    Acute on chronic diastolic congestive heart failure ([address] ) 2022    Lung nodule seen on imaging study 2022    NSVT (nonsustained ventricular tachycardia) ([address] ) 2021    Pulmonary embolism ([facility] ) 2021    Chronic obstructive pulmonary disease ([facility] ) 2021    Aortic ectasia, thoracic ([facility] ) 2021    Acute on chronic respiratory failure with hypoxia ([facility] ) 2021    COVID-19 determined by clinical diagnostic criteria 2021    Vitamin D deficiency 10/05/2017    Expressive aphasia 2016    Actinic keratosis 2013    Stenosis of carotid artery 10/29/2013    Hyperglycemia 10/25/2012    Cerebral infarction ([facility] ) 10/24/2012    Hyperlipidemia 10/24/2012    Hypertension 10/24/2012    Macular degeneration 10/24/2012      LOS (days): 10  Geometric Mean LOS (GMLOS) (days): 3 80  Days to GMLOS:-6 3     OBJECTIVE:  Risk of Unplanned Readmission Score: 14         Current admission status: Inpatient   Preferred Pharmacy:   [facility] [address] [facility], [address] [place] [address]  Phone: [phone] Fax: [phone]    Primary Care Provider: Scott Jennings PA-C    Primary Insurance: [facility]  Secondary Insurance:     DISCHARGE DETAILS:    Comments - [employer] of Choice: [address] and requested BLS  time      Receiving Facility/Agency Phone Number: [phone]  Facility/Agency Fax Number: [phone]

## 2022-02-09 NOTE — CASE MANAGEMENT
Case Management Discharge Planning Note    Patient name Alee Zheng  [facility] 4 /E4 [place] 80-* MRN [id number]  : 1945 Date 2022       Current Admission Date: 2022  Current Admission Diagnosis:Acute on chronic respiratory failure with hypoxia [facility])   Patient Active Problem List    Diagnosis Date Noted    Pulmonary hypertension ([address] ) 2022    Abnormal chest x-ray 2022    Atrial fibrillation ([address] ) 2022    Acute on chronic diastolic congestive heart failure ([address] ) 2022    Lung nodule seen on imaging study 2022    NSVT (nonsustained ventricular tachycardia) ([address] ) 2021    Pulmonary embolism ([address] ) 2021    Chronic obstructive pulmonary disease ([address] ) 2021    Aortic ectasia, thoracic ([facility] ) 2021    Acute on chronic respiratory failure with hypoxia ([facility] ) 2021    COVID-19 determined by clinical diagnostic criteria 2021    Vitamin D deficiency 10/05/2017    Expressive aphasia 2016    Actinic keratosis 2013    Stenosis of carotid artery 10/29/2013    Hyperglycemia 10/25/2012    Cerebral infarction ([address] ) 10/24/2012    Hyperlipidemia 10/24/2012    Hypertension 10/24/2012    Macular degeneration 10/24/2012      LOS (days): 10  Geometric Mean LOS (GMLOS) (days): 3 80  Days to GMLOS:-6 3     OBJECTIVE:  Risk of Unplanned Readmission Score: 14         Current admission status: Inpatient   Preferred Pharmacy:   [facility] [address] [facility], [address] [place] [address]  [address] [facility] [address]  Phone: [phone] Fax: [phone]    Primary Care Provider: Scott Jennings PA-C    Primary Insurance: [address]  Secondary Insurance:     DISCHARGE DETAILS:          Comments - Freedom of Choice: Margy S will  pt at  [phone]  MD, RN, and SNF are aware  Left a message with son with  time       Transported by [employer] and Unit #): [employer] BLS     ETA of Transport (Time): 9680     Receiving Facility/Agency Phone Number: [phone]  Facility/Agency Fax Number: [phone]

## 2022-02-09 NOTE — ASSESSMENT & PLAN NOTE
· Patient with severe history COPD with chronic hypoxic respiratory failure  · At baseline, requires 3 L O2 at rest and 8 L oximizer with exertion  · He was recently admitted 12/27-12/31 for pneumonia and COPD exacerbation, and recent diagnosis of PE   · Pt was evaluated by Cardiology and Pulmonary teams  · Patient has noted interest in hospice if continues to decline  · Accepts to rehab, discharged today

## 2022-02-10 ENCOUNTER — TELEPHONE (OUTPATIENT)
Dept: FAMILY MEDICINE CLINIC | Facility: CLINIC | Age: 77
End: 2022-02-10

## 2022-02-10 NOTE — TELEPHONE ENCOUNTER
----- Message from Seb Joseph MD sent at 2/9/2022  4:05 PM EST -----  Thank you for allowing us to participate in the care of your patient, Nicole Caro, who was hospitalized from 1/30/2022 through 2/9/2022 with the admitting diagnosis of acute on chronic respiratory failure secondary to heart failure  Patient was diuresed and euvolemic  Oxygen wean down to baseline of 3L at RA  Discharged to rehab  If you have any additional questions or would like to discuss further, please feel free to contact me      Seb Joseph MD  [place] [address] Internal Medicine, Hospitalist  [phone]

## 2022-02-10 NOTE — TELEPHONE ENCOUNTER
----- Message from Monica Barrientos MD sent at 2/9/2022  4:05 PM EST -----  Thank you for allowing us to participate in the care of your patient, Efren Hills, who was hospitalized from 1/30/2022 through 2/9/2022 with the admitting diagnosis of acute on chronic respiratory failure secondary to heart failure  Patient was diuresed and euvolemic  Oxygen wean down to baseline of 3L at RA  Discharged to rehab  If you have any additional questions or would like to discuss further, please feel free to contact me      Monica Barrientos MD  [place] [address] Internal Medicine, Hospitalist  [phone]

## 2022-02-13 ENCOUNTER — TELEPHONE (OUTPATIENT)
Dept: OTHER | Facility: OTHER | Age: 77
End: 2022-02-13

## 2022-02-13 NOTE — TELEPHONE ENCOUNTER
Patient's EC called to cancel appointment due to he is currently in rehab they would call to reschedule when he has been discharge,    Date: 2/25/2022 Status: Can   Time: 11:00 AM Length: 30   Visit Type: NEW PATIENT PG [[id number]] Copay: $25 00   Provider: Michelle Duncan MD [facility] [place] [facility] Y[facility] Lopez

## 2022-02-25 ENCOUNTER — TELEPHONE (OUTPATIENT)
Dept: GYNECOLOGIC ONCOLOGY | Facility: CLINIC | Age: 77
End: 2022-02-25

## 2022-02-25 NOTE — TELEPHONE ENCOUNTER
While working on WQ's I reviewed the patients chart and all cancelled appt notes for several depts states he is moving to assistant living and they will be reaching out to schedule appointments

## 2022-03-02 ENCOUNTER — RA CDI HCC (OUTPATIENT)
Dept: OTHER | Facility: HOSPITAL | Age: 77
End: 2022-03-02

## 2022-03-02 NOTE — PROGRESS NOTES
[facility]go [facility]  coding opportunities       Chart reviewed, no opportunity found: CHART REVIEWED, NO OPPORTUNITY FOUND                        Patients insurance company: [employer] (Medicare Advantage and Commercial)

## 2022-03-07 ENCOUNTER — TELEPHONE (OUTPATIENT)
Dept: HEMATOLOGY ONCOLOGY | Facility: CLINIC | Age: 77
End: 2022-03-07

## 2022-03-07 NOTE — TELEPHONE ENCOUNTER
Patients daughter, Kathrine Vernon, calling to confirm appt with Dr Eboni Gonzalez  3/14/22 was cancelled  Patient is currently in nursing home

## 2022-03-09 ENCOUNTER — OFFICE VISIT (OUTPATIENT)
Dept: FAMILY MEDICINE CLINIC | Facility: CLINIC | Age: 77
End: 2022-03-09
Payer: COMMERCIAL

## 2022-03-09 VITALS
DIASTOLIC BLOOD PRESSURE: 78 MMHG | TEMPERATURE: 96.9 F | BODY MASS INDEX: 22.98 KG/M2 | HEIGHT: 67 IN | WEIGHT: 146.38 LBS | SYSTOLIC BLOOD PRESSURE: 118 MMHG

## 2022-03-09 DIAGNOSIS — I48.91 ATRIAL FIBRILLATION, UNSPECIFIED TYPE (HCC): ICD-10-CM

## 2022-03-09 DIAGNOSIS — I27.20 PULMONARY HYPERTENSION (HCC): ICD-10-CM

## 2022-03-09 DIAGNOSIS — E78.2 MIXED HYPERLIPIDEMIA: ICD-10-CM

## 2022-03-09 DIAGNOSIS — Z00.00 HEALTHCARE MAINTENANCE: ICD-10-CM

## 2022-03-09 DIAGNOSIS — J43.9 PULMONARY EMPHYSEMA, UNSPECIFIED EMPHYSEMA TYPE (HCC): ICD-10-CM

## 2022-03-09 DIAGNOSIS — R47.01 EXPRESSIVE APHASIA: ICD-10-CM

## 2022-03-09 DIAGNOSIS — I63.9 CEREBRAL INFARCTION, UNSPECIFIED MECHANISM (HCC): ICD-10-CM

## 2022-03-09 DIAGNOSIS — I10 PRIMARY HYPERTENSION: Primary | ICD-10-CM

## 2022-03-09 PROCEDURE — 3074F SYST BP LT 130 MM HG: CPT | Performed by: PHYSICIAN ASSISTANT

## 2022-03-09 PROCEDURE — 1036F TOBACCO NON-USER: CPT | Performed by: PHYSICIAN ASSISTANT

## 2022-03-09 PROCEDURE — 1170F FXNL STATUS ASSESSED: CPT | Performed by: PHYSICIAN ASSISTANT

## 2022-03-09 PROCEDURE — 1101F PT FALLS ASSESS-DOCD LE1/YR: CPT | Performed by: PHYSICIAN ASSISTANT

## 2022-03-09 PROCEDURE — 3078F DIAST BP <80 MM HG: CPT | Performed by: PHYSICIAN ASSISTANT

## 2022-03-09 PROCEDURE — 3725F SCREEN DEPRESSION PERFORMED: CPT | Performed by: PHYSICIAN ASSISTANT

## 2022-03-09 PROCEDURE — 3288F FALL RISK ASSESSMENT DOCD: CPT | Performed by: PHYSICIAN ASSISTANT

## 2022-03-09 PROCEDURE — 1125F AMNT PAIN NOTED PAIN PRSNT: CPT | Performed by: PHYSICIAN ASSISTANT

## 2022-03-09 PROCEDURE — G0439 PPPS, SUBSEQ VISIT: HCPCS | Performed by: PHYSICIAN ASSISTANT

## 2022-03-09 PROCEDURE — 99214 OFFICE O/P EST MOD 30 MIN: CPT | Performed by: PHYSICIAN ASSISTANT

## 2022-03-09 PROCEDURE — 1160F RVW MEDS BY RX/DR IN RCRD: CPT | Performed by: PHYSICIAN ASSISTANT

## 2022-03-09 NOTE — PATIENT INSTRUCTIONS
Assessment/plan:  1  Benign essential hypertension -presently stable on metoprolol and amlodipine, no medication changes  2   Mixed hyperlipidemia  -Stable with atorvastatin 40 mg daily  3   Pulmonary emphysema  -Stable on Anoro inhaler and albuterol  4   Pulmonary hypertension  -Stable, no medication changes  -Patient continues to follow with Pulmonary Medicine     5   Atrial fibrillation  -Stable on Eliquis and beta-blocker  6   Cerebral infarction   -Stable on Eliquis  7   Expressive aphasia  - stable, no changes  8   Healthcare maintenance-annual Medicare wellness visit completed  See separate note

## 2022-03-09 NOTE — PROGRESS NOTES
Assessment and Plan:     1  Healthcare maintenance -annual Medicare wellness visit  Problem List Items Addressed This Visit        Respiratory    Chronic obstructive pulmonary disease ([address] )       Cardiovascular and Mediastinum    Hypertension - Primary    Atrial fibrillation ([address] )    Pulmonary hypertension ([address] )       Nervous and Auditory    Cerebral infarction ([address] )       Other    Expressive aphasia    Hyperlipidemia           Preventive health issues were discussed with patient, and age appropriate screening tests were ordered as noted in patient's After Visit Summary  Personalized health advice and appropriate referrals for health education or preventive services given if needed, as noted in patient's After Visit Summary       History of Present Illness:     Patient presents for Medicare Annual Wellness visit    Patient Care Team:  Debra Wade PA-C as PCP - General (Family Medicine)  Jorge Uriostegui MD as PCP - [facility]     Problem List:     Patient Active Problem List   Diagnosis    Stenosis of carotid artery    Cerebral infarction ([address] )    Expressive aphasia    Hyperlipidemia    Hypertension    Vitamin D deficiency    Macular degeneration    Hyperglycemia    Actinic keratosis    COVID-19 determined by clinical diagnostic criteria    Pulmonary embolism ([address] )    Chronic obstructive pulmonary disease ([address] )    Aortic ectasia, thoracic ([address] )    Acute on chronic respiratory failure with hypoxia (HCC)    NSVT (nonsustained ventricular tachycardia) ([address] )    Lung nodule seen on imaging study    Acute on chronic diastolic congestive heart failure ([address] )    Atrial fibrillation ([address] )    Pulmonary hypertension ([address] )    Abnormal chest x-ray      Past Medical and Surgical History:     Past Medical History:   Diagnosis Date    Abnormal CT scan 12/27/2021    CATY (acute kidney injury) ([address] ) 12/27/2021    Community acquired pneumonia 12/27/2021    COPD [facility]chronic obstructive pulmonary disease) [facility] )     Elevated troponin 2021    Hypertension     Pulmonary embolism (HCC)     Sepsis ([facility] ) 2021     Past Surgical History:   Procedure Laterality Date    HERNIA REPAIR        Family History:     Family History   Problem Relation Age of Onset    Lung cancer Father       Social History:     Social History     Socioeconomic History    Marital status:      Spouse name: Not on file    Number of children: Not on file    Years of education: Not on file    Highest education level: Not on file   Occupational History    Occupation: Retired   Tobacco Use    Smoking status: Former Smoker     Packs/day: 2 00     Years: 49 00     Pack years: 98 00     Types: Cigarettes     Start date:      Quit date:      Years since quittin 1    Smokeless tobacco: Former User    Tobacco comment: No secondhand smoke exposure   Vaping Use    Vaping Use: Never used   Substance and Sexual Activity    Alcohol use: Yes     Comment: Social drinker; Daily alcohol use per [employer]    Drug use: Not Currently    Sexual activity: Not Currently   Other Topics Concern    Not on file   Social History Narrative    Single per [employer]     Social Determinants of Health     Financial Resource Strain: Not on file   Food Insecurity: No Food Insecurity    Worried About Running Out of Food in the Last Year: Never true    Sincere of Food in the Last Year: Never true   Transportation Needs: No Transportation Needs    Lack of Transportation (Medical): No    Lack of Transportation (Non-Medical):  No   Physical Activity: Not on file   Stress: Not on file   Social Connections: Not on file   Intimate Partner Violence: Not on file   Housing Stability: Low Risk     Unable to Pay for Housing in the Last Year: No    Number of Places Lived in the Last Year: 1    Unstable Housing in the Last Year: No      Medications and Allergies:     Current Outpatient Medications   Medication Sig Dispense Refill    albuterol (2 5 mg/3 mL) 0 083 % nebulizer solution Take 3 mL (2 5 mg total) by nebulization every 6 (six) hours as needed for wheezing or shortness of breath 75 mL 5    albuterol (PROVENTIL HFA,VENTOLIN HFA) 90 mcg/act inhaler INHALE 2 PUFFS BY MOUTH AND INTO THE LUNGS EVERY 6 HOURS IF NEEDED FOR WHEEZING 25 5 g 5    amiodarone 200 mg tablet Take 1 tablet (200 mg total) by mouth daily with breakfast  0    apixaban (ELIQUIS) 5 mg Take 2 tablets (10 mg total) by mouth 2 (two) times a day for 7 days, THEN 1 tablet (5 mg total) 2 (two) times a day for 23 days  74 tablet 0    Ascorbic Acid (VITAMIN C) 1000 MG tablet Take 1,000 mg by mouth daily      atorvastatin (LIPITOR) 40 mg tablet take 1 tablet by mouth once daily 90 tablet 3    Cholecalciferol (VITAMIN D3) 5000 units CAPS Take 1,000 Units by mouth daily      cyanocobalamin (VITAMIN B-12) 100 mcg tablet Take by mouth daily      folic acid (FOLVITE) 1 mg tablet Take by mouth daily      ipratropium (ATROVENT) 0 03 % nasal spray 2 sprays into each nostril every 12 (twelve) hours 30 mL 2    metoprolol succinate (TOPROL-XL) 100 mg 24 hr tablet take 1 tablet by mouth once daily (Patient taking differently: 50 mg  ) 90 tablet 3    torsemide (DEMADEX) 20 mg tablet Take 2 tablets (40 mg total) by mouth daily  0    umeclidinium-vilanterol (Anoro Ellipta) 62 5-25 MCG/INH inhaler Inhale 1 puff daily 60 blister 5    amLODIPine (NORVASC) 5 mg tablet take 1 tablet by mouth once daily (Patient not taking: Reported on 3/9/2022) 90 tablet 3    multivitamin (THERAGRAN) TABS Take 1 tablet by mouth daily (Patient not taking: Reported on 3/9/2022 )      Omega-3 Fatty Acids (FISH OIL) 1,000 mg Take 1,000 mg by mouth daily (Patient not taking: Reported on 3/9/2022 )      vitamin E 100 UNIT capsule Take 100 Units by mouth daily (Patient not taking: Reported on 3/9/2022 )       No current facility-administered medications for this visit       No Known Allergies Immunizations: There is no immunization history for the selected administration types on file for this patient  Health Maintenance:         Topic Date Due    Hepatitis C Screening  Never done         Topic Date Due    COVID-19 Vaccine (1) Never done    DTaP,Tdap,and Td Vaccines (1 - Tdap) Never done      Medicare Health Risk Assessment:     There were no vitals taken for this visit  Stan Walls is here for his Subsequent Wellness visit  Health Risk Assessment:   Patient rates overall health as fair  Patient feels that their physical health rating is much worse  Patient is satisfied with their life  Eyesight was rated as same  Hearing was rated as same  Patient feels that their emotional and mental health rating is slightly worse  Patients states they are never, rarely angry  Patient states they are always unusually tired/fatigued  Pain experienced in the last 7 days has been none  Patient states that he has experienced no weight loss or gain in last 6 months  Depression Screening:   PHQ-2 Score: 2      Fall Risk Screening: In the past year, patient has experienced: no history of falling in past year      Home Safety:  Patient has trouble with stairs inside or outside of their home  Patient has working smoke alarms and has working carbon monoxide detector  Home safety hazards include: none  Nutrition:   Current diet is Regular  Medications:   Patient is currently taking over-the-counter supplements  OTC medications include: see medication list  Patient is able to manage medications  Activities of Daily Living (ADLs)/Instrumental Activities of Daily Living (IADLs):   Walk and transfer into and out of bed and chair?: Yes  Dress and groom yourself?: Yes    Bathe or shower yourself?: No    Feed yourself?  Yes  Do your laundry/housekeeping?: No  Manage your money, pay your bills and track your expenses?: Yes  Make your own meals?: No    Do your own shopping?: No    Previous Hospitalizations: Any hospitalizations or ED visits within the last 12 months?: Yes    How many hospitalizations have you had in the last year?: 1-2    Advance Care Planning:   Living will: No    Durable POA for healthcare: No      Cognitive Screening:   Provider or family/friend/caregiver concerned regarding cognition?: No    PREVENTIVE SCREENINGS      Cardiovascular Screening:    General: Screening Not Indicated and History Lipid Disorder      Diabetes Screening:     General: Screening Current      Colorectal Cancer Screening:     General: Screening Not Indicated      Prostate Cancer Screening:    General: Screening Not Indicated      Osteoporosis Screening:    General: Screening Not Indicated      Abdominal Aortic Aneurysm (AAA) Screening:    Risk factors include: tobacco use        Lung Cancer Screening:     General: Screening Not Indicated      Hepatitis C Screening:    General: Patient Declines    Screening, Brief Intervention, and Referral to Treatment (SBIRT)    Screening  Typical number of drinks in a day: 0  Typical number of drinks in a week: 0  Interpretation: Low risk drinking behavior      Single Item Drug Screening:  How often have you used an illegal drug (including marijuana) or a prescription medication for non-medical reasons in the past year? never    Single Item Drug Screen Score: 0  Interpretation: Negative screen for possible drug use disorder      Portia Mccall PA-C

## 2022-03-09 NOTE — PROGRESS NOTES
Assessment and Plan:  Patient Instructions    Assessment/plan:  1  Benign essential hypertension -presently stable on metoprolol and amlodipine, no medication changes  2   Mixed hyperlipidemia  -Stable with atorvastatin 40 mg daily  3   Pulmonary emphysema  -Stable on Anoro inhaler and albuterol  4   Pulmonary hypertension  -Stable, no medication changes  -Patient continues to follow with Pulmonary Medicine     5   Atrial fibrillation  -Stable on Eliquis and beta-blocker  6   Cerebral infarction   -Stable on Eliquis  7   Expressive aphasia  - stable, no changes  8   Healthcare maintenance-annual Medicare wellness visit completed  See separate note        Problem List Items Addressed This Visit        Respiratory    Chronic obstructive pulmonary disease ([facility] )    Relevant Orders    CBC and differential    Comprehensive metabolic panel    Lipid Panel with Direct LDL reflex    TSH, 3rd generation with Free T4 reflex    PSA, Total Screen       Cardiovascular and Mediastinum    Hypertension - Primary    Relevant Orders    CBC and differential    Comprehensive metabolic panel    Lipid Panel with Direct LDL reflex    TSH, 3rd generation with Free T4 reflex    PSA, Total Screen    Atrial fibrillation (HCC)    Relevant Orders    CBC and differential    Comprehensive metabolic panel    Lipid Panel with Direct LDL reflex    TSH, 3rd generation with Free T4 reflex    PSA, Total Screen    Pulmonary hypertension (HCC)    Relevant Orders    CBC and differential    Comprehensive metabolic panel    Lipid Panel with Direct LDL reflex    TSH, 3rd generation with Free T4 reflex    PSA, Total Screen       Nervous and Auditory    Cerebral infarction (HCC)    Relevant Orders    CBC and differential    Comprehensive metabolic panel    Lipid Panel with Direct LDL reflex    TSH, 3rd generation with Free T4 reflex    PSA, Total Screen       Other    Expressive aphasia    Relevant Orders    CBC and differential    Comprehensive metabolic panel    Lipid Panel with Direct LDL reflex    TSH, 3rd generation with Free T4 reflex    PSA, Total Screen    Hyperlipidemia    Relevant Orders    CBC and differential    Comprehensive metabolic panel    Lipid Panel with Direct LDL reflex    TSH, 3rd generation with Free T4 reflex    PSA, Total Screen                 Diagnoses and all orders for this visit:    Primary hypertension  -     CBC and differential; Future  -     Comprehensive metabolic panel; Future  -     Lipid Panel with Direct LDL reflex; Future  -     TSH, 3rd generation with Free T4 reflex; Future  -     PSA, Total Screen; Future    Pulmonary emphysema, unspecified emphysema type (HCC)  -     CBC and differential; Future  -     Comprehensive metabolic panel; Future  -     Lipid Panel with Direct LDL reflex; Future  -     TSH, 3rd generation with Free T4 reflex; Future  -     PSA, Total Screen; Future    Atrial fibrillation, unspecified type (HCC)  -     CBC and differential; Future  -     Comprehensive metabolic panel; Future  -     Lipid Panel with Direct LDL reflex; Future  -     TSH, 3rd generation with Free T4 reflex; Future  -     PSA, Total Screen; Future    Pulmonary hypertension (HCC)  -     CBC and differential; Future  -     Comprehensive metabolic panel; Future  -     Lipid Panel with Direct LDL reflex; Future  -     TSH, 3rd generation with Free T4 reflex; Future  -     PSA, Total Screen; Future    Cerebral infarction, unspecified mechanism (HCC)  -     CBC and differential; Future  -     Comprehensive metabolic panel; Future  -     Lipid Panel with Direct LDL reflex; Future  -     TSH, 3rd generation with Free T4 reflex; Future  -     PSA, Total Screen; Future    Expressive aphasia  -     CBC and differential; Future  -     Comprehensive metabolic panel; Future  -     Lipid Panel with Direct LDL reflex; Future  -     TSH, 3rd generation with Free T4 reflex; Future  -     PSA, Total Screen;  Future    Mixed hyperlipidemia  -     CBC and differential; Future  -     Comprehensive metabolic panel; Future  -     Lipid Panel with Direct LDL reflex; Future  -     TSH, 3rd generation with Free T4 reflex; Future  -     PSA, Total Screen; Future              Subjective:      Patient ID: Jessica Garcia is a 67 y o  male  CC:    Chief Complaint   Patient presents with    Follow-up     for chronic conditions  mgb    Medicare Wellness Visit     mgb       HPI:     HPI:  This is a 27-year-old gentleman that presents to the office for follow-up of chronic health conditions as well as annual Medicare wellness visit  He has a history of atrial fibrillation which has been stable with amiodarone, beta-blocker, and Eliquis  Patient also has history of COPD and pulmonary hypertension  He presents to the office today on 6 L of chronic oxygen therapy  He did have a significant episode respiratory failure in December and was hospitalized  He had atrial fibrillation at that time which was uncontrolled but currently he is back in sinus rhythm  He is rate controlled and on amiodarone  The following portions of the patient's history were reviewed and updated as appropriate: allergies, current medications, past family history, past medical history, past social history, past surgical history and problem list       Review of Systems   Constitutional: Negative for chills, fatigue and fever  HENT: Negative for congestion, ear pain and sinus pressure  Eyes: Negative for visual disturbance  Respiratory: Negative for cough, chest tightness and shortness of breath  Cardiovascular: Negative for chest pain and palpitations  Gastrointestinal: Negative for diarrhea, nausea and vomiting  Endocrine: Negative for polyuria  Genitourinary: Negative for dysuria and frequency  Musculoskeletal: Negative for arthralgias and myalgias  Skin: Negative for pallor and rash     Neurological: Negative for dizziness, weakness, light-headedness, numbness and headaches  Psychiatric/Behavioral: Negative for agitation, behavioral problems and sleep disturbance  All other systems reviewed and are negative  Data to review:       Objective:    Vitals:    03/09/22 1405   BP: 118/78   BP Location: Left arm   Patient Position: Sitting   Cuff Size: Standard   Temp: (!) 96 9 °F (36 1 °C)   TempSrc: Temporal   Weight: 66 4 kg (146 lb 6 oz)   Height: 5' 7" (1 702 m)        Physical Exam  Constitutional:       General: He is not in acute distress  Appearance: He is well-developed  HENT:      Head: Normocephalic and atraumatic  Right Ear: Tympanic membrane normal       Left Ear: Tympanic membrane normal    Eyes:      Conjunctiva/sclera: Conjunctivae normal    Cardiovascular:      Rate and Rhythm: Normal rate and regular rhythm  Pulmonary:      Effort: Pulmonary effort is normal    Abdominal:      General: Abdomen is flat  Bowel sounds are normal  There is no distension  Palpations: Abdomen is soft  There is no mass  Musculoskeletal:         General: Normal range of motion  Cervical back: Normal range of motion  Skin:     General: Skin is warm  Findings: No rash  Neurological:      Mental Status: He is alert and oriented to person, place, and time  Psychiatric:         Mood and Affect: Mood normal              Depression Screening and Follow-up Plan: Patient was screened for depression during today's encounter  They screened negative with a PHQ-2 score of 2

## 2022-03-21 ENCOUNTER — TELEPHONE (OUTPATIENT)
Dept: FAMILY MEDICINE CLINIC | Facility: CLINIC | Age: 77
End: 2022-03-21

## 2022-03-21 DIAGNOSIS — J43.9 PULMONARY EMPHYSEMA, UNSPECIFIED EMPHYSEMA TYPE (HCC): Primary | ICD-10-CM

## 2022-03-21 NOTE — TELEPHONE ENCOUNTER
Apoorva Foster from [address] [place] [address] called   They need a script for a conserving device test  Their fax number is [phone] Any questions you may call Apoorva parsons [phone]

## 2022-04-11 ENCOUNTER — TELEPHONE (OUTPATIENT)
Dept: HEMATOLOGY ONCOLOGY | Facility: CLINIC | Age: 77
End: 2022-04-11

## 2022-04-11 NOTE — TELEPHONE ENCOUNTER
Patient is scheduled for a consult on 4/28/2022  He is not yet an established patient of Dr Jaocb Roe, therefore there are no orders

## 2022-04-11 NOTE — TELEPHONE ENCOUNTER
CALL RETURN FORM   Reason for patient call? Andrea Arreola from [facility] is calling to see if the office can fax the patient's lab orders to fax [phone]    Patient's primary oncologist? Dr Delcid Left   Name of person the patient was calling for? Hem on clinical   Any additional information to add, if applicable? n/a   Informed patient that the message will be forwarded to the team and someone will get back to them as soon as possible    Did you relay this information to the patient?  yes

## 2022-04-26 ENCOUNTER — DOCUMENTATION (OUTPATIENT)
Dept: HEMATOLOGY ONCOLOGY | Facility: CLINIC | Age: 77
End: 2022-04-26

## 2022-04-26 ENCOUNTER — TELEPHONE (OUTPATIENT)
Dept: HEMATOLOGY ONCOLOGY | Facility: CLINIC | Age: 77
End: 2022-04-26

## 2022-04-26 ENCOUNTER — TELEPHONE (OUTPATIENT)
Dept: CARDIAC SURGERY | Facility: CLINIC | Age: 77
End: 2022-04-26

## 2022-04-26 NOTE — TELEPHONE ENCOUNTER
I left VM with Elna Bloch at the suggestion from [place] with [facility] where Alexa Rice is currently living  Per [facility] provides transportation for Alexa Rice to his appnts  I requested a return call to assist in coordination of scheduling testing and specialist appnts  I provided my return number [phone]

## 2022-04-26 NOTE — TELEPHONE ENCOUNTER
Please schedule on Dr Bharath Warren schedule 2 weeks after biopsy  We will not know when the biopsy is until he is seen on 5/10 or 5/11

## 2022-04-26 NOTE — TELEPHONE ENCOUNTER
Left message regarding David's upcoming f/u appt that was scheduled  Pt was not seen in office yet by Dr Luther Babin, the appt had to be r/s and visit appt had to be switched to consult  Next available for Dr Luther Babin is 5/5 at 0800  Left a vm stating this to patient's son voicemail  Informed patient that he can call the office back at [phone] if he has any questions regarding the upcoming consult

## 2022-04-26 NOTE — PROGRESS NOTES
I left VM with Rodrigo Solis at the suggestion from Jimbo Haider with [facility] where Chucky Trinh is currently living  Per [facility] provides transportation for Chucky Trinh to his appnts  I requested a return call to assist in coordination of scheduling testing and specialist appnts  I provided my return number [phone]  Kendall Estrada RN sent to [employer],   It would be very helpful to have  a new CTC if possible prior to appointment with Dr Kayla Merchant? Can you reach out to referring doctor or PCP to see if this is possible? If not we can get him scheduled with Dr Kayla Merchant  Please let me know  Thanks,   FRENCH             Previous Messages       ----- Message -----   From: Sabas Phan   Sent: 4/26/2022  10:56 AM EDT   To: Kendall Estrada, NIKKO, Blaine Reyes, RN, *   Subject: RE: New patient                                   George,   Looks like he was previously scheduled for everything but then Cx due to rehab status  He was on for Kayla Merchant in Thoracic too  Do you think it is possible to get him rescheduled so we can get tissue Dx? Thanks,   Mahsa Monroy   ----- Message -----   From: Alix Shoemaker RN   Sent: 4/26/2022  10:26 AM EDT   To: Blaine Reyes, NIKKO, Izabella Tsai   Subject: New patient                                       Hello,     This is a new patient on Dr Rosette Dakin schedule for a follow up on Thursday however, we are moving his appt due to never been seen before        He was on the schedule for a lung mass, however, there has been no biopsy that we can see  Harman Carrel you guys able to help get this patient with what is needed prior to seeing us  Orlando Pendleton will leave him on the schedule for 5/5 and move him if appropriate       Thanks,   Nicolas Christianson

## 2022-04-27 ENCOUNTER — DOCUMENTATION (OUTPATIENT)
Dept: HEMATOLOGY ONCOLOGY | Facility: CLINIC | Age: 77
End: 2022-04-27

## 2022-04-27 NOTE — PROGRESS NOTES
Sebas Samuels step daughter, returned my call to coordinate [facility] appointments  CT scan 5/6  Thoracic Consult 5/10  Dr Amado Ramsey for med/onc consult 5/23    I am awaiting a return call from Pulm to reschedule PFT's prior to thoracic consult       I notified NIKKO Castro with [facility] at [phone] of upcoming appointments per Rachel's request

## 2022-04-27 NOTE — TELEPHONE ENCOUNTER
Mili Dang returned my call to coordinate [facility] appointments  CT scan 5/6  Thoracic Consult 5/10  Dr Niya Patterson for med/onc consult 5/23    I am awaiting a return call from Pul to reschedule PFT's prior to thoracic consult       I notified RN Luci Molina with [facility] at [phone] of upcoming appointments per Rachel's request

## 2022-04-28 ENCOUNTER — DOCUMENTATION (OUTPATIENT)
Dept: HEMATOLOGY ONCOLOGY | Facility: CLINIC | Age: 77
End: 2022-04-28

## 2022-04-28 NOTE — PROGRESS NOTES
I left  to notify Constanza Martinez that PFTs are now scheduled on 5/5 at 8:30am and all other appointments as we discussed yesterday will remain  I spoke to [facility]  Appnt information faxed to [facility] for transport     Phone [phone]  Fax [phone]

## 2022-04-28 NOTE — TELEPHONE ENCOUNTER
I left  to notify Rodrigo Solis that PFTs are now scheduled on 5/5 at 8:30am and all other appointments as we discussed yesterday will remain  I spoke to Jimbo Haider at [facility] to arrange for transport to this [facility]  Appnt information faxed to Jimbo Haider for transport     Phone [phone]  Fax [phone]

## 2022-05-03 ENCOUNTER — DOCUMENTATION (OUTPATIENT)
Dept: HEMATOLOGY ONCOLOGY | Facility: CLINIC | Age: 77
End: 2022-05-03

## 2022-05-03 NOTE — PROGRESS NOTES
Received VM from [facility] from yesterday ,5/3/22 at 5:41pm stating that the family will not be able to assist with transportation to any appointments due to work schedules  The facility that Ehsan Sarabia is staying at currently can provide transportation between the hours of 7am and 2pm Mon- Fri only  I reviewed his schedule and found that his CT scan and consult for Dr Perico Gaxiola both fall outside of available transport hours  I was able to reschedule both of these appnts  I reached out to Junior Diamond the RN at [facility] to update his schedule and coordinate transportation to these new appnts  She will let Grant Jara know  I returned Miriam's call and left VM to let her know this was taken care of

## 2022-05-05 ENCOUNTER — HOSPITAL ENCOUNTER (OUTPATIENT)
Dept: PULMONOLOGY | Facility: HOSPITAL | Age: 77
Discharge: HOME | End: 2022-05-05
Attending: INTERNAL MEDICINE
Payer: COMMERCIAL

## 2022-05-05 DIAGNOSIS — J44.9 CHRONIC OBSTRUCTIVE PULMONARY DISEASE, UNSPECIFIED COPD TYPE (HCC): ICD-10-CM

## 2022-05-05 LAB
ARTERIAL PATENCY WRIST A: ABNORMAL
BASE EXCESS BLDA CALC-SCNC: 5 MMOL/L (ref -2–3)
CA-I BLD-SCNC: 1.28 MMOL/L (ref 1.12–1.32)
FIO2 GAS DIL.REBREATH: 45 L
GLUCOSE SERPL-MCNC: 139 MG/DL (ref 65–140)
HCO3 BLDA-SCNC: 30.3 MMOL/L (ref 22–28)
HCT VFR BLD CALC: 35 % (ref 36.5–49.3)
HGB BLDA-MCNC: 11.9 G/DL (ref 12–17)
PCO2 BLD: 32 MMOL/L (ref 21–32)
PCO2 BLD: 45.1 MM HG (ref 36–44)
PH BLD: 7.43 [PH] (ref 7.35–7.45)
PO2 BLD: 135 MM HG (ref 75–129)
POTASSIUM BLD-SCNC: 4.1 MMOL/L (ref 3.5–5.3)
SAMPLE SITE: ABNORMAL
SAO2 % BLD FROM PO2: 99 % (ref 60–85)
SODIUM BLD-SCNC: 137 MMOL/L (ref 136–145)
SPECIMEN SOURCE: ABNORMAL

## 2022-05-05 PROCEDURE — 84295 ASSAY OF SERUM SODIUM: CPT

## 2022-05-05 PROCEDURE — 82330 ASSAY OF CALCIUM: CPT

## 2022-05-05 PROCEDURE — 36600 WITHDRAWAL OF ARTERIAL BLOOD: CPT

## 2022-05-05 PROCEDURE — 84132 ASSAY OF SERUM POTASSIUM: CPT

## 2022-05-05 PROCEDURE — 94729 DIFFUSING CAPACITY: CPT

## 2022-05-05 PROCEDURE — 94060 EVALUATION OF WHEEZING: CPT | Performed by: INTERNAL MEDICINE

## 2022-05-05 PROCEDURE — 82947 ASSAY GLUCOSE BLOOD QUANT: CPT

## 2022-05-05 PROCEDURE — 94726 PLETHYSMOGRAPHY LUNG VOLUMES: CPT | Performed by: INTERNAL MEDICINE

## 2022-05-05 PROCEDURE — 85014 HEMATOCRIT: CPT

## 2022-05-05 PROCEDURE — 94726 PLETHYSMOGRAPHY LUNG VOLUMES: CPT

## 2022-05-05 PROCEDURE — 82803 BLOOD GASES ANY COMBINATION: CPT

## 2022-05-05 PROCEDURE — 94729 DIFFUSING CAPACITY: CPT | Performed by: INTERNAL MEDICINE

## 2022-05-05 PROCEDURE — 94060 EVALUATION OF WHEEZING: CPT

## 2022-05-05 RX ORDER — ALBUTEROL SULFATE 2.5 MG/3ML
2.5 SOLUTION RESPIRATORY (INHALATION) ONCE
Status: COMPLETED | OUTPATIENT
Start: 2022-05-05 | End: 2022-05-05

## 2022-05-05 RX ADMIN — ALBUTEROL SULFATE 2.5 MG: 2.5 SOLUTION RESPIRATORY (INHALATION) at 09:30

## 2022-05-06 ENCOUNTER — TELEPHONE (OUTPATIENT)
Dept: CARDIAC SURGERY | Facility: CLINIC | Age: 77
End: 2022-05-06

## 2022-05-06 ENCOUNTER — HOSPITAL ENCOUNTER (OUTPATIENT)
Dept: CT IMAGING | Facility: HOSPITAL | Age: 77
Discharge: HOME | End: 2022-05-06
Attending: INTERNAL MEDICINE
Payer: COMMERCIAL

## 2022-05-06 ENCOUNTER — TELEPHONE (OUTPATIENT)
Dept: PULMONOLOGY | Facility: CLINIC | Age: 77
End: 2022-05-06

## 2022-05-06 DIAGNOSIS — R91.1 LUNG NODULE SEEN ON IMAGING STUDY: ICD-10-CM

## 2022-05-06 PROCEDURE — G1004 CDSM NDSC: HCPCS

## 2022-05-06 PROCEDURE — 71250 CT THORAX DX C-: CPT

## 2022-05-06 NOTE — TELEPHONE ENCOUNTER
LVM   Hello, can I please speak to (patient name) this is (enter your name here) calling from [facility]  [facility]) to remind you of your appointment on (date and time) at (location)  I am calling to review our no-show/cancelation policy and masking policy  Do you have a few minutes? We ask that you come at least 15 minutes early for your appointment to complete all paperwork  However, If you are up to 20 minutes late for your appointment, we may need to reschedule you  Any lateness to an appointment may result in an shorted visit, for our providers to offer you the most out of your consult, please arrive early  We require at least 24-hour notice for cancelations and if you miss your appointment 3 times, we may unfortunately not be able to reschedule any future visits  We ask that you please call our office in the event you are feeling ill as we may need to reschedule your appointment  You are allowed to bring only one visitor with you to your appointment, if your visitor is not feeling well we ask that you don't bring them  Our current masking policy is: if you are vaccinated masking is optional, if you are unvaccinated masking is required  We look forward to seeing you at your upcoming appointment!

## 2022-05-06 NOTE — TELEPHONE ENCOUNTER
----- Message from Hector Inman MD sent at 5/5/2022  5:15 PM EDT -----  Please let patient PFTs consistent with his known COPD  Continue current therapy and see me in the office as scheduled

## 2022-05-09 ENCOUNTER — TELEPHONE (OUTPATIENT)
Dept: PULMONOLOGY | Facility: CLINIC | Age: 77
End: 2022-05-09

## 2022-05-09 DIAGNOSIS — I71.9 AORTIC ANEURYSM WITHOUT RUPTURE, UNSPECIFIED PORTION OF AORTA (HCC): ICD-10-CM

## 2022-05-09 DIAGNOSIS — R91.8 LUNG MASS: Primary | ICD-10-CM

## 2022-05-09 DIAGNOSIS — I71.2 THORACIC AORTIC ANEURYSM WITHOUT RUPTURE (HCC): ICD-10-CM

## 2022-05-09 NOTE — TELEPHONE ENCOUNTER
Left message with son's voicemail    1- CT showed persistence of R lung mass similar size   I recommend a PET/CT  Our office will help him schedule this  2- He has an aneurysm (dilation) of the aorta so he needs to see a vascular surgeon- I have placed this consult- our office will call back to help facilitate this  3- it showed severe emphysema which we know about   He should see me in the office in next month or two to check in (or an CHEL)

## 2022-05-10 ENCOUNTER — OFFICE VISIT (OUTPATIENT)
Dept: CARDIAC SURGERY | Facility: CLINIC | Age: 77
End: 2022-05-10
Payer: COMMERCIAL

## 2022-05-10 ENCOUNTER — DOCUMENTATION (OUTPATIENT)
Dept: HEMATOLOGY ONCOLOGY | Facility: CLINIC | Age: 77
End: 2022-05-10

## 2022-05-10 ENCOUNTER — DOCUMENTATION (OUTPATIENT)
Dept: OTHER | Facility: HOSPITAL | Age: 77
End: 2022-05-10

## 2022-05-10 ENCOUNTER — DOCUMENTATION (OUTPATIENT)
Dept: CARDIAC SURGERY | Facility: CLINIC | Age: 77
End: 2022-05-10

## 2022-05-10 VITALS
TEMPERATURE: 96.5 F | SYSTOLIC BLOOD PRESSURE: 110 MMHG | HEIGHT: 67 IN | WEIGHT: 153.66 LBS | OXYGEN SATURATION: 99 % | BODY MASS INDEX: 24.12 KG/M2 | HEART RATE: 53 BPM | DIASTOLIC BLOOD PRESSURE: 60 MMHG | RESPIRATION RATE: 16 BRPM

## 2022-05-10 DIAGNOSIS — R91.1 LUNG NODULE SEEN ON IMAGING STUDY: Primary | ICD-10-CM

## 2022-05-10 PROCEDURE — 3074F SYST BP LT 130 MM HG: CPT | Performed by: THORACIC SURGERY (CARDIOTHORACIC VASCULAR SURGERY)

## 2022-05-10 PROCEDURE — 1160F RVW MEDS BY RX/DR IN RCRD: CPT | Performed by: THORACIC SURGERY (CARDIOTHORACIC VASCULAR SURGERY)

## 2022-05-10 PROCEDURE — 99205 OFFICE O/P NEW HI 60 MIN: CPT | Performed by: THORACIC SURGERY (CARDIOTHORACIC VASCULAR SURGERY)

## 2022-05-10 PROCEDURE — 3078F DIAST BP <80 MM HG: CPT | Performed by: THORACIC SURGERY (CARDIOTHORACIC VASCULAR SURGERY)

## 2022-05-10 NOTE — PROGRESS NOTES
Documentation of Informed Consent Process for Clinical Research Study    Study Title: SEER  Patient Name: Monik Staley  YOB: 1945    This signed and dated document shall serve as certification that all of the below listed required elements of informed consent were provided to the subject or legally authorized representative signing the actual Informed Consent Document, both in written and verbal format  The HIPAA consent is contained within the Informed Consent document, and has also been discussed  A copy of the actual signed and dated Informed Consent has also been provided to the subject or legally authorized representative, and the original signed document shall be maintained in the research shadow chart  Element of Informed Consent Discussed Date Initials/ Person obtaining consent   A statement that the study involves research, an explanation of the purposes of the research and the expected duration of the subject's participation, a description of the procedures to be followed, and identification of any procedures which are experimental 5/10/2022 HS   A description of any reasonably foreseeable risks or discomforts to the subject  5/10/2022 HS   A description of any benefits to the subject or to others which may reasonably be expected from the research  5/10/2022 HS   A disclosure of appropriate alternative procedures or courses of treatment, if any, that might be advantageous to the subject   5/10/2022 HS   A statement describing the extent, if any, to which confidentiality of records identifying the subject will be maintained and that notes the possibility that the Food and Drug Administration may inspect the records 5/10/2022 HS   For research involving more than minimal risk, an explanation as to whether any compensation and an explanation as to whether any medical treatments are available if injury occurs and, if so, what they consist of, or where further information may be obtained  5/10/2022 HS   An explanation of whom to contact for answers to pertinent questions about the research and research subjects' rights, and whom to contact in the event of a research-related injury to the subject  5/10/2022 HS   A statement that participation is voluntary, that refusal to participate will involve no penalty or loss of benefits to which the subject is otherwise entitled, and that the subject may discontinue participation at any time without penalty or loss of benefits to which the subject is otherwise entitled  5/10/2022 HS   A statement that the particular treatment or procedure may involve risks to the subject (or to the embryo or fetus, if the subject is or may become pregnant) which are currently unforeseeable 5/10/2022 HS   Anticipated circumstances under which the subject's participation may be terminated by the investigator without regard to the subject's consent  5/10/2022 HS   Any additional costs to the subject that may result from participation in the research 5/10/2022 HS   The consequences of a subjects' decision to withdraw from the research and procedures for orderly termination of participation by the subject  5/10/2022 HS   A statement that significant new findings developed during the course of the research which may relate to the subject's willingness to continue participation will be provided to the subject  5/10/2022 HS   The approximate number of subjects involved in the study   5/10/2022 HS

## 2022-05-10 NOTE — TELEPHONE ENCOUNTER
Kelsey Neff MD  [facility]  Please call patient and tell  him:   1- CT showed persistence of R lung mass similar size   I recommend a PET/CT   Please help him schedule this   I see he has an appt tomorrow with thoracic surgery as well  2- He has an aneurysm (dilation) of the aorta so he needs to see a vascular surgeon- I have placed this consult- please help him get this appt  3- it showed severe emphysema which we know about   He should see me in the office in next month or two to check in (or an CHEL)

## 2022-05-10 NOTE — PROGRESS NOTES
Chart reviewed in preparation of Thoracic Tumor Conference presentation by Dr Peace Patel on 5/23/2022  He has a history of COPD, HTN, atrial fibrillation, pulmonary HTN, CHF, PE on Eliquis since January 2022, L SHAHEED 60%, CVA 2005, HLD, macular degeneration and expressive aphasia who was referred to Thoracic Surgery by the ED for a right apical lung mass  A CTA from 1/30/22, incidentally revealed a 3 5 x 3 5 x 3 cm right apical masslike opacity, previously 3 1 x 2 7 x 2 8 cm with new b/l pleural effusions  There was also a stable 1 2 cm AP window lymph node  CT scan from 5/6/22 revealed the persistence of a right apical masslike opacity with adjacent scarring, measuring 3 9 x 2 8 x 2 6 cm  There are also calcified pleural plaques and severe emphysema, but no hilar or mediastinal adenopathy  He is scheduled for PET/CT on 5/17/22

## 2022-05-10 NOTE — PROGRESS NOTES
Thoracic Consult  Assessment/Plan:    Lung nodule seen on imaging study  We had a discussion after personally reviewing Mr Nellie Woodruff imaging and medical history  He has a right apical lung mass that has slowly grown over time  It is concerning for a primary lung cancer  A PET scan has been ordered, but not scheduled so our office will schedule that  His PFT's are compromised from his significant emphysema, especially his DLCO  He is not a surgical candidate secondary to his co-morbidities  An IR biopsy would also be too risky, secondary to his emphysema  We will have Dr Dane Miller review his imaging to see if he is a candidate for a navigational bronchoscopy biopsy  We will call the patient after the PET scan, which is scheduled for 5/17/22 and present him at tumor board on 5/23/22 to discuss next steps  He is in agreement with the plan  We are also canceling his appt with Dr Gary Batista on 5/23/22, since that is unnecessary at this time  Diagnoses and all orders for this visit:    Lung nodule seen on imaging study             Thoracic History   Diagnosis: Right apical lung mass    Procedures/Surgeries:    Pathology:    Adjuvant Therapy:          Patient ID: Teodora Chamorro is a 65 y o  male  ECOG 2    HPI    Mr Mayo Bell is a 64 yo gentleman with a history of COPD, HTN, atrial fibrillation, pulmonary HTN, CHF, PE on Eliquis since January 2022, L SHAHEED 60%, CVA 2005, HLD, macular degeneration and expressive aphasia who was referred by the ED for a right apical lung mass  A CTA from 1/30/22, which incidentally revealed a 3 5 x 3 5 x 3 cm right apical masslike opacity, previously 3 1 x 2 7 x 2 8 cm with new b/l pleural effusions  There was also a stable 1 2 cm AP window lymph node  CT scan from 5/6/22 revealed the persistence of a right apical masslike opacity with adjacent scarring, measuring 3 9 x 2 8 x 2 6 cm   There are also calcified pleural plaques and severe emphysema, but no hilar or mediastinal adenopathy  His descending thoracic aorta measures 4 7 x 4 3 cm  His abdominal aorta measures 6 1 cm  PFT's from 22 revealed a FVC of 2 52L, 70%, FEV1 of 1 37L, 51%, and a DLCO of 19% predicted  He is on 6L of oxygen  He has shortness of breath  He is on 3L of oxygen at home  He has been on Oxygen since 2021  He denies fever, chills, cough, weight loss, or pain  He is a 80 ppy smoker, quit in   No previous chest surgeries or prior cancers  He is not COVID vaccinated and lives in assisted living  He lives at [facility]       The following portions of the patient's history were reviewed and updated as appropriate: allergies, current medications, past family history, past medical history, past social history, past surgical history and problem list     Past Medical History:   Diagnosis Date    Abnormal CT scan 2021    [facility]) ([facility] [address] ) 2021    Community acquired pneumonia 2021    COPD [facility]chronic obstructive pulmonary disease) ([facility] [address] [facility]     Elevated troponin 2021    Hypertension     Pulmonary embolism ([facility] [address] [facility]     Sepsis ([facility] [address] ) 2021      Past Surgical History:   Procedure Laterality Date    HERNIA REPAIR        Family History   Problem Relation Age of Onset    Lung cancer Father       Social History     Socioeconomic History    Marital status:      Spouse name: Not on file    Number of children: Not on file    Years of education: Not on file    Highest education level: Not on file   Occupational History    Occupation: Retired   Tobacco Use    Smoking status: Former Smoker     Packs/day: 2 00     Years: 49 00     Pack years: 98 00     Types: Cigarettes     Start date:      Quit date:      Years since quittin 3    Smokeless tobacco: Former User    Tobacco comment: No secondhand smoke exposure   Vaping Use    Vaping Use: Never used   Substance and Sexual Activity    Alcohol use: Yes     Comment: Social drinker; Daily alcohol use per [employer]    Drug use: Not Currently    Sexual activity: Not Currently   Other Topics Concern    Not on file   Social History Narrative    Single per [employer]     Social Determinants of Health     Financial Resource Strain: Not on file   Food Insecurity: No Food Insecurity    Worried About Running Out of Food in the Last Year: Never true    Sincere of Food in the Last Year: Never true   Transportation Needs: No Transportation Needs    Lack of Transportation (Medical): No    Lack of Transportation (Non-Medical): No   Physical Activity: Not on file   Stress: Not on file   Social Connections: Not on file   Intimate Partner Violence: Not on file   Housing Stability: Low Risk     Unable to Pay for Housing in the Last Year: No    Number of Places Lived in the Last Year: 1    Unstable Housing in the Last Year: No        No Known Allergies  Current Outpatient Medications on File Prior to Visit   Medication Sig Dispense Refill    albuterol (2 5 mg/3 mL) 0 083 % nebulizer solution Take 3 mL (2 5 mg total) by nebulization every 6 (six) hours as needed for wheezing or shortness of breath 75 mL 5    albuterol (PROVENTIL HFA,VENTOLIN HFA) 90 mcg/act inhaler INHALE 2 PUFFS BY MOUTH AND INTO THE LUNGS EVERY 6 HOURS IF NEEDED FOR WHEEZING 25 5 g 5    amiodarone 200 mg tablet Take 1 tablet (200 mg total) by mouth daily with breakfast  0    apixaban (ELIQUIS) 5 mg Take 2 tablets (10 mg total) by mouth 2 (two) times a day for 7 days, THEN 1 tablet (5 mg total) 2 (two) times a day for 23 days   74 tablet 0    Ascorbic Acid (VITAMIN C) 1000 MG tablet Take 1,000 mg by mouth daily      atorvastatin (LIPITOR) 40 mg tablet take 1 tablet by mouth once daily 90 tablet 3    Cholecalciferol (VITAMIN D3) 5000 units CAPS Take 1,000 Units by mouth daily      cyanocobalamin (VITAMIN B-12) 100 mcg tablet Take by mouth daily      folic acid (FOLVITE) 1 mg tablet Take by mouth daily      ipratropium (ATROVENT) 0 03 % nasal spray 2 sprays into each nostril every 12 (twelve) hours 30 mL 2    metoprolol succinate (TOPROL-XL) 100 mg 24 hr tablet take 1 tablet by mouth once daily (Patient taking differently: 50 mg  ) 90 tablet 3    multivitamin (THERAGRAN) TABS Take 1 tablet by mouth daily        torsemide (DEMADEX) 20 mg tablet Take 2 tablets (40 mg total) by mouth daily  0    amLODIPine (NORVASC) 5 mg tablet take 1 tablet by mouth once daily (Patient not taking: Reported on 3/9/2022) 90 tablet 3    Omega-3 Fatty Acids (FISH OIL) 1,000 mg Take 1,000 mg by mouth daily (Patient not taking: Reported on 5/10/2022 )      umeclidinium-vilanterol (Anoro Ellipta) 62 5-25 MCG/INH inhaler Inhale 1 puff daily 60 blister 5    vitamin E 100 UNIT capsule Take 100 Units by mouth daily (Patient not taking: Reported on 3/9/2022 )       No current facility-administered medications on file prior to visit  Review of Systems   Constitutional: Positive for fatigue  Negative for chills, fever and unexpected weight change  HENT: Negative for sore throat, trouble swallowing and voice change  Eyes: Positive for visual disturbance (uses glasses  )  Respiratory: Positive for shortness of breath  Negative for cough  Cardiovascular: Negative for leg swelling  Gastrointestinal: Negative for abdominal pain, nausea and vomiting  Musculoskeletal: Negative for gait problem (uses walker for oxygen  )  Neurological: Negative for syncope, light-headedness and headaches  Psychiatric/Behavioral: Negative for agitation, behavioral problems and confusion  Objective:   Physical Exam  Vitals reviewed  Constitutional:       General: He is not in acute distress  Appearance: Normal appearance  He is not diaphoretic  Comments: On oxygen    HENT:      Head: Normocephalic and atraumatic  Mouth/Throat:      Comments: Masked    Eyes:      Extraocular Movements: Extraocular movements intact     Cardiovascular:      Rate and Rhythm: Bradycardia present  Rhythm irregular  Heart sounds: Normal heart sounds  No murmur heard  Pulmonary:      Effort: Respiratory distress present  Breath sounds: Normal breath sounds  No wheezing  Musculoskeletal:      Cervical back: Normal range of motion and neck supple  Right lower leg: No edema  Left lower leg: No edema  Lymphadenopathy:      Cervical: No cervical adenopathy  Skin:     General: Skin is warm and dry  Neurological:      Mental Status: He is alert and oriented to person, place, and time  Gait: Gait normal    Psychiatric:         Mood and Affect: Mood normal          Behavior: Behavior normal      /60 (BP Location: Left arm, Patient Position: Sitting, Cuff Size: Standard)   Pulse (!) 53   Temp (!) 96 5 °F (35 8 °C) (Temporal)   Resp 16   Ht 5' 7" (1 702 m)   Wt 69 7 kg (153 lb 10 6 oz)   SpO2 99%   BMI 24 07 kg/m²     CT chest wo contrast    Result Date: 5/9/2022  Narrative CT CHEST WITHOUT IV CONTRAST INDICATION:   R91 1: Solitary pulmonary nodule  COMPARISON:  Chest CT January 30, 2022  Correlation chest CT December 27, 2021 TECHNIQUE: CT examination of the chest was performed without intravenous contrast  Axial, sagittal, and coronal 2D reformatted images were created from the source data and submitted for interpretation  Radiation dose length product (DLP) for this visit:  208 mGy-cm   This examination, like all CT scans performed in the [facility], was performed utilizing techniques to minimize radiation dose exposure, including the use of iterative reconstruction and automated exposure control  FINDINGS: LUNGS:  Severe emphysema  Right apical masslike opacity with adjacent scarring measures 3 9 x 2 8 x 2 6 cm, previously 3 5 x 3 5 x 3 0 cm (series 3, image 19)  PLEURA:  Calcified pleural plaques  HEART/GREAT VESSELS: Normal heart size  Coronary artery calcifications and atherosclerotic calcifications aorta    The ascending aorta is normal in caliber  The descending thoracic aorta measures up to  4 7 x 4 3 cm at the level of the aortic hiatus (series 2, image 47)  MEDIASTINUM AND SWAPNA:  Unremarkable  CHEST WALL AND LOWER NECK:  Unremarkable  VISUALIZED STRUCTURES IN THE UPPER ABDOMEN:  Partially imaged aneurysm of the abdominal aorta measuring up to 6 1 cm (series 2, image 67)  Atherosclerosis of the abdominal aorta  OSSEOUS STRUCTURES:  Spinal degenerative changes are noted  No acute fracture or destructive osseous lesion  Impression 1  Severe emphysema  2   Given differences in normal variation, the right apical masslike opacity is not significantly changed since January 30, 2022   6 month follow-up is recommended  3   Aortic aneurysm with partially imaged abdominal aorta measuring up to 6 1 cm  Vascular surgical consultation is advised  The study was marked in EPIC for significant notification   Workstation performed: [id number]

## 2022-05-10 NOTE — TELEPHONE ENCOUNTER
Called and spoke with pt's son, gave all info  He does not schedule any appt's for pt, he asked me to call over to [facility] and they schedule all testing for pt and arrange his transportation  I called and spoke with Claudia who will have the nurse supervisor Marlys Bose call us back tomorrow to set up follow up with Dr Charis Wheeler or an AP  I also provided the number for them to schedule the Vascular Consult  Pt's PET scan has already been scheduled for 5/17, this was set up at his appt with Thoracic Surgery today

## 2022-05-10 NOTE — ASSESSMENT & PLAN NOTE
We had a discussion after personally reviewing Mr Araceli Parker imaging and medical history  He has a right apical lung mass that has slowly grown over time  It is concerning for a primary lung cancer  A PET scan has been ordered, but not scheduled so our office will schedule that  His PFT's are compromised from his significant emphysema, especially his DLCO  He is not a surgical candidate secondary to his co-morbidities  An IR biopsy would also be too risky, secondary to his emphysema  We will have Dr Charlene Padron review his imaging to see if he is a candidate for a navigational bronchoscopy biopsy  We will call the patient after the PET scan, which is scheduled for 5/17/22 and present him at tumor board on 5/23/22 to discuss next steps  He is in agreement with the plan  We are also canceling his appt with Dr Angela Dueñas on 5/23/22, since that is unnecessary at this time

## 2022-05-10 NOTE — H&P (VIEW-ONLY)
Thoracic Consult  Assessment/Plan:    Lung nodule seen on imaging study  We had a discussion after personally reviewing Mr Cierra Merida imaging and medical history  He has a right apical lung mass that has slowly grown over time  It is concerning for a primary lung cancer  A PET scan has been ordered, but not scheduled so our office will schedule that  His PFT's are compromised from his significant emphysema, especially his DLCO  He is not a surgical candidate secondary to his co-morbidities  An IR biopsy would also be too risky, secondary to his emphysema  We will have Dr Kayla Merchant review his imaging to see if he is a candidate for a navigational bronchoscopy biopsy  We will call the patient after the PET scan, which is scheduled for 5/17/22 and present him at tumor board on 5/23/22 to discuss next steps  He is in agreement with the plan  We are also canceling his appt with Dr Alda Manriquez on 5/23/22, since that is unnecessary at this time  Diagnoses and all orders for this visit:    Lung nodule seen on imaging study             Thoracic History   Diagnosis: Right apical lung mass    Procedures/Surgeries:    Pathology:    Adjuvant Therapy:          Patient ID: Sara Tovar is a 65 y o  male  ECOG 2    HPI    Mr Vinita Wasserman is a 66 yo gentleman with a history of COPD, HTN, atrial fibrillation, pulmonary HTN, CHF, PE on Eliquis since January 2022, L SHAHEED 60%, CVA 2005, HLD, macular degeneration and expressive aphasia who was referred by the ED for a right apical lung mass  A CTA from 1/30/22, which incidentally revealed a 3 5 x 3 5 x 3 cm right apical masslike opacity, previously 3 1 x 2 7 x 2 8 cm with new b/l pleural effusions  There was also a stable 1 2 cm AP window lymph node  CT scan from 5/6/22 revealed the persistence of a right apical masslike opacity with adjacent scarring, measuring 3 9 x 2 8 x 2 6 cm   There are also calcified pleural plaques and severe emphysema, but no hilar or mediastinal adenopathy  His descending thoracic aorta measures 4 7 x 4 3 cm  His abdominal aorta measures 6 1 cm  PFT's from 22 revealed a FVC of 2 52L, 70%, FEV1 of 1 37L, 51%, and a DLCO of 19% predicted  He is on 6L of oxygen  He has shortness of breath  He is on 3L of oxygen at home  He has been on Oxygen since 2021  He denies fever, chills, cough, weight loss, or pain  He is a 80 ppy smoker, quit in   No previous chest surgeries or prior cancers  He is not COVID vaccinated and lives in assisted living  [facility] at [facility]       The following portions of the patient's history were reviewed and updated as appropriate: allergies, current medications, past family history, past medical history, past social history, past surgical history and problem list     Past Medical History:   Diagnosis Date    Abnormal CT scan 2021    CATY [facility]acute kidney injury) ([facility] ) 2021    Community acquired pneumonia 2021    COPD [facility]chronic obstructive pulmonary disease) ([facility] )     Elevated troponin 2021    Hypertension     Pulmonary embolism ([facility] )     Sepsis ([facility] ) 2021      Past Surgical History:   Procedure Laterality Date    HERNIA REPAIR        Family History   Problem Relation Age of Onset    Lung cancer Father       Social History     Socioeconomic History    Marital status:      Spouse name: Not on file    Number of children: Not on file    Years of education: Not on file    Highest education level: Not on file   Occupational History    Occupation: Retired   Tobacco Use    Smoking status: Former Smoker     Packs/day: 2 00     Years: 49 00     Pack years: 98 00     Types: Cigarettes     Start date:      Quit date:      Years since quittin 3    Smokeless tobacco: Former User    Tobacco comment: No secondhand smoke exposure   Vaping Use    Vaping Use: Never used   Substance and Sexual Activity    Alcohol use: Yes     Comment: Social drinker; Daily alcohol use per [employer]    Drug use: Not Currently    Sexual activity: Not Currently   Other Topics Concern    Not on file   Social History Narrative    Single per [employer]     Social Determinants of Health     Financial Resource Strain: Not on file   Food Insecurity: No Food Insecurity    Worried About Running Out of Food in the Last Year: Never true    Sincere of Food in the Last Year: Never true   Transportation Needs: No Transportation Needs    Lack of Transportation (Medical): No    Lack of Transportation (Non-Medical): No   Physical Activity: Not on file   Stress: Not on file   Social Connections: Not on file   Intimate Partner Violence: Not on file   Housing Stability: Low Risk     Unable to Pay for Housing in the Last Year: No    Number of Places Lived in the Last Year: 1    Unstable Housing in the Last Year: No        No Known Allergies  Current Outpatient Medications on File Prior to Visit   Medication Sig Dispense Refill    albuterol (2 5 mg/3 mL) 0 083 % nebulizer solution Take 3 mL (2 5 mg total) by nebulization every 6 (six) hours as needed for wheezing or shortness of breath 75 mL 5    albuterol (PROVENTIL HFA,VENTOLIN HFA) 90 mcg/act inhaler INHALE 2 PUFFS BY MOUTH AND INTO THE LUNGS EVERY 6 HOURS IF NEEDED FOR WHEEZING 25 5 g 5    amiodarone 200 mg tablet Take 1 tablet (200 mg total) by mouth daily with breakfast  0    apixaban (ELIQUIS) 5 mg Take 2 tablets (10 mg total) by mouth 2 (two) times a day for 7 days, THEN 1 tablet (5 mg total) 2 (two) times a day for 23 days   74 tablet 0    Ascorbic Acid (VITAMIN C) 1000 MG tablet Take 1,000 mg by mouth daily      atorvastatin (LIPITOR) 40 mg tablet take 1 tablet by mouth once daily 90 tablet 3    Cholecalciferol (VITAMIN D3) 5000 units CAPS Take 1,000 Units by mouth daily      cyanocobalamin (VITAMIN B-12) 100 mcg tablet Take by mouth daily      folic acid (FOLVITE) 1 mg tablet Take by mouth daily      ipratropium (ATROVENT) 0 03 % nasal spray 2 sprays into each nostril every 12 (twelve) hours 30 mL 2    metoprolol succinate (TOPROL-XL) 100 mg 24 hr tablet take 1 tablet by mouth once daily (Patient taking differently: 50 mg  ) 90 tablet 3    multivitamin (THERAGRAN) TABS Take 1 tablet by mouth daily        torsemide (DEMADEX) 20 mg tablet Take 2 tablets (40 mg total) by mouth daily  0    amLODIPine (NORVASC) 5 mg tablet take 1 tablet by mouth once daily (Patient not taking: Reported on 3/9/2022) 90 tablet 3    Omega-3 Fatty Acids (FISH OIL) 1,000 mg Take 1,000 mg by mouth daily (Patient not taking: Reported on 5/10/2022 )      umeclidinium-vilanterol (Anoro Ellipta) 62 5-25 MCG/INH inhaler Inhale 1 puff daily 60 blister 5    vitamin E 100 UNIT capsule Take 100 Units by mouth daily (Patient not taking: Reported on 3/9/2022 )       No current facility-administered medications on file prior to visit  Review of Systems   Constitutional: Positive for fatigue  Negative for chills, fever and unexpected weight change  HENT: Negative for sore throat, trouble swallowing and voice change  Eyes: Positive for visual disturbance (uses glasses  )  Respiratory: Positive for shortness of breath  Negative for cough  Cardiovascular: Negative for leg swelling  Gastrointestinal: Negative for abdominal pain, nausea and vomiting  Musculoskeletal: Negative for gait problem (uses walker for oxygen  )  Neurological: Negative for syncope, light-headedness and headaches  Psychiatric/Behavioral: Negative for agitation, behavioral problems and confusion  Objective:   Physical Exam  Vitals reviewed  Constitutional:       General: He is not in acute distress  Appearance: Normal appearance  He is not diaphoretic  Comments: On oxygen    HENT:      Head: Normocephalic and atraumatic  Mouth/Throat:      Comments: Masked    Eyes:      Extraocular Movements: Extraocular movements intact     Cardiovascular:      Rate and Rhythm: Bradycardia present  Rhythm irregular  Heart sounds: Normal heart sounds  No murmur heard  Pulmonary:      Effort: Respiratory distress present  Breath sounds: Normal breath sounds  No wheezing  Musculoskeletal:      Cervical back: Normal range of motion and neck supple  Right lower leg: No edema  Left lower leg: No edema  Lymphadenopathy:      Cervical: No cervical adenopathy  Skin:     General: Skin is warm and dry  Neurological:      Mental Status: He is alert and oriented to person, place, and time  Gait: Gait normal    Psychiatric:         Mood and Affect: Mood normal          Behavior: Behavior normal      /60 (BP Location: Left arm, Patient Position: Sitting, Cuff Size: Standard)   Pulse (!) 53   Temp (!) 96 5 °F (35 8 °C) (Temporal)   Resp 16   Ht 5' 7" (1 702 m)   Wt 69 7 kg (153 lb 10 6 oz)   SpO2 99%   BMI 24 07 kg/m²     CT chest wo contrast    Result Date: 5/9/2022  Narrative CT CHEST WITHOUT IV CONTRAST INDICATION:   R91 1: Solitary pulmonary nodule  COMPARISON:  Chest CT January 30, 2022  Correlation chest CT December 27, 2021 TECHNIQUE: CT examination of the chest was performed without intravenous contrast  Axial, sagittal, and coronal 2D reformatted images were created from the source data and submitted for interpretation  Radiation dose length product (DLP) for this visit:  208 mGy-cm   This examination, like all CT scans performed in the [facility], was performed utilizing techniques to minimize radiation dose exposure, including the use of iterative reconstruction and automated exposure control  FINDINGS: LUNGS:  Severe emphysema  Right apical masslike opacity with adjacent scarring measures 3 9 x 2 8 x 2 6 cm, previously 3 5 x 3 5 x 3 0 cm (series 3, image 19)  PLEURA:  Calcified pleural plaques  HEART/GREAT VESSELS: Normal heart size  Coronary artery calcifications and atherosclerotic calcifications aorta    The ascending aorta is normal in caliber  The descending thoracic aorta measures up to  4 7 x 4 3 cm at the level of the aortic hiatus (series 2, image 47)  MEDIASTINUM AND SWAPNA:  Unremarkable  CHEST WALL AND LOWER NECK:  Unremarkable  VISUALIZED STRUCTURES IN THE UPPER ABDOMEN:  Partially imaged aneurysm of the abdominal aorta measuring up to 6 1 cm (series 2, image 67)  Atherosclerosis of the abdominal aorta  OSSEOUS STRUCTURES:  Spinal degenerative changes are noted  No acute fracture or destructive osseous lesion  Impression 1  Severe emphysema  2   Given differences in normal variation, the right apical masslike opacity is not significantly changed since January 30, 2022   6 month follow-up is recommended  3   Aortic aneurysm with partially imaged abdominal aorta measuring up to 6 1 cm  Vascular surgical consultation is advised  The study was marked in EPIC for significant notification   Workstation performed: [id number]

## 2022-05-10 NOTE — PROGRESS NOTES
I met with Emma Em at his visit with Dr Luis Alberto Mclaughlin today  I introduced myself and explained my role to him  Questions answered, support provided  Our office will call patient with PET/CT results

## 2022-05-10 NOTE — PROGRESS NOTES
Patient was seen by Dr Stefan Foster at [facility]  Patient consented today to SEER-Lung trial  Subject ID # is [id number]  Informed consent was read, reviewed, and signed by the patient  Questions were asked and answered  Central labs were drawn and processed  Gift card was also provided to patient  Patient will contact me with any study related questions

## 2022-05-11 ENCOUNTER — DOCUMENTATION (OUTPATIENT)
Dept: CARDIAC SURGERY | Facility: CLINIC | Age: 77
End: 2022-05-11

## 2022-05-11 NOTE — PROGRESS NOTES
Dr Ariel Shelton feels right apical mass not amenable to navigational bronchoscopy  The plan will be to obtain PET/CT on 5/17 then present at Thoracic Tumor Board  Discuss SBRT without biopsy

## 2022-05-19 ENCOUNTER — HOSPITAL ENCOUNTER (OUTPATIENT)
Dept: NUCLEAR MEDICINE | Facility: HOSPITAL | Age: 77
Discharge: HOME | End: 2022-05-19
Attending: INTERNAL MEDICINE
Payer: COMMERCIAL

## 2022-05-19 DIAGNOSIS — R91.8 LUNG MASS: ICD-10-CM

## 2022-05-19 LAB — GLUCOSE SERPL-MCNC: 123 MG/DL (ref 65–140)

## 2022-05-19 PROCEDURE — G1004 CDSM NDSC: HCPCS

## 2022-05-19 PROCEDURE — A9552 F18 FDG: HCPCS

## 2022-05-19 PROCEDURE — 82948 REAGENT STRIP/BLOOD GLUCOSE: CPT

## 2022-05-19 PROCEDURE — 78815 PET IMAGE W/CT SKULL-THIGH: CPT

## 2022-05-23 ENCOUNTER — TELEPHONE (OUTPATIENT)
Dept: CARDIAC SURGERY | Facility: CLINIC | Age: 77
End: 2022-05-23

## 2022-05-23 ENCOUNTER — DOCUMENTATION (OUTPATIENT)
Dept: HEMATOLOGY ONCOLOGY | Facility: CLINIC | Age: 77
End: 2022-05-23

## 2022-05-23 DIAGNOSIS — M89.9 RIB LESION: Primary | ICD-10-CM

## 2022-05-23 NOTE — TELEPHONE ENCOUNTER
Spoke with facility: Carlos Gallardo the charge nurse and left a message for Rodrigo Solis to discuss today's recommendations from [facility]  Will await Rachel's return phone call to discuss further steps

## 2022-05-23 NOTE — PROGRESS NOTES
THORACIC ONCOLOGY MULTIDISCIPLINARY CASE REVIEW    DATE:  5/23/2022    PRESENTING DOCTOR:  Dr Luis Alberto Mclaughlin    DIAGNOSIS:  TBD  STAGING:     Daniel Vargas is a 65 y o  male who was presented at the Thoracic Oncology Multidisciplinary Tumor Conference today  He has a history of COPD, hypertension and prior stroke  He was hospitalized from 12/27/21-12/31/21 for respiratory failure  CTA demonstrated Asymmetric soft tissue in the right apex compared to the left measuring 2 4 x 2 4 cm  CT scan from 5/6/22 revealed the persistence of a right apical masslike opacity with adjacent scarring, measuring 3 9 x 2 8 x 2 6 cm  PET/CT was done on 5/19/22  PHYSICIAN RECOMMENDED PLAN: IR biopsy of rib, two cores if possible to send one for molecular testing  Imaging reviewed:   5/19/2022 PET/CT- Hypermetabolic right apical lung mass is most compatible with malignancy  Probable metastatic AP window mediastinal node  Hypermetabolic node posterior to the right thyroid is also suspicious for a metastasis  Hypermetabolic metastases involving the right 5th rib  Hypermetabolic left parotid nodule may represent either a primary parotid neoplasm versus metastasis  5/6/2022 CT chest  12/27/2021 CTA ED chest PE    Pathology reviewed:  No    PFT's reviewed:  5/5/2022 FEV1 51%; DLCO 19%    Future imaging:  None at this time  Referrals:  Interventional Radiology    Procedures:  Biopsy    Team agreed to plan  NCCN guidelines were readily available for review at this discussion    The final treatment plan will be left to the discretion of the patient and the treating physician  DISCLAIMERS:  TO THE TREATING PHYSICIAN:  This conference is a meeting of clinicians from various specialty areas who evaluate and discuss patients for whom a multidisciplinary treatment approach is being considered   Please note that the above opinion was a consensus of the conference attendees and is intended only to assist in quality care of the discussed patient  The responsibility for follow up on the input given during the conference, along with any final decisions regarding plan of care, is that of the patient and the patient's provider  Accordingly, appointments have only been recommended based on this information and have NOT been scheduled unless otherwise noted  TO THE PATIENT:  This summary is a brief record of major aspects of your cancer treatment  You may choose to share a copy with any of your doctors or nurses  However, this is not a detailed or comprehensive record of your care

## 2022-05-24 DIAGNOSIS — R91.1 LUNG NODULE SEEN ON IMAGING STUDY: Primary | ICD-10-CM

## 2022-05-24 RX ORDER — SODIUM CHLORIDE 9 MG/ML
75 INJECTION, SOLUTION INTRAVENOUS CONTINUOUS
Status: CANCELLED | OUTPATIENT
Start: 2022-05-24

## 2022-05-27 ENCOUNTER — HOSPITAL ENCOUNTER (OUTPATIENT)
Dept: CT IMAGING | Facility: HOSPITAL | Age: 77
Discharge: HOME | End: 2022-05-27
Attending: RADIOLOGY

## 2022-06-02 ENCOUNTER — HOSPITAL ENCOUNTER (OUTPATIENT)
Dept: RADIOLOGY | Facility: HOSPITAL | Age: 77
Discharge: HOME | End: 2022-06-02
Attending: RADIOLOGY | Admitting: INTERNAL MEDICINE
Payer: COMMERCIAL

## 2022-06-02 VITALS
RESPIRATION RATE: 20 BRPM | OXYGEN SATURATION: 96 % | BODY MASS INDEX: 22.29 KG/M2 | WEIGHT: 142 LBS | DIASTOLIC BLOOD PRESSURE: 59 MMHG | TEMPERATURE: 97.6 F | HEIGHT: 67 IN | SYSTOLIC BLOOD PRESSURE: 126 MMHG | HEART RATE: 52 BPM

## 2022-06-02 DIAGNOSIS — R91.1 LUNG NODULE SEEN ON IMAGING STUDY: ICD-10-CM

## 2022-06-02 LAB
ANION GAP SERPL CALCULATED.3IONS-SCNC: 6 MMOL/L (ref 4–13)
BUN SERPL-MCNC: 31 MG/DL (ref 5–25)
CALCIUM SERPL-MCNC: 9.7 MG/DL (ref 8.3–10.1)
CHLORIDE SERPL-SCNC: 101 MMOL/L (ref 100–108)
CO2 SERPL-SCNC: 32 MMOL/L (ref 21–32)
CREAT SERPL-MCNC: 1.76 MG/DL (ref 0.6–1.3)
ERYTHROCYTE [DISTWIDTH] IN BLOOD BY AUTOMATED COUNT: 13.4 % (ref 11.6–15.1)
GFR SERPL CREATININE-BSD FRML MDRD: 36 ML/MIN/1.73SQ M
GLUCOSE P FAST SERPL-MCNC: 130 MG/DL (ref 65–99)
GLUCOSE SERPL-MCNC: 130 MG/DL (ref 65–140)
HCT VFR BLD AUTO: 40.7 % (ref 36.5–49.3)
HGB BLD-MCNC: 12.8 G/DL (ref 12–17)
INR PPP: 1.04 (ref 0.84–1.19)
MCH RBC QN AUTO: 33.3 PG (ref 26.8–34.3)
MCHC RBC AUTO-ENTMCNC: 31.4 G/DL (ref 31.4–37.4)
MCV RBC AUTO: 106 FL (ref 82–98)
PLATELET # BLD AUTO: 225 THOUSANDS/UL (ref 149–390)
PMV BLD AUTO: 9 FL (ref 8.9–12.7)
POTASSIUM SERPL-SCNC: 4 MMOL/L (ref 3.5–5.3)
PROTHROMBIN TIME: 13.2 SECONDS (ref 11.6–14.5)
RBC # BLD AUTO: 3.84 MILLION/UL (ref 3.88–5.62)
SODIUM SERPL-SCNC: 139 MMOL/L (ref 136–145)
WBC # BLD AUTO: 9.75 THOUSAND/UL (ref 4.31–10.16)

## 2022-06-02 PROCEDURE — 88342 IMHCHEM/IMCYTCHM 1ST ANTB: CPT | Performed by: PATHOLOGY

## 2022-06-02 PROCEDURE — 20220 BONE BIOPSY TROCAR/NDL SUPFC: CPT

## 2022-06-02 PROCEDURE — 88305 TISSUE EXAM BY PATHOLOGIST: CPT | Performed by: PATHOLOGY

## 2022-06-02 PROCEDURE — 77012 CT SCAN FOR NEEDLE BIOPSY: CPT

## 2022-06-02 PROCEDURE — 85027 COMPLETE CBC AUTOMATED: CPT | Performed by: RADIOLOGY

## 2022-06-02 PROCEDURE — 99152 MOD SED SAME PHYS/QHP 5/>YRS: CPT

## 2022-06-02 PROCEDURE — 88341 IMHCHEM/IMCYTCHM EA ADD ANTB: CPT | Performed by: PATHOLOGY

## 2022-06-02 PROCEDURE — 99152 MOD SED SAME PHYS/QHP 5/>YRS: CPT | Performed by: INTERNAL MEDICINE

## 2022-06-02 PROCEDURE — 85610 PROTHROMBIN TIME: CPT | Performed by: RADIOLOGY

## 2022-06-02 PROCEDURE — 77012 CT SCAN FOR NEEDLE BIOPSY: CPT | Performed by: INTERNAL MEDICINE

## 2022-06-02 PROCEDURE — 80048 BASIC METABOLIC PNL TOTAL CA: CPT | Performed by: RADIOLOGY

## 2022-06-02 PROCEDURE — 20220 BONE BIOPSY TROCAR/NDL SUPFC: CPT | Performed by: INTERNAL MEDICINE

## 2022-06-02 PROCEDURE — 88333 PATH CONSLTJ SURG CYTO XM 1: CPT | Performed by: PATHOLOGY

## 2022-06-02 PROCEDURE — 99153 MOD SED SAME PHYS/QHP EA: CPT

## 2022-06-02 RX ORDER — LIDOCAINE WITH 8.4% SOD BICARB 0.9%(10ML)
SYRINGE (ML) INJECTION CODE/TRAUMA/SEDATION MEDICATION
Status: COMPLETED | OUTPATIENT
Start: 2022-06-02 | End: 2022-06-02

## 2022-06-02 RX ORDER — MIDAZOLAM HYDROCHLORIDE 2 MG/2ML
INJECTION, SOLUTION INTRAMUSCULAR; INTRAVENOUS CODE/TRAUMA/SEDATION MEDICATION
Status: COMPLETED | OUTPATIENT
Start: 2022-06-02 | End: 2022-06-02

## 2022-06-02 RX ORDER — FENTANYL CITRATE 50 UG/ML
INJECTION, SOLUTION INTRAMUSCULAR; INTRAVENOUS CODE/TRAUMA/SEDATION MEDICATION
Status: COMPLETED | OUTPATIENT
Start: 2022-06-02 | End: 2022-06-02

## 2022-06-02 RX ORDER — SODIUM CHLORIDE 9 MG/ML
75 INJECTION, SOLUTION INTRAVENOUS CONTINUOUS
Status: DISCONTINUED | OUTPATIENT
Start: 2022-06-02 | End: 2022-06-03 | Stop reason: HOSPADM

## 2022-06-02 RX ORDER — ACETAMINOPHEN 325 MG/1
650 TABLET ORAL EVERY 4 HOURS PRN
COMMUNITY

## 2022-06-02 RX ORDER — UMECLIDINIUM BROMIDE AND VILANTEROL TRIFENATATE 62.5; 25 UG/1; UG/1
1 POWDER RESPIRATORY (INHALATION) DAILY
COMMUNITY

## 2022-06-02 RX ADMIN — FENTANYL CITRATE 25 MCG: 50 INJECTION INTRAMUSCULAR; INTRAVENOUS at 10:03

## 2022-06-02 RX ADMIN — SODIUM CHLORIDE 75 ML/HR: 0.9 INJECTION, SOLUTION INTRAVENOUS at 08:51

## 2022-06-02 RX ADMIN — Medication 10 ML: at 10:01

## 2022-06-02 RX ADMIN — FENTANYL CITRATE 50 MCG: 50 INJECTION INTRAMUSCULAR; INTRAVENOUS at 09:54

## 2022-06-02 RX ADMIN — MIDAZOLAM 0.5 MG: 1 INJECTION INTRAMUSCULAR; INTRAVENOUS at 10:03

## 2022-06-02 RX ADMIN — MIDAZOLAM 0.5 MG: 1 INJECTION INTRAMUSCULAR; INTRAVENOUS at 10:15

## 2022-06-02 RX ADMIN — MIDAZOLAM 1 MG: 1 INJECTION INTRAMUSCULAR; INTRAVENOUS at 09:53

## 2022-06-02 NOTE — BRIEF OP NOTE (RAD/CATH)
INTERVENTIONAL RADIOLOGY PROCEDURE NOTE    Date: 6/2/2022    Procedure: IR BIOPSY BONE    Preoperative diagnosis:   1  Lung nodule seen on imaging study         Postoperative diagnosis: Same  Surgeon: Andrew Pineda MD     Assistant: None  No qualified resident was available  Blood loss: 2 mL    Specimens: Multiple core biopsy specimens of right anterior 5th rib lesion     Findings: CT-guided core biopsy of a rib lesion at the right anterior 5th rib  Please see the radiology report for details  Complications: None immediate      Anesthesia: conscious sedation

## 2022-06-02 NOTE — SEDATION DOCUMENTATION
Bone biopsy performed by Dr Nelson Damon  Procedure tolerated well, VSS  IR Procedure Bedrest Start Time is 1020

## 2022-06-02 NOTE — DISCHARGE INSTRUCTIONS
POST BIOPSY    Care after your procedure:    1  Limit your activities for 24 hours after your biopsy  2  No driving day of biopsy  3  Return to your normal diet  Small sips of flat soda will help with mild nausea  4  Remove band-aid or dressing 24 hours after procedure  Contact Interventional Radiology at [phone] Ivan PATIENTS: Contact Interventional Radiology at [phone]) Camilo Grullon PATIENTS: Contact Interventional Radiology at [phone]) if:    1  Difficulty breathing, nausea or vomiting  2  Chills or fever above 101 degrees F      3  Pain at biopsy site not relieved by medication  4  Develop any redness, swelling, heat, unusual drainage, heavy bruising or bleeding from biopsy site

## 2022-06-02 NOTE — INTERVAL H&P NOTE
Update: (This section must be completed if the H&P was completed greater than 24 hrs to procedure or admission)    H&P reviewed  After examining the patient, I find no changed to the H&P since it had been written  /78   Pulse 55   Temp 97 9 °F (36 6 °C) (Oral)   Resp 18   Ht 5' 7" (1 702 m)   Wt 64 4 kg (142 lb)   SpO2 100%   BMI 22 24 kg/m²     Patient re-evaluated  Accept as history and physical     PET/CT showed FDG avid anterior 5th right rib lesion which we will proceed with biopsy today      Shankar Davison MD/June 2, 2022/9:38 AM

## 2022-06-03 ENCOUNTER — DOCUMENTATION (OUTPATIENT)
Dept: CARDIAC SURGERY | Facility: CLINIC | Age: 77
End: 2022-06-03

## 2022-06-03 NOTE — PROGRESS NOTES
A user error has taken place: encounter opened in error, closed for administrative reasons, orders placed in error, not carried out on this patient  clear

## 2022-06-07 ENCOUNTER — TELEPHONE (OUTPATIENT)
Dept: HEMATOLOGY ONCOLOGY | Facility: CLINIC | Age: 77
End: 2022-06-07

## 2022-06-07 ENCOUNTER — DOCUMENTATION (OUTPATIENT)
Dept: HEMATOLOGY ONCOLOGY | Facility: CLINIC | Age: 77
End: 2022-06-07

## 2022-06-07 ENCOUNTER — DOCUMENTATION (OUTPATIENT)
Dept: CARDIAC SURGERY | Facility: CLINIC | Age: 77
End: 2022-06-07

## 2022-06-07 DIAGNOSIS — R91.1 LUNG NODULE SEEN ON IMAGING STUDY: Primary | ICD-10-CM

## 2022-06-07 NOTE — PROGRESS NOTES
Intake received- chart reviewed 6/07/22    Outside records needed? no    Pathology complete? 06/02/22- [address]    Imaging complete? CT Chest 5/06/22, PET/CT 5/19/22-   [facility]    Clinical trials aware?  Routing to

## 2022-06-07 NOTE — TELEPHONE ENCOUNTER
New Patient Intake Form   Patient Details:    Ian More  1945    Appointment Information   Who is calling to schedule? Physician Office   If not self, what is the caller's name? Please put name of [facility] nurse as well  Tati Pauldaya    DID YOU CONFIRM INSURANCE WITH PATIENT? yes   Referring provider Krishna Zeng PA-C   What is the diagnosis? Lung nodule     Is there a confirmed tissue diagnosis? Yes      Is there a biopsy ordered or pending? Please specify dates   06/02     Is patient aware of diagnosis? Yes   Have you had any imaging or labs done? If yes, where? (If imaging done outside of [facility], please remind patient to bring a disk ) Yes     05/19     If imaging done at outside facility, did you instruct patient to obtain discs and bring to visit? n/a   Have you been seen by another Oncologist/Hematologist?  If so, who and where? no   Are the records in [facility] or Care Everywhere? yes   Does the patient have records at another facility/hospital? no   If yes, Name of facility, city and state where facility is located  Did you instruct patient to have records faxed to [facility] and provide rightfax number? n/a   Preferred [facility]   Is the patient willing to be seen by another provider? (This is for breast patients only) n/a     Did you send new patient paperwork?   Email or mail? no   Miscellaneous Information: appt made for 06/23

## 2022-06-07 NOTE — PROGRESS NOTES
I contacted Michael Price to give him his results from IR biopsy of 5th rib  This revealed a Non Small Cell Carcinoma/ squamous cell carcinoma  His lung lesion isn't amenable to Navigational bronchoscopy  IR biopsy not recommended DLCO 19%  Referral to medical oncology for systemic treatment  I called facility and spoke with Vini Patel @ [phone]  I provided her with appointment date, time and location  I also spoke with Michael Price and I discussed positive biopsy results with him and need to see a medical oncologist  He would like a family member to come with him to appointment  If the appointment on 6/23 doesn't work for them he or the facility will call back to re-schedule

## 2022-06-07 NOTE — TELEPHONE ENCOUNTER
Intake received- chart reviewed 6/07/22    Outside records needed? no    Pathology complete? 06/02/22- [facility]    Imaging complete? CT Chest 5/06/22, PET/CT 5/19/22- [facility]    Clinical trials aware?  Routing to team

## 2022-06-07 NOTE — TELEPHONE ENCOUNTER
Appt appropriate  Scheduled in 2 weeks and 2 days from request   Pathology is noted  Squamous NSCLC with bx proven Metastatic disease  Bc on Bone- will need to discuss molecular testing options with Primary Onc

## 2022-06-20 ENCOUNTER — TELEPHONE (OUTPATIENT)
Dept: HEMATOLOGY ONCOLOGY | Facility: CLINIC | Age: 77
End: 2022-06-20

## 2022-06-23 ENCOUNTER — TELEPHONE (OUTPATIENT)
Dept: HEMATOLOGY ONCOLOGY | Facility: CLINIC | Age: 77
End: 2022-06-23

## 2022-06-23 ENCOUNTER — CONSULT (OUTPATIENT)
Dept: HEMATOLOGY ONCOLOGY | Facility: CLINIC | Age: 77
End: 2022-06-23
Payer: COMMERCIAL

## 2022-06-23 VITALS
HEIGHT: 67 IN | TEMPERATURE: 96.8 F | OXYGEN SATURATION: 96 % | RESPIRATION RATE: 18 BRPM | WEIGHT: 148 LBS | DIASTOLIC BLOOD PRESSURE: 68 MMHG | HEART RATE: 67 BPM | BODY MASS INDEX: 23.23 KG/M2 | SYSTOLIC BLOOD PRESSURE: 128 MMHG

## 2022-06-23 DIAGNOSIS — R91.1 LUNG NODULE SEEN ON IMAGING STUDY: ICD-10-CM

## 2022-06-23 DIAGNOSIS — Z71.89 GOALS OF CARE, COUNSELING/DISCUSSION: ICD-10-CM

## 2022-06-23 DIAGNOSIS — C34.91 PRIMARY SQUAMOUS CELL CARCINOMA OF RIGHT LUNG (HCC): Primary | ICD-10-CM

## 2022-06-23 DIAGNOSIS — I26.99 PULMONARY EMBOLISM (HCC): ICD-10-CM

## 2022-06-23 DIAGNOSIS — R93.89 ABNORMAL CT SCAN: ICD-10-CM

## 2022-06-23 PROCEDURE — 3078F DIAST BP <80 MM HG: CPT | Performed by: INTERNAL MEDICINE

## 2022-06-23 PROCEDURE — 1036F TOBACCO NON-USER: CPT | Performed by: INTERNAL MEDICINE

## 2022-06-23 PROCEDURE — 99205 OFFICE O/P NEW HI 60 MIN: CPT | Performed by: INTERNAL MEDICINE

## 2022-06-23 PROCEDURE — 3074F SYST BP LT 130 MM HG: CPT | Performed by: INTERNAL MEDICINE

## 2022-06-23 RX ORDER — METOPROLOL SUCCINATE 50 MG/1
TABLET, EXTENDED RELEASE ORAL
COMMUNITY
Start: 2022-05-31

## 2022-06-23 NOTE — TELEPHONE ENCOUNTER
I sent a message to Palliative care with the following information to call since the patient lives adin facility:     Sumeet Mckeon: [phone] and ask for Keith Coronaly

## 2022-06-23 NOTE — PROGRESS NOTES
[facility] - Hematology & Medical Oncology  Outpatient Visit Encounter Note      Charisma Osorio 65 y o  male [id number] Date:  6/23/2022        SUBJECTIVE      Charisma Osorio is a 65 y o  here for new consultation with me today  The patient is referred by Loraine Field PA-C and the reason for consultation is lung cancer  In review of the chart and talking with the patient, he has history of smoking and COPD  He was found to have 3 9cm lung mass in the right lung on 5/6/22 imaging  A PET was done on 5/19/22 which showed "Hypermetabolic right apical lung mass is most compatible with malignancy   Probable metastatic AP window mediastinal node  Hypermetabolic node posterior to the right thyroid is also suspicious for a metastasis  Hypermetabolic metastases involving the right 5th rib  Hypermetabolic left parotid nodule may represent either a primary parotid neoplasm versus metastasis " a tissue biopsy from right 5th rib cage showed squamous cell carcinoma  He is here with his son  He lives at [facility]  He says that he sits around mostly but is able to bathe by himself  He has on 24h oxygen with the highest needs during walking and any activity  Denies f/c/n/v/cp/ap/sob/cough that is any worse or new  Extensive smoking history  No blood in stool  I have reviewed the relevant past medical, surgical, social and family history  I have also reviewed allergies and medications for this patient  Review of Systems  Review of Systems   All other systems reviewed and are negative  OBJECTIVE     Physical Exam  Vitals:    06/23/22 1138   BP: 128/68   BP Location: Left arm   Patient Position: Sitting   Cuff Size: Adult   Pulse: 67   Resp: 18   Temp: (!) 96 8 °F (36 °C)   TempSrc: Temporal   SpO2: 96%   Weight: 67 1 kg (148 lb)   Height: 5' 7" (1 702 m)       Physical Exam  Vitals reviewed     Constitutional:       General: He is not in acute distress  Appearance: He is normal weight  He is not toxic-appearing  Eyes:      General: No scleral icterus  Cardiovascular:      Rate and Rhythm: Normal rate  Pulmonary:      Effort: Pulmonary effort is normal  No respiratory distress  Comments: On 5L NC  Abdominal:      General: There is no distension  Musculoskeletal:         General: Normal range of motion  Cervical back: Normal range of motion  Neurological:      General: No focal deficit present  Mental Status: He is alert and oriented to person, place, and time  Imaging  Relevant imaging reviewed in chart    Labs  Relevant labs reviewed in chart   ASSESSMENT & PLAN      Diagnosis ICD-10-CM Associated Orders   1  Primary squamous cell carcinoma of right lung [facility])  C34 91 Ambulatory referral to Hematology / Oncology     Ambulatory referral to Palliative Care   2  Abnormal CT scan  R93 89 Ambulatory referral to Hematology / Oncology     Ambulatory referral to Palliative Care   3  Pulmonary embolism ([facility] [address] )  I26 99 Ambulatory referral to Hematology / Oncology   4  Lung nodule seen on imaging study  R91 1 Ambulatory Referral to Hematology / Oncology     Ambulatory referral to Palliative Care   5  Goals of care, counseling/discussion  Z71 89 Ambulatory referral to Palliative Care         65 y o  male with extensive smoking and associated COPD history 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 via 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 rib cage biopsy  He is found to have right apical mass with malignant lymphadenopathy  The detection of 5th rib cage metastasis indicates M1 designation  He was diagnosed with likely malignancy associated provoked pulmonary embolism in December 2021  I reviewed his imaging, pathology and other clinical appointments   In discussion about his cancer diagnosis and management options, I detailed the standard of care options of immunotherapy or combination of chemotherapy with immunotherapy depending on NGS and PDL1 staining options  Given that he has squamous history with extensive smoking,  mutations are highly unlikely to be detected  He and his son told me that even prior to coming to see me, they were discussing what this cancer diagnosis means for him  He said that he wants to solely focus on his quality of life and does not wish to undergo systemic cancer therapy that involves any chemotherapy or immunotherapy  He says that given his limitations with oxygen dependence, he cannot sustain infusion center visits, frequent blood work amongst others  Hence, he wants to solely focus on best supportive care and quality of life  We discussed the natural disease progression and estimated prognosis and life expectancy  He wishes to be seen by palliative medicine to get their services before having them help determine the timing for hospice  He was thankful for the visit and appreciated the time spent and answering his questions at length  Follow Up   None required        Dr Celeste Burk MD  Hematology & Medical Oncology

## 2022-06-23 NOTE — TELEPHONE ENCOUNTER
Michelle Moulton from [facility] called stating she needs a referral for hospice  Per Dr Israel Hoang note from today: "he wants to solely focus on best supportive care and quality of life  We discussed the natural disease progression and estimated prognosis and life expectancy  He wishes to be seen by palliative medicine to get their services before having them help determine the timing for hospice "  Informed Michelle Moulton patient did not voice that at the office and since he is voicing it now, can the physician at the [facility] put the referral in, she stated his PCP stated that we will have to do it  Asked if it was possible to speak with the patient  Spoke with Clarissa Rodriguez on speaker phone with Michelle Moulton  Patient stated he would now like to be on hospice care  Informed Michelle Moulton I would fax over the referral to [phone]  Referral faxed

## 2022-06-27 ENCOUNTER — PATIENT OUTREACH (OUTPATIENT)
Dept: HEMATOLOGY ONCOLOGY | Facility: CLINIC | Age: 77
End: 2022-06-27

## 2022-06-27 NOTE — PROGRESS NOTES
Chart reviewed for Onc Care Coordination outreach post consult  Noted Pt will not be pursuing treatment for his cancer Dx at this time  No further Onc Care Coordinator intervention needed at this time

## 2022-06-29 ENCOUNTER — TELEPHONE (OUTPATIENT)
Dept: FAMILY MEDICINE CLINIC | Facility: CLINIC | Age: 77
End: 2022-06-29

## 2022-06-29 NOTE — TELEPHONE ENCOUNTER
Aggie from [facility] called stated she needs a verbal or order from provider for eval and treat for hospice but needs an order from an MD, Please call Aggie for any further questions [phone]

## 2022-07-01 ENCOUNTER — OFFICE VISIT (OUTPATIENT)
Dept: FAMILY MEDICINE CLINIC | Facility: CLINIC | Age: 77
End: 2022-07-01
Payer: COMMERCIAL

## 2022-07-01 VITALS
BODY MASS INDEX: 24.99 KG/M2 | HEART RATE: 59 BPM | RESPIRATION RATE: 18 BRPM | DIASTOLIC BLOOD PRESSURE: 72 MMHG | OXYGEN SATURATION: 91 % | TEMPERATURE: 96.6 F | WEIGHT: 159.2 LBS | SYSTOLIC BLOOD PRESSURE: 126 MMHG | HEIGHT: 67 IN

## 2022-07-01 DIAGNOSIS — C34.91 PRIMARY SQUAMOUS CELL CARCINOMA OF RIGHT LUNG (HCC): Primary | ICD-10-CM

## 2022-07-01 DIAGNOSIS — I71.40 ABDOMINAL AORTIC ANEURYSM (AAA) WITHOUT RUPTURE: ICD-10-CM

## 2022-07-01 DIAGNOSIS — J43.9 PULMONARY EMPHYSEMA, UNSPECIFIED EMPHYSEMA TYPE (HCC): ICD-10-CM

## 2022-07-01 DIAGNOSIS — I27.20 PULMONARY HYPERTENSION (HCC): ICD-10-CM

## 2022-07-01 DIAGNOSIS — I48.91 ATRIAL FIBRILLATION, UNSPECIFIED TYPE (HCC): ICD-10-CM

## 2022-07-01 DIAGNOSIS — I63.9 CEREBRAL INFARCTION, UNSPECIFIED MECHANISM (HCC): ICD-10-CM

## 2022-07-01 DIAGNOSIS — I77.810 AORTIC ECTASIA, THORACIC (HCC): ICD-10-CM

## 2022-07-01 PROBLEM — I71.4 ABDOMINAL AORTIC ANEURYSM (AAA) WITHOUT RUPTURE (HCC): Status: ACTIVE | Noted: 2022-07-01

## 2022-07-01 PROCEDURE — 1160F RVW MEDS BY RX/DR IN RCRD: CPT | Performed by: PHYSICIAN ASSISTANT

## 2022-07-01 PROCEDURE — 3078F DIAST BP <80 MM HG: CPT | Performed by: PHYSICIAN ASSISTANT

## 2022-07-01 PROCEDURE — 3074F SYST BP LT 130 MM HG: CPT | Performed by: PHYSICIAN ASSISTANT

## 2022-07-01 PROCEDURE — 99214 OFFICE O/P EST MOD 30 MIN: CPT | Performed by: PHYSICIAN ASSISTANT

## 2022-07-01 NOTE — PROGRESS NOTES
Assessment and Plan:  Patient Instructions    Assessment/plan:    Primary squamous cell carcinoma right lung with metastatic disease - patient has been evaluated by Hematology/Oncology and has discuss treatment options  He is not interested in further interventional treatment at this time and would like to be set up with palliative/ hospice care  Order will be placed  It has been into is a patent that his life expectancy is not  more than 6 months  2   Pulmonary hypertension  -Stable, no medication changes  3   COPD   -Late stage, currently requiring 3 L of oxygen at rest and 6 L with exertion  4  Congestive heart failure  - stable, no evidence of acute exacerbation or edema  5   Atrial fibrillation   -Stable with amiodarone and Eliquis therapy  6  Cerebral infarction   -Stable  Patient does continue with some forgetfulness and difficulty finding words at times  7  Abdominal aortic aneurysm - 6 6 cm at maximum based on CT imaging        Problem List Items Addressed This Visit        Respiratory    Chronic obstructive pulmonary disease ([address] )    Relevant Orders    Ambulatory Referral to Hospice    Primary squamous cell carcinoma of right lung ([address] ) - Primary    Relevant Orders    Ambulatory Referral to Hospice       Cardiovascular and Mediastinum    Aortic ectasia, thoracic (HCC)    Atrial fibrillation ([address] )    Pulmonary hypertension ([address] )    Abdominal aortic aneurysm (AAA) without rupture ([address] )    Relevant Orders    Ambulatory Referral to Hospice       Nervous and Auditory    Cerebral infarction ([facility] [address] )                 Diagnoses and all orders for this visit:    Primary squamous cell carcinoma of right lung ([address] )  -     Ambulatory Referral to Hospice; Future    Pulmonary emphysema, unspecified emphysema type ([address] )  -     Ambulatory Referral to Hospice;  Future    Pulmonary hypertension (HCC)    Atrial fibrillation, unspecified type ([address] )    Cerebral infarction, unspecified mechanism (HCC)    Aortic ectasia, thoracic ([facility] [address] )    Abdominal aortic aneurysm (AAA) without rupture ([facility] [address] )  -     Ambulatory Referral to Hospice; Future            Subjective:      Patient ID: Tab Goldsmith is a 65 y o  male  CC:    Chief Complaint   Patient presents with    Follow-up     Patient in office for follow up  HPI:      HPI:  This is a 78-year-old gentleman that presents to the office with caretaker from [facility] where he resides  He was recently diagnosed with metastatic squamous cell carcinoma of the right lung  He has been seen by Hematology/Oncology specialist and discussed possible treatment options but would prefer to go with palliative / hospice care  It is anticipated that his life expectancy is not more than 6 months  He also has comorbidities including COPD which is late stage requiring 3 L of oxygen at rest and 6 L with exertion  He has large abdominal aortic aneurysm of 6 6 cm for which he decides no further interventional treatment would be suitable  He has history of pulmonary hypertension and congestive heart failure  He is also had history of CVA with cerebral infarct  The following portions of the patient's history were reviewed and updated as appropriate: allergies, current medications, past family history, past medical history, past social history, past surgical history and problem list       Review of Systems   Constitutional: Negative for chills and fever  HENT: Negative for ear pain and sore throat  Eyes: Negative for pain and visual disturbance  Respiratory: Negative for cough and shortness of breath  Cardiovascular: Negative for chest pain and palpitations  Gastrointestinal: Negative for abdominal pain and vomiting  Genitourinary: Negative for dysuria and hematuria  Musculoskeletal: Negative for arthralgias and back pain  Skin: Negative for color change and rash  Neurological: Negative for seizures and syncope  All other systems reviewed and are negative  Data to review:       Objective:    Vitals:    07/01/22 1131   BP: 126/72   BP Location: Left arm   Patient Position: Sitting   Cuff Size: Adult   Pulse: 59   Resp: 18   Temp: (!) 96 6 °F (35 9 °C)   TempSrc: Temporal   SpO2: 91%   Weight: 72 2 kg (159 lb 3 2 oz)   Height: 5' 7" (1 702 m)        Physical Exam  Constitutional:       General: He is not in acute distress  Appearance: He is well-developed  Comments:   Comfortable at rest, on 3 L of oxygen via nasal cannula  HENT:      Head: Normocephalic and atraumatic  Right Ear: Tympanic membrane normal       Left Ear: Tympanic membrane normal    Eyes:      Conjunctiva/sclera: Conjunctivae normal    Cardiovascular:      Rate and Rhythm: Normal rate and regular rhythm  Pulmonary:      Effort: Pulmonary effort is normal    Abdominal:      General: Abdomen is flat  Bowel sounds are normal  There is no distension  Palpations: Abdomen is soft  There is no mass  Musculoskeletal:         General: Normal range of motion  Cervical back: Normal range of motion  Skin:     General: Skin is warm  Findings: No rash  Neurological:      Mental Status: He is alert and oriented to person, place, and time  Comments:   Responds appropriately to questioning  Psychiatric:         Mood and Affect: Mood normal          Thought Content:  Thought content normal          Judgment: Judgment normal

## 2022-07-01 NOTE — PATIENT INSTRUCTIONS
Assessment/plan:    Primary squamous cell carcinoma right lung with metastatic disease - patient has been evaluated by Hematology/Oncology and has discuss treatment options  He is not interested in further interventional treatment at this time and would like to be set up with palliative/ hospice care  Order will be placed  It has been into is a patent that his life expectancy is not  more than 6 months  2   Pulmonary hypertension  -Stable, no medication changes  3   COPD   -Late stage, currently requiring 3 L of oxygen at rest and 6 L with exertion  4  Congestive heart failure  - stable, no evidence of acute exacerbation or edema  5   Atrial fibrillation   -Stable with amiodarone and Eliquis therapy  6  Cerebral infarction   -Stable  Patient does continue with some forgetfulness and difficulty finding words at times    7  Abdominal aortic aneurysm - 6 6 cm at maximum based on CT imaging

## 2022-10-12 NOTE — PROGRESS NOTES
Assessment  1  Hyperlipidemia (272 4) (E78 5)   2  Hypertension (401 9) (I10)   3  Hyperglycemia (790 29) (R73 9)   4  Cerebral infarction (434 91) (I63 9)   5  Former smoker (L11 28) (Y68 566)    Plan  Cerebral infarction, Hyperglycemia, Hyperlipidemia, Hypertension    · (1) CBC/PLT/DIFF; Status:Active; Requested for:05Apr2018;    · (1) COMPREHENSIVE METABOLIC PANEL; Status:Active; Requested for:05Apr2018;    · (1) HEMOGLOBIN A1C; Status:Active; Requested for:05Apr2018;    · (1) LIPID PANEL FASTING W DIRECT LDL REFLEX; Status:Active; Requested  for:05Apr2018;    · (1) TSH WITH FT4 REFLEX; Status:Active; Requested for:05Apr2018; Cerebral infarction, Hyperglycemia, Hyperlipidemia, Hypertension, Special screening  examination for neoplasm of prostate    · (1) PSA (SCREEN) (Dx V76 44 Screen for Prostate Cancer); Status:Active; Requested  for:05Apr2018;   Hyperlipidemia    · Atorvastatin Calcium 40 MG Oral Tablet (Lipitor); take 1 tablet by mouth once  daily  Hypertension    · AmLODIPine Besylate 5 MG Oral Tablet; take 1 tablet by mouth once daily   · Benazepril HCl - 40 MG Oral Tablet; take 1 tablet by mouth once daily   · Metoprolol Succinate  MG Oral Tablet Extended Release 24 Hour; take 1  tablet by mouth once daily  Vitamin D deficiency    · (1) VITAMIN D 25-HYDROXY; Status:Active; Requested for:05Apr2018; Discussion/Summary    1  Hyperlipidemia-presently stable with atorvastatin 40 mg daily  Hypertension-stable on amlodipine, benazepril, and metoprolol  Hyperglycemia-A1c was stable at 5 9 which is down from his last visit  Continue with diet and exercise  History of CVA with late effects and mild expressive aphasia-continue regular aspirin daily  Continue close control of blood pressure, sugar, and cholesterol  maintenance-patient declined flu vaccine and Pneumovax  in 6 months with labs  Chief Complaint  Follow up to chronic conditions and review bw       History of Present Illness  This is a Refill Decision Note   Hiren Bello  is requesting a refill authorization.  Brief Assessment and Rationale for Refill:  Approve     Medication Therapy Plan:       Medication Reconciliation Completed: No   Comments:     No Care Gaps recommended.     Note composed:1:05 AM 10/12/2022           78-year-old gentleman that presents to the office for follow-up of recent blood work and chronic health conditions  He is feeling well without any acute complaints  He does have a history of CVA with mild expressive aphasia  He does continue to take aspirin on a regular basis  His blood pressure has been controlled with amlodipine and benazepril as well as metoprolol  His cholesterol has been stable on atorvastatin 40 mg daily  Review of Systems    Constitutional: no fever,-not feeling poorly,-no chills-and-not feeling tired  Eyes: no eyesight problems-and-no purulent discharge from the eyes  ENT: no nosebleeds-and-no nasal discharge  Cardiovascular: no chest pain-and-no palpitations  Respiratory: no shortness of breath,-no cough-and-no shortness of breath during exertion  Gastrointestinal: no abdominal pain,-no nausea-and-no diarrhea  Musculoskeletal: no arthralgias-and-no myalgias  Neurological: no headache  Hematologic/Lymphatic: no swollen glands-and-no swollen glands in the neck  Active Problems  1  Actinic keratosis (702 0) (L57 0)   2  Carotid artery stenosis (433 10) (I65 29)   3  Cerebral infarction (434 91) (I63 9)   4  Expressive aphasia (784 3) (R47 01)   5  Hyperglycemia (790 29) (R73 9)   6  Hyperlipidemia (272 4) (E78 5)   7  Hypertension (401 9) (I10)   8  Macular degeneration (362 50) (H35 30)   9  Screening for colon cancer (V76 51) (Z12 11)   10  Skin lesion (709 9) (L98 9)   11  Special screening examination for neoplasm of prostate (V76 44) (Z12 5)    Past Medical History    The active problems and past medical history were reviewed and updated today  Surgical History  1  History of Hernia Repair    Family History  Father    1  Family history of Lung Cancer (V16 1)    Social History   · Being A Social Drinker   · Daily alcohol use   · Former smoker (V15 82) (Z08 419)   · No secondhand smoke exposure (V49 89) (Z78 9)   · Retired   · Single    Current Meds   1  AmLODIPine Besylate 5 MG Oral Tablet; take 1 tablet by mouth once daily; Therapy: 34AIS8572 to [phone])  Requested for: 26DUC1278; Last   Rx:01Nov2016 Ordered   2  Aspirin 325 MG Oral Tablet; TAKE 1 TABLET DAILY; Therapy: 66Izc0095 to (Evaluate:15Vjc9608); Last Rx:18Apr2013 Ordered   3  Atorvastatin Calcium 40 MG Oral Tablet; take 1 tablet by mouth once daily; Therapy: 38VCQ0501 to (Josh Estrada)  Requested for: 02VPO1888; Last   Rx:28Nov2016 Ordered   4  Benazepril HCl - 40 MG Oral Tablet; take 1 tablet by mouth once daily; Therapy: 13SGR7396 to [phone])  Requested for: 50FPY8431; Last   Rx:01Nov2016 Ordered   5  Fish Oil 1000 MG Oral Capsule; TAKE 1 CAPSULE DAILY EVERY MORNING BEFORE   BREAKFAST Recorded   6  Folic Acid 1 MG Oral Tablet; Take 1 tablet daily Recorded   7  Metoprolol Succinate  MG Oral Tablet Extended Release 24 Hour; take 1 tablet by   mouth once daily; Therapy: 57AQV0621 to [phone])  Requested for: 92XEP5008; Last   Rx:01Nov2016 Ordered   8  Multivitamins CHEW; TAKE 1 TABLET DAILY; Therapy: 18Apr2013 to (Evaluate:77Jht6770)  Requested for: 64XAV8179; Last   Rx:44Amb1321 Ordered    The medication list was reviewed and updated today  Allergies  1  No Known Drug Allergies    Vitals  Vital Signs    Recorded: 55YBR0196 03:09PM   Heart Rate 76   Systolic 982   Diastolic 74   Height 5 ft 6 5 in   Weight 159 lb 2 oz   BMI Calculated 25 3   BSA Calculated 1 82     Physical Exam    Constitutional   General appearance: No acute distress, well appearing and well nourished  Eyes   Conjunctiva and lids: No swelling, erythema, or discharge  Pupils and irises: Equal, round and reactive to light  Ears, Nose, Mouth, and Throat   External inspection of ears and nose: Normal     Otoscopic examination: Tympanic membrance translucent with normal light reflex  Canals patent without erythema      Nasal mucosa, septum, and turbinates: Normal without edema or erythema  Oropharynx: Normal with no erythema, edema, exudate or lesions  Pulmonary   Respiratory effort: No increased work of breathing or signs of respiratory distress  Auscultation of lungs: Clear to auscultation, equal breath sounds bilaterally, no wheezes, no rales, no rhonci  Cardiovascular   Auscultation of heart: Normal rate and rhythm, normal S1 and S2, without murmurs  Examination of extremities for edema and/or varicosities: Normal     Abdomen   Abdomen: Non-tender, no masses  Liver and spleen: No hepatomegaly or splenomegaly  Musculoskeletal   Gait and station: Normal     Digits and nails: Normal without clubbing or cyanosis  Neurologic   Reflexes: 2+ and symmetric  Psychiatric   Orientation to person, place and time: Normal     Mood and affect: Normal          Health Management  Screening for colon cancer   (1) OCCULT BLOOD, FECAL IMMUNOCHEMICAL TEST; every 1 year; Next Due: 89BYU0081; Overdue  Health Maintenance   Medicare Annual Wellness Visit; every 1 year; Next Due: 42PHM7053;  Overdue    Signatures   Electronically signed by : Faye Phillips [facility]; Oct  5 2017  3:36PM EST                       (Author)    Electronically signed by : Manuel Ang DO; Oct  5 2017  3:39PM EST

## 2023-01-03 ENCOUNTER — RA CDI HCC (OUTPATIENT)
Dept: OTHER | Facility: HOSPITAL | Age: 78
End: 2023-01-03

## 2023-01-03 NOTE — PROGRESS NOTES
[facility]  coding opportunities          Chart Reviewed number of suggestions sent to Provider: 1     Patients Insurance     Medicare Insurance: [employer] Medicare Advantage            I11 0: Hypertensive heart disease with heart failure ([facility] ) [[id number]]    Per ICD 10 CM coding guidelines the classification presumes a causal relationship between hypertension and heart involvement and between hypertension unless the documentation clearly states the conditions are unrelated    CKD in BPA as 'SUSPECTED' , if confirmed by provider then I13 0 might be and appropriate suggestion

## 2023-01-13 PROBLEM — I77.810 AORTIC ECTASIA, THORACIC (HCC): Status: RESOLVED | Noted: 2021-12-27 | Resolved: 2023-01-13
